# Patient Record
Sex: MALE | Race: WHITE | NOT HISPANIC OR LATINO | Employment: OTHER | URBAN - METROPOLITAN AREA
[De-identification: names, ages, dates, MRNs, and addresses within clinical notes are randomized per-mention and may not be internally consistent; named-entity substitution may affect disease eponyms.]

---

## 2017-01-12 ENCOUNTER — TELEPHONE (OUTPATIENT)
Dept: FAMILY MEDICINE | Facility: CLINIC | Age: 52
End: 2017-01-12

## 2017-01-12 ENCOUNTER — HOSPITAL ENCOUNTER (INPATIENT)
Facility: HOSPITAL | Age: 52
LOS: 5 days | Discharge: NURSING, REHAB OR LONG-TERM CARE | DRG: 871 | End: 2017-01-17
Attending: SURGERY | Admitting: FAMILY MEDICINE
Payer: MEDICARE

## 2017-01-12 DIAGNOSIS — J18.9 PNEUMONIA OF BOTH LUNGS DUE TO INFECTIOUS ORGANISM: ICD-10-CM

## 2017-01-12 DIAGNOSIS — J18.9 PNEUMONIA OF LEFT LOWER LOBE DUE TO INFECTIOUS ORGANISM: ICD-10-CM

## 2017-01-12 DIAGNOSIS — A41.9 SEPSIS, DUE TO UNSPECIFIED ORGANISM: Primary | ICD-10-CM

## 2017-01-12 PROBLEM — R33.9 URINARY RETENTION: Status: ACTIVE | Noted: 2017-01-12

## 2017-01-12 LAB
ALBUMIN SERPL BCP-MCNC: 3.5 G/DL
ALP SERPL-CCNC: 134 U/L
ALT SERPL W/O P-5'-P-CCNC: 32 U/L
ANION GAP SERPL CALC-SCNC: 12 MMOL/L
APTT BLDCRRT: 27.9 SEC
AST SERPL-CCNC: 39 U/L
BACTERIA #/AREA URNS HPF: ABNORMAL /HPF
BASOPHILS # BLD AUTO: 0.03 K/UL
BASOPHILS NFR BLD: 0.2 %
BILIRUB SERPL-MCNC: 0.7 MG/DL
BILIRUB UR QL STRIP: ABNORMAL
BNP SERPL-MCNC: 14 PG/ML
BUN SERPL-MCNC: 9 MG/DL
CALCIUM SERPL-MCNC: 9.9 MG/DL
CAOX CRY URNS QL MICRO: ABNORMAL
CHLORIDE SERPL-SCNC: 105 MMOL/L
CK MB SERPL-MCNC: 3.7 NG/ML
CK MB SERPL-RTO: 0.3 %
CK SERPL-CCNC: 1058 U/L
CK SERPL-CCNC: 1058 U/L
CLARITY UR: CLEAR
CO2 SERPL-SCNC: 27 MMOL/L
COLOR UR: YELLOW
CREAT SERPL-MCNC: 1.2 MG/DL
DIFFERENTIAL METHOD: ABNORMAL
EOSINOPHIL # BLD AUTO: 0 K/UL
EOSINOPHIL NFR BLD: 0 %
ERYTHROCYTE [DISTWIDTH] IN BLOOD BY AUTOMATED COUNT: 15 %
EST. GFR  (AFRICAN AMERICAN): >60 ML/MIN/1.73 M^2
EST. GFR  (NON AFRICAN AMERICAN): >60 ML/MIN/1.73 M^2
GLUCOSE SERPL-MCNC: 133 MG/DL
GLUCOSE UR QL STRIP: NEGATIVE
GRAN CASTS #/AREA URNS LPF: 2 /LPF
HCT VFR BLD AUTO: 48.4 %
HGB BLD-MCNC: 16.4 G/DL
HGB UR QL STRIP: NEGATIVE
HYALINE CASTS #/AREA URNS LPF: 3 /LPF
INR PPP: 1.2
KETONES UR QL STRIP: NEGATIVE
LACTATE SERPL-SCNC: 1.3 MMOL/L
LACTATE SERPL-SCNC: 1.9 MMOL/L
LACTATE SERPL-SCNC: 2.8 MMOL/L
LEUKOCYTE ESTERASE UR QL STRIP: NEGATIVE
LYMPHOCYTES # BLD AUTO: 1.3 K/UL
LYMPHOCYTES NFR BLD: 6.4 %
MAGNESIUM SERPL-MCNC: 2.4 MG/DL
MCH RBC QN AUTO: 31.4 PG
MCHC RBC AUTO-ENTMCNC: 33.9 %
MCV RBC AUTO: 93 FL
MICROSCOPIC COMMENT: ABNORMAL
MONOCYTES # BLD AUTO: 1.2 K/UL
MONOCYTES NFR BLD: 6 %
NEUTROPHILS # BLD AUTO: 17.4 K/UL
NEUTROPHILS NFR BLD: 87.4 %
NITRITE UR QL STRIP: NEGATIVE
PH UR STRIP: 6 [PH] (ref 5–8)
PHOSPHATE SERPL-MCNC: 2.9 MG/DL
PLATELET # BLD AUTO: 343 K/UL
PMV BLD AUTO: 11.6 FL
POTASSIUM SERPL-SCNC: 3.8 MMOL/L
PROT SERPL-MCNC: 8.5 G/DL
PROT UR QL STRIP: ABNORMAL
PROTHROMBIN TIME: 11.9 SEC
RBC # BLD AUTO: 5.23 M/UL
RBC #/AREA URNS HPF: 0 /HPF (ref 0–4)
SODIUM SERPL-SCNC: 144 MMOL/L
SP GR UR STRIP: 1.02 (ref 1–1.03)
TROPONIN I SERPL DL<=0.01 NG/ML-MCNC: 0.01 NG/ML
TROPONIN I SERPL DL<=0.01 NG/ML-MCNC: 0.01 NG/ML
URN SPEC COLLECT METH UR: ABNORMAL
UROBILINOGEN UR STRIP-ACNC: NEGATIVE EU/DL
WBC # BLD AUTO: 19.93 K/UL
WBC #/AREA URNS HPF: 2 /HPF (ref 0–5)

## 2017-01-12 PROCEDURE — 85610 PROTHROMBIN TIME: CPT

## 2017-01-12 PROCEDURE — 27000494 *HC OXYGEN SET UP (STAH ONLY)

## 2017-01-12 PROCEDURE — 81000 URINALYSIS NONAUTO W/SCOPE: CPT

## 2017-01-12 PROCEDURE — 85025 COMPLETE CBC W/AUTO DIFF WBC: CPT

## 2017-01-12 PROCEDURE — 82553 CREATINE MB FRACTION: CPT

## 2017-01-12 PROCEDURE — 83735 ASSAY OF MAGNESIUM: CPT

## 2017-01-12 PROCEDURE — 25000242 PHARM REV CODE 250 ALT 637 W/ HCPCS: Performed by: FAMILY MEDICINE

## 2017-01-12 PROCEDURE — 63600175 PHARM REV CODE 636 W HCPCS: Performed by: FAMILY MEDICINE

## 2017-01-12 PROCEDURE — 11000001 HC ACUTE MED/SURG PRIVATE ROOM

## 2017-01-12 PROCEDURE — 27000221 HC OXYGEN, UP TO 24 HOURS

## 2017-01-12 PROCEDURE — 83880 ASSAY OF NATRIURETIC PEPTIDE: CPT

## 2017-01-12 PROCEDURE — 83605 ASSAY OF LACTIC ACID: CPT | Mod: 91

## 2017-01-12 PROCEDURE — 93005 ELECTROCARDIOGRAM TRACING: CPT

## 2017-01-12 PROCEDURE — 25000003 PHARM REV CODE 250: Performed by: SURGERY

## 2017-01-12 PROCEDURE — 36415 COLL VENOUS BLD VENIPUNCTURE: CPT

## 2017-01-12 PROCEDURE — 87040 BLOOD CULTURE FOR BACTERIA: CPT | Mod: 59

## 2017-01-12 PROCEDURE — 27000339 *HC DAILY SUPPLY KIT

## 2017-01-12 PROCEDURE — 25000003 PHARM REV CODE 250: Performed by: FAMILY MEDICINE

## 2017-01-12 PROCEDURE — 99285 EMERGENCY DEPT VISIT HI MDM: CPT

## 2017-01-12 PROCEDURE — 84484 ASSAY OF TROPONIN QUANT: CPT | Mod: 91

## 2017-01-12 PROCEDURE — 99223 1ST HOSP IP/OBS HIGH 75: CPT | Mod: ,,, | Performed by: FAMILY MEDICINE

## 2017-01-12 PROCEDURE — 85730 THROMBOPLASTIN TIME PARTIAL: CPT

## 2017-01-12 PROCEDURE — 63600175 PHARM REV CODE 636 W HCPCS: Performed by: SURGERY

## 2017-01-12 PROCEDURE — 94640 AIRWAY INHALATION TREATMENT: CPT

## 2017-01-12 PROCEDURE — 51701 INSERT BLADDER CATHETER: CPT

## 2017-01-12 PROCEDURE — 96367 TX/PROPH/DG ADDL SEQ IV INF: CPT

## 2017-01-12 PROCEDURE — 84100 ASSAY OF PHOSPHORUS: CPT

## 2017-01-12 PROCEDURE — 94761 N-INVAS EAR/PLS OXIMETRY MLT: CPT

## 2017-01-12 PROCEDURE — 96365 THER/PROPH/DIAG IV INF INIT: CPT

## 2017-01-12 PROCEDURE — 94760 N-INVAS EAR/PLS OXIMETRY 1: CPT

## 2017-01-12 PROCEDURE — 80053 COMPREHEN METABOLIC PANEL: CPT

## 2017-01-12 RX ORDER — ONDANSETRON 2 MG/ML
4 INJECTION INTRAMUSCULAR; INTRAVENOUS EVERY 8 HOURS PRN
Status: DISCONTINUED | OUTPATIENT
Start: 2017-01-12 | End: 2017-01-17 | Stop reason: HOSPADM

## 2017-01-12 RX ORDER — OXCARBAZEPINE 150 MG/1
300 TABLET, FILM COATED ORAL 2 TIMES DAILY
Status: DISCONTINUED | OUTPATIENT
Start: 2017-01-12 | End: 2017-01-17 | Stop reason: HOSPADM

## 2017-01-12 RX ORDER — MUPIROCIN 20 MG/G
OINTMENT TOPICAL 2 TIMES DAILY
COMMUNITY
End: 2018-07-27

## 2017-01-12 RX ORDER — ALBUTEROL SULFATE 2.5 MG/.5ML
2.5 SOLUTION RESPIRATORY (INHALATION) EVERY 6 HOURS PRN
Status: DISCONTINUED | OUTPATIENT
Start: 2017-01-12 | End: 2017-01-13

## 2017-01-12 RX ORDER — HYDROCODONE BITARTRATE AND ACETAMINOPHEN 5; 325 MG/1; MG/1
1 TABLET ORAL EVERY 4 HOURS PRN
Status: DISCONTINUED | OUTPATIENT
Start: 2017-01-12 | End: 2017-01-17 | Stop reason: HOSPADM

## 2017-01-12 RX ORDER — TAMSULOSIN HYDROCHLORIDE 0.4 MG/1
0.4 CAPSULE ORAL DAILY
Status: DISCONTINUED | OUTPATIENT
Start: 2017-01-12 | End: 2017-01-17 | Stop reason: HOSPADM

## 2017-01-12 RX ORDER — TRAZODONE HYDROCHLORIDE 50 MG/1
50 TABLET ORAL NIGHTLY
Status: DISCONTINUED | OUTPATIENT
Start: 2017-01-12 | End: 2017-01-13

## 2017-01-12 RX ORDER — ACETAMINOPHEN 325 MG/1
650 TABLET ORAL EVERY 8 HOURS PRN
Status: DISCONTINUED | OUTPATIENT
Start: 2017-01-12 | End: 2017-01-17 | Stop reason: HOSPADM

## 2017-01-12 RX ORDER — LEVALBUTEROL 1.25 MG/.5ML
1.25 SOLUTION, CONCENTRATE RESPIRATORY (INHALATION)
Status: COMPLETED | OUTPATIENT
Start: 2017-01-12 | End: 2017-01-12

## 2017-01-12 RX ORDER — PANTOPRAZOLE SODIUM 40 MG/1
40 TABLET, DELAYED RELEASE ORAL DAILY
Status: DISCONTINUED | OUTPATIENT
Start: 2017-01-12 | End: 2017-01-14

## 2017-01-12 RX ADMIN — TAMSULOSIN HYDROCHLORIDE 0.4 MG: 0.4 CAPSULE ORAL at 08:01

## 2017-01-12 RX ADMIN — OXCARBAZEPINE 300 MG: 150 TABLET, FILM COATED ORAL at 08:01

## 2017-01-12 RX ADMIN — CEFTRIAXONE 1 G: 1 INJECTION, SOLUTION INTRAVENOUS at 01:01

## 2017-01-12 RX ADMIN — AZITHROMYCIN MONOHYDRATE 500 MG: 500 INJECTION, POWDER, LYOPHILIZED, FOR SOLUTION INTRAVENOUS at 02:01

## 2017-01-12 RX ADMIN — TRAZODONE HYDROCHLORIDE 50 MG: 50 TABLET ORAL at 08:01

## 2017-01-12 RX ADMIN — PIPERACILLIN AND TAZOBACTAM 3.38 G: 3; .375 INJECTION, POWDER, LYOPHILIZED, FOR SOLUTION INTRAVENOUS; PARENTERAL at 08:01

## 2017-01-12 RX ADMIN — SODIUM CHLORIDE 500 ML: 0.9 INJECTION, SOLUTION INTRAVENOUS at 01:01

## 2017-01-12 RX ADMIN — PANTOPRAZOLE SODIUM 40 MG: 40 TABLET, DELAYED RELEASE ORAL at 02:01

## 2017-01-12 RX ADMIN — ALBUTEROL SULFATE 2.5 MG: 2.5 SOLUTION RESPIRATORY (INHALATION) at 09:01

## 2017-01-12 RX ADMIN — LEVALBUTEROL 1.25 MG: 1.25 SOLUTION, CONCENTRATE RESPIRATORY (INHALATION) at 12:01

## 2017-01-12 NOTE — PLAN OF CARE
Problem: Patient Care Overview  Goal: Plan of Care Review  Outcome: Ongoing (interventions implemented as appropriate)  Caregiver agrees with plan of care.   Iv antibiotics given. o2 sat monitored.  Will check on often, because pt can not communicate secondary to mental retardation.

## 2017-01-12 NOTE — ED NOTES
"Here from group home w c/o cough x 1 week, reports was seen at [facility] for same c/o "a few days ago"  "

## 2017-01-12 NOTE — TELEPHONE ENCOUNTER
Spoke with Dr Alvarez and per his recommendation, Chery was told to bring him to the ER.  She verbalized understanding.

## 2017-01-12 NOTE — TELEPHONE ENCOUNTER
----- Message from Sawyer Nash sent at 2017  8:25 AM CST -----  Contact: ANNIKA / CARE TAKER  Mack Will  MRN: [id number]  : 1965  PCP: Chetan Alvarez  Home Phone      [phone]  Work Phone      Not on file.  Mobile          [phone]  Mobile          [phone]      MESSAGE: STATED THAT PT NEEDS TO BE SEEN TODAY FOR NOT FEELING WELL. HE WAS BROUGHT TO ER A FEW DAYS AGO AND WAS TOLD HE WAS HAVING BAD GAS PAINS BUT NOW HE IS VERY LETHARGIC, NOT WANTING TO OPEN HIS EYES AND JUST NOT SEEMING LIKE NORMAL. PLEASE CALL     PHONE: [phone]

## 2017-01-12 NOTE — ED NOTES
Patient to room 305 via stretcher w portable O2 @ 3 LPM NC, respirations even and unlabored, NAD, skin W/D.

## 2017-01-12 NOTE — ED PROVIDER NOTES
[facility]                                                               Chief Complaint  51 y.o. male with Cough (x 1 week)    History of Present Illness  Mack Will presents to the emergency room with cough this past week  Patient is mentally handicapped and lives at a local group home with sitters  Patient has had increasing cough with subjective fever at the group home  Patient on exam has coarse breath sounds in all fields, worsen in the LLL  Pox was 88% on arrival, chest x-ray shows a left retrocardiac pneumonia    The history is provided by the patient  Past medical history: Alzheimer's, Down's, seizures  Past surgical history: Cyst removal, cholecystectomy  No known ALLERGIES    Review of Systems and Physical Exam     Review of Systems  -- Constitution - no fever, denies fatigue, no weakness, no chills  -- Eyes - no tearing or redness, no visual disturbance  -- Ear, Nose - no tinnitus or earache, no nasal congestion or discharge  -- Mouth,Throat - no sore throat, no toothache, normal voice, normal swallowing  -- Respiratory - cough and congestion, shortness of breath, no MCQUEEN  -- Cardiovascular - denies chest pain, no palpitations, denies claudication  -- Gastrointestinal - denies abdominal pain, nausea, vomiting, or diarrhea  -- Musculoskeletal - denies back pain, negative for myalgias and arthralgias   -- Neurological - no headache, denies weakness or seizure; no LOC  -- Skin - denies pallor, rash, or changes in skin. no hives or welts noted    Vital Signs  -- BP is 111/79 and his pulse is 97.   -- His respiration is 20 and oxygen saturation is 97%.      Physical Exam  -- Nursing note and vitals reviewed  -- Head: Atraumatic. Normocephalic. No obvious abnormality  -- Eyes: Pupils are equal and reactive to light. Normal conjunctiva and lids  -- Nose: Nose normal in appearance, nares grossly normal. No discharge  -- Throat: Mucous membranes moist, pharynx normal, normal tonsils. No  lesions   -- Ears: External ears and TM normal bilaterally. Normal hearing and no drainage  -- Cardiac: Normal rate, regular rhythm and normal heart sounds  -- Pulmonary: Crackles in all fields bilaterally, worse in the left base  -- Abdominal: Soft, no tenderness. Normal bowel sounds. Normal liver edge  -- Musculoskeletal: Normal range of motion, no effusions. Joints stable   -- Neurological: No focal deficits. Showed good interaction with staff    Emergency Room Course     Treatment and Evaluation  -- The EKG findings today were without concerning findings from baseline   -- Chest x-ray shows a retrocardiac left opacity  -- Lactic Acid drawn in the ER today was normal   -- Blood cultures have also been drawn, results are pending   -- The troponin drawn in the ER today was within normal limits    -- The magnesium and phosphorus were within normal limits   -- The BNP drawn in the ER today were within normal limits     Abnormal lab values  -- WBC 19.93 (*)   -- Gran% 87.4 (*)   -- Lymph% 6.4 (*)   -- Glucose 133 (*)   -- Total Protein 8.5 (*)   -- CPK 1058 (*)     Medications Given  -- cefTRIAXone (ROCEPHIN) 1 g in dextrose 5 % 50 mL IVPB    -- azithromycin 500 mg in dextrose 5 % 250 mL IVPB (ready to mix system) (not administered)   -- levalbuterol nebulizer solution 1.25 mg (1.25 mg Nebulization Given 1/12/17 1213)   -- sodium chloride 0.9% bolus 500 mL (500 mLs Intravenous New Bag 1/12/17 1334)     Diagnosis  -- The primary encounter diagnosis was Sepsis, due to unspecified organism.   -- A diagnosis of Pneumonia of left lower lobe due to infectious organism     Disposition and Plan  -- Disposition: observation  -- Condition: stable  -- Telemetry monitoring  -- Morning labs  -- SCD hoses  -- Home medications  -- Nausea medication when necessary  -- Pain medication when necessary  -- Hep-Lock IV  -- Routine monitoring  -- Bed rest until otherwise stated  -- Low salt diet  -- Protonix for GERD prophylaxis  --  Breathing treatments  -- IV antibiotics  -- Blood cultures pending    This note is dictated on Dragon Natural Speaking word recognition program.  There are word recognition mistakes that are occasionally missed on review.           Dax Zavala MD  01/12/17 4159

## 2017-01-13 LAB
ALBUMIN SERPL BCP-MCNC: 2.6 G/DL
ALP SERPL-CCNC: 120 U/L
ALT SERPL W/O P-5'-P-CCNC: 23 U/L
ANION GAP SERPL CALC-SCNC: 11 MMOL/L
AST SERPL-CCNC: 24 U/L
BASOPHILS # BLD AUTO: 0.03 K/UL
BASOPHILS NFR BLD: 0.2 %
BILIRUB SERPL-MCNC: 0.5 MG/DL
BUN SERPL-MCNC: 8 MG/DL
CALCIUM SERPL-MCNC: 8.7 MG/DL
CHLORIDE SERPL-SCNC: 106 MMOL/L
CO2 SERPL-SCNC: 25 MMOL/L
CREAT SERPL-MCNC: 0.9 MG/DL
D DIMER PPP IA.FEU-MCNC: 1.72 MG/L FEU
DIFFERENTIAL METHOD: ABNORMAL
EOSINOPHIL # BLD AUTO: 0 K/UL
EOSINOPHIL NFR BLD: 0.1 %
ERYTHROCYTE [DISTWIDTH] IN BLOOD BY AUTOMATED COUNT: 15.2 %
EST. GFR  (AFRICAN AMERICAN): >60 ML/MIN/1.73 M^2
EST. GFR  (NON AFRICAN AMERICAN): >60 ML/MIN/1.73 M^2
FLUAV AG SPEC QL IA: NEGATIVE
FLUBV AG SPEC QL IA: NEGATIVE
GLUCOSE SERPL-MCNC: 133 MG/DL
HCT VFR BLD AUTO: 41.1 %
HGB BLD-MCNC: 13.9 G/DL
LACTATE SERPL-SCNC: 1.7 MMOL/L
LYMPHOCYTES # BLD AUTO: 1.2 K/UL
LYMPHOCYTES NFR BLD: 6.7 %
MCH RBC QN AUTO: 31.5 PG
MCHC RBC AUTO-ENTMCNC: 33.8 %
MCV RBC AUTO: 93 FL
MONOCYTES # BLD AUTO: 1.5 K/UL
MONOCYTES NFR BLD: 8.2 %
NEUTROPHILS # BLD AUTO: 15.7 K/UL
NEUTROPHILS NFR BLD: 84.8 %
PLATELET # BLD AUTO: 281 K/UL
PMV BLD AUTO: 12 FL
POTASSIUM SERPL-SCNC: 3.5 MMOL/L
PROT SERPL-MCNC: 6.7 G/DL
RBC # BLD AUTO: 4.41 M/UL
SODIUM SERPL-SCNC: 142 MMOL/L
SPECIMEN SOURCE: NORMAL
TROPONIN I SERPL DL<=0.01 NG/ML-MCNC: 0.01 NG/ML
TROPONIN I SERPL DL<=0.01 NG/ML-MCNC: 0.01 NG/ML
TROPONIN I SERPL DL<=0.01 NG/ML-MCNC: <0.006 NG/ML
WBC # BLD AUTO: 18.52 K/UL

## 2017-01-13 PROCEDURE — 25500020 PHARM REV CODE 255: Performed by: FAMILY MEDICINE

## 2017-01-13 PROCEDURE — 92610 EVALUATE SWALLOWING FUNCTION: CPT

## 2017-01-13 PROCEDURE — 80053 COMPREHEN METABOLIC PANEL: CPT

## 2017-01-13 PROCEDURE — 94761 N-INVAS EAR/PLS OXIMETRY MLT: CPT

## 2017-01-13 PROCEDURE — 84484 ASSAY OF TROPONIN QUANT: CPT

## 2017-01-13 PROCEDURE — 63600175 PHARM REV CODE 636 W HCPCS: Performed by: NURSE PRACTITIONER

## 2017-01-13 PROCEDURE — 25000242 PHARM REV CODE 250 ALT 637 W/ HCPCS: Performed by: INTERNAL MEDICINE

## 2017-01-13 PROCEDURE — 27200120 HC KIT IV START (RUSH ONLY)

## 2017-01-13 PROCEDURE — 85379 FIBRIN DEGRADATION QUANT: CPT

## 2017-01-13 PROCEDURE — 94668 MNPJ CHEST WALL SBSQ: CPT

## 2017-01-13 PROCEDURE — 94640 AIRWAY INHALATION TREATMENT: CPT

## 2017-01-13 PROCEDURE — 83605 ASSAY OF LACTIC ACID: CPT

## 2017-01-13 PROCEDURE — 99233 SBSQ HOSP IP/OBS HIGH 50: CPT | Mod: ,,, | Performed by: INTERNAL MEDICINE

## 2017-01-13 PROCEDURE — 87400 INFLUENZA A/B EACH AG IA: CPT

## 2017-01-13 PROCEDURE — 25000003 PHARM REV CODE 250: Performed by: SURGERY

## 2017-01-13 PROCEDURE — 36415 COLL VENOUS BLD VENIPUNCTURE: CPT

## 2017-01-13 PROCEDURE — 27000221 HC OXYGEN, UP TO 24 HOURS

## 2017-01-13 PROCEDURE — 85025 COMPLETE CBC W/AUTO DIFF WBC: CPT

## 2017-01-13 PROCEDURE — 27000339 *HC DAILY SUPPLY KIT

## 2017-01-13 PROCEDURE — 25000003 PHARM REV CODE 250: Performed by: FAMILY MEDICINE

## 2017-01-13 PROCEDURE — 63600175 PHARM REV CODE 636 W HCPCS: Performed by: FAMILY MEDICINE

## 2017-01-13 PROCEDURE — 51701 INSERT BLADDER CATHETER: CPT

## 2017-01-13 PROCEDURE — 51798 US URINE CAPACITY MEASURE: CPT

## 2017-01-13 PROCEDURE — 11000001 HC ACUTE MED/SURG PRIVATE ROOM

## 2017-01-13 PROCEDURE — 25000003 PHARM REV CODE 250: Performed by: NURSE PRACTITIONER

## 2017-01-13 RX ORDER — SODIUM CHLORIDE 9 MG/ML
INJECTION, SOLUTION INTRAVENOUS CONTINUOUS
Status: DISCONTINUED | OUTPATIENT
Start: 2017-01-13 | End: 2017-01-14

## 2017-01-13 RX ORDER — ALBUTEROL SULFATE 2.5 MG/.5ML
2.5 SOLUTION RESPIRATORY (INHALATION) EVERY 6 HOURS
Status: DISCONTINUED | OUTPATIENT
Start: 2017-01-13 | End: 2017-01-17 | Stop reason: HOSPADM

## 2017-01-13 RX ORDER — ENOXAPARIN SODIUM 100 MG/ML
40 INJECTION SUBCUTANEOUS EVERY 24 HOURS
Status: DISCONTINUED | OUTPATIENT
Start: 2017-01-13 | End: 2017-01-17 | Stop reason: HOSPADM

## 2017-01-13 RX ADMIN — OXCARBAZEPINE 300 MG: 150 TABLET, FILM COATED ORAL at 05:01

## 2017-01-13 RX ADMIN — IOHEXOL 100 ML: 350 INJECTION, SOLUTION INTRAVENOUS at 02:01

## 2017-01-13 RX ADMIN — AZITHROMYCIN MONOHYDRATE 500 MG: 500 INJECTION, POWDER, LYOPHILIZED, FOR SOLUTION INTRAVENOUS at 04:01

## 2017-01-13 RX ADMIN — ENOXAPARIN SODIUM 40 MG: 40 INJECTION, SOLUTION INTRAVENOUS; SUBCUTANEOUS at 01:01

## 2017-01-13 RX ADMIN — ALBUTEROL SULFATE 2.5 MG: 2.5 SOLUTION RESPIRATORY (INHALATION) at 01:01

## 2017-01-13 RX ADMIN — PIPERACILLIN AND TAZOBACTAM 3.38 G: 3; .375 INJECTION, POWDER, LYOPHILIZED, FOR SOLUTION INTRAVENOUS; PARENTERAL at 04:01

## 2017-01-13 RX ADMIN — PIPERACILLIN AND TAZOBACTAM 3.38 G: 3; .375 INJECTION, POWDER, LYOPHILIZED, FOR SOLUTION INTRAVENOUS; PARENTERAL at 09:01

## 2017-01-13 RX ADMIN — PIPERACILLIN AND TAZOBACTAM 3.38 G: 3; .375 INJECTION, POWDER, LYOPHILIZED, FOR SOLUTION INTRAVENOUS; PARENTERAL at 01:01

## 2017-01-13 RX ADMIN — SODIUM CHLORIDE 250 ML: 0.9 INJECTION, SOLUTION INTRAVENOUS at 09:01

## 2017-01-13 RX ADMIN — ALBUTEROL SULFATE 2.5 MG: 2.5 SOLUTION RESPIRATORY (INHALATION) at 06:01

## 2017-01-13 NOTE — PROGRESS NOTES
Bladder scan performed at 1230 showed >200 mL urine. After 3 attempts, 230 mL were drained with a red-tip catheter.

## 2017-01-13 NOTE — PLAN OF CARE
Called to room because Mr Will's sp02 is 88% on 5lpm NC. Placed him on 50% venti mask. sp02 is now 95%. He is tolerating the venti mask. RN aware.

## 2017-01-13 NOTE — PLAN OF CARE
01/13/17 1243   Discharge Assessment   Assessment Type Discharge Planning Assessment   Confirmed/corrected address and phone number on facesheet? Yes   Assessment information obtained from? Patient   Expected Length of Stay (days) 3   Communicated expected length of stay with patient/caregiver yes   Type of Healthcare Directive Received Advance Directive   If Healthcare Directive is received, is it scanned into Epic? no (comment)  (Just completed)   Prior to hospitilization cognitive status: Unable to Assess   Prior to hospitalization functional status: Partially Dependent   Current cognitive status: Unable to Assess   Current Functional Status: Partially Dependent   Arrived From group home   Lives With facility resident   Able to Return to Prior Arrangements yes   Is patient able to care for self after discharge? No   How many people do you have in your home that can help with your care after discharge? other (see comments)  ([facility] [place])   Who are your caregiver(s) and their phone number(s)? [phone]   Patient's perception of discharge disposition group home   Readmission Within The Last 30 Days no previous admission in last 30 days   Patient currently being followed by outpatient case management? No   Patient currently receives home health services? No   Does the patient currently use HME? No   Patient currently receives private duty nursing? No   Patient currently receives any other outside agency services? No   Equipment Currently Used at Home none   Do you have any problems affording any of your prescribed medications? No   Is the patient taking medications as prescribed? yes   Do you have any financial concerns preventing you from receiving the healthcare you need? No   Does the patient have transportation to healthcare appointments? Yes   Transportation Available family or friend will provide   On Dialysis? No   Does the patient receive services at the Coumadin Clinic? No   Are there any open  cases? No   Discharge Plan A Group Home   Discharge Plan B Group home   Patient/Family In Agreement With Plan yes   Patient is a 51 year old male coming to the hospital from home where he lives at a group home.  His brother tanner is here showing patient support and encouragement.  He spoke with the MD and is interested in completing a declaration by surrogate.  He is unable to locate the POA.  Tanner contacted patient's family who is a sister, Sultana Cheng [phone] and father, Chase Will [phone] and all in agreement with declaration/living will.  A surrogate declaration was completed.  Patient's family had no further questions or concerns for .  Patient to return to the group home upon discharge.

## 2017-01-13 NOTE — SUBJECTIVE & OBJECTIVE
Interval note:: he is non verbal. Oxygenation is a concern. Also watching fluid status  Review of Systems   Constitutional: Positive for fatigue and fever. Negative for chills.   HENT: Negative for congestion, ear pain, postnasal drip, rhinorrhea, sore throat and trouble swallowing.    Respiratory: Positive for cough, shortness of breath and wheezing.    Cardiovascular: Negative for chest pain and palpitations.   Gastrointestinal: Negative for abdominal pain, diarrhea, nausea and vomiting.   Genitourinary: Positive for decreased urine volume and difficulty urinating. Negative for dysuria and frequency.   Skin: Negative for rash.   Neurological: Positive for weakness. Negative for headaches.     Objective:     Vital Signs (Most Recent):  Temp: 98.1 °F (36.7 °C) (01/13/17 0815)  Pulse: 89 (01/13/17 0815)  Resp: 20 (01/13/17 0815)  BP: (!) 149/66 (01/13/17 0815)  SpO2: (!) 90 % (01/13/17 0815) Vital Signs (24h Range):  Temp:  [97.6 °F (36.4 °C)-100.3 °F (37.9 °C)] 98.1 °F (36.7 °C)  Pulse:  [] 89  Resp:  [18-24] 20  SpO2:  [86 %-97 %] 90 %  BP: (106-149)/(54-79) 149/66     Weight: 79.8 kg (176 lb)  Body mass index is 31.18 kg/(m^2).    Physical Exam   Constitutional: He appears well-developed. No distress.   HENT:   Head: Normocephalic and atraumatic.   Downs facies   Eyes: Conjunctivae are normal. Pupils are equal, round, and reactive to light.   Neck: Normal range of motion. Neck supple. No thyromegaly present.   Cardiovascular: Normal rate, regular rhythm and intact distal pulses.    Murmur heard.  Pulmonary/Chest: He is in respiratory distress. He has no wheezes. He has rales.   Abdominal: Soft. Bowel sounds are normal. There is no tenderness.   Musculoskeletal: Normal range of motion. He exhibits no edema.   Lymphadenopathy:     He has no cervical adenopathy.   Neurological: He is alert.   Skin: Skin is warm and dry. No rash noted.   Psychiatric: He has a normal mood and affect. His behavior is normal.    Nursing note and vitals reviewed.       Significant Labs:   BMP:   Recent Labs  Lab 01/12/17  1223  01/13/17  0531   GLU  --   < > 133*   NA  --   < > 142   K  --   < > 3.5   CL  --   < > 106   CO2  --   < > 25   BUN  --   < > 8   CREATININE  --   < > 0.9   CALCIUM  --   < > 8.7   MG 2.4  --   --    < > = values in this interval not displayed.  CBC:     Recent Labs  Lab 01/12/17  1224 01/13/17  0531   WBC 19.93* 18.52*   HGB 16.4 13.9*   HCT 48.4 41.1    281     Lactic Acid:     Recent Labs  Lab 01/12/17  1224 01/12/17  1613 01/12/17  1950   LACTATE 1.3 1.9 2.8*       Recent Labs  Lab 01/12/17  1224  01/13/17  0531   CPK 1058*  1058*  --   --    CPKMB 3.7  --   --    TROPONINI 0.008  < > 0.009   MB 0.3  --   --    < > = values in this interval not displayed.  BNP    Recent Labs  Lab 01/12/17  1223   BNP 14     Blood cultures pending x 2    resp cultures ordered    Urinalysis    Recent Labs  Lab 01/12/17  2111   COLORU Yellow   SPECGRAV 1.025   PHUR 6.0   PROTEINUA 1+*   BACTERIA Many*   NITRITE Negative   LEUKOCYTESUR Negative   UROBILINOGEN Negative   HYALINECASTS 3*     ificant Imaging:   CXR Left retrocardiac opacity is noted which could be related to atelectasis, aspiration or pneumonia.  Mild chronic lung changes are seen.  The heart is normal in size.  The skeletal structures are intact.

## 2017-01-13 NOTE — ASSESSMENT & PLAN NOTE
Given dose of rocephin and azithromycin in Er.  Continue azithromycin and change rocephin to zosyn to cover for aspiration pna.  With mental status and poor ability to communicate, swallow study may not be successful, but has been ordered.  Monitor for signs of fever, etc.

## 2017-01-13 NOTE — ASSESSMENT & PLAN NOTE
Likely secondary to pna but with urinary retention, some concern for bladder etiology.  On pathway.  No need for fluids.  Continue abx.

## 2017-01-13 NOTE — H&P
[facility] Medicine  History & Physical    Patient Name: Mack Will  MRN: [id number]  Admission Date: 1/12/2017  Attending Physician: Makenzie Mckeon MD   Primary Care Provider: Chetan Alvarez MD         Patient information was obtained from relative(s) and ER records.     Subjective:     Principal Problem:Pneumonia of left lower lobe due to infectious organism    Chief Complaint:  Pna     HPI: 51 year old non verbal and demented Downs syndrome patient was sent here via ambulance from his group home because of worsening cough, fever and fatigue. His condition was called to Dr. Alvarez who advised ER evaluation.    Past Medical History   Diagnosis Date    Alzheimer disease     Down syndrome     Seizure        Past Surgical History   Procedure Laterality Date    Cholecystectomy      Cyst removal         Review of patient's allergies indicates:  No Known Allergies    No current facility-administered medications on file prior to encounter.      Current Outpatient Prescriptions on File Prior to Encounter   Medication Sig    clotrimazole (LOTRIMIN) 1 % cream Apply topically 2 (two) times daily.    cyanocobalamin, vitamin B-12, (VITAMIN B-12) 50 mcg tablet Take 50 mcg by mouth once daily.     oxcarbazepine (TRILEPTAL) 300 MG Tab Take 1 tablet (300 mg total) by mouth 2 (two) times daily.    trazodone (DESYREL) 50 MG tablet Take 1 tablet (50 mg total) by mouth every evening.    [DISCONTINUED] ketoconazole (NIZORAL) 2 % cream Apply topically once daily.     Family History     Problem Relation (Age of Onset)    Heart disease Father        Social History Main Topics    Smoking status: Never Smoker    Smokeless tobacco: Never Used    Alcohol use No    Drug use: Not on file    Sexual activity: Not on file     Review of Systems   Constitutional: Positive for fatigue and fever. Negative for chills.   HENT: Negative for congestion, ear pain, postnasal drip, rhinorrhea, sore  throat and trouble swallowing.    Respiratory: Positive for cough, shortness of breath and wheezing.    Cardiovascular: Negative for chest pain and palpitations.   Gastrointestinal: Negative for abdominal pain, diarrhea, nausea and vomiting.   Genitourinary: Positive for decreased urine volume and difficulty urinating. Negative for dysuria and frequency.   Skin: Negative for rash.   Neurological: Positive for weakness. Negative for headaches.     Objective:     Vital Signs (Most Recent):  Temp: 99.4 °F (37.4 °C) (01/12/17 1416)  Pulse: 108 (01/12/17 1800)  Resp: 20 (01/12/17 1416)  BP: (!) 142/64 (01/12/17 1416)  SpO2: (!) 94 % (01/12/17 1416) Vital Signs (24h Range):  Temp:  [97.6 °F (36.4 °C)-99.4 °F (37.4 °C)] 99.4 °F (37.4 °C)  Pulse:  [] 108  Resp:  [18-24] 20  SpO2:  [88 %-97 %] 94 %  BP: (108-142)/(64-79) 142/64     Weight: 79.8 kg (176 lb)  Body mass index is 31.18 kg/(m^2).    Physical Exam   Constitutional: He appears well-developed. No distress.   HENT:   Head: Normocephalic and atraumatic.   Downs facies   Eyes: Conjunctivae are normal. Pupils are equal, round, and reactive to light.   Neck: Normal range of motion. Neck supple. No thyromegaly present.   Cardiovascular: Normal rate, regular rhythm and intact distal pulses.    Murmur heard.  Pulmonary/Chest: He is in respiratory distress. He has no wheezes. He has rales.   Abdominal: Soft. Bowel sounds are normal. There is no tenderness.   Musculoskeletal: Normal range of motion. He exhibits no edema.   Lymphadenopathy:     He has no cervical adenopathy.   Neurological: He is alert.   Skin: Skin is warm and dry. No rash noted.   Psychiatric: He has a normal mood and affect. His behavior is normal.   Nursing note and vitals reviewed.       Significant Labs:   BMP:   Recent Labs  Lab 01/12/17  1223 01/12/17  1224   GLU  --  133*   NA  --  144   K  --  3.8   CL  --  105   CO2  --  27   BUN  --  9   CREATININE  --  1.2   CALCIUM  --  9.9   MG 2.4  --       CBC:   Recent Labs  Lab 01/12/17  1224   WBC 19.93*   HGB 16.4   HCT 48.4        Cardiac Markers: No results for input(s): CKMB, TROPONINT, MYOGLOBIN in the last 48 hours.  Lactic Acid:   Recent Labs  Lab 01/12/17  1224 01/12/17  1613 01/12/17  1950   LACTATE 1.3 1.9 2.8*     Urine Culture: No results for input(s): LABURIN in the last 48 hours.    Significant Imaging: I have reviewed all pertinent imaging results/findings within the past 24 hours.    Assessment/Plan:     * Pneumonia of left lower lobe due to infectious organism  Given dose of rocephin and azithromycin in Er.  Continue azithromycin and change rocephin to zosyn to cover for aspiration pna.  With mental status and poor ability to communicate, swallow study may not be successful, but has been ordered.  Monitor for signs of fever, etc.      Down's syndrome        Sepsis  Likely secondary to pna but with urinary retention, some concern for bladder etiology.  On pathway.  No need for fluids.  Continue abx.        Urinary retention  Check In and Out cath.  Send urine culture.      VTE Risk Mitigation         Ordered     Medium Risk of VTE  Once      01/12/17 0460        Makenzie Mckeon MD  Department of Hospital Medicine   [facility]

## 2017-01-13 NOTE — ASSESSMENT & PLAN NOTE
Non verbal, difficult to communicate, unsure what patient is understanding, no family at bedside

## 2017-01-13 NOTE — PROGRESS NOTES
Scanned bladder, <523Mls. Catheter placed, had some difficulty, pt is ok now. Urine is draining slowing. Will remove in later.

## 2017-01-13 NOTE — CONSULTS
Mack Will is a 51 y.o. year old male that's presents with a chief complaint of SOB and Wheezing for 3 days.Pt with production of sputum and he felt hot .Pt has alzheimer and was seen at [facility] and a CXR was done that is reported negative  He had flu injection. Pt never smoked Consulted to evaluate Respiratory status.    Past Medical History   Diagnosis Date    Alzheimer disease     Down syndrome     Seizure         Past Surgical History   Procedure Laterality Date    Cholecystectomy      Cyst removal         Prior to Admission medications    Medication Sig Start Date End Date Taking? Authorizing Provider   clotrimazole (LOTRIMIN) 1 % cream Apply topically 2 (two) times daily. 7/18/16  Yes Chetan Alvarez MD   cyanocobalamin, vitamin B-12, (VITAMIN B-12) 50 mcg tablet Take 50 mcg by mouth once daily.    Yes Historical Provider, MD   mupirocin (BACTROBAN) 2 % ointment Apply topically 2 (two) times daily.   Yes Historical Provider, MD   oxcarbazepine (TRILEPTAL) 300 MG Tab Take 1 tablet (300 mg total) by mouth 2 (two) times daily. 6/21/16 6/21/17 Yes Bib Correa MD   trazodone (DESYREL) 50 MG tablet Take 1 tablet (50 mg total) by mouth every evening. 8/4/16  Yes Bib Correa MD       Social History     Social History    Marital status: Single     Spouse name: N/A    Number of children: N/A    Years of education: N/A     Occupational History    Not on file.     Social History Main Topics    Smoking status: Never Smoker    Smokeless tobacco: Never Used    Alcohol use No    Drug use: Not on file    Sexual activity: Not on file     Other Topics Concern    Not on file     Social History Narrative       Family History   Problem Relation Age of Onset    Heart disease Father        Review of patient's allergies indicates:  No Known Allergies Allergies have been reviewed.     ROS:  negative N/V, positive Fever, negative Syncope, negative Unilateral weakness, negative Chest pain,  negative Rash, negative Sore throat, negative   Lower extremity swelling, negativeAbdominal pain, negativeDysuria, negativePolyuria, negativeEasy Bruising,  negativeWeight Loss, negativeNasal Drainage,otherwise ROS Negative    PE:   Vitals:    01/13/17 0815   BP: (!) 149/66   Pulse: 89   Resp: 20   Temp: 98.1 °F (36.7 °C)     Alert and orientated X 3   HEENT: Head: Normocephalic no trauma                Ears : Normal Pinna No Drainage no Battles sign                Eyes: Vision Unchanged, No conjunctivitis,No drainage                Neck: Supple, No JVD,No Abnormal Carotid Pulsations                Throat: No Erythema, No pus,No Swelling,Mallampati score=    Chest: bilateral wheezing rhonchi with faint crackles bibasilar   Cardiac: RRR S1+ S2 with a -S3: +M = 2/6, No R/H/G  Abdomen: Bowel Sounds are Normal.Soft Abdomen. No organomegaly of Liver,Spleen,or Kidneys   CNS: Non focal and intact. Cranial nerves 2, 346,8,9,10 and 12 are normal.Norrmal gait.Normal posture.  Extremities: No Clubbing,No Cyanosis with oxygen,Positive mild edema of lower extremities Bilateral  Skin: No Rash, No Ulcerative sores,and No cellulitis of the IV site.    Lab Results   Component Value Date    WBC 18.52 (H) 01/13/2017    HGB 13.9 (L) 01/13/2017    HCT 41.1 01/13/2017     01/13/2017    ALT 23 01/13/2017    AST 24 01/13/2017     01/13/2017    K 3.5 01/13/2017     01/13/2017    CREATININE 0.9 01/13/2017    BUN 8 01/13/2017    CO2 25 01/13/2017    TSH 1.028 05/30/2016    INR 1.2 01/12/2017       Assesment  1. Left lower Lobe Pneumonia   2. Downs syndrome  3. Leukocytosis  4. UTI      ICD-10-CM ICD-9-CM   1. Sepsis, due to unspecified organism A41.9 038.9     995.91   2. Pneumonia of left lower lobe due to infectious organism J18.1 486         Plan:  1. Antibiotics   2. Follow Cxr  3. Beta agonist  4. Flu test

## 2017-01-13 NOTE — SUBJECTIVE & OBJECTIVE
Past Medical History   Diagnosis Date    Alzheimer disease     Down syndrome     Seizure        Past Surgical History   Procedure Laterality Date    Cholecystectomy      Cyst removal         Review of patient's allergies indicates:  No Known Allergies    No current facility-administered medications on file prior to encounter.      Current Outpatient Prescriptions on File Prior to Encounter   Medication Sig    clotrimazole (LOTRIMIN) 1 % cream Apply topically 2 (two) times daily.    cyanocobalamin, vitamin B-12, (VITAMIN B-12) 50 mcg tablet Take 50 mcg by mouth once daily.     oxcarbazepine (TRILEPTAL) 300 MG Tab Take 1 tablet (300 mg total) by mouth 2 (two) times daily.    trazodone (DESYREL) 50 MG tablet Take 1 tablet (50 mg total) by mouth every evening.    [DISCONTINUED] ketoconazole (NIZORAL) 2 % cream Apply topically once daily.     Family History     Problem Relation (Age of Onset)    Heart disease Father        Social History Main Topics    Smoking status: Never Smoker    Smokeless tobacco: Never Used    Alcohol use No    Drug use: Not on file    Sexual activity: Not on file     Review of Systems   Constitutional: Positive for fatigue and fever. Negative for chills.   HENT: Negative for congestion, ear pain, postnasal drip, rhinorrhea, sore throat and trouble swallowing.    Respiratory: Positive for cough, shortness of breath and wheezing.    Cardiovascular: Negative for chest pain and palpitations.   Gastrointestinal: Negative for abdominal pain, diarrhea, nausea and vomiting.   Genitourinary: Positive for decreased urine volume and difficulty urinating. Negative for dysuria and frequency.   Skin: Negative for rash.   Neurological: Positive for weakness. Negative for headaches.     Objective:     Vital Signs (Most Recent):  Temp: 99.4 °F (37.4 °C) (01/12/17 1416)  Pulse: 108 (01/12/17 1800)  Resp: 20 (01/12/17 1416)  BP: (!) 142/64 (01/12/17 1416)  SpO2: (!) 94 % (01/12/17 1416) Vital Signs  (24h Range):  Temp:  [97.6 °F (36.4 °C)-99.4 °F (37.4 °C)] 99.4 °F (37.4 °C)  Pulse:  [] 108  Resp:  [18-24] 20  SpO2:  [88 %-97 %] 94 %  BP: (108-142)/(64-79) 142/64     Weight: 79.8 kg (176 lb)  Body mass index is 31.18 kg/(m^2).    Physical Exam   Constitutional: He appears well-developed. No distress.   HENT:   Head: Normocephalic and atraumatic.   Downs facies   Eyes: Conjunctivae are normal. Pupils are equal, round, and reactive to light.   Neck: Normal range of motion. Neck supple. No thyromegaly present.   Cardiovascular: Normal rate, regular rhythm and intact distal pulses.    Murmur heard.  Pulmonary/Chest: He is in respiratory distress. He has no wheezes. He has rales.   Abdominal: Soft. Bowel sounds are normal. There is no tenderness.   Musculoskeletal: Normal range of motion. He exhibits no edema.   Lymphadenopathy:     He has no cervical adenopathy.   Neurological: He is alert.   Skin: Skin is warm and dry. No rash noted.   Psychiatric: He has a normal mood and affect. His behavior is normal.   Nursing note and vitals reviewed.       Significant Labs:   BMP:   Recent Labs  Lab 01/12/17  1223 01/12/17  1224   GLU  --  133*   NA  --  144   K  --  3.8   CL  --  105   CO2  --  27   BUN  --  9   CREATININE  --  1.2   CALCIUM  --  9.9   MG 2.4  --      CBC:   Recent Labs  Lab 01/12/17  1224   WBC 19.93*   HGB 16.4   HCT 48.4        Cardiac Markers: No results for input(s): CKMB, TROPONINT, MYOGLOBIN in the last 48 hours.  Lactic Acid:   Recent Labs  Lab 01/12/17  1224 01/12/17  1613 01/12/17  1950   LACTATE 1.3 1.9 2.8*     Urine Culture: No results for input(s): LABURIN in the last 48 hours.    Significant Imaging: I have reviewed all pertinent imaging results/findings within the past 24 hours.

## 2017-01-13 NOTE — ASSESSMENT & PLAN NOTE
Likely secondary to pna but with urinary retention, some concern for bladder etiology.  On pathway.  Continue abx.  REpeat lactate acid now  NS 250ml bolus this am and cont NS at 75ml/hr. BP stable

## 2017-01-13 NOTE — PROGRESS NOTES
Bedside report received. Pt stable, not very communicative. Dr. Mckeon here to see pt. Brother at bedside.

## 2017-01-13 NOTE — ASSESSMENT & PLAN NOTE
Check In and Out cath.  Send u/a- ok  Give fluids and cont flomax  D/c trazadone  If no urine this am will need to place gabriel

## 2017-01-13 NOTE — PROGRESS NOTES
Discussed his condition with his brother who has power of    Discussed living will issues   His impression is he will be heading for DNR status .  Will have  to do surrogate declaration

## 2017-01-13 NOTE — ASSESSMENT & PLAN NOTE
Given dose of rocephin and azithromycin in Er.  Continue azithromycin and change rocephin to zosyn to cover for aspiration pna.  With mental status and poor ability to communicate, swallow study may not be successful, but has been ordered.  Monitor for signs of fever, etc.  Due to increased O2 needs check d dimer and repeat cxr, consult pulmonology

## 2017-01-13 NOTE — PT/OT/SLP EVAL
Inpatient Speech Language Pathology  Clinical Swallow Evaluation    Mack Will   MRN: [id number]   Admitting Diagnosis: Pneumonia of left lower lobe due to infectious organism    Diet recommendations: Solid Diet Level: Puree  Liquid Diet Level: Thin Feed only when awake/alert, HOB to 90 degrees, Check for pocketing/oral residue and Meds crushed in puree, ONE BITE AT A TIME AND ALLOW PT TIME TO SWALLOW BETWEEN BITES, SMALL BITES AND SIPS, MONITOR O2 WITH MEALS.     SLP Treatment Date: 17  Speech Start Time: 1600     Speech Stop Time: 1620     Speech Total (min): 20 min       TREATMENT BILLABLE MINUTES:  Eval Swallow and Oral Function  20 minutes    Diagnosis: Pneumonia of left lower lobe due to infectious organism  Mack Will is a 51 y.o. male with PMH of Down Syndrome and Alzheimers.  Pt's brother Misael was present and stated that Mr. Will lived with him until 09/15/16 when he moved into a group home.  Pt presented to ED from group home with worsening cough, fever, and fatigue.  CT of the chest revealed  bilateral lower lobe infiltrates greater on the right than the left.    Past Medical History   Diagnosis Date    Alzheimer disease     Down syndrome     Seizure      Past Surgical History   Procedure Laterality Date    Cholecystectomy      Cyst removal       Has the patient been evaluated by SLP for swallowing? : Yes  Keep patient NPO?: No   General Precautions: Standard,      Current Respiratory Status: Venti mask     Social Hx: Patient lives in a group home.    Prior diet: regular.    Subjective:  Pt was calm during this evaluation; however responses were limited and Pts brother was helpful to improve cooperation.   Patient goals: none stated.    Pain Ratin/10 (no pain behaviors noted during this evaluation)    Objective:     Oral Musculature Evaluation  Oral Musculature:  (decreased ROM )  Oral Musculature Comments: Unable to fully assess due to impaired following directions, decreased  labial ROM noted.  Brother present and reported Pt at baseline.      Bedside Swallow Eval:  Consistencies Assessed: Thin liquids - coca cola via straw and Puree - chocolate pudding via clinician fed teaspoons  Oral Phase: excess chewing  Pharyngeal Phase: Pt did not demonstrate any classic signs of aspiration including vocal quality change, cough, or throat clear.  He did however demonstrate watery eye X1. Pts brother stated that this happens occasionally at baseline.      Assessment:  Mack Will is a 51 y.o. male with a medical diagnosis of Pneumonia of left lower lobe due to infectious organism and presents with no overt signs of aspiration.  Pt does however have lower lobe infiltrates greater on the right than the left (per CT) as well as one episode of watery eyes and vocalization was limited (during swallow assessment) to only being present after about 1/2 of the presentations. MBSS would be optimal to fully assess swallow function however cooperation would be doubtful.  There is not enough evidence in my opinion to recommend NPO however I do recommend close monitoring of Pt during meals including O2.  Also recommend speech to continue to follow and make changes as needed.      Do you have any cultural, spiritual, Christianity conflicts, given your current situation?: none verbalized     Goals:   SLP Goals        Problem: SLP Goal    Goal Priority Disciplines Outcome   SLP Goal     SLP    Description:    1) Pt will tolerate least restrictive diet              Plan:   Patient to be seen Therapy Frequency: 3 x/week, 4 x/week, 5 x/week   Plan of Care expires: 01/27/17  Plan of Care reviewed with: patient, sibling  SLP Follow-up?: Yes      JEAN Seasl, CCC-SLP  01/13/2017

## 2017-01-13 NOTE — PLAN OF CARE
Problem: Patient Care Overview  Goal: Plan of Care Review  Outcome: Ongoing (interventions implemented as appropriate)  Vitals stable, afebrile, no signs of distress. Pt is not verbal, but non verbal indicators are absent for pain. Pt lung sounds have Rhonchi, and cough is really congested. Pt has speech evaluation in morning, for swallow study. In and out catheter placed for urinary retention. Pt will be re-evaluated in AM. Breathing treatments PRN. Telemetry. IV abx. Fall precautions remain, frequent checks done, and bed alarm on. Bother agrees with plan of care, questions answered.

## 2017-01-14 PROCEDURE — 11000001 HC ACUTE MED/SURG PRIVATE ROOM

## 2017-01-14 PROCEDURE — 94640 AIRWAY INHALATION TREATMENT: CPT

## 2017-01-14 PROCEDURE — 25000003 PHARM REV CODE 250: Performed by: NURSE PRACTITIONER

## 2017-01-14 PROCEDURE — 25000242 PHARM REV CODE 250 ALT 637 W/ HCPCS: Performed by: INTERNAL MEDICINE

## 2017-01-14 PROCEDURE — 27000339 *HC DAILY SUPPLY KIT

## 2017-01-14 PROCEDURE — 25000003 PHARM REV CODE 250: Performed by: INTERNAL MEDICINE

## 2017-01-14 PROCEDURE — 63600175 PHARM REV CODE 636 W HCPCS: Performed by: INTERNAL MEDICINE

## 2017-01-14 PROCEDURE — 27200120 HC KIT IV START (RUSH ONLY)

## 2017-01-14 PROCEDURE — 99233 SBSQ HOSP IP/OBS HIGH 50: CPT | Mod: ,,, | Performed by: INTERNAL MEDICINE

## 2017-01-14 PROCEDURE — 25000003 PHARM REV CODE 250: Performed by: SURGERY

## 2017-01-14 PROCEDURE — 63600175 PHARM REV CODE 636 W HCPCS: Performed by: NURSE PRACTITIONER

## 2017-01-14 PROCEDURE — 25000003 PHARM REV CODE 250: Performed by: FAMILY MEDICINE

## 2017-01-14 PROCEDURE — 63600175 PHARM REV CODE 636 W HCPCS: Performed by: FAMILY MEDICINE

## 2017-01-14 PROCEDURE — C9113 INJ PANTOPRAZOLE SODIUM, VIA: HCPCS | Performed by: INTERNAL MEDICINE

## 2017-01-14 RX ORDER — PANTOPRAZOLE SODIUM 40 MG/10ML
40 INJECTION, POWDER, LYOPHILIZED, FOR SOLUTION INTRAVENOUS DAILY
Status: DISCONTINUED | OUTPATIENT
Start: 2017-01-14 | End: 2017-01-17 | Stop reason: HOSPADM

## 2017-01-14 RX ORDER — PHENAZOPYRIDINE HYDROCHLORIDE 100 MG/1
100 TABLET, FILM COATED ORAL
Status: DISPENSED | OUTPATIENT
Start: 2017-01-14 | End: 2017-01-16

## 2017-01-14 RX ORDER — KETOROLAC TROMETHAMINE 30 MG/ML
30 INJECTION, SOLUTION INTRAMUSCULAR; INTRAVENOUS EVERY 6 HOURS PRN
Status: DISCONTINUED | OUTPATIENT
Start: 2017-01-14 | End: 2017-01-17

## 2017-01-14 RX ADMIN — PIPERACILLIN AND TAZOBACTAM 3.38 G: 3; .375 INJECTION, POWDER, LYOPHILIZED, FOR SOLUTION INTRAVENOUS; PARENTERAL at 04:01

## 2017-01-14 RX ADMIN — SODIUM CHLORIDE: 0.9 INJECTION, SOLUTION INTRAVENOUS at 12:01

## 2017-01-14 RX ADMIN — TAMSULOSIN HYDROCHLORIDE 0.4 MG: 0.4 CAPSULE ORAL at 11:01

## 2017-01-14 RX ADMIN — PHENAZOPYRIDINE HYDROCHLORIDE 100 MG: 100 TABLET ORAL at 06:01

## 2017-01-14 RX ADMIN — PANTOPRAZOLE SODIUM 40 MG: 40 INJECTION, POWDER, FOR SOLUTION INTRAVENOUS at 11:01

## 2017-01-14 RX ADMIN — PIPERACILLIN AND TAZOBACTAM 3.38 G: 3; .375 INJECTION, POWDER, LYOPHILIZED, FOR SOLUTION INTRAVENOUS; PARENTERAL at 08:01

## 2017-01-14 RX ADMIN — ENOXAPARIN SODIUM 40 MG: 40 INJECTION, SOLUTION INTRAVENOUS; SUBCUTANEOUS at 11:01

## 2017-01-14 RX ADMIN — ALBUTEROL SULFATE 2.5 MG: 2.5 SOLUTION RESPIRATORY (INHALATION) at 07:01

## 2017-01-14 RX ADMIN — ALBUTEROL SULFATE 2.5 MG: 2.5 SOLUTION RESPIRATORY (INHALATION) at 01:01

## 2017-01-14 RX ADMIN — OXCARBAZEPINE 300 MG: 150 TABLET, FILM COATED ORAL at 11:01

## 2017-01-14 RX ADMIN — ALBUTEROL SULFATE 2.5 MG: 2.5 SOLUTION RESPIRATORY (INHALATION) at 12:01

## 2017-01-14 RX ADMIN — AZITHROMYCIN MONOHYDRATE 500 MG: 500 INJECTION, POWDER, LYOPHILIZED, FOR SOLUTION INTRAVENOUS at 02:01

## 2017-01-14 RX ADMIN — PIPERACILLIN AND TAZOBACTAM 3.38 G: 3; .375 INJECTION, POWDER, LYOPHILIZED, FOR SOLUTION INTRAVENOUS; PARENTERAL at 02:01

## 2017-01-14 RX ADMIN — KETOROLAC TROMETHAMINE 30 MG: 30 INJECTION, SOLUTION INTRAMUSCULAR at 09:01

## 2017-01-14 RX ADMIN — LEUCINE, PHENYLALANINE, LYSINE, METHIONINE, ISOLEUCINE, VALINE, HISTIDINE, THREONINE, TRYPTOPHAN, ALANINE, GLYCINE, ARGININE, PROLINE, SERINE, TYROSINE, DEXTROSE 1000 ML: 311; 238; 247; 170; 255; 247; 204; 179; 77; 880; 438; 489; 289; 213; 17; 5 INJECTION INTRAVENOUS at 10:01

## 2017-01-14 RX ADMIN — OXCARBAZEPINE 300 MG: 150 TABLET, FILM COATED ORAL at 06:01

## 2017-01-14 RX ADMIN — ALBUTEROL SULFATE 2.5 MG: 2.5 SOLUTION RESPIRATORY (INHALATION) at 06:01

## 2017-01-14 RX ADMIN — PHENAZOPYRIDINE HYDROCHLORIDE 100 MG: 100 TABLET ORAL at 11:01

## 2017-01-14 NOTE — PLAN OF CARE
Problem: Patient Care Overview  Goal: Plan of Care Review  Outcome: Ongoing (interventions implemented as appropriate)  Vitals stable; O2 above 90% on venti mask. No indicators of pain. Unable to void; bladder scans and in & out catheters performed every 8 hours as needed. Patient put on BSC to attempt voiding; unsuccessful. Fall precautions maintained: up with assistance; bed alarm; non-slip socks; frequent patient rounding; call bell within reach. IV fluids and antibiotics continued; breathing treatments continued. After speech evaluation; pureed food, thin liquids, crush pills. Plan of care reviewed with brother and other family; voiced understanding. Will continue to monitor.

## 2017-01-14 NOTE — SUBJECTIVE & OBJECTIVE
ICT: I have reviewed all pertinent results/findings within the past 24 hours and my personal findings are: Review of Systems   Constitutional: Positive for fatigue and fever. Negative for chills.   HENT: Negative for congestion, ear pain, postnasal drip, rhinorrhea, sore throat and trouble swallowing.    Respiratory: Positive for cough, shortness of breath and wheezing.    Cardiovascular: Negative for chest pain and palpitations.   Gastrointestinal: Negative for abdominal pain, diarrhea, nausea and vomiting.   Genitourinary: Negative for dysuria and frequency.   Skin: Negative for rash.   Neurological: Positive for weakness. Negative for headaches.     Objective:     Vital Signs (Most Recent):  Temp: 97.8 °F (36.6 °C) (01/14/17 0730)  Pulse: 60 (01/14/17 0741)  Resp: 20 (01/14/17 0741)  BP: 135/66 (01/14/17 0730)  SpO2: 96 % (01/14/17 0741) Vital Signs (24h Range):  Temp:  [96.9 °F (36.1 °C)-99 °F (37.2 °C)] 97.8 °F (36.6 °C)  Pulse:  [51-93] 60  Resp:  [18-22] 20  SpO2:  [87 %-97 %] 96 %  BP: (120-135)/(58-66) 135/66     Weight: 67.2 kg (148 lb 2.4 oz)  Body mass index is 26.24 kg/(m^2).    Intake/Output Summary (Last 24 hours) at 01/14/17 0850  Last data filed at 01/14/17 0600   Gross per 24 hour   Intake             1810 ml   Output              555 ml   Net             1255 ml      Physical Exam   Constitutional: He appears well-developed. No distress.   HENT:   Head: Normocephalic and atraumatic.   Downs facies   Eyes: Conjunctivae are normal. Pupils are equal, round, and reactive to light.   Neck: Normal range of motion. Neck supple. No thyromegaly present.   Cardiovascular: Normal rate, regular rhythm and intact distal pulses.    Murmur heard.  Pulmonary/Chest: He has wheezes. He has rhonchi. He has rales.   Abdominal: Soft. Bowel sounds are normal. There is no tenderness.   Musculoskeletal: Normal range of motion. He exhibits no edema.   Lymphadenopathy:     He has no cervical adenopathy.   Neurological: He  is alert.   Skin: Skin is warm and dry. No rash noted.   Psychiatric: He has a normal mood and affect. His behavior is normal.   Nursing note and vitals reviewed.      Significant Labs:   CBC:   Recent Labs  Lab 01/12/17  1224 01/13/17  0531   WBC 19.93* 18.52*   HGB 16.4 13.9*   HCT 48.4 41.1    281     CMP:   Recent Labs  Lab 01/12/17  1224 01/13/17  0531    142   K 3.8 3.5    106   CO2 27 25   * 133*   BUN 9 8   CREATININE 1.2 0.9   CALCIUM 9.9 8.7   PROT 8.5* 6.7   ALBUMIN 3.5 2.6*   BILITOT 0.7 0.5   ALKPHOS 134 120   AST 39 24   ALT 32 23   ANIONGAP 12 11   EGFRNONAA >60 >60       Significant ImaginseeCT:  No pulmonary embolism

## 2017-01-14 NOTE — PROGRESS NOTES
[facility] Hospital Medicine  Progress Note    Patient Name: Mack Will  MRN: [id number]  Patient Class: IP- Inpatient   Admission Date: 1/12/2017  Length of Stay: 2 days  Attending Physician: Makenzie Mckeon MD  Primary Care Provider: Chetan Alvarez MD        Subjective:     Principal Problem:Pneumonia of left lower lobe due to infectious organism    HPI:  51 year old non verbal and demented Downs syndrome patient was sent here via ambulance from his group home because of worsening cough, fever and fatigue. His condition was called to Dr. Alvarez who advised ER evaluation.  Admitted for pneumia ; presently on IV antibiotics      Hospital Course:  Patient was seen and evaluated in the ER and was found to be hypoxic and in respiratory distress. He was admitted to floor with left lower lobe pneumonia. Placed on zithromax and zosyn. Questionable aspiration. Speech consulted for swallow assessment this am. He is currntly NPO. t max 100.3. POx 90% on 5LNC this am. WBC [id number]>>>[id number].     He is non verbal    Nurse at bedside reports that he had a difficult cath last night. Urine dark and mucusy. No urine since then. (NPO and not on fluids). Also noted elevated lactate.     1/14/17  He looks more alert   Looks like bothered by catheter ;but finally had foleys in  Not eating well.  White count slightly better    CT scan ; no PE   Bilateral infiltrates          ICT: I have reviewed all pertinent results/findings within the past 24 hours and my personal findings are: Review of Systems   Constitutional: Positive for fatigue and fever. Negative for chills.   HENT: Negative for congestion, ear pain, postnasal drip, rhinorrhea, sore throat and trouble swallowing.    Respiratory: Positive for cough, shortness of breath and wheezing.    Cardiovascular: Negative for chest pain and palpitations.   Gastrointestinal: Negative for abdominal pain, diarrhea, nausea and vomiting.   Genitourinary: Negative for  dysuria and frequency.   Skin: Negative for rash.   Neurological: Positive for weakness. Negative for headaches.     Objective:     Vital Signs (Most Recent):  Temp: 97.8 °F (36.6 °C) (01/14/17 0730)  Pulse: 60 (01/14/17 0741)  Resp: 20 (01/14/17 0741)  BP: 135/66 (01/14/17 0730)  SpO2: 96 % (01/14/17 0741) Vital Signs (24h Range):  Temp:  [96.9 °F (36.1 °C)-99 °F (37.2 °C)] 97.8 °F (36.6 °C)  Pulse:  [51-93] 60  Resp:  [18-22] 20  SpO2:  [87 %-97 %] 96 %  BP: (120-135)/(58-66) 135/66     Weight: 67.2 kg (148 lb 2.4 oz)  Body mass index is 26.24 kg/(m^2).    Intake/Output Summary (Last 24 hours) at 01/14/17 0850  Last data filed at 01/14/17 0600   Gross per 24 hour   Intake             1810 ml   Output              555 ml   Net             1255 ml      Physical Exam   Constitutional: He appears well-developed. No distress.   HENT:   Head: Normocephalic and atraumatic.   Downs facies   Eyes: Conjunctivae are normal. Pupils are equal, round, and reactive to light.   Neck: Normal range of motion. Neck supple. No thyromegaly present.   Cardiovascular: Normal rate, regular rhythm and intact distal pulses.    Murmur heard.  Pulmonary/Chest: He has wheezes. He has rhonchi. He has rales.   Abdominal: Soft. Bowel sounds are normal. There is no tenderness.   Musculoskeletal: Normal range of motion. He exhibits no edema.   Lymphadenopathy:     He has no cervical adenopathy.   Neurological: He is alert.   Skin: Skin is warm and dry. No rash noted.   Psychiatric: He has a normal mood and affect. His behavior is normal.   Nursing note and vitals reviewed.      Significant Labs:   CBC:   Recent Labs  Lab 01/12/17  1224 01/13/17  0531   WBC 19.93* 18.52*   HGB 16.4 13.9*   HCT 48.4 41.1    281     CMP:   Recent Labs  Lab 01/12/17  1224 01/13/17  0531    142   K 3.8 3.5    106   CO2 27 25   * 133*   BUN 9 8   CREATININE 1.2 0.9   CALCIUM 9.9 8.7   PROT 8.5* 6.7   ALBUMIN 3.5 2.6*   BILITOT 0.7 0.5   ALKPHOS  134 120   AST 39 24   ALT 32 23   ANIONGAP 12 11   EGFRNONAA >60 >60       Significant ImaginseeCT:  No pulmonary embolism         Assessment/Plan:      * Pneumonia of left lower lobe due to infectious organism  Given dose of rocephin and azithromycin in Er.  Continue azithromycin and change rocephin to zosyn to cover for aspiration pna.  With mental status and poor ability to communicate, swallow study may not be successful, but has been ordered.  Monitor for signs of fever, etc.  CT ; NO PE   Bilateral infiltrates : bilateral pneumonia     Syncope        Down's syndrome  Non verbal, difficult to communicate, unsure what patient is understanding, no family at bedside      Dementia without behavioral disturbance  See down plan      Sepsis  Likely secondary to pna but with urinary retention, some concern for bladder etiology.  On pathway.  Continue abx.  REpeat lactate acid now  Not eating well   Will change IV fluids to clinimix for nutrition    Urinary retention  S/p foleys catheter  Bladder /uretheral discomfort due to catheter  toradol ordered for pain       VTE Risk Mitigation         Ordered     enoxaparin injection 40 mg  Daily     Route:  Subcutaneous        01/13/17 1008     Medium Risk of VTE  Once      01/12/17 1409          Hunter Morfin MD  Department of Hospital Medicine   [facility]

## 2017-01-14 NOTE — PLAN OF CARE
Problem: Patient Care Overview  Goal: Plan of Care Review  Outcome: Ongoing (interventions implemented as appropriate)  Vitals stable, afebrile, no signs of distress. Pt is not communicative, but non verbal indicators are absent of pain at this time. [place] placed during shift for urinary retention, but pt is making little urine. Pt on fluids, and ABX. Lung sounds remain the same, rhonchi heard throughout lung fields. Cough is really congested and loose, but pt does not comprehend to spit it out. Pt refused to eat or drink anything during shift. He seems worn out. Pt on Venti mask, and tolerates it well. Sister was here during the evening. Fall precautions remain, frequent checks done and bed alarm set. Family are all in agreement with plan of care. Questions answered.

## 2017-01-14 NOTE — PROGRESS NOTES
"Mack Will is a 51 y.o. male patient.    Active Hospital Problems    Diagnosis  POA    *Pneumonia of left lower lobe due to infectious organism [J18.1]  Yes    Sepsis [A41.9]  Yes    Urinary retention [R33.9]  Yes    Dementia without behavioral disturbance [F03.90]  Yes    Down's syndrome [Q90.9]  Not Applicable      Resolved Hospital Problems    Diagnosis Date Resolved POA   No resolved problems to display.     Temp: 97.8 °F (36.6 °C) (01/14/17 0730)  Pulse: 60 (01/14/17 0741)  Resp: 20 (01/14/17 0741)  BP: 135/66 (01/14/17 0730)  SpO2: 96 % (01/14/17 0741)  Weight: 67.2 kg (148 lb 2.4 oz) (01/14/17 0500)  Height: 5' 3" (160 cm) (01/12/17 1414)    Subjective:  Symptoms:  Worsening.  He reports shortness of breath, cough, chest pain, weakness and anxiety.    Diet:  Adequate intake.    Activity level: Impaired due to weakness.    Pain:  He complains of pain that is mild.      Objective:  General Appearance:  Ill-appearing.    Vital signs: (most recent): Blood pressure 135/66, pulse 60, temperature 97.8 °F (36.6 °C), temperature source Axillary, resp. rate 20, height 5' 3" (1.6 m), weight 67.2 kg (148 lb 2.4 oz), SpO2 96 %.  Fever.    Output: Producing urine and producing stool.    Lungs:  Tachypnea and increased effort.  He is in respiratory distress.  No stridor.  There are wheezes, rales, rhonchi and decreased breath sounds.    Heart: Tachycardia.  Regular rhythm.  S1 normal and S2 normal.  No murmur, gallop or friction rub.   Chest: Chest wall tenderness present.  Symmetric chest wall expansion.   Extremities: Normal range of motion.  There is dependent edema.  There is no venous stasis, deformity, effusion or local swelling.    Neurological: Patient is alert.  Normal strength.  Patient has normal reflexes, normal muscle tone and normal coordination.    Skin:  Warm and dry.  No rash, cyanosis or ulceration.   Abdomen: Abdomen is soft, flat and non-distended.  There are no signs of ascites.  Bowel sounds " are normal.   There is no abdominal tenderness.     Pupils:  Pupils are equal, round, and reactive to light.    Pulses: Distal pulses are intact.      Assessment:  (Left Lower Lobe Pneumonia   Asthmatic Bronchitis   UTI  Downs syndrome).     Plan:   Continue support looks better still with crackles and wheezing .       Salas Nevarez MD  1/14/2017

## 2017-01-14 NOTE — ASSESSMENT & PLAN NOTE
Likely secondary to pna but with urinary retention, some concern for bladder etiology.  On pathway.  Continue abx.  REpeat lactate acid now  Not eating well   Will change IV fluids to clinimix for nutrition

## 2017-01-14 NOTE — ASSESSMENT & PLAN NOTE
Given dose of rocephin and azithromycin in Er.  Continue azithromycin and change rocephin to zosyn to cover for aspiration pna.  With mental status and poor ability to communicate, swallow study may not be successful, but has been ordered.  Monitor for signs of fever, etc.  CT ; NO PE   Bilateral infiltrates : bilateral pneumonia

## 2017-01-15 LAB
ALBUMIN SERPL BCP-MCNC: 2.2 G/DL
ALP SERPL-CCNC: 129 U/L
ALT SERPL W/O P-5'-P-CCNC: 29 U/L
ANION GAP SERPL CALC-SCNC: 8 MMOL/L
AST SERPL-CCNC: 25 U/L
BASOPHILS # BLD AUTO: 0.05 K/UL
BASOPHILS NFR BLD: 0.6 %
BILIRUB SERPL-MCNC: 0.3 MG/DL
BUN SERPL-MCNC: 9 MG/DL
CALCIUM SERPL-MCNC: 8.7 MG/DL
CHLORIDE SERPL-SCNC: 106 MMOL/L
CO2 SERPL-SCNC: 28 MMOL/L
CREAT SERPL-MCNC: 0.8 MG/DL
DIFFERENTIAL METHOD: ABNORMAL
EOSINOPHIL # BLD AUTO: 0.2 K/UL
EOSINOPHIL NFR BLD: 1.8 %
ERYTHROCYTE [DISTWIDTH] IN BLOOD BY AUTOMATED COUNT: 14.8 %
EST. GFR  (AFRICAN AMERICAN): >60 ML/MIN/1.73 M^2
EST. GFR  (NON AFRICAN AMERICAN): >60 ML/MIN/1.73 M^2
GLUCOSE SERPL-MCNC: 119 MG/DL
HCT VFR BLD AUTO: 37 %
HGB BLD-MCNC: 12.5 G/DL
LYMPHOCYTES # BLD AUTO: 1.9 K/UL
LYMPHOCYTES NFR BLD: 21 %
MCH RBC QN AUTO: 31.6 PG
MCHC RBC AUTO-ENTMCNC: 33.8 %
MCV RBC AUTO: 94 FL
MONOCYTES # BLD AUTO: 0.8 K/UL
MONOCYTES NFR BLD: 8.6 %
NEUTROPHILS # BLD AUTO: 6 K/UL
NEUTROPHILS NFR BLD: 68 %
PLATELET # BLD AUTO: 325 K/UL
PMV BLD AUTO: 11.8 FL
POTASSIUM SERPL-SCNC: 4.2 MMOL/L
PROT SERPL-MCNC: 6.3 G/DL
RBC # BLD AUTO: 3.95 M/UL
SODIUM SERPL-SCNC: 142 MMOL/L
WBC # BLD AUTO: 8.8 K/UL

## 2017-01-15 PROCEDURE — 94640 AIRWAY INHALATION TREATMENT: CPT

## 2017-01-15 PROCEDURE — 25000003 PHARM REV CODE 250: Performed by: INTERNAL MEDICINE

## 2017-01-15 PROCEDURE — 11000001 HC ACUTE MED/SURG PRIVATE ROOM

## 2017-01-15 PROCEDURE — 99232 SBSQ HOSP IP/OBS MODERATE 35: CPT | Mod: ,,, | Performed by: INTERNAL MEDICINE

## 2017-01-15 PROCEDURE — 25000003 PHARM REV CODE 250: Performed by: SURGERY

## 2017-01-15 PROCEDURE — 25000003 PHARM REV CODE 250: Performed by: FAMILY MEDICINE

## 2017-01-15 PROCEDURE — 80053 COMPREHEN METABOLIC PANEL: CPT

## 2017-01-15 PROCEDURE — 27200120 HC KIT IV START (RUSH ONLY)

## 2017-01-15 PROCEDURE — 63600175 PHARM REV CODE 636 W HCPCS: Performed by: INTERNAL MEDICINE

## 2017-01-15 PROCEDURE — 94668 MNPJ CHEST WALL SBSQ: CPT

## 2017-01-15 PROCEDURE — 27000221 HC OXYGEN, UP TO 24 HOURS

## 2017-01-15 PROCEDURE — 85025 COMPLETE CBC W/AUTO DIFF WBC: CPT

## 2017-01-15 PROCEDURE — 99900031 HC PATIENT EDUCATION (STAT)

## 2017-01-15 PROCEDURE — 25000242 PHARM REV CODE 250 ALT 637 W/ HCPCS: Performed by: INTERNAL MEDICINE

## 2017-01-15 PROCEDURE — 63600175 PHARM REV CODE 636 W HCPCS: Performed by: NURSE PRACTITIONER

## 2017-01-15 PROCEDURE — C9113 INJ PANTOPRAZOLE SODIUM, VIA: HCPCS | Performed by: INTERNAL MEDICINE

## 2017-01-15 PROCEDURE — 94761 N-INVAS EAR/PLS OXIMETRY MLT: CPT

## 2017-01-15 PROCEDURE — 63600175 PHARM REV CODE 636 W HCPCS: Performed by: FAMILY MEDICINE

## 2017-01-15 PROCEDURE — 36415 COLL VENOUS BLD VENIPUNCTURE: CPT

## 2017-01-15 PROCEDURE — 27000339 *HC DAILY SUPPLY KIT

## 2017-01-15 RX ADMIN — PIPERACILLIN AND TAZOBACTAM 3.38 G: 3; .375 INJECTION, POWDER, LYOPHILIZED, FOR SOLUTION INTRAVENOUS; PARENTERAL at 05:01

## 2017-01-15 RX ADMIN — LEUCINE, PHENYLALANINE, LYSINE, METHIONINE, ISOLEUCINE, VALINE, HISTIDINE, THREONINE, TRYPTOPHAN, ALANINE, GLYCINE, ARGININE, PROLINE, SERINE, TYROSINE, DEXTROSE 1000 ML: 311; 238; 247; 170; 255; 247; 204; 179; 77; 880; 438; 489; 289; 213; 17; 5 INJECTION INTRAVENOUS at 12:01

## 2017-01-15 RX ADMIN — HYDROCODONE BITARTATE AND ACETAMINOPHEN 1 TABLET: 5; 325 TABLET ORAL at 02:01

## 2017-01-15 RX ADMIN — OXCARBAZEPINE 300 MG: 150 TABLET, FILM COATED ORAL at 05:01

## 2017-01-15 RX ADMIN — PHENAZOPYRIDINE HYDROCHLORIDE 100 MG: 100 TABLET ORAL at 08:01

## 2017-01-15 RX ADMIN — HYDROCODONE BITARTATE AND ACETAMINOPHEN 1 TABLET: 5; 325 TABLET ORAL at 08:01

## 2017-01-15 RX ADMIN — ALBUTEROL SULFATE 2.5 MG: 2.5 SOLUTION RESPIRATORY (INHALATION) at 01:01

## 2017-01-15 RX ADMIN — ALBUTEROL SULFATE 2.5 MG: 2.5 SOLUTION RESPIRATORY (INHALATION) at 07:01

## 2017-01-15 RX ADMIN — PHENAZOPYRIDINE HYDROCHLORIDE 100 MG: 100 TABLET ORAL at 05:01

## 2017-01-15 RX ADMIN — ENOXAPARIN SODIUM 40 MG: 40 INJECTION, SOLUTION INTRAVENOUS; SUBCUTANEOUS at 01:01

## 2017-01-15 RX ADMIN — LEUCINE, PHENYLALANINE, LYSINE, METHIONINE, ISOLEUCINE, VALINE, HISTIDINE, THREONINE, TRYPTOPHAN, ALANINE, GLYCINE, ARGININE, PROLINE, SERINE, TYROSINE, DEXTROSE 1000 ML: 311; 238; 247; 170; 255; 247; 204; 179; 77; 880; 438; 489; 289; 213; 17; 5 INJECTION INTRAVENOUS at 01:01

## 2017-01-15 RX ADMIN — PANTOPRAZOLE SODIUM 40 MG: 40 INJECTION, POWDER, FOR SOLUTION INTRAVENOUS at 08:01

## 2017-01-15 RX ADMIN — OXCARBAZEPINE 300 MG: 150 TABLET, FILM COATED ORAL at 08:01

## 2017-01-15 RX ADMIN — PIPERACILLIN AND TAZOBACTAM 3.38 G: 3; .375 INJECTION, POWDER, LYOPHILIZED, FOR SOLUTION INTRAVENOUS; PARENTERAL at 09:01

## 2017-01-15 RX ADMIN — KETOROLAC TROMETHAMINE 30 MG: 30 INJECTION, SOLUTION INTRAMUSCULAR at 07:01

## 2017-01-15 RX ADMIN — PIPERACILLIN AND TAZOBACTAM 3.38 G: 3; .375 INJECTION, POWDER, LYOPHILIZED, FOR SOLUTION INTRAVENOUS; PARENTERAL at 01:01

## 2017-01-15 RX ADMIN — AZITHROMYCIN MONOHYDRATE 500 MG: 500 INJECTION, POWDER, LYOPHILIZED, FOR SOLUTION INTRAVENOUS at 03:01

## 2017-01-15 NOTE — PROGRESS NOTES
[facility] Hospital Medicine  Progress Note    Patient Name: Mack Will  MRN: [id number]  Patient Class: IP- Inpatient   Admission Date: 1/12/2017  Length of Stay: 3 days  Attending Physician: Makenzie Mckeon MD  Primary Care Provider: Chetan Alvarez MD        Subjective:     Principal Problem:Pneumonia of left lower lobe due to infectious organism    HPI:  51 year old non verbal and demented Downs syndrome patient was sent here via ambulance from his group home because of worsening cough, fever and fatigue. His condition was called to Dr. Alvarez who advised ER evaluation.  Admitted for pneumia ; presently on IV antibiotics      Hospital Course:  Patient was seen and evaluated in the ER and was found to be hypoxic and in respiratory distress. He was admitted to floor with left lower lobe pneumonia. Placed on zithromax and zosyn. Questionable aspiration. Speech consulted for swallow assessment this am. He is currntly NPO. t max 100.3. POx 90% on 5LNC this am. WBC [id number]>>>[id number].     He is non verbal    Nurse at bedside reports that he had a difficult cath last night. Urine dark and mucusy. No urine since then. (NPO and not on fluids). Also noted elevated lactate.     1/15/17  He looks more alert   Looks like bothered by catheter ;but  eating  Better     White count slightly better    CT scan ; no PE   Bilateral infiltrates              Review of Systems   Constitutional: Positive for fatigue and fever. Negative for chills.   HENT: Negative for congestion, ear pain, postnasal drip, rhinorrhea, sore throat and trouble swallowing.    Respiratory: Positive for cough, shortness of breath and wheezing.    Cardiovascular: Negative for chest pain and palpitations.   Gastrointestinal: Negative for abdominal pain, diarrhea, nausea and vomiting.   Genitourinary: Positive for penile swelling. Negative for dysuria and frequency.   Skin: Negative for rash.   Neurological: Positive for weakness.  Negative for headaches.     Objective:     Vital Signs (Most Recent):  Temp: 96.9 °F (36.1 °C) (01/15/17 0358)  Pulse: 60 (01/15/17 0737)  Resp: 18 (01/15/17 0737)  BP: (!) 112/58 (01/15/17 0358)  SpO2: 95 % (01/15/17 0737) Vital Signs (24h Range):  Temp:  [96.4 °F (35.8 °C)-98 °F (36.7 °C)] 96.9 °F (36.1 °C)  Pulse:  [47-86] 60  Resp:  [18-20] 18  SpO2:  [91 %-97 %] 95 %  BP: (110-135)/(58-67) 112/58     Weight: 67.2 kg (148 lb 2.4 oz)  Body mass index is 26.24 kg/(m^2).    Intake/Output Summary (Last 24 hours) at 01/15/17 0907  Last data filed at 01/15/17 0600   Gross per 24 hour   Intake          1136.25 ml   Output             1850 ml   Net          -713.75 ml      Physical Exam   Constitutional: He appears well-developed. No distress.   HENT:   Head: Normocephalic and atraumatic.   Downs facies   Eyes: Conjunctivae are normal. Pupils are equal, round, and reactive to light.   Neck: Normal range of motion. Neck supple. No thyromegaly present.   Cardiovascular: Normal rate, regular rhythm and intact distal pulses.    Murmur heard.  Pulmonary/Chest: He has wheezes. He has rhonchi. He has rales.   Abdominal: Soft. Bowel sounds are normal. There is no tenderness.   Musculoskeletal: Normal range of motion. He exhibits no edema.   Lymphadenopathy:     He has no cervical adenopathy.   Neurological: He is alert.   Skin: Skin is warm and dry. No rash noted.   Psychiatric: He has a normal mood and affect. His behavior is normal.   Nursing note and vitals reviewed.      Significant Labs:   CBC:   Recent Labs  Lab 01/15/17  0630   WBC 8.80   HGB 12.5*   HCT 37.0*        CMP:   Recent Labs  Lab 01/15/17  0630      K 4.2      CO2 28   *   BUN 9   CREATININE 0.8   CALCIUM 8.7   PROT 6.3   ALBUMIN 2.2*   BILITOT 0.3   ALKPHOS 129   AST 25   ALT 29   ANIONGAP 8   EGFRNONAA >60         Assessment/Plan:      * Pneumonia of left lower lobe due to infectious organism  Given dose of rocephin and azithromycin  in Er.  Continue azithromycin and change rocephin to zosyn to cover for aspiration pna.  With mental status and poor ability to communicate, swallow study may not be successful, but has been ordered.  Monitor for signs of fever, etc.  CT ; NO PE   Bilateral infiltrates : bilateral pneumonia     Syncope        Down's syndrome  Non verbal, difficult to communicate, unsure what patient is understanding, no family at bedside      Dementia without behavioral disturbance  See down plan      Sepsis  Likely secondary to pna but with urinary retention, some concern for bladder etiology.  On pathway.  Continue abx.  REpeat lactate acid now  Not eating well   Will change IV fluids to clinimix for nutrition    Urinary retention  S/p foleys catheter  Bladder /uretheral discomfort due to catheter  toradol ordered for pain       VTE Risk Mitigation         Ordered     enoxaparin injection 40 mg  Daily     Route:  Subcutaneous        01/13/17 1008     Medium Risk of VTE  Once      01/12/17 1409          Hunter Morfin MD  Department of Hospital Medicine   [facility]

## 2017-01-15 NOTE — PROGRESS NOTES
"Mack Will is a 51 y.o. male patient.    Active Hospital Problems    Diagnosis  POA    *Pneumonia of left lower lobe due to infectious organism [J18.1]  Yes    Sepsis [A41.9]  Yes    Urinary retention [R33.9]  Yes    Dementia without behavioral disturbance [F03.90]  Yes    Down's syndrome [Q90.9]  Not Applicable      Resolved Hospital Problems    Diagnosis Date Resolved POA   No resolved problems to display.     Temp: 96.9 °F (36.1 °C) (01/15/17 0358)  Pulse: 60 (01/15/17 0737)  Resp: 18 (01/15/17 0737)  BP: (!) 112/58 (01/15/17 0358)  SpO2: 95 % (01/15/17 0737)  Weight: 67.2 kg (148 lb 2.4 oz) (01/14/17 0500)  Height: 5' 3" (160 cm) (01/12/17 1414)    Subjective:  Symptoms:  Worsening.  He reports shortness of breath, cough, chest pain, weakness and anxiety.    Diet:  Adequate intake.    Activity level: Impaired due to weakness.    Pain:  He complains of pain that is mild.      Objective:  General Appearance:  Ill-appearing.    Vital signs: (most recent): Blood pressure (!) 112/58, pulse 60, temperature 96.9 °F (36.1 °C), temperature source Axillary, resp. rate 18, height 5' 3" (1.6 m), weight 67.2 kg (148 lb 2.4 oz), SpO2 95 %.  Fever.    Output: Producing urine and producing stool.    Lungs:  Tachypnea and increased effort.  He is in respiratory distress.  No stridor.  There are wheezes, rales, rhonchi and decreased breath sounds.    Heart: Tachycardia.  Regular rhythm.  S1 normal and S2 normal.  No murmur, gallop or friction rub.   Chest: Chest wall tenderness present.  Symmetric chest wall expansion.   Extremities: Normal range of motion.  There is dependent edema.  There is no venous stasis, deformity, effusion or local swelling.    Neurological: Patient is alert.  Normal strength.  Patient has normal reflexes, normal muscle tone and normal coordination.    Skin:  Warm and dry.  No rash, cyanosis or ulceration.   Abdomen: Abdomen is soft, flat and non-distended.  There are no signs of ascites.  Bowel " sounds are normal.   There is no abdominal tenderness.     Pupils:  Pupils are equal, round, and reactive to light.    Pulses: Distal pulses are intact.      Assessment:  (Left Lower Lobe Pneumonia   Asthmatic Bronchitis   UTI  Downs syndrome).     Plan:   Less rhonchi but breathing improved.       Salas Nevarez MD  1/15/2017

## 2017-01-15 NOTE — PLAN OF CARE
Problem: Patient Care Overview  Goal: Plan of Care Review  Outcome: Ongoing (interventions implemented as appropriate)  Bradycardic; other vitals stable; O2 above 95% on venti mask. Non-verbal indicators of pain this morning indicating bladder/catheter pain; catheter manipulated to drain more urine; toradol and pyridium given; non-verbal indicators absent. Catheter and BSC.  Fall precautions maintained: bed alarm; non-slip socks; frequent patient rounding; up with assistance; call bell within reach. Clinimix and IV antibiotics continued. Patient's appetite increased; ate most of lunch and some of supper today. Patient is more alert and verbal today. Plan of care discussed with family; voiced understanding. Will continue to monitor.

## 2017-01-15 NOTE — SUBJECTIVE & OBJECTIVE
Review of Systems   Constitutional: Positive for fatigue and fever. Negative for chills.   HENT: Negative for congestion, ear pain, postnasal drip, rhinorrhea, sore throat and trouble swallowing.    Respiratory: Positive for cough, shortness of breath and wheezing.    Cardiovascular: Negative for chest pain and palpitations.   Gastrointestinal: Negative for abdominal pain, diarrhea, nausea and vomiting.   Genitourinary: Positive for penile swelling. Negative for dysuria and frequency.   Skin: Negative for rash.   Neurological: Positive for weakness. Negative for headaches.     Objective:     Vital Signs (Most Recent):  Temp: 96.9 °F (36.1 °C) (01/15/17 0358)  Pulse: 60 (01/15/17 0737)  Resp: 18 (01/15/17 0737)  BP: (!) 112/58 (01/15/17 0358)  SpO2: 95 % (01/15/17 0737) Vital Signs (24h Range):  Temp:  [96.4 °F (35.8 °C)-98 °F (36.7 °C)] 96.9 °F (36.1 °C)  Pulse:  [47-86] 60  Resp:  [18-20] 18  SpO2:  [91 %-97 %] 95 %  BP: (110-135)/(58-67) 112/58     Weight: 67.2 kg (148 lb 2.4 oz)  Body mass index is 26.24 kg/(m^2).    Intake/Output Summary (Last 24 hours) at 01/15/17 0907  Last data filed at 01/15/17 0600   Gross per 24 hour   Intake          1136.25 ml   Output             1850 ml   Net          -713.75 ml      Physical Exam   Constitutional: He appears well-developed. No distress.   HENT:   Head: Normocephalic and atraumatic.   Downs facies   Eyes: Conjunctivae are normal. Pupils are equal, round, and reactive to light.   Neck: Normal range of motion. Neck supple. No thyromegaly present.   Cardiovascular: Normal rate, regular rhythm and intact distal pulses.    Murmur heard.  Pulmonary/Chest: He has wheezes. He has rhonchi. He has rales.   Abdominal: Soft. Bowel sounds are normal. There is no tenderness.   Musculoskeletal: Normal range of motion. He exhibits no edema.   Lymphadenopathy:     He has no cervical adenopathy.   Neurological: He is alert.   Skin: Skin is warm and dry. No rash noted.   Psychiatric:  He has a normal mood and affect. His behavior is normal.   Nursing note and vitals reviewed.      Significant Labs:   CBC:   Recent Labs  Lab 01/15/17  0630   WBC 8.80   HGB 12.5*   HCT 37.0*        CMP:   Recent Labs  Lab 01/15/17  0630      K 4.2      CO2 28   *   BUN 9   CREATININE 0.8   CALCIUM 8.7   PROT 6.3   ALBUMIN 2.2*   BILITOT 0.3   ALKPHOS 129   AST 25   ALT 29   ANIONGAP 8   EGFRNONAA >60

## 2017-01-15 NOTE — PROGRESS NOTES
Patient's family reports that patient is unresponsive. Initially, patient only woke to sternal rub. Patient is difficult to arouse but responds to pain and repositioning. Withdraws from pain, opens eyes, and moans. Vitals stable. Pupils reactive. Will continue to monitor.

## 2017-01-15 NOTE — PLAN OF CARE
Problem: Patient Care Overview  Goal: Plan of Care Review  Outcome: Ongoing (interventions implemented as appropriate)  Clinimix continues. Patient crying out and moaning. When asked if he was hurting he blinked indicating yes. Patient grabbing at catheter.   Catheter repositioned. Bladder non-distended. Hydrocodone given and was effective for bladder pain.    Problem: Pneumonia (Adult)  Goal: Signs and Symptoms of Listed Potential Problems Will be Absent, Minimized or Managed (Pneumonia)  Signs and symptoms of listed potential problems will be absent, minimized or managed by discharge/transition of care (reference Pneumonia (Adult) CPG).   Outcome: Ongoing (interventions implemented as appropriate)  Afebrile. IV Zosyn continues without any adverse reactions. Lungs sounds coarse with rhonchi noted. Oxygen continues on venti mask 50%. Respiratory continues every 6 hours.     Problem: Pressure Ulcer Risk (Morales Scale) (Adult,Obstetrics,Pediatric)  Goal: Skin Integrity  Patient will demonstrate the desired outcomes by discharge/transition of care.   Outcome: Ongoing (interventions implemented as appropriate)  Patient turned and repositioned every 2 hours. Heel booties on.

## 2017-01-16 PROBLEM — R53.81 DEBILITY: Status: ACTIVE | Noted: 2017-01-16

## 2017-01-16 LAB
ALBUMIN SERPL BCP-MCNC: 2.6 G/DL
ALP SERPL-CCNC: 154 U/L
ALT SERPL W/O P-5'-P-CCNC: 28 U/L
ANION GAP SERPL CALC-SCNC: 15 MMOL/L
AST SERPL-CCNC: 27 U/L
BASOPHILS # BLD AUTO: 0.07 K/UL
BASOPHILS NFR BLD: 0.6 %
BILIRUB SERPL-MCNC: 0.3 MG/DL
BUN SERPL-MCNC: 11 MG/DL
CALCIUM SERPL-MCNC: 9.1 MG/DL
CHLORIDE SERPL-SCNC: 104 MMOL/L
CO2 SERPL-SCNC: 22 MMOL/L
CREAT SERPL-MCNC: 0.8 MG/DL
DIFFERENTIAL METHOD: ABNORMAL
EOSINOPHIL # BLD AUTO: 0.2 K/UL
EOSINOPHIL NFR BLD: 1.8 %
ERYTHROCYTE [DISTWIDTH] IN BLOOD BY AUTOMATED COUNT: 14.9 %
EST. GFR  (AFRICAN AMERICAN): >60 ML/MIN/1.73 M^2
EST. GFR  (NON AFRICAN AMERICAN): >60 ML/MIN/1.73 M^2
GLUCOSE SERPL-MCNC: 106 MG/DL
HCT VFR BLD AUTO: 44.3 %
HGB BLD-MCNC: 14.8 G/DL
LYMPHOCYTES # BLD AUTO: 2.6 K/UL
LYMPHOCYTES NFR BLD: 23.4 %
MCH RBC QN AUTO: 31.2 PG
MCHC RBC AUTO-ENTMCNC: 33.4 %
MCV RBC AUTO: 93 FL
MONOCYTES # BLD AUTO: 1.2 K/UL
MONOCYTES NFR BLD: 10.9 %
NEUTROPHILS # BLD AUTO: 7.2 K/UL
NEUTROPHILS NFR BLD: 63.3 %
PLATELET # BLD AUTO: 349 K/UL
PMV BLD AUTO: 12.3 FL
POTASSIUM SERPL-SCNC: 4.2 MMOL/L
PROT SERPL-MCNC: 7.1 G/DL
RBC # BLD AUTO: 4.75 M/UL
SODIUM SERPL-SCNC: 141 MMOL/L
WBC # BLD AUTO: 11.29 K/UL

## 2017-01-16 PROCEDURE — 25000003 PHARM REV CODE 250: Performed by: FAMILY MEDICINE

## 2017-01-16 PROCEDURE — 25000003 PHARM REV CODE 250: Performed by: SURGERY

## 2017-01-16 PROCEDURE — 80053 COMPREHEN METABOLIC PANEL: CPT

## 2017-01-16 PROCEDURE — 25000003 PHARM REV CODE 250: Performed by: NURSE PRACTITIONER

## 2017-01-16 PROCEDURE — 97530 THERAPEUTIC ACTIVITIES: CPT

## 2017-01-16 PROCEDURE — 27000339 *HC DAILY SUPPLY KIT

## 2017-01-16 PROCEDURE — 97162 PT EVAL MOD COMPLEX 30 MIN: CPT

## 2017-01-16 PROCEDURE — 25000003 PHARM REV CODE 250: Performed by: INTERNAL MEDICINE

## 2017-01-16 PROCEDURE — 63600175 PHARM REV CODE 636 W HCPCS: Performed by: SURGERY

## 2017-01-16 PROCEDURE — 36415 COLL VENOUS BLD VENIPUNCTURE: CPT

## 2017-01-16 PROCEDURE — 99900031 HC PATIENT EDUCATION (STAT)

## 2017-01-16 PROCEDURE — 63600175 PHARM REV CODE 636 W HCPCS: Performed by: FAMILY MEDICINE

## 2017-01-16 PROCEDURE — 85025 COMPLETE CBC W/AUTO DIFF WBC: CPT

## 2017-01-16 PROCEDURE — C9113 INJ PANTOPRAZOLE SODIUM, VIA: HCPCS | Performed by: INTERNAL MEDICINE

## 2017-01-16 PROCEDURE — 94761 N-INVAS EAR/PLS OXIMETRY MLT: CPT

## 2017-01-16 PROCEDURE — 11000001 HC ACUTE MED/SURG PRIVATE ROOM

## 2017-01-16 PROCEDURE — 94640 AIRWAY INHALATION TREATMENT: CPT

## 2017-01-16 PROCEDURE — 25000242 PHARM REV CODE 250 ALT 637 W/ HCPCS: Performed by: INTERNAL MEDICINE

## 2017-01-16 PROCEDURE — 99232 SBSQ HOSP IP/OBS MODERATE 35: CPT | Mod: ,,, | Performed by: INTERNAL MEDICINE

## 2017-01-16 PROCEDURE — 63600175 PHARM REV CODE 636 W HCPCS: Performed by: INTERNAL MEDICINE

## 2017-01-16 PROCEDURE — 63600175 PHARM REV CODE 636 W HCPCS: Performed by: NURSE PRACTITIONER

## 2017-01-16 PROCEDURE — 27000221 HC OXYGEN, UP TO 24 HOURS

## 2017-01-16 PROCEDURE — 94668 MNPJ CHEST WALL SBSQ: CPT

## 2017-01-16 RX ADMIN — LEUCINE, PHENYLALANINE, LYSINE, METHIONINE, ISOLEUCINE, VALINE, HISTIDINE, THREONINE, TRYPTOPHAN, ALANINE, GLYCINE, ARGININE, PROLINE, SERINE, TYROSINE, DEXTROSE 1000 ML: 311; 238; 247; 170; 255; 247; 204; 179; 77; 880; 438; 489; 289; 213; 17; 5 INJECTION INTRAVENOUS at 03:01

## 2017-01-16 RX ADMIN — PIPERACILLIN AND TAZOBACTAM 3.38 G: 3; .375 INJECTION, POWDER, LYOPHILIZED, FOR SOLUTION INTRAVENOUS; PARENTERAL at 09:01

## 2017-01-16 RX ADMIN — ALBUTEROL SULFATE 2.5 MG: 2.5 SOLUTION RESPIRATORY (INHALATION) at 06:01

## 2017-01-16 RX ADMIN — TAMSULOSIN HYDROCHLORIDE 0.4 MG: 0.4 CAPSULE ORAL at 09:01

## 2017-01-16 RX ADMIN — PIPERACILLIN AND TAZOBACTAM 3.38 G: 3; .375 INJECTION, POWDER, LYOPHILIZED, FOR SOLUTION INTRAVENOUS; PARENTERAL at 02:01

## 2017-01-16 RX ADMIN — PIPERACILLIN AND TAZOBACTAM 3.38 G: 3; .375 INJECTION, POWDER, LYOPHILIZED, FOR SOLUTION INTRAVENOUS; PARENTERAL at 05:01

## 2017-01-16 RX ADMIN — ENOXAPARIN SODIUM 40 MG: 40 INJECTION, SOLUTION INTRAVENOUS; SUBCUTANEOUS at 12:01

## 2017-01-16 RX ADMIN — PANTOPRAZOLE SODIUM 40 MG: 40 INJECTION, POWDER, FOR SOLUTION INTRAVENOUS at 09:01

## 2017-01-16 RX ADMIN — ALBUTEROL SULFATE 2.5 MG: 2.5 SOLUTION RESPIRATORY (INHALATION) at 01:01

## 2017-01-16 RX ADMIN — LEUCINE, PHENYLALANINE, LYSINE, METHIONINE, ISOLEUCINE, VALINE, HISTIDINE, THREONINE, TRYPTOPHAN, ALANINE, GLYCINE, ARGININE, PROLINE, SERINE, TYROSINE, DEXTROSE 1000 ML: 311; 238; 247; 170; 255; 247; 204; 179; 77; 880; 438; 489; 289; 213; 17; 5 INJECTION INTRAVENOUS at 05:01

## 2017-01-16 RX ADMIN — OXCARBAZEPINE 150 MG: 150 TABLET, FILM COATED ORAL at 09:01

## 2017-01-16 RX ADMIN — KETOROLAC TROMETHAMINE 30 MG: 30 INJECTION, SOLUTION INTRAMUSCULAR at 01:01

## 2017-01-16 RX ADMIN — AZITHROMYCIN MONOHYDRATE 500 MG: 500 INJECTION, POWDER, LYOPHILIZED, FOR SOLUTION INTRAVENOUS at 03:01

## 2017-01-16 NOTE — ASSESSMENT & PLAN NOTE
S/p foleys catheter-remains in place  Bladder/uretheral discomfort due to catheter  toradol ordered for pain     Will try to d/c gabriel for now and watch for urinary retention;   We unfortunately only have one 10 F in house. Checking with central supply to see how quickly we can get another in house in case he cannot void again.

## 2017-01-16 NOTE — ASSESSMENT & PLAN NOTE
Non verbal, difficult to communicate, unsure what patient is understanding.     Normal state is minimal communication, walking, voiding on his own.

## 2017-01-16 NOTE — PROGRESS NOTES
[facility] Medicine  Progress Note    Patient Name: Mack Will  MRN: [id number]  Patient Class: IP- Inpatient   Admission Date: 1/12/2017  Length of Stay: 4 days  Attending Physician: Makenzie Mckeon MD  Primary Care Provider: Chetan Alvarez MD        Subjective:     Principal Problem:Pneumonia of both lungs due to infectious organism    HPI:  51 year old non verbal and demented Downs syndrome patient was sent here via ambulance from his group home because of worsening cough, fever and fatigue. His condition was called to Dr. Alvarez who advised ER evaluation.  Admitted for pneumia ; presently on IV antibiotics      Hospital Course:  Patient was seen and evaluated in the ER and was found to be hypoxic and in respiratory distress. He was admitted to floor with left lower lobe pneumonia. Placed on zithromax and zosyn-day 5 today. Questionable aspiration. Speech consulted for swallow assessment, limited eval, but no gross aspiration noted. No longer having a fever. Pt's WBC down to normal.     He is non verbal now-had limited speech before.     Pt had traumatic gabriel placement, now remains with gabriel catheter for urinary retention. Started on Flomax    CT scan; no PE   Bilateral infiltrates    He is eating minimal even with family's coaxing. We have changed his IVF to clinimix on Sunday.     Pt has not gotten out of bed, was previously walking.               Review of Systems   Unable to perform ROS: Dementia     Objective:     Vital Signs (Most Recent):  Temp: 97.4 °F (36.3 °C) (01/16/17 1108)  Pulse: 70 (01/16/17 1338)  Resp: 19 (01/16/17 1338)  BP: (!) 147/71 (01/16/17 1108)  SpO2: 96 % (01/16/17 1338) Vital Signs (24h Range):  Temp:  [95.3 °F (35.2 °C)-97.4 °F (36.3 °C)] 97.4 °F (36.3 °C)  Pulse:  [48-82] 70  Resp:  [18-20] 19  SpO2:  [94 %-99 %] 96 %  BP: (115-156)/(60-84) 147/71     Weight: 78.5 kg (173 lb 1 oz)  Body mass index is 30.66 kg/(m^2).    Intake/Output Summary  (Last 24 hours) at 01/16/17 1511  Last data filed at 01/16/17 0600   Gross per 24 hour   Intake          1581.25 ml   Output             2100 ml   Net          -518.75 ml      Physical Exam   Constitutional: He appears well-developed. No distress.   HENT:   Head: Normocephalic and atraumatic.   Downs facies   Eyes: Conjunctivae are normal. Pupils are equal, round, and reactive to light. Right eye exhibits no discharge. Left eye exhibits no discharge.   Neck: Normal range of motion. Neck supple. No thyromegaly present.   Cardiovascular: Normal rate, regular rhythm and intact distal pulses.    Murmur heard.  Pulmonary/Chest: Effort normal. He has rhonchi.   Coarse breath sounds throughout without wheezing   Abdominal: Soft. Bowel sounds are normal. He exhibits no distension. There is no tenderness.   Musculoskeletal: Normal range of motion. He exhibits no edema.   Lymphadenopathy:     He has no cervical adenopathy.   Neurological: He is alert.   Skin: Skin is warm and dry. No rash noted.   Psychiatric: He has a normal mood and affect. His behavior is normal.   Nursing note and vitals reviewed.      Significant Labs:   CBC:     Recent Labs  Lab 01/15/17  0630 01/16/17  0514   WBC 8.80 11.29   HGB 12.5* 14.8   HCT 37.0* 44.3    349     CMP:     Recent Labs  Lab 01/15/17  0630 01/16/17  0514    141   K 4.2 4.2    104   CO2 28 22*   * 106   BUN 9 11   CREATININE 0.8 0.8   CALCIUM 8.7 9.1   PROT 6.3 7.1   ALBUMIN 2.2* 2.6*   BILITOT 0.3 0.3   ALKPHOS 129 154*   AST 25 27   ALT 29 28   ANIONGAP 8 15   EGFRNONAA >60 >60         Assessment/Plan:      * Pneumonia of both lungs due to infectious organism  Given dose of rocephin and azithromycin in Er.  Continue azithromycin and change rocephin to zosyn to cover for aspiration pna; day 5 today.   With mental status and poor ability to communicate.   Swallow eval done; no gross swallowing diff noted.     CT ; NO PE   Bilateral infiltrates : bilateral  pneumonia   No more fever, elevation in WBC-continue same antibx, 10 days total antibx needed    Down's syndrome  Non verbal, difficult to communicate, unsure what patient is understanding.     Normal state is minimal communication, walking, voiding on his own.       Seizure disorder  Continue oxcarbazepine      Dementia without behavioral disturbance  See down's plan      Sepsis  Likely secondary to pna but with urinary retention, some concern for bladder etiology.  On pathway.  Continue abx.  Repeat Lactic Acid wnl.   Not eating well-changed to clinimix for nutrition Sunday    Urinary retention  S/p foleys catheter-remains in place  Bladder/uretheral discomfort due to catheter  toradol ordered for pain     Will try to d/c gabriel for now and watch for urinary retention;   We unfortunately only have one 10 F in house. Checking with central supply to see how quickly we can get another in house in case he cannot void again.     Debility  Has yet to get OOB; usually walking; OOB today per PT      VTE Risk Mitigation         Ordered     enoxaparin injection 40 mg  Daily     Route:  Subcutaneous        01/13/17 1008     Medium Risk of VTE  Once      01/12/17 1409          Italia Vargas MD  Department of Hospital Medicine   [facility]

## 2017-01-16 NOTE — ASSESSMENT & PLAN NOTE
Likely secondary to pna but with urinary retention, some concern for bladder etiology.  On pathway.  Continue abx.  Repeat Lactic Acid wnl.   Not eating well-changed to clinimix for nutrition Sunday

## 2017-01-16 NOTE — PLAN OF CARE
Problem: Patient Care Overview  Goal: Plan of Care Review  Outcome: Ongoing (interventions implemented as appropriate)  Plan of care reviewed with patient and with his brother. They both agree with the plan of care. clinimix continues without any adverse reactions. Telemetry monitoring continues. Patient crying and moaning in pain. C/o pain to scrotal area. Ketorolac given x 1 this shift and was effective for pain.     Problem: Pneumonia (Adult)  Goal: Signs and Symptoms of Listed Potential Problems Will be Absent, Minimized or Managed (Pneumonia)  Signs and symptoms of listed potential problems will be absent, minimized or managed by discharge/transition of care (reference Pneumonia (Adult) CPG).   Outcome: Ongoing (interventions implemented as appropriate)  Afebrile. IV antibiotics continue without any adverse reactions. Lungs with coarse rhonchi noted. Wet, non-productive cough noted. patient pulls mask off frequently. Patient with difficulty coughing up secretions.  Respiratory called. Patient suctioned with yellow phelgm noted in cannister and oxygen switched from nasal cannula to high flow nasal cannula at 7l. Nebs continue every 6 hours.      Problem: Pressure Ulcer Risk (Morales Scale) (Adult,Obstetrics,Pediatric)  Goal: Skin Integrity  Patient will demonstrate the desired outcomes by discharge/transition of care.   Outcome: Ongoing (interventions implemented as appropriate)  Patient encouraged and compliant with turning every 2 hours. Skin integrity intact.

## 2017-01-16 NOTE — PLAN OF CARE
Problem: Patient Care Overview  Goal: Plan of Care Review  Outcome: Ongoing (interventions implemented as appropriate)  Bradycardic; O2 above 94% on venti mask; other vitals stable. Complained of headache and catheter pain with non-verbal indicators; relieved with hydrocodone. Patient sedated most of the day; now almost back to baseline. Fall precautions maintained: up with assistance; bed alarm; frequent patient rounding; call bell within reach. [place] catheter; BSC. Pureed diet; ate most of supper. IV fluids and antibiotics continued. Breathing treatments and chest physiotherapy. Repositioned every 2 hours; heel booties. Plan of care discussed with family; voiced understanding. Will continue to monitor.

## 2017-01-16 NOTE — PROGRESS NOTES
"Mack Will is a 51 y.o. male patient.sleeping minimal snoring    Active Hospital Problems    Diagnosis  POA    *Pneumonia of left lower lobe due to infectious organism [J18.1]  Yes    Sepsis [A41.9]  Yes    Urinary retention [R33.9]  Yes    Dementia without behavioral disturbance [F03.90]  Yes    Down's syndrome [Q90.9]  Not Applicable      Resolved Hospital Problems    Diagnosis Date Resolved POA   No resolved problems to display.     Temp: 96.7 °F (35.9 °C) (01/16/17 0359)  Pulse: (!) 52 (01/16/17 0659)  Resp: 18 (01/16/17 0659)  BP: 130/84 (01/16/17 0359)  SpO2: 95 % (01/16/17 0659)  Weight: 78.5 kg (173 lb 1 oz) (01/16/17 0500)  Height: 5' 3" (160 cm) (01/12/17 1414)    Subjective:  Symptoms:  Worsening.  He reports shortness of breath, cough, chest pain, weakness and anxiety.    Diet:  Adequate intake.    Activity level: Impaired due to weakness.    Pain:  He complains of pain that is mild.      Objective:  General Appearance:  Ill-appearing.    Vital signs: (most recent): Blood pressure 130/84, pulse (!) 52, temperature 96.7 °F (35.9 °C), temperature source Oral, resp. rate 18, height 5' 3" (1.6 m), weight 78.5 kg (173 lb 1 oz), SpO2 95 %.  Fever.    Output: Producing urine and producing stool.    Lungs:  Tachypnea and increased effort.  He is in respiratory distress.  No stridor.  There are wheezes, rales, rhonchi and decreased breath sounds.    Heart: Tachycardia.  Regular rhythm.  S1 normal and S2 normal.  No murmur, gallop or friction rub.   Chest: Chest wall tenderness present.  Symmetric chest wall expansion.   Extremities: Normal range of motion.  There is dependent edema.  There is no venous stasis, deformity, effusion or local swelling.    Neurological: Patient is alert.  Normal strength.  Patient has normal reflexes, normal muscle tone and normal coordination.    Skin:  Warm and dry.  No rash, cyanosis or ulceration.   Abdomen: Abdomen is soft, flat and non-distended.  There are no signs of " ascites.  Bowel sounds are normal.   There is no abdominal tenderness.     Pupils:  Pupils are equal, round, and reactive to light.    Pulses: Distal pulses are intact.      Assessment:  (Left Lower Lobe Pneumonia   Asthmatic Bronchitis   UTI  Downs syndrome).     Plan:   Much Less rhonchi but breathing improved.       Salas Nevarez MD  1/16/2017

## 2017-01-16 NOTE — PROGRESS NOTES
01/16/17 1608   Reason for Assessment   Reason for Assessment physician consult   Diagnosis pulmonary disease  (pneumonia)   Relevent Medical History Down's syndrome and alzheimerws   Level of Care Medium: 2   Nutrition Risk Screen   Nutrition Risk Screen tube feeding or parenteral nutrition   Nutrition/Diet History   Food Preferences has been eating one meal daily   Meal/Snack Patterns pt will recieve 3 meals and 3 snacks daily while on unit   Typical Activity Patterns sedentary   Functional Status not able to prepare meals;not able to purchase food   Food Allergies (none)   Factors Affecting Nutritional Intake impaired cognitive status/motor control;compromised airway   Nutrition Support Formula Prior to Admit (n/a)   Anthropometrics   Height (cm) 160 cm   Height (inches) 62.99 in   Weight (kg) 78.5 kg   Weight (lb) 173.06 lb   Ideal Body Weight (IBW), Male 123.94 lb   % Ideal Body Weight, Male (lb) 139.63 lb   Adjusted Body Weight 61.7 kg (136 lb)   BMI (kg/m2) 30.66   BMI Grade 30 - 34.9- obesity - grade I   Labs/Tests/Procedures/Meds   Pertinent Labs Reviewed reviewed   Pertinent Labs Comments Alb 2.6;   Pertinent Medications Reviewed reviewed   Physical Findings/Assessment   Overall Physical Appearance overweight   Tubes (PPN)   Oral/Mouth Cavity (needs blended foods)   Estimated/Assessed Needs   Weight Used For Calorie Calculations 78.5 kg (173 lb 1 oz)   Energy Calorie Reqirements (kcal) 1900   Energy Need Method indirect calorimetry   Indirect Calorimetry x 25   25 kcal/kg (kcal) 1962.5   Protein Requirements 80 gm   Weight Used For Protein Calculations 61.7 kg (136 lb)   1.3 gm Protein (gm) 80.36   RDA Method (mL) 1900   Nutrition Prescription Ordered   Current Diet Order low salt, pureed   Nutrition Order Comments good   Current Nutrition Support Formula Ordered (n/a)   Evaluation Of Received Nutrient/Fluid Intake   Parenteral Calories (kcal) 306   Parenteral Protein (gm) 76.5   Parenteral Fluid (mL)  1800   Oral Calories (kcal) (pt eating 25-75%)   Oral Protein (gm) (unknown at this time)   Total Calories (kcal) 1800   Total Calories (kcal/kg) 22.84   Total Protein (gm) 76.5   Total Protein (gm/kg) 0.97   Total Fluid Intake (mL) 1800   Energy Calories Required meeting needs  (needs met only with PPN)   Protein Required meeting needs  (only with PPN)   Malnutrition (Undernutrition) Diagnosis   % Intake of Estimated Energy Needs 50 - 75%   % Meal Intake 50%   Malnutrition Reason chronic   Nutrition Diagnosis   Nutrition Problem Inadequate protein intake   Etiology/Related To hx of Downs and Alz   Nutrition Diagnosis Signs/Symptoms As Evidenced By review labs   Nutrition Diagnosis Status Continues   Additional Nutrition Diagnosis? no   Recommendations   Recommendation/Intervention may need [facility] placement to double check on oral intake.  Will send high protein snacks   Goals to increase oral intake to 75% each meal   Nutrition Goal Status progressing towards goal   Communication of RD Recs (refer to notes)   Monitor and Evaluation   Food and Nutrient Intake food and beverage intake  (will ask staff to record oral intake)   Biochemical Data, Medical Tests and Procedures electrolyte and renal panel;gastrointestinal profile;glucose/endocrine profile;inflammatory profile;lipid profile   Nutrition Follow-up   RD Follow-up? Yes   Next Date to be Seen by RD 01/17/17

## 2017-01-16 NOTE — PROGRESS NOTES
Pt was having difficulty moving air due to secretions built up in back of the throat so sx was set up and pt was suctioned the O2 was also changed from a VM to a HFNC at 7L

## 2017-01-16 NOTE — PT/OT/SLP EVAL
"Physical Therapy  Evaluation    Mack Will   MRN: [id number]   Admitting Diagnosis: Pneumonia of both lungs due to infectious organism    PT Received On: 17  PT Start Time: 1105     PT Stop Time: 1120    PT Total Time (min): 15 min       Billable Minutes:  Evaluation 15 minutes    Diagnosis: Pneumonia of both lungs due to infectious organism      Past Medical History   Diagnosis Date    Alzheimer disease     Down syndrome     Seizure       Past Surgical History   Procedure Laterality Date    Cholecystectomy      Cyst removal         Referring physician: Neeru Mendieta NP  Date referred to PT: 17    General Precautions: Standard, fall  Orthopedic Precautions: N/A     Patient History:  Lives With: facility resident  Living Arrangements: group home  Stair Railings at Home: none  Equipment Currently Used at Home: none  DME owned (not currently used): none    Previous Level of Function:  Ambulation Skills: independent  Transfer Skills: independent  ADL Skills: independent  Work/Leisure Activity: independent    Subjective:  Communicated with patient and family prior to session.    Chief Complaint: "Pt does not verbally communicate or follow commands well." per pt sister  Patient goals: Family goals to increase mobility    Pain Ratin/10                    Objective:   Patient found with: bed alarm, peripheral IV, oxygen, gabriel catheter     Cognitive Exam:  Oriented to: Person    Follows Commands/attention: Pt does not follow commands  Communication: Pt non-verbal and does not follow commands  Safety awareness/insight to disability: impaired    Physical Exam:  Skin integrity: Visible skin intact  Edema: None noted     Sensation:   Intact    Upper Extremity Range of Motion:  Right Upper Extremity: WFL  Left Upper Extremity: WFL    Upper Extremity Strength:  Right Upper Extremity: WFL  Left Upper Extremity: WFL    Lower Extremity Range of Motion:  Right Lower Extremity: WFL  Left Lower Extremity: " WFL    Lower Extremity Strength:  Right Lower Extremity: WFL  Left Lower Extremity: WFL     Fine motor coordination:  Intact    Gross motor coordination: WFL    Functional Mobility:  Bed Mobility:  Rolling/Turning to Left: Total assistance  Rolling/Turning Right: Total assistance  Scooting/Bridging: Total Assistance  Supine to Sit: Total Assistance  Sit to Supine: Total Assistance    Transfers: Pt actively resisting attempts to sit EOB or attempt any OOB mobility.       Gait:          AM-PAC 6 CLICK MOBILITY  How much help from another person does this patient currently need?   1 = Unable, Total/Dependent Assistance  2 = A lot, Maximum/Moderate Assistance  3 = A little, Minimum/Contact Guard/Supervision  4 = None, Modified [place]/Independent    Turning over in bed (including adjusting bedclothes, sheets and blankets)?: 2  Sitting down on and standing up from a chair with arms (e.g., wheelchair, bedside commode, etc.): 1  Moving from lying on back to sitting on the side of the bed?: 1  Moving to and from a bed to a chair (including a wheelchair)?: 1  Need to walk in hospital room?: 1  Climbing 3-5 steps with a railing?: 1  Total Score: 7     AM-PAC Raw Score CMS G-Code Modifier Level of Impairment Assistance   6 % Total / Unable   7 - 9 CM 80 - 100% Maximal Assist   10 - 14 CL 60 - 80% Moderate Assist   15 - 19 CK 40 - 60% Moderate Assist   20 - 22 CJ 20 - 40% Minimal Assist   23 CI 1-20% SBA / CGA   24 CH 0% Independent/ Mod I     Patient left supine with all lines intact, call button in reach, NSG notified and family present.    Assessment:     Factors that may affect patient's status:  [] Patient's age [] Time since onset of injury/illness/exacerbation  []Prior Level of function       [] Current level of function [] Status of current condition [x]Patient's cognitive status and safety concerns      [x] Patient's level of motivation    [] Patient's home situation (environment and family support)  [x]  Objective examination findings [] Goals and goal agreement with the patient        [x] Rehab potential (prognosis) and probable outcome    [] Expected progression of patient    Criteria:     History:    > 3  Personal Factors and/or co-morbidity - 97324  Examination of Body System:  addressing a total of 3 or more elements - 28898  Clinical Presentation:  Evolving - 77756  Clinical Decision Making (Complexity):  Moderate - 35363      On examination of body system using standardized tests and measures patient presents with 3 or more elements from any of the following: body structures and functions, activity limitations, and/or participation restrictions.  Leading to a clinical presentation that is considered evolving with changing characteristics     Mack Will is a 51 y.o. male with a medical diagnosis of Pneumonia of both lungs due to infectious organism and presents with inability to follow commands. No active participation in PT evaluation except actively resisting all bed mobility. Pt with good strength when actively resisting mobility. Pt with increased fall risk and impaired safety awareness. Pt refused to attempt sitting EOB or attempts at OOB activity by screaming and resisting forcefully.    Rehab identified problem list/impairments: Rehab identified problem list/impairments: weakness, impaired endurance, impaired self care skills, impaired functional mobilty, impaired cognition, impaired balance, gait instability, decreased lower extremity function, decreased upper extremity function, decreased safety awareness    Rehab potential is poor.    Activity tolerance: Poor    Discharge recommendations: Discharge Facility/Level Of Care Needs: home with home health, home health PT, home health OT     Barriers to discharge: Barriers to Discharge: None    Equipment recommendations: Equipment Needed After Discharge: none     GOALS:   Physical Therapy Goals        Problem: Physical Therapy Goal    Goal Priority  Disciplines Outcome Goal Variances Interventions   Physical Therapy Goal     PT/OT, PT      Description:  Goals to be met by: upon D/C from facility     Patient will increase functional independence with mobility by performin. Supine to sit with MInimal Assistance  2. Sit to supine with MInimal Assistance  3. Sit to stand transfer with Minimal Assistance  4. Bed to chair transfer with Minimal Assistance   5. Gait  x 20 feet with Minimal Assistance               PLAN:    Patient to be seen  (1-2x/day Monday-Friday; PRN Weekends/Holidays) to address the above listed problems via therapeutic activities, therapeutic exercises, gait training  Plan of Care expires:  (upon D/C from facility)  Plan of Care reviewed with: patient, caregiver          Lulú Sagastume, PT  2017

## 2017-01-16 NOTE — PT/OT/SLP PROGRESS
Speech Language Pathology      Mack Will  MRN: [id number]    Patient not seen today secondary to Patient fatigue (Pt was sleeping and had been agitated much of the night and morning according to sister who was present in the room.  ). Had a discussion with Pts sister regarding potential for aspiration, and signs to watch for during meals.  She stated that she will try to feed him when he wakes but did not want to disturb him now.  Will follow-up tomorrow.    JEAN Seals, CCC-SLP   1/16/2017

## 2017-01-16 NOTE — PLAN OF CARE
Problem: Patient Care Overview  Goal: Plan of Care Review  Outcome: Ongoing (interventions implemented as appropriate)  Recommendations  Recommendation/Intervention may need [facility] placement to double check on oral intake. Will send high protein snacks  Goals to increase oral intake to 75% each meal  Nutrition Goal Status progressing towards goal  Communication of RD Recs (refer to notes)

## 2017-01-16 NOTE — SUBJECTIVE & OBJECTIVE
Review of Systems   Unable to perform ROS: Dementia     Objective:     Vital Signs (Most Recent):  Temp: 97.4 °F (36.3 °C) (01/16/17 1108)  Pulse: 70 (01/16/17 1338)  Resp: 19 (01/16/17 1338)  BP: (!) 147/71 (01/16/17 1108)  SpO2: 96 % (01/16/17 1338) Vital Signs (24h Range):  Temp:  [95.3 °F (35.2 °C)-97.4 °F (36.3 °C)] 97.4 °F (36.3 °C)  Pulse:  [48-82] 70  Resp:  [18-20] 19  SpO2:  [94 %-99 %] 96 %  BP: (115-156)/(60-84) 147/71     Weight: 78.5 kg (173 lb 1 oz)  Body mass index is 30.66 kg/(m^2).    Intake/Output Summary (Last 24 hours) at 01/16/17 1511  Last data filed at 01/16/17 0600   Gross per 24 hour   Intake          1581.25 ml   Output             2100 ml   Net          -518.75 ml      Physical Exam   Constitutional: He appears well-developed. No distress.   HENT:   Head: Normocephalic and atraumatic.   Downs facies   Eyes: Conjunctivae are normal. Pupils are equal, round, and reactive to light. Right eye exhibits no discharge. Left eye exhibits no discharge.   Neck: Normal range of motion. Neck supple. No thyromegaly present.   Cardiovascular: Normal rate, regular rhythm and intact distal pulses.    Murmur heard.  Pulmonary/Chest: Effort normal. He has rhonchi.   Coarse breath sounds throughout without wheezing   Abdominal: Soft. Bowel sounds are normal. He exhibits no distension. There is no tenderness.   Musculoskeletal: Normal range of motion. He exhibits no edema.   Lymphadenopathy:     He has no cervical adenopathy.   Neurological: He is alert.   Skin: Skin is warm and dry. No rash noted.   Psychiatric: He has a normal mood and affect. His behavior is normal.   Nursing note and vitals reviewed.      Significant Labs:   CBC:     Recent Labs  Lab 01/15/17  0630 01/16/17  0514   WBC 8.80 11.29   HGB 12.5* 14.8   HCT 37.0* 44.3    349     CMP:     Recent Labs  Lab 01/15/17  0630 01/16/17  0514    141   K 4.2 4.2    104   CO2 28 22*   * 106   BUN 9 11   CREATININE 0.8 0.8    CALCIUM 8.7 9.1   PROT 6.3 7.1   ALBUMIN 2.2* 2.6*   BILITOT 0.3 0.3   ALKPHOS 129 154*   AST 25 27   ALT 29 28   ANIONGAP 8 15   EGFRNONAA >60 >60

## 2017-01-16 NOTE — PT/OT/SLP PROGRESS
Physical Therapy  Treatment    Mack Will   MRN: [id number]   Admitting Diagnosis: Pneumonia of both lungs due to infectious organism    PT Received On: 17  PT Start Time: 1415     PT Stop Time: 1445    PT Total Time (min): 30 min       Billable Minutes:  Therapeutic Activity 30 minutes    Treatment Type: Treatment  PT/PTA: PT             General Precautions: Standard, fall  Orthopedic Precautions: N/A   Braces:           Subjective:  Communicated with patient and family prior to session.    Pain Ratin/10                   Objective:   Patient found with: bed alarm, peripheral IV, oxygen    Functional Mobility:  Bed Mobility:   Rolling/Turning to Left: Maximum assistance  Rolling/Turning Right: Maximum assistance  Scooting/Bridging: Maximum Assistance  Supine to Sit: Maximum Assistance (x 3 people)  Sit to Supine: Maximum Assistance (x 3 people)    Balance:   Static Sit: 0: Needs MAXIMAL assist to maintain sitting without back support    Therapeutic Activities and Exercises:  Pt sat EOB x 3 minutes with total assist x 3 people with multiple forms of cueing for task completion. Pt actively pushing backward while sitting and screaming.      AM-PAC 6 CLICK MOBILITY  How much help from another person does this patient currently need?   1 = Unable, Total/Dependent Assistance  2 = A lot, Maximum/Moderate Assistance  3 = A little, Minimum/Contact Guard/Supervision  4 = None, Modified [place]/Independent    Turning over in bed (including adjusting bedclothes, sheets and blankets)?: 2  Sitting down on and standing up from a chair with arms (e.g., wheelchair, bedside commode, etc.): 1  Moving from lying on back to sitting on the side of the bed?: 2  Moving to and from a bed to a chair (including a wheelchair)?: 1  Need to walk in hospital room?: 1  Climbing 3-5 steps with a railing?: 1  Total Score: 8    AM-PAC Raw Score CMS G-Code Modifier Level of Impairment Assistance   6 % Total / Unable   7 - 9 CM  80 - 100% Maximal Assist   10 - 14 CL 60 - 80% Moderate Assist   15 - 19 CK 40 - 60% Moderate Assist   20 - 22 CJ 20 - 40% Minimal Assist   23 CI 1-20% SBA / CGA   24 CH 0% Independent/ Mod I     Patient left supine with all lines intact, call button in reach, NSG notified and family present.    Assessment:  Mack Will is a 51 y.o. male with a medical diagnosis of Pneumonia of both lungs due to infectious organism and presents with poor active participation this PM. Pt very drowsy through treatment session. Pt actively resisting all mobility attempts.    Rehab identified problem list/impairments: Rehab identified problem list/impairments: weakness, impaired endurance, impaired self care skills, impaired functional mobilty, gait instability, impaired balance, decreased lower extremity function, decreased safety awareness, impaired cognition    Rehab potential is poor.    Activity tolerance: Poor    Discharge recommendations: Discharge Facility/Level Of Care Needs: home with home health     Barriers to discharge: Barriers to Discharge: None    Equipment recommendations: Equipment Needed After Discharge: none     GOALS:   Physical Therapy Goals        Problem: Physical Therapy Goal    Goal Priority Disciplines Outcome Goal Variances Interventions   Physical Therapy Goal     PT/OT, PT      Description:  Goals to be met by: upon D/C from facility     Patient will increase functional independence with mobility by performin. Supine to sit with MInimal Assistance  2. Sit to supine with MInimal Assistance  3. Sit to stand transfer with Minimal Assistance  4. Bed to chair transfer with Minimal Assistance   5. Gait  x 20 feet with Minimal Assistance               PLAN:    Patient to be seen  (1-2x/day Monday-Friday; PRN Weekends/Holidays)  to address the above listed problems via gait training, therapeutic activities, therapeutic exercises  Plan of Care expires:  (upon D/C from facility)  Plan of Care reviewed with:  patient, caregiver         Lulú Sagastume, PT  01/16/2017

## 2017-01-16 NOTE — PLAN OF CARE
Problem: Patient Care Overview  Goal: Plan of Care Review  Outcome: Ongoing (interventions implemented as appropriate)  Pt doing well. He is not cooperating  Today. He has been asleep or pretending to sleep most of the day. No question/concerns at this time from family. Family agrees with poc. No pain/falls.  Iv abx azito and zoysn on board. [place] is removed awaiting pt to pee

## 2017-01-16 NOTE — PROGRESS NOTES
Patient almost back to baseline according to brother. Awake and alert; interacting with family; eating; speaking select words.

## 2017-01-16 NOTE — ASSESSMENT & PLAN NOTE
Given dose of rocephin and azithromycin in Er.  Continue azithromycin and change rocephin to zosyn to cover for aspiration pna; day 5 today.   With mental status and poor ability to communicate.   Swallow eval done; no gross swallowing diff noted.     CT ; NO PE   Bilateral infiltrates : bilateral pneumonia   No more fever, elevation in WBC-continue same antibx, 10 days total antibx needed

## 2017-01-16 NOTE — CONSULTS
IP consult to dietary  Consult performed by: HOMER THURSTON  Consult ordered by: RAFAEL MENCHACA  Assessment/Recommendations: OUSMANE Thurston Registered Dietitian Signed Nutrition Progress Notes         Hide copied text  Jose De Jesusver for attribution information      01/16/17 9117  Reason for Assessment  Reason for Assessment physician consult  Diagnosis pulmonary disease  (pneumonia)  Relevent Medical History Down's syndrome and alzheimerws  Level of Care Medium: 2  Nutrition Risk Screen  Nutrition Risk Screen tube feeding or parenteral nutrition  Nutrition/Diet History  Food Preferences has been eating one meal daily  Meal/Snack Patterns pt will recieve 3 meals and 3 snacks daily while on unit  Typical Activity Patterns sedentary  Functional Status not able to prepare meals;not able to purchase food  Food Allergies (none)  Factors Affecting Nutritional Intake impaired cognitive status/motor control;compromised airway  Nutrition Support Formula Prior to Admit (n/a)  Anthropometrics  Height (cm) 160 cm  Height (inches) 62.99 in  Weight (kg) 78.5 kg  Weight (lb) 173.06 lb  Ideal Body Weight (IBW), Male 123.94 lb  % Ideal Body Weight, Male (lb) 139.63 lb  Adjusted Body Weight 61.7 kg (136 lb)  BMI (kg/m2) 30.66  BMI Grade 30 - 34.9- obesity - grade I  Labs/Tests/Procedures/Meds  Pertinent Labs Reviewed reviewed  Pertinent Labs Comments Alb 2.6;  Pertinent Medications Reviewed reviewed  Physical Findings/Assessment  Overall Physical Appearance overweight  Tubes (PPN)  Oral/Mouth Cavity (needs blended foods)  Estimated/Assessed Needs  Weight Used For Calorie Calculations 78.5 kg (173 lb 1 oz)  Energy Calorie Reqirements (kcal) 1900  Energy Need Method indirect calorimetry  Indirect Calorimetry x 25  25 kcal/kg (kcal) 1962.5  Protein Requirements 80 gm  Weight Used For Protein Calculations 61.7 kg (136 lb)  1.3 gm Protein (gm) 80.36  RDA Method (mL) 1900  Nutrition Prescription Ordered  Current Diet Order low salt,  pureed  Nutrition Order Comments good  Current Nutrition Support Formula Ordered (n/a)  Evaluation Of Received Nutrient/Fluid Intake  Parenteral Calories (kcal) 306  Parenteral Protein (gm) 76.5  Parenteral Fluid (mL) 1800  Oral Calories (kcal) (pt eating 25-75%)  Oral Protein (gm) (unknown at this time)  Total Calories (kcal) 1800  Total Calories (kcal/kg) 22.84  Total Protein (gm) 76.5  Total Protein (gm/kg) 0.97  Total Fluid Intake (mL) 1800  Energy Calories Required meeting needs  (needs met only with PPN)  Protein Required meeting needs  (only with PPN)  Malnutrition (Undernutrition) Diagnosis  % Intake of Estimated Energy Needs 50 - 75%  % Meal Intake 50%  Malnutrition Reason chronic  Nutrition Diagnosis  Nutrition Problem Inadequate protein intake  Etiology/Related To hx of Downs and Alz  Nutrition Diagnosis Signs/Symptoms As Evidenced By review labs  Nutrition Diagnosis Status Continues  Additional Nutrition Diagnosis? no  Recommendations  Recommendation/Intervention may need [facility] placement to double check on oral intake. Will send high protein snacks  Goals to increase oral intake to 75% each meal  Nutrition Goal Status progressing towards goal  Communication of RD Recs (refer to notes)  Monitor and Evaluation  Food and Nutrient Intake food and beverage intake  (will ask staff to record oral intake)  Biochemical Data, Medical Tests and Procedures electrolyte and renal panel;gastrointestinal profile;glucose/endocrine profile;inflammatory profile;lipid profile  Nutrition Follow-up  RD Follow-up? Yes  Next Date to be Seen by RD 01/17/17

## 2017-01-17 ENCOUNTER — HOSPITAL ENCOUNTER (INPATIENT)
Facility: HOSPITAL | Age: 52
LOS: 3 days | Discharge: ANOTHER HOSPITAL | DRG: 193 | End: 2017-01-20
Attending: FAMILY MEDICINE | Admitting: INTERNAL MEDICINE
Payer: MEDICARE

## 2017-01-17 VITALS
SYSTOLIC BLOOD PRESSURE: 105 MMHG | RESPIRATION RATE: 18 BRPM | BODY MASS INDEX: 30.66 KG/M2 | HEART RATE: 58 BPM | WEIGHT: 173.06 LBS | DIASTOLIC BLOOD PRESSURE: 64 MMHG | TEMPERATURE: 95 F | HEIGHT: 63 IN | OXYGEN SATURATION: 95 %

## 2017-01-17 DIAGNOSIS — I61.4 CEREBELLAR HEMORRHAGE, ACUTE: Primary | ICD-10-CM

## 2017-01-17 DIAGNOSIS — I61.9 CEREBROVASCULAR ACCIDENT (CVA) WITH INTRACRANIAL HEMORRHAGE: ICD-10-CM

## 2017-01-17 DIAGNOSIS — J18.9 PNEUMONIA: ICD-10-CM

## 2017-01-17 LAB
ALBUMIN SERPL BCP-MCNC: 2.5 G/DL
ALP SERPL-CCNC: 157 U/L
ALT SERPL W/O P-5'-P-CCNC: 30 U/L
ANION GAP SERPL CALC-SCNC: 10 MMOL/L
AST SERPL-CCNC: 26 U/L
BASOPHILS # BLD AUTO: 0.03 K/UL
BASOPHILS NFR BLD: 0.3 %
BILIRUB SERPL-MCNC: 0.2 MG/DL
BUN SERPL-MCNC: 11 MG/DL
CALCIUM SERPL-MCNC: 9.4 MG/DL
CHLORIDE SERPL-SCNC: 103 MMOL/L
CO2 SERPL-SCNC: 28 MMOL/L
CREAT SERPL-MCNC: 0.7 MG/DL
DIFFERENTIAL METHOD: ABNORMAL
EOSINOPHIL # BLD AUTO: 0.1 K/UL
EOSINOPHIL NFR BLD: 0.6 %
ERYTHROCYTE [DISTWIDTH] IN BLOOD BY AUTOMATED COUNT: 14.3 %
EST. GFR  (AFRICAN AMERICAN): >60 ML/MIN/1.73 M^2
EST. GFR  (NON AFRICAN AMERICAN): >60 ML/MIN/1.73 M^2
GLUCOSE SERPL-MCNC: 106 MG/DL
HCT VFR BLD AUTO: 43.3 %
HGB BLD-MCNC: 14.7 G/DL
LYMPHOCYTES # BLD AUTO: 1.5 K/UL
LYMPHOCYTES NFR BLD: 15.4 %
MCH RBC QN AUTO: 30.7 PG
MCHC RBC AUTO-ENTMCNC: 33.9 %
MCV RBC AUTO: 90 FL
MONOCYTES # BLD AUTO: 1 K/UL
MONOCYTES NFR BLD: 10.2 %
NEUTROPHILS # BLD AUTO: 7.2 K/UL
NEUTROPHILS NFR BLD: 73.5 %
PLATELET # BLD AUTO: 382 K/UL
PMV BLD AUTO: 12 FL
POTASSIUM SERPL-SCNC: 3.5 MMOL/L
PROT SERPL-MCNC: 7.1 G/DL
RBC # BLD AUTO: 4.79 M/UL
SODIUM SERPL-SCNC: 141 MMOL/L
WBC # BLD AUTO: 9.79 K/UL

## 2017-01-17 PROCEDURE — 25000003 PHARM REV CODE 250: Performed by: SURGERY

## 2017-01-17 PROCEDURE — 94761 N-INVAS EAR/PLS OXIMETRY MLT: CPT

## 2017-01-17 PROCEDURE — 85025 COMPLETE CBC W/AUTO DIFF WBC: CPT

## 2017-01-17 PROCEDURE — C9113 INJ PANTOPRAZOLE SODIUM, VIA: HCPCS | Performed by: INTERNAL MEDICINE

## 2017-01-17 PROCEDURE — 27000221 HC OXYGEN, UP TO 24 HOURS

## 2017-01-17 PROCEDURE — 63600175 PHARM REV CODE 636 W HCPCS: Performed by: FAMILY MEDICINE

## 2017-01-17 PROCEDURE — 25000242 PHARM REV CODE 250 ALT 637 W/ HCPCS: Performed by: INTERNAL MEDICINE

## 2017-01-17 PROCEDURE — 25000003 PHARM REV CODE 250: Performed by: NURSE PRACTITIONER

## 2017-01-17 PROCEDURE — 25000003 PHARM REV CODE 250: Performed by: FAMILY MEDICINE

## 2017-01-17 PROCEDURE — 63600175 PHARM REV CODE 636 W HCPCS: Performed by: INTERNAL MEDICINE

## 2017-01-17 PROCEDURE — 94640 AIRWAY INHALATION TREATMENT: CPT

## 2017-01-17 PROCEDURE — 25000242 PHARM REV CODE 250 ALT 637 W/ HCPCS: Performed by: NURSE PRACTITIONER

## 2017-01-17 PROCEDURE — 11000004 HC SNF PRIVATE

## 2017-01-17 PROCEDURE — 97530 THERAPEUTIC ACTIVITIES: CPT

## 2017-01-17 PROCEDURE — 94668 MNPJ CHEST WALL SBSQ: CPT

## 2017-01-17 PROCEDURE — 36415 COLL VENOUS BLD VENIPUNCTURE: CPT

## 2017-01-17 PROCEDURE — 99900031 HC PATIENT EDUCATION (STAT)

## 2017-01-17 PROCEDURE — 80053 COMPREHEN METABOLIC PANEL: CPT

## 2017-01-17 PROCEDURE — 99238 HOSP IP/OBS DSCHRG MGMT 30/<: CPT | Mod: ,,, | Performed by: INTERNAL MEDICINE

## 2017-01-17 PROCEDURE — 63600175 PHARM REV CODE 636 W HCPCS: Performed by: NURSE PRACTITIONER

## 2017-01-17 PROCEDURE — 27000339 *HC DAILY SUPPLY KIT

## 2017-01-17 PROCEDURE — 97110 THERAPEUTIC EXERCISES: CPT

## 2017-01-17 RX ORDER — HYDROCODONE BITARTRATE AND ACETAMINOPHEN 5; 325 MG/1; MG/1
1 TABLET ORAL EVERY 4 HOURS PRN
Status: CANCELLED | OUTPATIENT
Start: 2017-01-17

## 2017-01-17 RX ORDER — GUAIFENESIN 600 MG/1
600 TABLET, EXTENDED RELEASE ORAL 2 TIMES DAILY
Status: DISCONTINUED | OUTPATIENT
Start: 2017-01-17 | End: 2017-01-17 | Stop reason: HOSPADM

## 2017-01-17 RX ORDER — ONDANSETRON 2 MG/ML
4 INJECTION INTRAMUSCULAR; INTRAVENOUS EVERY 8 HOURS PRN
Status: DISCONTINUED | OUTPATIENT
Start: 2017-01-17 | End: 2017-01-20 | Stop reason: HOSPADM

## 2017-01-17 RX ORDER — PANTOPRAZOLE SODIUM 40 MG/10ML
40 INJECTION, POWDER, LYOPHILIZED, FOR SOLUTION INTRAVENOUS DAILY
Status: CANCELLED | OUTPATIENT
Start: 2017-01-18

## 2017-01-17 RX ORDER — KETOROLAC TROMETHAMINE 30 MG/ML
30 INJECTION, SOLUTION INTRAMUSCULAR; INTRAVENOUS EVERY 6 HOURS PRN
Status: ACTIVE | OUTPATIENT
Start: 2017-01-17 | End: 2017-01-17

## 2017-01-17 RX ORDER — TAMSULOSIN HYDROCHLORIDE 0.4 MG/1
0.4 CAPSULE ORAL DAILY
Status: DISCONTINUED | OUTPATIENT
Start: 2017-01-18 | End: 2017-01-20 | Stop reason: HOSPADM

## 2017-01-17 RX ORDER — OXCARBAZEPINE 150 MG/1
300 TABLET, FILM COATED ORAL 2 TIMES DAILY
Status: CANCELLED | OUTPATIENT
Start: 2017-01-17

## 2017-01-17 RX ORDER — TAMSULOSIN HYDROCHLORIDE 0.4 MG/1
0.4 CAPSULE ORAL DAILY
Status: CANCELLED | OUTPATIENT
Start: 2017-01-18

## 2017-01-17 RX ORDER — GUAIFENESIN 600 MG/1
600 TABLET, EXTENDED RELEASE ORAL 2 TIMES DAILY
Status: DISCONTINUED | OUTPATIENT
Start: 2017-01-17 | End: 2017-01-20 | Stop reason: HOSPADM

## 2017-01-17 RX ORDER — ALBUTEROL SULFATE 2.5 MG/.5ML
2.5 SOLUTION RESPIRATORY (INHALATION) EVERY 6 HOURS
Status: DISCONTINUED | OUTPATIENT
Start: 2017-01-17 | End: 2017-01-20 | Stop reason: HOSPADM

## 2017-01-17 RX ORDER — PANTOPRAZOLE SODIUM 40 MG/10ML
40 INJECTION, POWDER, LYOPHILIZED, FOR SOLUTION INTRAVENOUS DAILY
Status: DISCONTINUED | OUTPATIENT
Start: 2017-01-18 | End: 2017-01-20 | Stop reason: HOSPADM

## 2017-01-17 RX ORDER — HYDROCODONE BITARTRATE AND ACETAMINOPHEN 5; 325 MG/1; MG/1
1 TABLET ORAL EVERY 4 HOURS PRN
Status: DISCONTINUED | OUTPATIENT
Start: 2017-01-17 | End: 2017-01-20 | Stop reason: HOSPADM

## 2017-01-17 RX ORDER — ACETAMINOPHEN 325 MG/1
650 TABLET ORAL EVERY 8 HOURS PRN
Status: DISCONTINUED | OUTPATIENT
Start: 2017-01-17 | End: 2017-01-20 | Stop reason: HOSPADM

## 2017-01-17 RX ORDER — ALBUTEROL SULFATE 2.5 MG/.5ML
2.5 SOLUTION RESPIRATORY (INHALATION) EVERY 6 HOURS
Status: CANCELLED | OUTPATIENT
Start: 2017-01-17

## 2017-01-17 RX ORDER — ONDANSETRON 2 MG/ML
4 INJECTION INTRAMUSCULAR; INTRAVENOUS EVERY 8 HOURS PRN
Status: CANCELLED | OUTPATIENT
Start: 2017-01-17

## 2017-01-17 RX ORDER — OXCARBAZEPINE 150 MG/1
300 TABLET, FILM COATED ORAL 2 TIMES DAILY
Status: DISCONTINUED | OUTPATIENT
Start: 2017-01-17 | End: 2017-01-20 | Stop reason: HOSPADM

## 2017-01-17 RX ORDER — ENOXAPARIN SODIUM 100 MG/ML
40 INJECTION SUBCUTANEOUS EVERY 24 HOURS
Status: CANCELLED | OUTPATIENT
Start: 2017-01-17

## 2017-01-17 RX ORDER — KETOROLAC TROMETHAMINE 30 MG/ML
30 INJECTION, SOLUTION INTRAMUSCULAR; INTRAVENOUS EVERY 6 HOURS PRN
Status: CANCELLED | OUTPATIENT
Start: 2017-01-17 | End: 2017-01-17

## 2017-01-17 RX ORDER — ENOXAPARIN SODIUM 100 MG/ML
40 INJECTION SUBCUTANEOUS EVERY 24 HOURS
Status: DISCONTINUED | OUTPATIENT
Start: 2017-01-18 | End: 2017-01-19

## 2017-01-17 RX ORDER — ACETAMINOPHEN 325 MG/1
650 TABLET ORAL EVERY 8 HOURS PRN
Status: CANCELLED | OUTPATIENT
Start: 2017-01-17

## 2017-01-17 RX ORDER — GUAIFENESIN 600 MG/1
600 TABLET, EXTENDED RELEASE ORAL 2 TIMES DAILY
Status: CANCELLED | OUTPATIENT
Start: 2017-01-17

## 2017-01-17 RX ADMIN — PIPERACILLIN AND TAZOBACTAM 3.38 G: 3; .375 INJECTION, POWDER, LYOPHILIZED, FOR SOLUTION INTRAVENOUS; PARENTERAL at 09:01

## 2017-01-17 RX ADMIN — GUAIFENESIN 600 MG: 600 TABLET, EXTENDED RELEASE ORAL at 09:01

## 2017-01-17 RX ADMIN — LEUCINE, PHENYLALANINE, LYSINE, METHIONINE, ISOLEUCINE, VALINE, HISTIDINE, THREONINE, TRYPTOPHAN, ALANINE, GLYCINE, ARGININE, PROLINE, SERINE, TYROSINE, DEXTROSE 1000 ML: 311; 238; 247; 170; 255; 247; 204; 179; 77; 880; 438; 489; 289; 213; 17; 5 INJECTION INTRAVENOUS at 09:01

## 2017-01-17 RX ADMIN — TAMSULOSIN HYDROCHLORIDE 0.4 MG: 0.4 CAPSULE ORAL at 08:01

## 2017-01-17 RX ADMIN — ALBUTEROL SULFATE 2.5 MG: 2.5 SOLUTION RESPIRATORY (INHALATION) at 07:01

## 2017-01-17 RX ADMIN — ALBUTEROL SULFATE 2.5 MG: 2.5 SOLUTION RESPIRATORY (INHALATION) at 12:01

## 2017-01-17 RX ADMIN — ALBUTEROL SULFATE 2.5 MG: 2.5 SOLUTION RESPIRATORY (INHALATION) at 06:01

## 2017-01-17 RX ADMIN — PIPERACILLIN AND TAZOBACTAM 3.38 G: 3; .375 INJECTION, POWDER, LYOPHILIZED, FOR SOLUTION INTRAVENOUS; PARENTERAL at 05:01

## 2017-01-17 RX ADMIN — ENOXAPARIN SODIUM 40 MG: 40 INJECTION, SOLUTION INTRAVENOUS; SUBCUTANEOUS at 01:01

## 2017-01-17 RX ADMIN — AZITHROMYCIN MONOHYDRATE 500 MG: 500 INJECTION, POWDER, LYOPHILIZED, FOR SOLUTION INTRAVENOUS at 02:01

## 2017-01-17 RX ADMIN — PIPERACILLIN AND TAZOBACTAM 3.38 G: 3; .375 INJECTION, POWDER, LYOPHILIZED, FOR SOLUTION INTRAVENOUS; PARENTERAL at 01:01

## 2017-01-17 RX ADMIN — PANTOPRAZOLE SODIUM 40 MG: 40 INJECTION, POWDER, FOR SOLUTION INTRAVENOUS at 08:01

## 2017-01-17 NOTE — PT/OT/SLP PROGRESS
Physical Therapy  Treatment    Mack Will   MRN: [id number]   Admitting Diagnosis: Pneumonia of both lungs due to infectious organism    PT Received On: 17  PT Start Time: 1310     PT Stop Time: 1330    PT Total Time (min): 20 min       Billable Minutes:  Therapeutic Exercise 20 minutes    Treatment Type: Treatment  PT/PTA: PT             General Precautions: Standard, aspiration, fall  Orthopedic Precautions: N/A   Braces:           Subjective:  Communicated with patient prior to session.  Pt. Had no verbalization this p.m.  Increased eye opening and increased alertness noted.    Pain Ratin/10              Pain Rating Post-Intervention: 0/10    Objective:   Patient found with: bed alarm, peripheral IV    Functional Mobility:  Bed Mobility:   Rolling/Turning to Left: Maximum assistance  Rolling/Turning Right: Maximum assistance  Scooting/Bridging: Maximum Assistance       Therapeutic Activities and Exercises:  There. Ex.:  Bed mobility tasks performed with max. A.  VC's given to pt. With manual guidance and tactile cues for sequencing.  Pt. Performed A/A exercises of BLE, while supine in bed, for N.M. Tone and strength and fluid dynamics. 1-on-1 BLE HC stretches performed with pt.  BLE: ankle pumps, hip abd/add, heel slides (2x15) performed with pt.     AM-PAC 6 CLICK MOBILITY  How much help from another person does this patient currently need?   1 = Unable, Total/Dependent Assistance  2 = A lot, Maximum/Moderate Assistance  3 = A little, Minimum/Contact Guard/Supervision  4 = None, Modified [place]/Independent    Turning over in bed (including adjusting bedclothes, sheets and blankets)?: 2  Sitting down on and standing up from a chair with arms (e.g., wheelchair, bedside commode, etc.): 2  Moving from lying on back to sitting on the side of the bed?: 2  Moving to and from a bed to a chair (including a wheelchair)?: 1  Need to walk in hospital room?: 1  Climbing 3-5 steps with a railing?: 1  Total  Score: 9    AM-PAC Raw Score CMS G-Code Modifier Level of Impairment Assistance   6 % Total / Unable   7 - 9 CM 80 - 100% Maximal Assist   10 - 14 CL 60 - 80% Moderate Assist   15 - 19 CK 40 - 60% Moderate Assist   20 - 22 CJ 20 - 40% Minimal Assist   23 CI 1-20% SBA / CGA   24 CH 0% Independent/ Mod I     Patient left supine with all lines intact, call button in reach and RN notified.    Assessment:  Mack Will is a 51 y.o. male with a medical diagnosis of Pneumonia of both lungs due to infectious organism and presents with increased alertness this p.m. And increased participation.  Please see PT discharge note.  Anticipated pt. D/C to Swing Bed Unit.      Rehab identified problem list/impairments: Rehab identified problem list/impairments: weakness, impaired balance, decreased safety awareness, impaired endurance, impaired functional mobilty, gait instability, decreased lower extremity function, impaired cognition    Rehab potential is poor.    Activity tolerance: Poor    Discharge recommendations: Discharge Facility/Level Of Care Needs: nursing facility, skilled     Barriers to discharge: Barriers to Discharge: None    Equipment recommendations: Equipment Needed After Discharge: none     GOALS:   Physical Therapy Goals        Problem: Physical Therapy Goal    Goal Priority Disciplines Outcome Goal Variances Interventions   Physical Therapy Goal     PT/OT, PT      Description:  Goals to be met by: upon D/C from facility     Patient will increase functional independence with mobility by performin. Supine to sit with MInimal Assistance  2. Sit to supine with MInimal Assistance  3. Sit to stand transfer with Minimal Assistance  4. Bed to chair transfer with Minimal Assistance   5. Gait  x 20 feet with Minimal Assistance               PLAN:    Patient to be seen  (PRN(Sat.,Sun.);1-2x/day(M-F))  to address the above listed problems via gait training, therapeutic activities, therapeutic  exercises  Plan of Care expires: 01/17/17 (Following p.m. PT tx. session)  Plan of Care reviewed with: patient         Matthew Riggs, PT  01/17/2017

## 2017-01-17 NOTE — PROGRESS NOTES
[facility] Hospital Medicine  Progress Note    Patient Name: Mack Will  MRN: [id number]  Patient Class: IP- Inpatient   Admission Date: 1/12/2017  Length of Stay: 5 days  Attending Physician: Makenzie Mckeon MD  Primary Care Provider: Chetan Alvarez MD        Subjective:     Principal Problem:Pneumonia of both lungs due to infectious organism    HPI:  51 year old non verbal and demented Downs syndrome patient was sent here via ambulance from his group home because of worsening cough, fever and fatigue. His condition was called to Dr. Alvarez who advised ER evaluation.  Admitted for pneumia ; presently on IV antibiotics      Hospital Course:  Patient was seen and evaluated in the ER and was found to be hypoxic and in respiratory distress. He was admitted to floor with left lower lobe pneumonia. Placed on zithromax and zosyn-day 5 today. Questionable aspiration. Speech consulted for swallow assessment, limited eval, but no gross aspiration noted. CT scan; no PE. Bilateral infiltrates. No longer having a fever. Pt's WBC down to normal and remains there    He is non verbal now-had limited speech before. Most info comes from brother at bedside    Pt had traumatic gabriel placement for urinary retention. Started on Flomax. He voided last night and again this am a large amount.    He is eating minimal even with family's coaxing. We have changed his IVF to clinimix on Sunday. Brother at bedside he will try and push today.     Pt has not gotten out of bed, was previously walking. PT started. Resistant to them but when he was ready he got into bed by himself              Review of Systems   Unable to perform ROS: Dementia     Objective:     Vital Signs (Most Recent):  Temp: 96.1 °F (35.6 °C) (01/17/17 0740)  Pulse: (!) 58 (01/17/17 0740)  Resp: 16 (01/17/17 0740)  BP: 123/65 (01/17/17 0740)  SpO2: 97 % (01/17/17 0740) Vital Signs (24h Range):  Temp:  [95.3 °F (35.2 °C)-97.6 °F (36.4 °C)] 96.1 °F  (35.6 °C)  Pulse:  [46-80] 58  Resp:  [16-22] 16  SpO2:  [91 %-97 %] 97 %  BP: (107-156)/(61-91) 123/65     Weight: 78.5 kg (173 lb 1 oz)  Body mass index is 30.66 kg/(m^2).    Intake/Output Summary (Last 24 hours) at 01/17/17 0826  Last data filed at 01/17/17 0602   Gross per 24 hour   Intake           1217.5 ml   Output              900 ml   Net            317.5 ml      Physical Exam   Constitutional: He appears well-developed. No distress.   HENT:   Head: Normocephalic and atraumatic.   Downs facies   Eyes: Conjunctivae are normal. Pupils are equal, round, and reactive to light. Right eye exhibits no discharge. Left eye exhibits no discharge.   Neck: Normal range of motion. Neck supple. No thyromegaly present.   Cardiovascular: Normal rate, regular rhythm and intact distal pulses.    Murmur heard.  Pulmonary/Chest: Effort normal. He has rhonchi.   Coarse breath sounds throughout without wheezing   Abdominal: Soft. Bowel sounds are normal. He exhibits no distension. There is no tenderness.   Musculoskeletal: Normal range of motion. He exhibits no edema.   Lymphadenopathy:     He has no cervical adenopathy.   Neurological: He is alert.   Skin: Skin is warm and dry. No rash noted.   Psychiatric: He has a normal mood and affect. His behavior is normal.   Nursing note and vitals reviewed.      Significant Labs:   CBC:     Recent Labs  Lab 01/16/17  0514 01/17/17  0537   WBC 11.29 9.79   HGB 14.8 14.7   HCT 44.3 43.3    382*     CMP:     Recent Labs  Lab 01/16/17  0514 01/17/17  0537    141   K 4.2 3.5    103   CO2 22* 28    106   BUN 11 11   CREATININE 0.8 0.7   CALCIUM 9.1 9.4   PROT 7.1 7.1   ALBUMIN 2.6* 2.5*   BILITOT 0.3 0.2   ALKPHOS 154* 157*   AST 27 26   ALT 28 30   ANIONGAP 15 10   EGFRNONAA >60 >60       Recent Labs  Lab 01/13/17  1225   TROPONINI <0.006     D dimer 1.72    BNP    Recent Labs  Lab 01/12/17  1223   BNP 14         Flu -    U/a -    KUB Nonspecific bowel gas pattern  without evidence of focal obstruction     CXR The cardiac silhouette is within normal limits.  Bilateral lower lobe infiltrates.  No acute osseous abnormality    CT chest r/o PE   1.  No CT evidence of central pulmonary embolus.  Mid and distal branch emboli cannot be excluded due to the timing of the bolus of contrast and extensive patient motion artifact.  2.  Bilateral lower lobe infiltrates greater on the right than the left.    Assessment/Plan:      * Pneumonia of both lungs due to infectious organism  Given dose of rocephin and azithromycin in Er.  Continue azithromycin and change rocephin to zosyn to cover for aspiration pna; day 6 today.   With mental status and poor ability to communicate.   Swallow eval done; no gross swallowing diff noted.     CT ; NO PE   Bilateral infiltrates : bilateral pneumonia   No more fever, elevation in WBC-continue same antibx, 10 days total antibx needed  SWING today for cont IV abx and PT/OT    Down's syndrome  Non verbal, difficult to communicate, unsure what patient is understanding.     Normal state is minimal communication, walking, voiding on his own.       Seizure disorder  Continue oxcarbazepine      Dementia without behavioral disturbance  See down's plan      Sepsis  Likely secondary to pna but with urinary retention, some concern for bladder etiology.  On pathway.  Continue abx.  Repeat Lactic Acid wnl.   Not eating well-changed to clinimix for nutrition Sunday    Urinary retention  S/p foleys catheter    toradol ordered for pain     d/c gabriel for now and watch for urinary retention; cont flomax      Debility  Has yet to get OOB; usually walking; OOB today per PT/OT      VTE Risk Mitigation         Ordered     enoxaparin injection 40 mg  Daily     Route:  Subcutaneous        01/13/17 1008     Medium Risk of VTE  Once      01/12/17 1409          Hunter Morfin MD  Department of Hospital Medicine   [facility]

## 2017-01-17 NOTE — PLAN OF CARE
Problem: Fall Risk (Adult)  Goal: Absence of Falls  Patient will demonstrate the desired outcomes by discharge/transition of care.   Outcome: Ongoing (interventions implemented as appropriate)  Bed alarm in use. MELANIE Mercado RN    Problem: Patient Care Overview  Goal: Plan of Care Review  Outcome: Ongoing (interventions implemented as appropriate)  Awake at intervals and hollering out. Quiets down when spoken to in a soothing voice. Congested cough with scattered rhonchi and rales though out entire chest. Resp. Tech.  doing PT to chest and tolerating well. O2 per N/C @ 4 l/min with O2 sats in the mid 90's. Tolerating  Clinimix and IV antibiotics well. Diapers in use and voided large amounts X 2 . Turned every 2 hours. Bed alarm in use. MELANIE Mercado RN    Problem: Pressure Ulcer Risk (Morales Scale) (Adult,Obstetrics,Pediatric)  Goal: Identify Related Risk Factors and Signs and Symptoms  Related risk factors and signs and symptoms are identified upon initiation of Human Response Clinical Practice Guideline (CPG)   Outcome: Ongoing (interventions implemented as appropriate)  Turned every 2 hours. Skin care with incontinence. MELANIE Mercado RN

## 2017-01-17 NOTE — PT/OT/SLP PROGRESS
Occupational Therapy      Mack Will  MRN: [id number]    Patient not seen today secondary to Patient unwilling to participate, Other (Comment) (Therapist with help of nursing tried to get patient to participate.  Pt resisted movement of B UE and LE, attempt to assess LE dressing and feeding. Pt open eyes when name called, but close eyes when commands given .). Will follow-up 01/18/2017.    Bess Collier, OT  1/17/2017

## 2017-01-17 NOTE — PT/OT/SLP PROGRESS
Physical Therapy  Treatment    Mack Will   MRN: [id number]   Admitting Diagnosis: Pneumonia of both lungs due to infectious organism    PT Received On: 17  PT Start Time: 0945     PT Stop Time: 1009    PT Total Time (min): 24 min       Billable Minutes:  Therapeutic Activity 24 minutes    Treatment Type: Treatment  PT/PTA: PT             General Precautions: Standard, aspiration, fall  Orthopedic Precautions: N/A   Braces:           Subjective:  Communicated with patient prior to session.  No pt. Verbalization this a.m.    Pain Ratin/10              Pain Rating Post-Intervention: 0/10    Objective:   Patient found with: bed alarm, peripheral IV    Functional Mobility:  Bed Mobility:   Rolling/Turning to Left: Maximum assistance  Rolling/Turning Right: Maximum assistance  Scooting/Bridging: Maximum Assistance  Supine to Sit: Maximum Assistance  Sit to Supine: Maximum Assistance    Transfers:  Sit <> Stand Assistance: Maximum Assistance  Sit <> Stand Assistive Device: No Assistive Device    Gait:   Gait Distance:  (U/A to initiate)        Balance:   Static Sit: 0: Needs MAXIMAL assist to maintain sitting without back support  Dynamic Sit: POOR: N/A  Static Stand: 0: Needs MAXIMAL assist to maintain   Dynamic stand: POOR: N/A     Therapeutic Activities and Exercises:  There. Act.:  Weight shifting and weight acceptance tasks performed in standing with max. A. X 2.  Trunk control tasks performed with max. A. X 2, while pt. Seated EOB.  Transfer Training:  Pt. Transferred sit <> stand with HHA, max. A. X 2, x 4 bouts.  VC's and manual guidance and tactile cues given to pt. For sequencing.  Bed mobility tasks performed with max. A. X 2 and VC's and manual guidance for sequencing.  VC's given to pt. To increase alertness and participation.     AM-PAC 6 CLICK MOBILITY  How much help from another person does this patient currently need?   1 = Unable, Total/Dependent Assistance  2 = A lot, Maximum/Moderate  Assistance  3 = A little, Minimum/Contact Guard/Supervision  4 = None, Modified [place]/Independent    Turning over in bed (including adjusting bedclothes, sheets and blankets)?: 2  Sitting down on and standing up from a chair with arms (e.g., wheelchair, bedside commode, etc.): 2  Moving from lying on back to sitting on the side of the bed?: 2  Moving to and from a bed to a chair (including a wheelchair)?: 1  Need to walk in hospital room?: 1  Climbing 3-5 steps with a railing?: 1  Total Score: 9    AM-PAC Raw Score CMS G-Code Modifier Level of Impairment Assistance   6 % Total / Unable   7 - 9 CM 80 - 100% Maximal Assist   10 - 14 CL 60 - 80% Moderate Assist   15 - 19 CK 40 - 60% Moderate Assist   20 - 22 CJ 20 - 40% Minimal Assist   23 CI 1-20% SBA / CGA   24 CH 0% Independent/ Mod I     Patient left left sidelying with all lines intact, call button in reach, RN notified and Family present.    Assessment:  Mack Will is a 51 y.o. male with a medical diagnosis of Pneumonia of both lungs due to infectious organism and presents with increased participation with PT and increased mobility with sit<>stand transfers.  Pt. Is progressing towards PT POC goals.    Rehab identified problem list/impairments: Rehab identified problem list/impairments: weakness, impaired balance, decreased safety awareness, impaired endurance, impaired self care skills, impaired functional mobilty, gait instability, decreased lower extremity function, impaired cognition    Rehab potential is poor.    Activity tolerance: Poor    Discharge recommendations: Discharge Facility/Level Of Care Needs: nursing facility, basic     Barriers to discharge: Barriers to Discharge: None    Equipment recommendations: Equipment Needed After Discharge: none     GOALS:   Physical Therapy Goals        Problem: Physical Therapy Goal    Goal Priority Disciplines Outcome Goal Variances Interventions   Physical Therapy Goal     PT/OT, PT       Description:  Goals to be met by: upon D/C from facility     Patient will increase functional independence with mobility by performin. Supine to sit with MInimal Assistance  2. Sit to supine with MInimal Assistance  3. Sit to stand transfer with Minimal Assistance  4. Bed to chair transfer with Minimal Assistance   5. Gait  x 20 feet with Minimal Assistance               PLAN:    Patient to be seen  (PRN(Sat.,Sun.);1-2x/day(M-F))  to address the above listed problems via gait training, therapeutic activities, therapeutic exercises  Plan of Care expires:  (Upon D/C from facility)  Plan of Care reviewed with: patient, family         Matthew Schusternard, PT  2017

## 2017-01-17 NOTE — ASSESSMENT & PLAN NOTE
Given dose of rocephin and azithromycin in Er.  Continue azithromycin and change rocephin to zosyn to cover for aspiration pna; day 6 today.   With mental status and poor ability to communicate.   Swallow eval done; no gross swallowing diff noted.     CT ; NO PE   Bilateral infiltrates : bilateral pneumonia   No more fever, elevation in WBC-continue same antibx, 10 days total antibx needed  SWING today for cont IV abx and PT/OT

## 2017-01-17 NOTE — PLAN OF CARE
Problem: Patient Care Overview  Goal: Plan of Care Review  Outcome: Ongoing (interventions implemented as appropriate)  Pt doing well. Family has no question/concerns at this time. Family agrees with poc. No pain/falls.  PT/OT/ST on case. Pt moved to swing today. Iv abx and clinmix. Pt got up in rommel chair today for 1-2 hours. He also stood 3 times with pt with max assistance.

## 2017-01-17 NOTE — PLAN OF CARE
01/17/17 1343   Final Note   Assessment Type Final Discharge Note   Discharge Disposition Swing Bed   Right Care Referral Info   Post Acute Recommendation SNF

## 2017-01-17 NOTE — ASSESSMENT & PLAN NOTE
Non verbal, difficult to communicate, unsure what patient is understanding.     Normal state is minimal communication, walking, voiding on his own.     Brother at bedside, discussed POC, verbalized understanding

## 2017-01-17 NOTE — NURSING
Report received and assessment completed. Pretending to be asleep, does flutter eye lids when spoken to and does slightly resist his eye being opened to complete Neuro checks. Skin warm and dry and moving all extremities. Clinimix IV patent and infusing per pump. Bruises and swelling, small blisters and possible tears to scrotum.  noted to scrotum. With scattered bruises to extremities. Diaper in place and has not voided since am shift. Will monitor urinary output. Supine position & will turn every 2 hours. MELANIE Mercado RN

## 2017-01-17 NOTE — PROGRESS NOTES
"Mack Will is a 51 y.o. male patient.sleeping minimal snoring    Active Hospital Problems    Diagnosis  POA    *Pneumonia of both lungs due to infectious organism [J18.9]  Yes    Debility [R53.81]  Yes    Sepsis [A41.9]  Yes    Urinary retention [R33.9]  Yes    Dementia without behavioral disturbance [F03.90]  Yes    Seizure disorder [G40.909]  Yes    Down's syndrome [Q90.9]  Not Applicable      Resolved Hospital Problems    Diagnosis Date Resolved POA   No resolved problems to display.     Temp: 96.1 °F (35.6 °C) (01/17/17 0740)  Pulse: (!) 58 (01/17/17 0740)  Resp: 16 (01/17/17 0740)  BP: 123/65 (01/17/17 0740)  SpO2: 97 % (01/17/17 0740)  Weight: 78.5 kg (173 lb 1 oz) (01/16/17 0500)  Height: 5' 3" (160 cm) (01/12/17 1414)    Subjective:  Symptoms:  Worsening.  He reports shortness of breath, cough, chest pain, weakness and anxiety.    Diet:  Adequate intake.    Activity level: Impaired due to weakness.    Pain:  He complains of pain that is mild.      Objective:  General Appearance:  Ill-appearing.    Vital signs: (most recent): Blood pressure 123/65, pulse (!) 58, temperature 96.1 °F (35.6 °C), temperature source Axillary, resp. rate 16, height 5' 3" (1.6 m), weight 78.5 kg (173 lb 1 oz), SpO2 97 %.  Vital signs are normal.  Fever.    Output: Producing urine and producing stool.    Lungs:  Tachypnea and increased effort.  He is not in respiratory distress.  No stridor.  There are decreased breath sounds.  No wheezes, rales or rhonchi (sounds of secretions are from the throat ).    Heart: Tachycardia.  Regular rhythm.  S1 normal and S2 normal.  No murmur, gallop or friction rub.   Chest: Chest wall tenderness present.  Symmetric chest wall expansion.   Extremities: Normal range of motion.  There is dependent edema.  There is no venous stasis, deformity, effusion or local swelling.    Neurological: Patient is alert and oriented to person, place and time.  Normal strength.  Patient has normal reflexes, " normal muscle tone and normal coordination.    Skin:  Warm and dry.  No rash, ecchymosis, cyanosis or ulceration.   Abdomen: Abdomen is soft, flat and non-distended.  There are no signs of ascites.  Bowel sounds are normal.   There is no abdominal tenderness.   There is no mass.   Pupils:  Pupils are equal, round, and reactive to light.    Pulses: Distal pulses are intact.      Assessment:  (Left Lower Lobe Pneumonia resolved  Asthmatic Bronchitis   UTI  Downs syndrome).     Plan:   No rhonchi  No Wheezing and  breathing much improved  Catheter removed patient with good urine Output.       Salas Nevarez MD  1/17/2017

## 2017-01-17 NOTE — SUBJECTIVE & OBJECTIVE
Review of Systems   Unable to perform ROS: Dementia     Objective:     Vital Signs (Most Recent):  Temp: 96.1 °F (35.6 °C) (01/17/17 0740)  Pulse: (!) 58 (01/17/17 0740)  Resp: 16 (01/17/17 0740)  BP: 123/65 (01/17/17 0740)  SpO2: 97 % (01/17/17 0740) Vital Signs (24h Range):  Temp:  [95.3 °F (35.2 °C)-97.6 °F (36.4 °C)] 96.1 °F (35.6 °C)  Pulse:  [46-80] 58  Resp:  [16-22] 16  SpO2:  [91 %-97 %] 97 %  BP: (107-156)/(61-91) 123/65     Weight: 78.5 kg (173 lb 1 oz)  Body mass index is 30.66 kg/(m^2).    Intake/Output Summary (Last 24 hours) at 01/17/17 0826  Last data filed at 01/17/17 0602   Gross per 24 hour   Intake           1217.5 ml   Output              900 ml   Net            317.5 ml      Physical Exam   Constitutional: He appears well-developed. No distress.   HENT:   Head: Normocephalic and atraumatic.   Downs facies   Eyes: Conjunctivae are normal. Pupils are equal, round, and reactive to light. Right eye exhibits no discharge. Left eye exhibits no discharge.   Neck: Normal range of motion. Neck supple. No thyromegaly present.   Cardiovascular: Normal rate, regular rhythm and intact distal pulses.    Murmur heard.  Pulmonary/Chest: Effort normal. He has rhonchi.   Coarse breath sounds throughout without wheezing   Abdominal: Soft. Bowel sounds are normal. He exhibits no distension. There is no tenderness.   Musculoskeletal: Normal range of motion. He exhibits no edema.   Lymphadenopathy:     He has no cervical adenopathy.   Neurological: He is alert.   Skin: Skin is warm and dry. No rash noted.   Psychiatric: He has a normal mood and affect. His behavior is normal.   Nursing note and vitals reviewed.      Significant Labs:   CBC:     Recent Labs  Lab 01/16/17  0514 01/17/17  0537   WBC 11.29 9.79   HGB 14.8 14.7   HCT 44.3 43.3    382*     CMP:     Recent Labs  Lab 01/16/17  0514 01/17/17  0537    141   K 4.2 3.5    103   CO2 22* 28    106   BUN 11 11   CREATININE 0.8 0.7    CALCIUM 9.1 9.4   PROT 7.1 7.1   ALBUMIN 2.6* 2.5*   BILITOT 0.3 0.2   ALKPHOS 154* 157*   AST 27 26   ALT 28 30   ANIONGAP 15 10   EGFRNONAA >60 >60       Recent Labs  Lab 01/13/17  1225   TROPONINI <0.006     D dimer 1.72    BNP    Recent Labs  Lab 01/12/17  1223   BNP 14         Flu -    U/a -    KUB Nonspecific bowel gas pattern without evidence of focal obstruction     CXR The cardiac silhouette is within normal limits.  Bilateral lower lobe infiltrates.  No acute osseous abnormality    CT chest r/o PE   1.  No CT evidence of central pulmonary embolus.  Mid and distal branch emboli cannot be excluded due to the timing of the bolus of contrast and extensive patient motion artifact.  2.  Bilateral lower lobe infiltrates greater on the right than the left.

## 2017-01-17 NOTE — PT/OT/SLP DISCHARGE
Physical Therapy Discharge Summary    Mack Will  MRN: [id number]   Pneumonia of both lungs due to infectious organism   Patient Discharged from acute Physical Therapy on 2017 following p.m. PT tx. session.  Please refer to prior PT noted date on 2017 for functional status.     Assessment:   Patient appropriate for care in another setting.  GOALS:   Physical Therapy Goals        Problem: Physical Therapy Goal    Goal Priority Disciplines Outcome Goal Variances Interventions   Physical Therapy Goal     PT/OT, PT      Description:  Goals to be met by: upon D/C from facility     Patient will increase functional independence with mobility by performin. Supine to sit with MInimal Assistance  2. Sit to supine with MInimal Assistance  3. Sit to stand transfer with Minimal Assistance  4. Bed to chair transfer with Minimal Assistance   5. Gait  x 20 feet with Minimal Assistance             Reasons for Discontinuation of Therapy Services  Transfer to alternate level of care.      Plan:  Patient Discharged to: Swing Bed Unit.

## 2017-01-17 NOTE — ASSESSMENT & PLAN NOTE
Given dose of rocephin and azithromycin in Er.  Continue azithromycin and change rocephin to zosyn to cover for aspiration pna; day 6 today. - will SWING today to cont getting 10 days  With mental status and poor ability to communicate.   Swallow eval done; no gross swallowing diff noted.     CT ; NO PE   Bilateral infiltrates : bilateral pneumonia   No more fever, elevation in WBC-continue same antibx, 10 days total antibx needed  SWING today for cont IV abx and PT/OT

## 2017-01-17 NOTE — DISCHARGE SUMMARY
[facility] Medicine  Discharge Summary      Patient Name: Mack Will  MRN: [id number]  Admission Date: 1/12/2017  Hospital Length of Stay: 5 days  Discharge Date and Time: 1/17/2017 9:21 AM  Attending Physician: Makenzie Mckeon MD   Discharging Provider: Eva Brasher NP  Primary Care Provider: Chetan Alvarez MD      HPI:   51 year old non verbal and demented Downs syndrome patient was sent here via ambulance from his group home because of worsening cough, fever and fatigue. His condition was called to Dr. Alvarez who advised ER evaluation.  Admitted for pneumia ; presently on IV antibiotics      * No surgery found *      Indwelling Lines/Drains at time of discharge:   Lines/Drains/Airways          No matching active lines, drains, or airways        Hospital Course:   Patient was seen and evaluated in the ER and was found to be hypoxic and in respiratory distress. He was admitted to floor with left lower lobe pneumonia. Placed on zithromax and zosyn-day 5 today. Questionable aspiration. Speech consulted for swallow assessment, limited eval, but no gross aspiration noted. CT scan; no PE. Bilateral infiltrates. No longer having a fever. Pt's WBC down to normal and remains there    He is non verbal now-had limited speech before. Most info comes from brother at bedside    Pt had traumatic gabriel placement for urinary retention. Started on Flomax. He voided last night and again this am a large amount.    He is eating minimal even with family's coaxing. We have changed his IVF to clinimix on Sunday. Brother at bedside he will try and push today.     Pt has not gotten out of bed, was previously walking. PT started. Resistant to them but when he was ready he got into bed by himself           Consults:   Consults         Status Ordering Provider     Inpatient consult to Pulmonology  Once     Provider:  Salas Nevarez MD    Completed EVA BRASHER consult to case management   Once     Provider:  (Not yet assigned)    Completed ARASH CARSON consult to dietary  Once     Provider:  (Not yet assigned)    Completed RAFAEL MENCHACA          Significant Diagnostic Studies:     CBC:      Recent Labs  Lab 01/16/17  0514 01/17/17  0537   WBC 11.29 9.79   HGB 14.8 14.7   HCT 44.3 43.3    382*      CMP:      Recent Labs  Lab 01/16/17  0514 01/17/17  0537    141   K 4.2 3.5    103   CO2 22* 28    106   BUN 11 11   CREATININE 0.8 0.7   CALCIUM 9.1 9.4   PROT 7.1 7.1   ALBUMIN 2.6* 2.5*   BILITOT 0.3 0.2   ALKPHOS 154* 157*   AST 27 26   ALT 28 30   ANIONGAP 15 10   EGFRNONAA >60 >60         Recent Labs  Lab 01/13/17  1225   TROPONINI <0.006      D dimer 1.72     BNP     Recent Labs  Lab 01/12/17  1223   BNP 14            Flu -     U/a -     KUB Nonspecific bowel gas pattern without evidence of focal obstruction      CXR The cardiac silhouette is within normal limits.  Bilateral lower lobe infiltrates.  No acute osseous abnormality     CT chest r/o PE   1.  No CT evidence of central pulmonary embolus.  Mid and distal branch emboli cannot be excluded due to the timing of the bolus of contrast and extensive patient motion artifact.  2.  Bilateral lower lobe infiltrates greater on the right than the left.      Pending Diagnostic Studies:     None        Final Active Diagnoses:    Diagnosis Date Noted POA    PRINCIPAL PROBLEM:  Pneumonia of both lungs due to infectious organism [J18.9] 01/12/2017 Yes    Debility [R53.81] 01/16/2017 Yes    Sepsis [A41.9] 01/12/2017 Yes    Urinary retention [R33.9] 01/12/2017 Yes    Dementia without behavioral disturbance [F03.90] 07/18/2016 Yes    Seizure disorder [G40.909] 03/28/2015 Yes    Down's syndrome [Q90.9] 03/26/2015 Not Applicable      Problems Resolved During this Admission:    Diagnosis Date Noted Date Resolved POA      * Pneumonia of both lungs due to infectious organism  Given dose of rocephin and azithromycin in  Er.  Continue azithromycin and change rocephin to zosyn to cover for aspiration pna; day 6 today. - will SWING today to cont getting 10 days  With mental status and poor ability to communicate.   Swallow eval done; no gross swallowing diff noted.     CT ; NO PE   Bilateral infiltrates : bilateral pneumonia   No more fever, elevation in WBC-continue same antibx, 10 days total antibx needed  SWING today for cont IV abx and PT/OT    Down's syndrome  Non verbal, difficult to communicate, unsure what patient is understanding.     Normal state is minimal communication, walking, voiding on his own.     Brother at bedside, discussed POC, verbalized understanding      Seizure disorder  Continue oxcarbazepine      Dementia without behavioral disturbance  See down's plan      Sepsis  Likely secondary to pna but with urinary retention, some concern for bladder etiology.  On pathway.  Continue abx.  Repeat Lactic Acid wnl.   Not eating well-changed to clinimix for nutrition Sunday consider d/cing once taking po    Urinary retention  S/p foleys catheter    toradol ordered for pain     d/c gabriel for now and watch for urinary retention; cont flomax      Debility   usually walking; OOB yesterday with resistance with PT  Cont on SWING bed  Also add OT        Discharged Condition: fair    Disposition: Swing Bed    Follow Up:  Follow-up Information     Follow up with Chetan Alvarez MD.    Specialty:  Family Medicine    Why:  Outpatient Services    Contact information:    [facility] [place] [address] [phone]          Patient Instructions:   No discharge procedures on file.  Medications:  Transfer Medications (for Discharge Readmit only):   Current Facility-Administered Medications   Medication Dose Route Frequency Provider Last Rate Last Dose    acetaminophen tablet 650 mg  650 mg Oral Q8H PRN Dax Zavala MD        albuterol sulfate nebulizer solution 2.5 mg  2.5 mg Nebulization Q6H Salas Nevarez MD   2.5 mg at  01/17/17 0737    Amino acid 4.25% - dextrose 5% (CLINIMIX) solution  1,000 mL Intravenous Continuous Neeru Mendieta NP 75 mL/hr at 01/17/17 0602 1,000 mL at 01/17/17 0602    azithromycin 500 mg in dextrose 5 % 250 mL IVPB (ready to mix system)  500 mg Intravenous Q24H Dax Zavala MD   Stopped at 01/16/17 1600    enoxaparin injection 40 mg  40 mg Subcutaneous Daily Eva Brasher NP   40 mg at 01/16/17 1200    guaifenesin 12 hr tablet 600 mg  600 mg Oral BID Eva Brasher NP   600 mg at 01/17/17 0854    hydrocodone-acetaminophen 5-325mg per tablet 1 tablet  1 tablet Oral Q4H PRN Dax Zavala MD   1 tablet at 01/15/17 0834    ketorolac injection 30 mg  30 mg Intravenous Q6H PRN Hunter Morfin MD   30 mg at 01/16/17 0101    ondansetron injection 4 mg  4 mg Intravenous Q8H PRN Dax Zavala MD        oxcarbazepine tablet 300 mg  300 mg Oral BID Dax Zavala MD   300 mg at 01/17/17 0854    pantoprazole injection 40 mg  40 mg Intravenous Daily Hunter Morfin MD   40 mg at 01/17/17 0854    piperacillin-tazobactam 3.375 g in dextrose 5 % 50 mL IVPB (ready to mix system)  3.375 g Intravenous Q8H Makenzie Mckeon MD   Stopped at 01/17/17 0601    promethazine (PHENERGAN) 12.5 mg in dextrose 5 % 50 mL IVPB  12.5 mg Intravenous Q6H PRN Dax Zavala MD        tamsulosin 24 hr capsule 0.4 mg  0.4 mg Oral Daily Makenzie Mckeon MD   0.4 mg at 01/17/17 0854     Time spent on the discharge of patient: 20 minutes    Eva Brasher NP  Department of Hospital Medicine  [facility]

## 2017-01-17 NOTE — ASSESSMENT & PLAN NOTE
S/p foleys catheter    toradol ordered for pain     d/c gabriel for now and watch for urinary retention; cont flomax

## 2017-01-17 NOTE — PT/OT/SLP PROGRESS
Speech Language Pathology  Treatment    Mack Will   MRN: [id number]   Admitting Diagnosis: Pneumonia of both lungs due to infectious organism    Diet recommendations: Solid Diet Level: Puree  Liquid Diet Level: Thin Feed only when awake/alert, Check for pocketing/oral residue and Meds crushed in puree    SLP Treatment Date: 17  Speech Start Time: 930     Speech Stop Time: 942     Speech Total (min): 12 min       TREATMENT BILLABLE MINUTES:  Treatment Swallowing Dysfunction  10 minutes    Has the patient been evaluated by SLP for swallowing? : Yes  Keep patient NPO?: No   General Precautions: Standard, aspiration, fall  Current Respiratory Status:  (trials of room air)       Subjective:  Pt sleeping initially with brother and sister present.      Pain Ratin/10 (no pain behaviors noted)    Objective:   Attempted to waken/arouse patient. Pt with no positive response to environmental or positioning changes, tactile cueing, or verbal encouragement.  Pt kept eyes closed throughout all attempts. Pt raised his eyebrows when he heard footsteps toward him, he tightened his lips when anything was placed near his mouth, he pulled his arms away X1.   Placed the spoon in Pts hand to elicit an independent action.  Offered encouragement to Pt to feed himself.  He did not make any attempt to hold spoon or  a piece of ice to eat.      Assessment:  Mack Will is a 51 y.o. male with a medical diagnosis of Pneumonia of both lungs due to infectious organism and presents with decreased PO intake related to cooperation.  Nursing reported they would like Pt to be seated in a chair today to improve alertness/cooperation.     Discharge recommendations: Discharge Facility/Level Of Care Needs: nursing facility, skilled     Goals:   SLP Goals        Problem: SLP Goal    Goal Priority Disciplines Outcome   SLP Goal     SLP Ongoing (interventions implemented as appropriate)   Description:    1) Pt will tolerate least  restrictive diet              Plan:   Patient to be seen Therapy Frequency: 3 x/week, 4 x/week, 5 x/week   Plan of Care expires: 01/27/17  Plan of Care reviewed with: patient, sibling  SLP Follow-up?: Yes  SLP - Next Visit Date: 01/18/17      JEAN Seals, CCC-SLP  01/17/2017

## 2017-01-17 NOTE — ASSESSMENT & PLAN NOTE
Likely secondary to pna but with urinary retention, some concern for bladder etiology.  On pathway.  Continue abx.  Repeat Lactic Acid wnl.   Not eating well-changed to clinimix for nutrition Sunday consider d/cing once taking po

## 2017-01-18 LAB
BACTERIA BLD CULT: NORMAL
BACTERIA BLD CULT: NORMAL

## 2017-01-18 PROCEDURE — 27000221 HC OXYGEN, UP TO 24 HOURS

## 2017-01-18 PROCEDURE — 99232 SBSQ HOSP IP/OBS MODERATE 35: CPT | Mod: ,,, | Performed by: FAMILY MEDICINE

## 2017-01-18 PROCEDURE — 94668 MNPJ CHEST WALL SBSQ: CPT

## 2017-01-18 PROCEDURE — 94667 MNPJ CHEST WALL 1ST: CPT

## 2017-01-18 PROCEDURE — 27000339 *HC DAILY SUPPLY KIT

## 2017-01-18 PROCEDURE — 25000242 PHARM REV CODE 250 ALT 637 W/ HCPCS: Performed by: NURSE PRACTITIONER

## 2017-01-18 PROCEDURE — 11000004 HC SNF PRIVATE

## 2017-01-18 PROCEDURE — C9113 INJ PANTOPRAZOLE SODIUM, VIA: HCPCS | Performed by: NURSE PRACTITIONER

## 2017-01-18 PROCEDURE — 94761 N-INVAS EAR/PLS OXIMETRY MLT: CPT

## 2017-01-18 PROCEDURE — 25000003 PHARM REV CODE 250: Performed by: NURSE PRACTITIONER

## 2017-01-18 PROCEDURE — 94640 AIRWAY INHALATION TREATMENT: CPT

## 2017-01-18 PROCEDURE — 99900035 HC TECH TIME PER 15 MIN (STAT)

## 2017-01-18 PROCEDURE — 63600175 PHARM REV CODE 636 W HCPCS: Performed by: NURSE PRACTITIONER

## 2017-01-18 RX ORDER — MEGESTROL ACETATE 40 MG/ML
200 SUSPENSION ORAL DAILY
Status: DISCONTINUED | OUTPATIENT
Start: 2017-01-18 | End: 2017-01-20 | Stop reason: HOSPADM

## 2017-01-18 RX ADMIN — ALBUTEROL SULFATE 2.5 MG: 2.5 SOLUTION RESPIRATORY (INHALATION) at 01:01

## 2017-01-18 RX ADMIN — ALBUTEROL SULFATE 2.5 MG: 2.5 SOLUTION RESPIRATORY (INHALATION) at 07:01

## 2017-01-18 RX ADMIN — TAMSULOSIN HYDROCHLORIDE 0.4 MG: 0.4 CAPSULE ORAL at 09:01

## 2017-01-18 RX ADMIN — ALBUTEROL SULFATE 2.5 MG: 2.5 SOLUTION RESPIRATORY (INHALATION) at 12:01

## 2017-01-18 RX ADMIN — PIPERACILLIN AND TAZOBACTAM 3.38 G: 3; .375 INJECTION, POWDER, LYOPHILIZED, FOR SOLUTION INTRAVENOUS; PARENTERAL at 05:01

## 2017-01-18 RX ADMIN — PANTOPRAZOLE SODIUM 40 MG: 40 INJECTION, POWDER, FOR SOLUTION INTRAVENOUS at 09:01

## 2017-01-18 RX ADMIN — OXCARBAZEPINE 300 MG: 150 TABLET, FILM COATED ORAL at 04:01

## 2017-01-18 RX ADMIN — AZITHROMYCIN MONOHYDRATE 500 MG: 500 INJECTION, POWDER, LYOPHILIZED, FOR SOLUTION INTRAVENOUS at 02:01

## 2017-01-18 RX ADMIN — ENOXAPARIN SODIUM 40 MG: 40 INJECTION, SOLUTION INTRAVENOUS; SUBCUTANEOUS at 01:01

## 2017-01-18 RX ADMIN — PIPERACILLIN AND TAZOBACTAM 3.38 G: 3; .375 INJECTION, POWDER, LYOPHILIZED, FOR SOLUTION INTRAVENOUS; PARENTERAL at 09:01

## 2017-01-18 RX ADMIN — LEUCINE, PHENYLALANINE, LYSINE, METHIONINE, ISOLEUCINE, VALINE, HISTIDINE, THREONINE, TRYPTOPHAN, ALANINE, GLYCINE, ARGININE, PROLINE, SERINE, TYROSINE, DEXTROSE 1000 ML: 311; 238; 247; 170; 255; 247; 204; 179; 77; 880; 438; 489; 289; 213; 17; 5 INJECTION INTRAVENOUS at 07:01

## 2017-01-18 RX ADMIN — LEUCINE, PHENYLALANINE, LYSINE, METHIONINE, ISOLEUCINE, VALINE, HISTIDINE, THREONINE, TRYPTOPHAN, ALANINE, GLYCINE, ARGININE, PROLINE, SERINE, TYROSINE, DEXTROSE 1000 ML: 311; 238; 247; 170; 255; 247; 204; 179; 77; 880; 438; 489; 289; 213; 17; 5 INJECTION INTRAVENOUS at 03:01

## 2017-01-18 RX ADMIN — PIPERACILLIN AND TAZOBACTAM 3.38 G: 3; .375 INJECTION, POWDER, LYOPHILIZED, FOR SOLUTION INTRAVENOUS; PARENTERAL at 01:01

## 2017-01-18 RX ADMIN — GUAIFENESIN 600 MG: 600 TABLET, EXTENDED RELEASE ORAL at 04:01

## 2017-01-18 NOTE — PLAN OF CARE
Problem: Pressure Ulcer Risk (Morales Scale) (Adult,Obstetrics,Pediatric)  Intervention: Promote/Optimize Nutrition  Recommendations   Recommendation/Intervention 1. Continue Puree Low Sodium diet with assistance, strong encouragement of intake   2. High protein snacks to be provided   3. Continue Clinimax until PO intake is >50%   Nutrition Goal Status progressing towards goal

## 2017-01-18 NOTE — NURSING
Patient coughing frequently, could hear the sputum moving. O2 sat 89-91 on 9L of O2. Respiratory therapist called to bedside. Repositioned patient to sit higher in bed. Suction performed by the respiratory therapist. O2 sat 91. Patient stable upon leaving the room.

## 2017-01-18 NOTE — ASSESSMENT & PLAN NOTE
Very minimal communication, brother at bedside. Pt responsive to him which is his usual  Not eating very much even with brother coaxing. Clinimix infusion cont

## 2017-01-18 NOTE — NURSING
Report received. Patient resting in bed. Family at bedside. Call bell within reach. Clinimix infusing. Side rails up. Bed locked and low.

## 2017-01-18 NOTE — PROGRESS NOTES
Patient transferred to skill unit.  Based on his discharge planning assessment of 1/13/17 his discharge plan is to return to the group home.  His brother and sister have been visiting him on a daily basis and show support.  A surrogate declaration has been completed by family.   will follow for any needs that may arise.

## 2017-01-18 NOTE — PROGRESS NOTES
Inpatient Speech Pathology Note        [id number]  Initially Pt having a bath.  Waited for PCT's to complete. They reported that he did not open his eyes and only moaned once.  Attempted to waken Pt via multiple methods, pulled sheets, mild painful stimuli to ear lobe-pulled away but did not respond otherwise, elevated HOB, lights on, straw to lips, tickled feet, verbal stimuli, and encouragement.  Pt with no other appropriate response. Unable to complete evaluation at this time. Consider alternate means of nutrition if Pt response/cooperation does not improve.  Will re-attempt tomorrow 01/19/17.    JEAN Seals, CCC-SLP  1/18/2017

## 2017-01-18 NOTE — PT/OT/SLP PROGRESS
Physical Therapy      Mack Will  MRN: [id number]    Patient not seen today secondary to Unavailable (Comment) (Pt having a bath. ST waiting to perform skilled eval when finished his bath.). Will follow-up 01/18/2017 in PM.    Lulú Sagastume, PT

## 2017-01-18 NOTE — SUBJECTIVE & OBJECTIVE
Past Medical History   Diagnosis Date    Alzheimer disease     Down syndrome     Seizure        Past Surgical History   Procedure Laterality Date    Cholecystectomy      Cyst removal         Review of patient's allergies indicates:  No Known Allergies    Current Facility-Administered Medications on File Prior to Encounter   Medication    [DISCONTINUED] acetaminophen tablet 650 mg    [DISCONTINUED] albuterol sulfate nebulizer solution 2.5 mg    [DISCONTINUED] Amino acid 4.25% - dextrose 5% (CLINIMIX) solution    [DISCONTINUED] azithromycin 500 mg in dextrose 5 % 250 mL IVPB (ready to mix system)    [DISCONTINUED] enoxaparin injection 40 mg    [DISCONTINUED] guaifenesin 12 hr tablet 600 mg    [DISCONTINUED] hydrocodone-acetaminophen 5-325mg per tablet 1 tablet    [DISCONTINUED] ketorolac injection 30 mg    [DISCONTINUED] ondansetron injection 4 mg    [DISCONTINUED] oxcarbazepine tablet 300 mg    [DISCONTINUED] pantoprazole injection 40 mg    [DISCONTINUED] piperacillin-tazobactam 3.375 g in dextrose 5 % 50 mL IVPB (ready to mix system)    [DISCONTINUED] promethazine (PHENERGAN) 12.5 mg in dextrose 5 % 50 mL IVPB    [DISCONTINUED] tamsulosin 24 hr capsule 0.4 mg     Current Outpatient Prescriptions on File Prior to Encounter   Medication Sig    clotrimazole (LOTRIMIN) 1 % cream Apply topically 2 (two) times daily.    cyanocobalamin, vitamin B-12, (VITAMIN B-12) 50 mcg tablet Take 50 mcg by mouth once daily.     mupirocin (BACTROBAN) 2 % ointment Apply topically 2 (two) times daily.    oxcarbazepine (TRILEPTAL) 300 MG Tab Take 1 tablet (300 mg total) by mouth 2 (two) times daily.    trazodone (DESYREL) 50 MG tablet Take 1 tablet (50 mg total) by mouth every evening.     Family History     Problem Relation (Age of Onset)    Heart disease Father        Social History Main Topics    Smoking status: Never Smoker    Smokeless tobacco: Never Used    Alcohol use No    Drug use: Not on file     Sexual activity: Not on file     Review of Systems   Unable to perform ROS: Dementia     Objective:     Vital Signs (Most Recent):  Temp: 96.8 °F (36 °C) (01/18/17 0358)  Pulse: 87 (01/18/17 0717)  Resp: (!) 22 (01/18/17 0717)  BP: (!) 143/70 (01/18/17 0358)  SpO2: (!) 92 % (01/18/17 0717) Vital Signs (24h Range):  Temp:  [95.3 °F (35.2 °C)-97.7 °F (36.5 °C)] 96.8 °F (36 °C)  Pulse:  [55-87] 87  Resp:  [18-22] 22  SpO2:  [85 %-98 %] 92 %  BP: (105-153)/(64-91) 143/70     Weight: 79.8 kg (176 lb)  Body mass index is 31.18 kg/(m^2).    Physical Exam   Constitutional: He appears well-developed. No distress.   HENT:   Head: Normocephalic and atraumatic.   Downs facies   Eyes: Conjunctivae are normal. Pupils are equal, round, and reactive to light. Right eye exhibits no discharge. Left eye exhibits no discharge.   Neck: Normal range of motion. Neck supple. No thyromegaly present.   Cardiovascular: Normal rate, regular rhythm and intact distal pulses.    Murmur heard.  Pulmonary/Chest: Effort normal. He has rhonchi.   Coarse breath sounds throughout without wheezing   Abdominal: Soft. Bowel sounds are normal. He exhibits no distension. There is no tenderness.   Musculoskeletal: Normal range of motion. He exhibits no edema.   Lymphadenopathy:     He has no cervical adenopathy.   Neurological:   Somnolent. Opens eyes, tries to talk but is not audible, very soft    Skin: Skin is warm and dry. No rash noted.   Psychiatric: He has a normal mood and affect. His behavior is normal.   Nursing note and vitals reviewed.       Significant Labs:   Lab Results   Component Value Date    WBC 9.79 01/17/2017    HGB 14.7 01/17/2017    HCT 43.3 01/17/2017    MCV 90 01/17/2017     (H) 01/17/2017     BMP  Lab Results   Component Value Date     01/17/2017    K 3.5 01/17/2017     01/17/2017    CO2 28 01/17/2017    BUN 11 01/17/2017    CREATININE 0.7 01/17/2017    CALCIUM 9.4 01/17/2017    ANIONGAP 10 01/17/2017     ESTGFRAFRICA >60 01/17/2017    EGFRNONAA >60 01/17/2017     Lab Results   Component Value Date    ALT 30 01/17/2017    AST 26 01/17/2017    ALKPHOS 157 (H) 01/17/2017    BILITOT 0.2 01/17/2017     Blood cultures NGTD x 2    KUB Nonspecific bowel gas pattern without evidence of focal obstruction.    CXR Left retrocardiac opacity is noted which could be related to atelectasis, aspiration or pneumonia.  Mild chronic lung changes are seen.  The heart is normal in size.  The skeletal structures are intact.    CTA chest   1.  No CT evidence of central pulmonary embolus.  Mid and distal branch emboli cannot be excluded due to the timing of the bolus of contrast and extensive patient motion artifact.  2.  Bilateral lower lobe infiltrates greater on the right than the left.

## 2017-01-18 NOTE — PROGRESS NOTES
Food & Nutrition  Follow-Up    Recommendations   Recommendation/Intervention 1. Continue Puree Low Sodium diet with assistance, strong encouragement of intake   2. High protein snacks to be provided   3. Continue Clinimax until PO intake is >50%   Nutrition Goal Status progressing towards goal        01/18/17 1400   Reason for Assessment   Reason for Assessment RD follow-up   Level of Care Medium: 2   General Information Comments changed to swing yesterday, does not cooperate, SLP unable to assess today, no family present at time of visit   Nutrition Risk Screen   Nutrition Risk Screen tube feeding or parenteral nutrition   Nutrition/Diet History   Factors Affecting Nutritional Intake impaired cognitive status/motor control;compromised airway   Labs/Tests/Procedures/Meds   Diagnostic Test/Procedure Review reviewed, pertinent   Pertinent Labs Reviewed reviewed   Pertinent Labs Comments Alb 2.5L   Pertinent Medications Reviewed reviewed   Pertinent Medications Comments Insulin   Physical Findings/Assessment   Tubes (PPN)   Nutrition Prescription Ordered   Current Diet Order low salt puree with snacks   Nutrition Order Comments adequate   Current Nutrition Support Formula Ordered (Clinimax 4.25/5)   Current Nutrition Support Rate Ordered 75 (ml)   Current Nutrition Support Frequency Ordered ml/aph30hty   Evaluation Of Received Nutrient/Fluid Intake   Parenteral Calories (kcal) 612   Parenteral Protein (gm) 76.5   Parenteral Fluid (mL) 1800   Nutrition Risk   Level of Risk high   Nutrition Diagnosis   Nutrition Diagnosis Status Continues   Additional Nutrition Diagnosis? no   Recommendations   Recommendation/Intervention Continue Puree Low Sodium diet with assistance, strong encouragement of intake 2. High protein snacks to be provided 3. Continue Clinimax until PO intake is >50%   Nutrition Goal Status progressing towards goal   Monitor and Evaluation   Food and Nutrient Intake food and beverage intake   Food and  Nutrient Adminstration diet order   Physical Activity and Function nutrition-related ADLs and IADLs   Anthropometric Measurements weight;weight change   Biochemical Data, Medical Tests and Procedures electrolyte and renal panel   Nutrition-Focused Physical Findings overall appearance   Nutrition Follow-up   RD Follow-up? Yes   Next Date to be Seen by RD 01/20/17

## 2017-01-18 NOTE — PROGRESS NOTES
Pt's brother here and able to get him to eat 2 containers of applesauce and successfully take his mucinex and oxcarbazepine from this morning.

## 2017-01-18 NOTE — PT/OT/SLP PROGRESS
Physical Therapy      Mack Will  MRN: [id number]    Patient not seen today secondary to Patient unwilling to participate. Pt with eyes open but when spoken to purposefully shuts them. Pt actively resisting all attempts at mobility. Unable to complete evaluation secondary to lack of cooperation and inability to follow any commands. Will follow-up 01/19/2017.    Lulú Sagastume, PT

## 2017-01-18 NOTE — PT/OT/SLP PROGRESS
Occupational Therapy      Mack Will  MRN: [id number]    Patient not seen today secondary to Patient unwilling to participate. Pt open eyes today, but when movement is attempted, he close eyes and actively resist.  Therapist will attempt evaluation again tomorrow, if Pt does not participate orders will be d/c until improvement in status. Will follow-up 01/19/2017.    Bess Collier OT  1/18/2017

## 2017-01-18 NOTE — PLAN OF CARE
Problem: Patient Care Overview  Goal: Plan of Care Review  Outcome: Ongoing (interventions implemented as appropriate)  Pt respiratory status is deteriorating he has coarse rhonchi BS and you can place hand on chest and feel the vibration without stethoscope. His SPO2 dropped again tonight so he was sx'd which did not help much in my opinion he needs a therapy vest and aggressive pulmonary toilet which we don't have here, or possible bronchoscopy, however; another chest xray would be helpful in that determination.

## 2017-01-18 NOTE — NURSING
MD notified about increase in oxygen, agrees. Will leave patient on 9L O2 via high flow nasal canula per MD order and chest x-ray ordered for morning.

## 2017-01-18 NOTE — NURSING
"Patient swallowed flomax with great encouragement. Unable to give rest of medication. Contraindicated; patient not wanting to participate and other meds unable to crush. i asked his brother with his encouragement  Should I  attempt to crush and give his oxcarbazepine at present, states" no".  "

## 2017-01-18 NOTE — PLAN OF CARE
Problem: Patient Care Overview  Goal: Plan of Care Review  Outcome: Ongoing (interventions implemented as appropriate)  Fall precautions maintained. Patient remains free from falls/injuries. IV ABX administered. Continuous infusion of clinimix. Patient coughing frequently. Lungs coarse. Suctioning done by respiratory therapist. Helped with repositioning. Hygiene care.  Diapers.

## 2017-01-19 PROBLEM — E53.8 B12 DEFICIENCY: Status: ACTIVE | Noted: 2017-01-19

## 2017-01-19 PROBLEM — I61.9 HEMORRHAGIC CEREBROVASCULAR ACCIDENT (CVA): Status: ACTIVE | Noted: 2017-01-19

## 2017-01-19 PROBLEM — G30.0 EARLY ONSET ALZHEIMER'S DISEASE WITH BEHAVIORAL DISTURBANCE: Status: ACTIVE | Noted: 2017-01-19

## 2017-01-19 PROBLEM — F02.818 EARLY ONSET ALZHEIMER'S DISEASE WITH BEHAVIORAL DISTURBANCE: Status: ACTIVE | Noted: 2017-01-19

## 2017-01-19 PROCEDURE — 27000339 *HC DAILY SUPPLY KIT

## 2017-01-19 PROCEDURE — 25000242 PHARM REV CODE 250 ALT 637 W/ HCPCS: Performed by: NURSE PRACTITIONER

## 2017-01-19 PROCEDURE — 27000221 HC OXYGEN, UP TO 24 HOURS

## 2017-01-19 PROCEDURE — 86580 TB INTRADERMAL TEST: CPT | Performed by: NURSE PRACTITIONER

## 2017-01-19 PROCEDURE — 94640 AIRWAY INHALATION TREATMENT: CPT

## 2017-01-19 PROCEDURE — 94761 N-INVAS EAR/PLS OXIMETRY MLT: CPT

## 2017-01-19 PROCEDURE — 99223 1ST HOSP IP/OBS HIGH 75: CPT | Mod: ,,, | Performed by: PSYCHIATRY & NEUROLOGY

## 2017-01-19 PROCEDURE — 25000003 PHARM REV CODE 250: Performed by: NURSE PRACTITIONER

## 2017-01-19 PROCEDURE — 11000004 HC SNF PRIVATE

## 2017-01-19 PROCEDURE — 99309 SBSQ NF CARE MODERATE MDM 30: CPT | Mod: ,,, | Performed by: FAMILY MEDICINE

## 2017-01-19 PROCEDURE — 63600175 PHARM REV CODE 636 W HCPCS: Performed by: NURSE PRACTITIONER

## 2017-01-19 PROCEDURE — C9113 INJ PANTOPRAZOLE SODIUM, VIA: HCPCS | Performed by: NURSE PRACTITIONER

## 2017-01-19 PROCEDURE — 94668 MNPJ CHEST WALL SBSQ: CPT

## 2017-01-19 RX ADMIN — PANTOPRAZOLE SODIUM 40 MG: 40 INJECTION, POWDER, FOR SOLUTION INTRAVENOUS at 08:01

## 2017-01-19 RX ADMIN — ALBUTEROL SULFATE 2.5 MG: 2.5 SOLUTION RESPIRATORY (INHALATION) at 12:01

## 2017-01-19 RX ADMIN — TUBERCULIN PURIFIED PROTEIN DERIVATIVE 5 UNITS: 5 INJECTION, SOLUTION INTRADERMAL at 01:01

## 2017-01-19 RX ADMIN — MEGESTROL ACETATE 200 MG: 40 SUSPENSION ORAL at 08:01

## 2017-01-19 RX ADMIN — GUAIFENESIN 600 MG: 600 TABLET, EXTENDED RELEASE ORAL at 08:01

## 2017-01-19 RX ADMIN — PIPERACILLIN AND TAZOBACTAM 3.38 G: 3; .375 INJECTION, POWDER, LYOPHILIZED, FOR SOLUTION INTRAVENOUS; PARENTERAL at 09:01

## 2017-01-19 RX ADMIN — PIPERACILLIN AND TAZOBACTAM 3.38 G: 3; .375 INJECTION, POWDER, LYOPHILIZED, FOR SOLUTION INTRAVENOUS; PARENTERAL at 05:01

## 2017-01-19 RX ADMIN — PIPERACILLIN AND TAZOBACTAM 3.38 G: 3; .375 INJECTION, POWDER, LYOPHILIZED, FOR SOLUTION INTRAVENOUS; PARENTERAL at 01:01

## 2017-01-19 RX ADMIN — ALBUTEROL SULFATE 2.5 MG: 2.5 SOLUTION RESPIRATORY (INHALATION) at 07:01

## 2017-01-19 RX ADMIN — OXCARBAZEPINE 300 MG: 150 TABLET, FILM COATED ORAL at 08:01

## 2017-01-19 RX ADMIN — OXCARBAZEPINE 300 MG: 150 TABLET, FILM COATED ORAL at 05:01

## 2017-01-19 RX ADMIN — TAMSULOSIN HYDROCHLORIDE 0.4 MG: 0.4 CAPSULE ORAL at 08:01

## 2017-01-19 RX ADMIN — AZITHROMYCIN MONOHYDRATE 500 MG: 500 INJECTION, POWDER, LYOPHILIZED, FOR SOLUTION INTRAVENOUS at 01:01

## 2017-01-19 NOTE — SUBJECTIVE & OBJECTIVE
Interval History: not responding to questions, no sig events overnight    Review of Systems   Unable to perform ROS: Dementia     Objective:     Vital Signs (Most Recent):  Temp: 96.6 °F (35.9 °C) (01/19/17 0752)  Pulse: (!) 55 (01/19/17 0752)  Resp: 20 (01/19/17 0752)  BP: (!) 140/69 (01/19/17 0752)  SpO2: 96 % (01/19/17 0752) Vital Signs (24h Range):  Temp:  [96.6 °F (35.9 °C)-98.3 °F (36.8 °C)] 96.6 °F (35.9 °C)  Pulse:  [48-88] 55  Resp:  [18-20] 20  SpO2:  [95 %-99 %] 96 %  BP: (109-153)/(62-90) 140/69     Weight: 67.4 kg (148 lb 9.4 oz)  Body mass index is 26.32 kg/(m^2).    Intake/Output Summary (Last 24 hours) at 01/19/17 1001  Last data filed at 01/19/17 0400   Gross per 24 hour   Intake              970 ml   Output                0 ml   Net              970 ml      Physical Exam   Constitutional: He appears well-developed. No distress.   HENT:   Head: Normocephalic and atraumatic.   Downs facies   Eyes: Right eye exhibits no discharge. Left eye exhibits no discharge.   Left eye ptosis   Neck: Normal range of motion. Neck supple. No thyromegaly present.   Cardiovascular: Normal rate, regular rhythm and intact distal pulses.    Murmur heard.  Pulmonary/Chest: Effort normal. He has rhonchi.   Coarse breath sounds throughout without wheezing   Abdominal: Soft. Bowel sounds are normal. He exhibits no distension. There is no tenderness.   Musculoskeletal: Normal range of motion. He exhibits no edema.   Lymphadenopathy:     He has no cervical adenopathy.   Neurological:   Somnolent. Opens eyes only   Skin: Skin is warm and dry. No rash noted.   Erythema to left hand   Psychiatric: He has a normal mood and affect. His behavior is normal.   Nursing note and vitals reviewed.      Significant Labs:  Labs reviewed    Significant Imaging: I have reviewed and interpreted all pertinent imaging results/findings within the past 24 hours.     CXR 1/18/17: Chest, 1 view: Coarse interstitial markings are seen throughout the  lungs.  Left retrocardiac opacity is seen.  The heart is normal in size.  The skeletal structures are intact    CT head pending

## 2017-01-19 NOTE — PROGRESS NOTES
Food & Nutrition  Follow-UP Note    Recommendation/Interventions:  1. Continue Low Sodium Puree diet with high protein snacks as tolerated encouraging intake  2. Continue Megace to increase appetite   3. Re-initiate PPN for nutrition support if intake does not increase with appetite stimulant.   4. Consider obtaining access for enteral nutrition (NG,OG, or PEG) for nutrition intake if does not increase with appetite stimulant    Goals:increase oral intake to 75%  Nutrition Goal Status: progressing    Reason for Assessment: Follow-Up  Interdisciplinary Rounds: Attended  Level of Care: High    Pt not consuming meals, PPN D/c'd, Megace added    Anthropometrics:  Ht: 160.02cm  Wt: 67.4kg    Labs: No New  Medications:   Current Facility-Administered Medications:     acetaminophen tablet 650 mg, 650 mg, Oral, Q8H PRN, Eva Brasher NP    albuterol sulfate nebulizer solution 2.5 mg, 2.5 mg, Nebulization, Q6H, Eva Brasher NP, 2.5 mg at 01/19/17 1230    azithromycin 500 mg in dextrose 5 % 250 mL IVPB (ready to mix system), 500 mg, Intravenous, Q24H, Eva Brasher NP, Last Rate: 250 mL/hr at 01/19/17 1356, 500 mg at 01/19/17 1356    guaifenesin 12 hr tablet 600 mg, 600 mg, Oral, BID, Eva Brasher NP, 600 mg at 01/19/17 0801    hydrocodone-acetaminophen 5-325mg per tablet 1 tablet, 1 tablet, Oral, Q4H PRN, Eva Brasher NP    megestrol 400 mg/10 mL (10 mL) suspension 200 mg, 200 mg, Oral, Daily, Eva Brasher NP, 200 mg at 01/19/17 0801    ondansetron injection 4 mg, 4 mg, Intravenous, Q8H PRN, Eva Brasher NP    oxcarbazepine tablet 300 mg, 300 mg, Oral, BID, Eva Brasher NP, 300 mg at 01/19/17 0801    pantoprazole injection 40 mg, 40 mg, Intravenous, Daily, Eva Brasher NP, 40 mg at 01/19/17 0801    piperacillin-tazobactam 3.375 g in dextrose 5 % 50 mL IVPB (ready to mix system), 3.375 g, Intravenous, Q8H, Eva Brasher, NP, Last Rate: 100 mL/hr at 01/19/17 1307, 3.375 g  at 01/19/17 1307    promethazine (PHENERGAN) 12.5 mg in dextrose 5 % 50 mL IVPB, 12.5 mg, Intravenous, Q6H PRN, Eva Brasher NP    tamsulosin 24 hr capsule 0.4 mg, 0.4 mg, Oral, Daily, Eva Brasher NP, 0.4 mg at 01/19/17 0800    Physical Findings:  Overall:bedbound, overwt  Tubes:N/A    Estimated Needs:  Wt used: 78.5kg  Energy Calorie Requirement: 1900 (indirect calorimetry)  Protein Requirement: 80gm (1.3)  Fluid: 1900 (1ml/kcal)    Current Diet: Low Sodium, Puree with Snacks  Nutrition Order Comments: Adequate    Nutrition Risk: High    Nutrition Diagnosis:  Inadequate protein intake R/T Hx of down syndrome AEB reviewed labs.  Status: Continues    Monitor & Evaluate:  Intake: Oral food and nutrition intake  Nutrition Administration: Oral diet order  Knowledge/Beliefs/Attitutes:  Physical Activity & Function: Nutrition Related ADLs  Anthropometric Measurements: weight; weight change  Biochemical Data, Medical Tests and Procedures: electrolytes, albumin  Nutrition-Focused Physical Findings: Overall appearance    Nutrition Follow-Up: 01/23/2017

## 2017-01-19 NOTE — ASSESSMENT & PLAN NOTE
Neurology following.  With this bleed prognosis is poor.    Will discuss with family plan for ongoing care.    Not eating currently

## 2017-01-19 NOTE — NURSING
Patient will received the remainder of the Clinimix in bag currently infusing per MD order, then will be discontinued.

## 2017-01-19 NOTE — PLAN OF CARE
Problem: Patient Care Overview  Goal: Plan of Care Review  Outcome: Ongoing (interventions implemented as appropriate)  Pt tolerating treatments and HFNC at 9lpm.  Pt gets combative and screams when trying to do CPT this shift.  No distress noted.

## 2017-01-19 NOTE — SUBJECTIVE & OBJECTIVE
Past Medical History   Diagnosis Date    Alzheimer disease     Down syndrome     Seizure        Past Surgical History   Procedure Laterality Date    Cholecystectomy      Cyst removal         Review of patient's allergies indicates:  No Known Allergies        No current facility-administered medications on file prior to encounter.      Current Outpatient Prescriptions on File Prior to Encounter   Medication Sig    clotrimazole (LOTRIMIN) 1 % cream Apply topically 2 (two) times daily.    cyanocobalamin, vitamin B-12, (VITAMIN B-12) 50 mcg tablet Take 50 mcg by mouth once daily.     mupirocin (BACTROBAN) 2 % ointment Apply topically 2 (two) times daily.    oxcarbazepine (TRILEPTAL) 300 MG Tab Take 1 tablet (300 mg total) by mouth 2 (two) times daily.    trazodone (DESYREL) 50 MG tablet Take 1 tablet (50 mg total) by mouth every evening.     Family History     Problem Relation (Age of Onset)    Heart disease Father        Social History Main Topics    Smoking status: Never Smoker    Smokeless tobacco: Never Used    Alcohol use No    Drug use: Not on file    Sexual activity: Not on file   I have reviewed all of this patient's past medical and surgical histories as well as family and social histories and active allergies and medications as documented in the electronic medical record.    Review of Systems   Unable to perform ROS: Dementia     Objective:     Vital Signs (Most Recent):  Temp: 97.8 °F (36.6 °C) (01/19/17 0428)  Pulse: 60 (01/19/17 0718)  Resp: 20 (01/19/17 0718)  BP: 128/69 (01/19/17 0428)  SpO2: 97 % (01/19/17 0718) Vital Signs (24h Range):  Temp:  [97 °F (36.1 °C)-98.3 °F (36.8 °C)] 97.8 °F (36.6 °C)  Pulse:  [48-88] 60  Resp:  [18-20] 20  SpO2:  [95 %-99 %] 97 %  BP: (109-153)/(62-90) 128/69     Weight: 67.4 kg (148 lb 9.4 oz)  Body mass index is 26.32 kg/(m^2).    Physical Exam            Exam:    Gen Appearance, well developed/nourished in no apparent distress  CV: 2+ distal pulses with no  edema or swelling  Neuro:  MS: Sleeping but grimaces to voice/ localizes to pain and resists examiner. Does open eyes to brother. Does not speak at baseline   CN: Optic discs are flat with normal vasculature, PERRL, face symmetric and tongue is midline and strong  Motor: Does localize to pain and resists equally   Sensory: Can't cooperate  Cerebellar:Can't cooperate  Gait: Not done      Significant Labs: Blood cultures are no growth. CMP, CBC reviewed from 17    Significant Imagin17 Xray chest: Coarse interstitial markings are seen throughout the lungs.  Left retrocardiac opacity is seen.  The heart is normal in size.  The skeletal structures are intact.

## 2017-01-19 NOTE — PLAN OF CARE
Problem: Patient Care Overview  Goal: Plan of Care Review  Outcome: Ongoing (interventions implemented as appropriate)  HOB elevated, neuro checks q4, iv abx continued, Pt family aware of plan of care. No questions or concerns at this time.     Problem: Fall Risk (Adult)  Goal: Absence of Falls  Patient will demonstrate the desired outcomes by discharge/transition of care.   Outcome: Ongoing (interventions implemented as appropriate)  Pt free of falls/incidents. Fall precautions maintained. Bed alarm engaged.

## 2017-01-19 NOTE — PROGRESS NOTES
[facility] Medicine  Progress Note    Patient Name: Mack Will  MRN: [id number]  Patient Class: IP- Swing   Admission Date: 1/17/2017  Length of Stay: 1 days  Attending Physician: Chetan Alvarez MD  Primary Care Provider: Chetan Alvarez MD        Subjective:     Principal Problem:Pneumonia    HPI:  Principal Problem:Pneumonia of both lungs due to infectious organism     HPI:  51 year old non verbal and demented Downs syndrome patient was sent here via ambulance from his group home because of worsening cough, fever and fatigue. His condition was called to Dr. Alvarez who advised ER evaluation.    Patient was seen and evaluated in the ER and was found to be hypoxic and in respiratory distress. He was admitted to floor with left lower lobe pneumonia. Placed on zithromax and zosyn-day 6 today. Questionable aspiration. Speech consulted for swallow assessment, limited eval, but no gross aspiration noted. CT scan; no PE. Bilateral infiltrates. No longer having a fever. Pt's WBC down to normal and remains there     He is non verbal now-had limited speech before. Most info comes from brother at bedside     Pt had traumatic gabriel placement for urinary retention. Started on Flomax. He voided last night and again this am a large amount.     He is eating minimal even with family's coaxing. We have changed his IVF to clinimix on Sunday. Brother at bedside he will try and push.      Pt has not gotten out of bed, was previously walking. PT started. Resistant to them but when he was ready he got into bed by himself      Hospital Course:  He has been placed on SWING unit for continued 10 day course of abx and cont PT. His plans are to return to the group home and needs more independent ambulation.     Dr Nevarez following. He is currently on 9L high flow NC tks> 90%. Refuses mask. Mouth breather. Has junky cough. Not bringing anything up. Mucinex started yesterday      Past Medical History    Diagnosis Date    Alzheimer disease     Down syndrome     Seizure        Past Surgical History   Procedure Laterality Date    Cholecystectomy      Cyst removal         Review of patient's allergies indicates:  No Known Allergies    Current Facility-Administered Medications on File Prior to Encounter   Medication    [DISCONTINUED] acetaminophen tablet 650 mg    [DISCONTINUED] albuterol sulfate nebulizer solution 2.5 mg    [DISCONTINUED] Amino acid 4.25% - dextrose 5% (CLINIMIX) solution    [DISCONTINUED] azithromycin 500 mg in dextrose 5 % 250 mL IVPB (ready to mix system)    [DISCONTINUED] enoxaparin injection 40 mg    [DISCONTINUED] guaifenesin 12 hr tablet 600 mg    [DISCONTINUED] hydrocodone-acetaminophen 5-325mg per tablet 1 tablet    [DISCONTINUED] ketorolac injection 30 mg    [DISCONTINUED] ondansetron injection 4 mg    [DISCONTINUED] oxcarbazepine tablet 300 mg    [DISCONTINUED] pantoprazole injection 40 mg    [DISCONTINUED] piperacillin-tazobactam 3.375 g in dextrose 5 % 50 mL IVPB (ready to mix system)    [DISCONTINUED] promethazine (PHENERGAN) 12.5 mg in dextrose 5 % 50 mL IVPB    [DISCONTINUED] tamsulosin 24 hr capsule 0.4 mg     Current Outpatient Prescriptions on File Prior to Encounter   Medication Sig    clotrimazole (LOTRIMIN) 1 % cream Apply topically 2 (two) times daily.    cyanocobalamin, vitamin B-12, (VITAMIN B-12) 50 mcg tablet Take 50 mcg by mouth once daily.     mupirocin (BACTROBAN) 2 % ointment Apply topically 2 (two) times daily.    oxcarbazepine (TRILEPTAL) 300 MG Tab Take 1 tablet (300 mg total) by mouth 2 (two) times daily.    trazodone (DESYREL) 50 MG tablet Take 1 tablet (50 mg total) by mouth every evening.     Family History     Problem Relation (Age of Onset)    Heart disease Father        Social History Main Topics    Smoking status: Never Smoker    Smokeless tobacco: Never Used    Alcohol use No    Drug use: Not on file    Sexual activity: Not on  file     Review of Systems   Unable to perform ROS: Dementia     Objective:     Vital Signs (Most Recent):  Temp: 96.8 °F (36 °C) (01/18/17 0358)  Pulse: 87 (01/18/17 0717)  Resp: (!) 22 (01/18/17 0717)  BP: (!) 143/70 (01/18/17 0358)  SpO2: (!) 92 % (01/18/17 0717) Vital Signs (24h Range):  Temp:  [95.3 °F (35.2 °C)-97.7 °F (36.5 °C)] 96.8 °F (36 °C)  Pulse:  [55-87] 87  Resp:  [18-22] 22  SpO2:  [85 %-98 %] 92 %  BP: (105-153)/(64-91) 143/70     Weight: 79.8 kg (176 lb)  Body mass index is 31.18 kg/(m^2).    Physical Exam   Constitutional: He appears well-developed. No distress.   HENT:   Head: Normocephalic and atraumatic.   Downs facies   Eyes: Conjunctivae are normal. Pupils are equal, round, and reactive to light. Right eye exhibits no discharge. Left eye exhibits no discharge.   Neck: Normal range of motion. Neck supple. No thyromegaly present.   Cardiovascular: Normal rate, regular rhythm and intact distal pulses.    Murmur heard.  Pulmonary/Chest: Effort normal. He has rhonchi.   Coarse breath sounds throughout without wheezing   Abdominal: Soft. Bowel sounds are normal. He exhibits no distension. There is no tenderness.   Musculoskeletal: Normal range of motion. He exhibits no edema.   Lymphadenopathy:     He has no cervical adenopathy.   Neurological:   Somnolent. Opens eyes, tries to talk but is not audible, very soft    Skin: Skin is warm and dry. No rash noted.   Psychiatric: He has a normal mood and affect. His behavior is normal.   Nursing note and vitals reviewed.       Significant Labs:   Lab Results   Component Value Date    WBC 9.79 01/17/2017    HGB 14.7 01/17/2017    HCT 43.3 01/17/2017    MCV 90 01/17/2017     (H) 01/17/2017     BMP  Lab Results   Component Value Date     01/17/2017    K 3.5 01/17/2017     01/17/2017    CO2 28 01/17/2017    BUN 11 01/17/2017    CREATININE 0.7 01/17/2017    CALCIUM 9.4 01/17/2017    ANIONGAP 10 01/17/2017    ESTGFRAFRICA >60 01/17/2017     EGFRNONAA >60 01/17/2017     Lab Results   Component Value Date    ALT 30 01/17/2017    AST 26 01/17/2017    ALKPHOS 157 (H) 01/17/2017    BILITOT 0.2 01/17/2017     Blood cultures NGTD x 2    KUB Nonspecific bowel gas pattern without evidence of focal obstruction.    CXR Left retrocardiac opacity is noted which could be related to atelectasis, aspiration or pneumonia.  Mild chronic lung changes are seen.  The heart is normal in size.  The skeletal structures are intact.    CTA chest   1.  No CT evidence of central pulmonary embolus.  Mid and distal branch emboli cannot be excluded due to the timing of the bolus of contrast and extensive patient motion artifact.  2.  Bilateral lower lobe infiltrates greater on the right than the left.          Assessment/Plan:      * Pneumonia  Azithromycin and zosyn day 6/10  Albuterol 2.5mg neb every 6hr  mucinex 600mg po BID      Down's syndrome  Very minimal communication, brother at bedside. Pt responsive to him which is his usual  Not eating very much even with brother coaxing. Clinimix infusion cont      Seizure disorder  Oxcarbazepine 300mg po BID      Dementia without behavioral disturbance  See down's plan      Urinary retention  Still urinating without cath. Cont flomax      Debility  PT/OT      VTE Risk Mitigation         Ordered     enoxaparin injection 40 mg  Daily     Route:  Subcutaneous        01/17/17 1442     Medium Risk of VTE  Once      01/17/17 1442          Chetan Alvarez MD  Department of Hospital Medicine   [facility]

## 2017-01-19 NOTE — ASSESSMENT & PLAN NOTE
-some delirium on dementia +/- behavioral disturbance likely associated with acute infectious changes from pneumonia this admit   -Check CT head to rule out other causes  -trazodone for insomnia, sundowning given pre admit not currently being used- likely due to more sedated state since admission  -prognosis long term in this patient with dementia and down syndrome reviewed with patient's  brother Giovanni. Agreed: will continue current care and reassess for longer term less agressive care if patient does not show improvement in the next few days.

## 2017-01-19 NOTE — PROGRESS NOTES
Inpatient Speech Pathology Note        8:35am - 8:55am    Attempted to eval Pt today. Pt with slightly improved responses. Pt opened eyes once, nodded yes 2x, shook head no once, Held spoon and bottle of milk with encouragement. Pt did not however take any bites or sips despite max encouragement and cueing. Pt nodded yes when asked if he would like to try again tomorrow. Will re-attempt eval tomorrow.     JEAN Seals, CCC-SLP  1/19/2017

## 2017-01-19 NOTE — PLAN OF CARE
Problem: Patient Care Overview  Goal: Plan of Care Review  Outcome: Ongoing (interventions implemented as appropriate)  Clinimix maintained. IV abx continued. Turn q2. Pt & brother aware of plan of care. No questions or concerns at this time.    Problem: Fall Risk (Adult)  Goal: Absence of Falls  Patient will demonstrate the desired outcomes by discharge/transition of care.   Outcome: Ongoing (interventions implemented as appropriate)  Pt free of falls/incidents. Fall precautions maintained. Bed alarm engaged.

## 2017-01-19 NOTE — ASSESSMENT & PLAN NOTE
Patient with expanded life span with Down's syndrome who has developed associated Alzheimer's dementia.

## 2017-01-19 NOTE — PHYSICIAN QUERY
"PT Name: Mack Will  MR #: [id number]    Physician Query Form - Pneumonia Clarification    Reviewer Ag Kirkpatrick RN, CDI  Ext [phone]    This form is a permanent document in the medical record.    Query Date:  January 19, 2017    By submitting this query, we are merely seeking further clarification of documentation. Please utilize your independent clinical judgment when addressing the question(s) below.  (The Medical record reflects the following:)   Indicators   Supporting Clinical Findings Location in Medical Record   X "Pneumonia" documented Pneumonia of both lungs due to infectious organism   Progress Note: 1/18    Chest X-Ray:      PaO2=   PaCO2=    O2 sat=     X Cultures : Blood cultures NGTD x 2   Progress Note: 1/18   X Treatment : Placed on zithromax and zosyn-day 6 today. Questionable aspiration. Speech consulted for swallow assessment, limited eval, but no gross aspiration noted    Azithromycin and zosyn day 6/10  Albuterol 2.5mg neb every 6hr  mucinex 600mg po BID   Progress Note: 1/18    Supplemental O2:      Other     Provider, please specify type of pneumonia.    [  ] Bacterial (Specify organism) PNA        [  ] Bacterial, Gram Negative organism PNA        [  ] Viral (Specify virus) PNA       [  ] Fungal (Specify organism) PNA       [  ] Aspiration PNA        [  ] Other type of pneumonia (Specify)        [ ] Clinically undetermined    Please document in your progress notes daily for the duration of treatment, until resolved, and include in your discharge summary.                                                                                    "

## 2017-01-19 NOTE — ASSESSMENT & PLAN NOTE
Very minimal communication, brother no longer at bedside.  He is minimally arouseable at present.   Eating minimal. Spoke to Dr. Correa about poor prognosis. Will get CT head today.   Potential move to hospice later this week.

## 2017-01-19 NOTE — CONSULTS
[facility]  Neurology  Consult Note    Patient Name: Mack Will  MRN: [id number]  Admission Date: 1/17/2017  Hospital Length of Stay: 2 days  Code Status: Full Code   Attending Provider: Chetan Alvarez MD   Consulting Provider: Huang Correa MD  Primary Care Physician: Chetan Alvarez MD  Principal Problem:Pneumonia    Inpatient consult to Neurology  Consult performed by: HUANG CORREA  Consult ordered by: ELODIA PAIZ         Subjective:     Chief Complaint:  AMS     HPI:   Mack Will is a 51 y.o. Male known to me for Alzheimer's dementia associated with Down Syndrome.  Patient is admitted to Swing bed after hospitalization for pneumonia and has had altered mental status since admit. His usual baseline is ambulatory and non-verbal with some behavioral disturbance although often smiling and interactive. This has not been the case since admit. He has been eating only at times and only interactive with extreme encouragement.        Past Medical History   Diagnosis Date    Alzheimer disease     Down syndrome     Seizure        Past Surgical History   Procedure Laterality Date    Cholecystectomy      Cyst removal         Review of patient's allergies indicates:  No Known Allergies        No current facility-administered medications on file prior to encounter.      Current Outpatient Prescriptions on File Prior to Encounter   Medication Sig    clotrimazole (LOTRIMIN) 1 % cream Apply topically 2 (two) times daily.    cyanocobalamin, vitamin B-12, (VITAMIN B-12) 50 mcg tablet Take 50 mcg by mouth once daily.     mupirocin (BACTROBAN) 2 % ointment Apply topically 2 (two) times daily.    oxcarbazepine (TRILEPTAL) 300 MG Tab Take 1 tablet (300 mg total) by mouth 2 (two) times daily.    trazodone (DESYREL) 50 MG tablet Take 1 tablet (50 mg total) by mouth every evening.     Family History     Problem Relation (Age of Onset)    Heart disease Father        Social  History Main Topics    Smoking status: Never Smoker    Smokeless tobacco: Never Used    Alcohol use No    Drug use: Not on file    Sexual activity: Not on file   I have reviewed all of this patient's past medical and surgical histories as well as family and social histories and active allergies and medications as documented in the electronic medical record.    Review of Systems   Unable to perform ROS: Dementia     Objective:     Vital Signs (Most Recent):  Temp: 97.8 °F (36.6 °C) (17)  Pulse: 60 (17)  Resp: 20 (17)  BP: 128/69 (17)  SpO2: 97 % (17) Vital Signs (24h Range):  Temp:  [97 °F (36.1 °C)-98.3 °F (36.8 °C)] 97.8 °F (36.6 °C)  Pulse:  [48-88] 60  Resp:  [18-20] 20  SpO2:  [95 %-99 %] 97 %  BP: (109-153)/(62-90) 128/69     Weight: 67.4 kg (148 lb 9.4 oz)  Body mass index is 26.32 kg/(m^2).    Physical Exam            Exam:    Gen Appearance, well developed/nourished in no apparent distress  CV: 2+ distal pulses with no edema or swelling  Neuro:  MS: Sleeping but grimaces to voice/ localizes to pain and resists examiner. Does open eyes to brother. Does not speak at baseline   CN: Optic discs are flat with normal vasculature, PERRL, face symmetric and tongue is midline and strong  Motor: Does localize to pain and resists equally   Sensory: Can't cooperate  Cerebellar:Can't cooperate  Gait: Not done      Significant Labs: Blood cultures are no growth. CMP, CBC reviewed from 17    Significant Imagin17 Xray chest: Coarse interstitial markings are seen throughout the lungs.  Left retrocardiac opacity is seen.  The heart is normal in size.  The skeletal structures are intact.    Assessment and Plan:     Down's syndrome  Patient with expanded life span with Down's syndrome who has developed associated Alzheimer's dementia.     Seizure disorder  Continue home med, trileptal for seizure prevention    Dementia without behavioral disturbance  More  altered than baseline. Check CT head  Prognosis reviewed with brother    B12 deficiency  -Patient has B12 deficiency known prior.   -Check B12 level (patient was on po repletion prior to admit)    Early onset Alzheimer's disease with behavioral disturbance  -some delirium on dementia +/- behavioral disturbance likely associated with acute infectious changes from pneumonia this admit   -Check CT head to rule out other causes  -trazodone for insomnia, sundowning given pre admit not currently being used- likely due to more sedated state since admission  -prognosis long term in this patient with dementia and down syndrome reviewed with patient's  brother Giovanni. Agreed: will continue current care and reassess for longer term less agressive care if patient does not show improvement in the next few days.           Bib Correa MD  Neurology  [facility]    ADDENDUM:  Reviewed results of CT head with patient's brother Giovanni, Primary team, and Nursing staff. Patient has a midline acute cerebellar hemorrhage. Likely this is contributing to patient's delirium/ AMS. Suspect bleeding is not hyperacute.  I recommend:  - offered transfer to higher level of care with neurosurgery consultation/ closer monitoring. Brother declines.  -Continue Neurochecks q 4hours and will order repeat Head CT  if worse (pupillary changes, hemiparesis, worsening level of alertness  -CT head in the am  -Involve speech therapy again to reassess swallow evaluation if able  -Treat any increased BP  aggressively (Blood pressure has been ideal below 160 systolic)  -Avoid ASA/anticoagulation  -Etiology of hemorrhage likely due to cerebral amyloid angiopathy in patient with AD. Check CTA head tomorrow to rule out other causes.   -Patient is full code   Will follow

## 2017-01-19 NOTE — PROGRESS NOTES
[facility] Hospital Medicine  Progress Note    Patient Name: Mack Will  MRN: [id number]  Patient Class: IP- Swing   Admission Date: 1/17/2017  Length of Stay: 2 days  Attending Physician: Chetan Alvarez MD  Primary Care Provider: Chetan Alvarez MD        Subjective:     Principal Problem:Pneumonia    HPI:  Principal Problem:Pneumonia of both lungs due to infectious organism     HPI:  51 year old non verbal and demented Downs syndrome patient was sent here via ambulance from his group home because of worsening cough, fever and fatigue. His condition was called to Dr. Alvarez who advised ER evaluation.    Patient was seen and evaluated in the ER and was found to be hypoxic and in respiratory distress. He was admitted to floor with left lower lobe pneumonia. Placed on zithromax and zosyn-day 6 today. Questionable aspiration. Speech consulted for swallow assessment, limited eval, but no gross aspiration noted. CT scan; no PE. Bilateral infiltrates. No longer having a fever. Pt's WBC down to normal and remains there     He is non verbal now-had limited speech before. Most info comes from brother at bedside     Pt had traumatic gabriel placement for urinary retention. Started on Flomax. He voided last night and again this am a large amount.     He is eating minimal even with family's coaxing. We have changed his IVF to clinimix on Sunday. Brother at bedside he will try and push.      Pt has not gotten out of bed, was previously walking. PT started. Resistant to them but when he was ready he got into bed by himself      Hospital Course:  He has been placed on SWING unit for continued 10 day course of abx and cont PT.     Dr Nevarez following. He is currently on 9L high flow NC tks> 90%. Refuses mask. Mouth breather. Has junky cough. Not bringing anything up. Mucinex started yesterday    Not eating.    Very acute change in MS.      Interval History: not responding to questions, no sig events  overnight    Review of Systems   Unable to perform ROS: Dementia     Objective:     Vital Signs (Most Recent):  Temp: 96.6 °F (35.9 °C) (01/19/17 0752)  Pulse: (!) 55 (01/19/17 0752)  Resp: 20 (01/19/17 0752)  BP: (!) 140/69 (01/19/17 0752)  SpO2: 96 % (01/19/17 0752) Vital Signs (24h Range):  Temp:  [96.6 °F (35.9 °C)-98.3 °F (36.8 °C)] 96.6 °F (35.9 °C)  Pulse:  [48-88] 55  Resp:  [18-20] 20  SpO2:  [95 %-99 %] 96 %  BP: (109-153)/(62-90) 140/69     Weight: 67.4 kg (148 lb 9.4 oz)  Body mass index is 26.32 kg/(m^2).    Intake/Output Summary (Last 24 hours) at 01/19/17 1001  Last data filed at 01/19/17 0400   Gross per 24 hour   Intake              970 ml   Output                0 ml   Net              970 ml      Physical Exam   Constitutional: He appears well-developed. No distress.   HENT:   Head: Normocephalic and atraumatic.   Downs facies   Eyes: Right eye exhibits no discharge. Left eye exhibits no discharge.   Left eye ptosis   Neck: Normal range of motion. Neck supple. No thyromegaly present.   Cardiovascular: Normal rate, regular rhythm and intact distal pulses.    Murmur heard.  Pulmonary/Chest: Effort normal. He has rhonchi.   Coarse breath sounds throughout without wheezing   Abdominal: Soft. Bowel sounds are normal. He exhibits no distension. There is no tenderness.   Musculoskeletal: Normal range of motion. He exhibits no edema.   Lymphadenopathy:     He has no cervical adenopathy.   Neurological:   Somnolent. Opens eyes only   Skin: Skin is warm and dry. No rash noted.   Erythema to left hand   Psychiatric: He has a normal mood and affect. His behavior is normal.   Nursing note and vitals reviewed.      Significant Labs:  Labs reviewed    Significant Imaging: I have reviewed and interpreted all pertinent imaging results/findings within the past 24 hours.     CXR 1/18/17: Chest, 1 view: Coarse interstitial markings are seen throughout the lungs.  Left retrocardiac opacity is seen.  The heart is  normal in size.  The skeletal structures are intact    CT head pending    Assessment/Plan:      * Pneumonia  Azithromycin and zosyn day 7/10  Albuterol 2.5mg neb every 6hr  mucinex 600mg po BID      Down's syndrome  Very minimal communication, brother no longer at bedside.  He is minimally arouseable at present.   Eating minimal. Spoke to Dr. Correa about poor prognosis. Will get CT head today.   Potential move to hospice later this week.       Seizure disorder  Oxcarbazepine 300mg po BID      Urinary retention  Still urinating without cath. Cont flomax      Debility  PT/OT      B12 deficiency  Dr. Correa checking B12 level; h/o def      Early onset Alzheimer's disease with behavioral disturbance  Dr. Correa consulted, reports poor prognosis, but will get CT head today  May be future hospice candidate.       Hemorrhagic cerebrovascular accident (CVA)  Neurology following.  With this bleed prognosis is poor.    Will discuss with family plan for ongoing care.    Not eating currently      Dementia without behavioral disturbance, resolved as of 1/19/2017  See down's plan      VTE Risk Mitigation         Ordered     enoxaparin injection 40 mg  Daily     Route:  Subcutaneous        01/17/17 1442     Medium Risk of VTE  Once      01/17/17 1442          Makenzie Mckeon MD  Department of Hospital Medicine   [facility]

## 2017-01-19 NOTE — PT/OT/SLP PROGRESS
Physical Therapy      Mack Will  MRN: [id number]    Patient not seen 1/19/2017 p.m. secondary to MD hold (Comment) (M.REINALDO hold secondary to pt. status.). Will follow-up 1/20/2017.  RN aware of pt. Status.    Matthew Riggs, PT

## 2017-01-19 NOTE — NURSING
Report received. Patient sitting up in bed. No signs of respiratory distress. Clinimix infusing. Plan of care reviewed with patient. Call bell within reach.

## 2017-01-19 NOTE — PT/OT/SLP PROGRESS
Physical Therapy      Mack Will  MRN: [id number]    Patient not seen 1/19/2017 a.m. secondary to MD hold (Comment) (MD hold secondary to pt. status.). Will follow-up 1/19/2017 luz marina Riggs, PT

## 2017-01-19 NOTE — PLAN OF CARE
Problem: Patient Care Overview  Goal: Plan of Care Review  Outcome: Ongoing (interventions implemented as appropriate)  Fall precautions maintained. Patient remains free from falls/injuries. IV ABX and clinimix administered. Assisted patient with repositioning. Diapers. Hygiene care provided. Patient receiving respiratory treatments.

## 2017-01-19 NOTE — PLAN OF CARE
Problem: Nutrition, Imbalanced: Inadequate Oral Intake (Adult)  Intervention: Promote/Optimize Nutrition  Recommendation/Interventions:  1. Continue Low Sodium Puree diet with high protein snacks as tolerated encouraging intake  2. Continue Megace to increase appetite   3. Re-initiate PPN for nutrition support if intake does not increase with appetite stimulant.   4. Consider obtaining access for enteral nutrition (NG,OG, or PEG) for nutrition intake if does not increase with appetite stimulant     Goals:increase oral intake to 75%  Nutrition Goal Status: progressing

## 2017-01-19 NOTE — ASSESSMENT & PLAN NOTE
Dr. Correa consulted, reports poor prognosis, but will get CT head today  May be future hospice candidate.

## 2017-01-19 NOTE — ASSESSMENT & PLAN NOTE
-Patient has B12 deficiency known prior.   -Check B12 level (patient was on po repletion prior to admit)

## 2017-01-19 NOTE — PT/OT/SLP PROGRESS
Occupational Therapy      Mack Will  MRN: [id number]    Patient not seen today secondary to MD hold (Comment) (Pt on hold due to medical status.). Will follow-up 01/20/1017.    Bess Collier OT  1/19/2017

## 2017-01-20 ENCOUNTER — HOSPITAL ENCOUNTER (INPATIENT)
Facility: HOSPITAL | Age: 52
LOS: 3 days | Discharge: NURSING, REHAB OR LONG-TERM CARE | DRG: 064 | End: 2017-01-23
Attending: FAMILY MEDICINE | Admitting: FAMILY MEDICINE
Payer: MEDICARE

## 2017-01-20 VITALS
WEIGHT: 142.88 LBS | HEIGHT: 63 IN | HEART RATE: 63 BPM | DIASTOLIC BLOOD PRESSURE: 67 MMHG | OXYGEN SATURATION: 97 % | TEMPERATURE: 98 F | SYSTOLIC BLOOD PRESSURE: 140 MMHG | BODY MASS INDEX: 25.32 KG/M2 | RESPIRATION RATE: 20 BRPM

## 2017-01-20 DIAGNOSIS — I61.9 CEREBROVASCULAR ACCIDENT (CVA) WITH INTRACRANIAL HEMORRHAGE: ICD-10-CM

## 2017-01-20 LAB
ANION GAP SERPL CALC-SCNC: 12 MMOL/L
BASOPHILS NFR BLD: 2 %
BUN SERPL-MCNC: 11 MG/DL
CALCIUM SERPL-MCNC: 9.2 MG/DL
CHLORIDE SERPL-SCNC: 103 MMOL/L
CO2 SERPL-SCNC: 26 MMOL/L
CREAT SERPL-MCNC: 0.9 MG/DL
DIFFERENTIAL METHOD: ABNORMAL
EOSINOPHIL NFR BLD: 3 %
ERYTHROCYTE [DISTWIDTH] IN BLOOD BY AUTOMATED COUNT: 14.9 %
EST. GFR  (AFRICAN AMERICAN): >60 ML/MIN/1.73 M^2
EST. GFR  (NON AFRICAN AMERICAN): >60 ML/MIN/1.73 M^2
GIANT PLATELETS BLD QL SMEAR: PRESENT
GLUCOSE SERPL-MCNC: 99 MG/DL
HCT VFR BLD AUTO: 44.8 %
HGB BLD-MCNC: 15.2 G/DL
LYMPHOCYTES NFR BLD: 18 %
MCH RBC QN AUTO: 31 PG
MCHC RBC AUTO-ENTMCNC: 33.9 %
MCV RBC AUTO: 91 FL
MONOCYTES NFR BLD: 4 %
NEUTROPHILS NFR BLD: 71 %
NEUTS BAND NFR BLD MANUAL: 2 %
PLATELET # BLD AUTO: 395 K/UL
PLATELET BLD QL SMEAR: ABNORMAL
PMV BLD AUTO: 11.5 FL
POTASSIUM SERPL-SCNC: 3.3 MMOL/L
RBC # BLD AUTO: 4.9 M/UL
SODIUM SERPL-SCNC: 141 MMOL/L
VIT B12 SERPL-MCNC: 1968 PG/ML
WBC # BLD AUTO: 10.4 K/UL

## 2017-01-20 PROCEDURE — C9113 INJ PANTOPRAZOLE SODIUM, VIA: HCPCS | Performed by: NURSE PRACTITIONER

## 2017-01-20 PROCEDURE — 25000003 PHARM REV CODE 250: Performed by: NURSE PRACTITIONER

## 2017-01-20 PROCEDURE — 63600175 PHARM REV CODE 636 W HCPCS: Performed by: NURSE PRACTITIONER

## 2017-01-20 PROCEDURE — 27000221 HC OXYGEN, UP TO 24 HOURS

## 2017-01-20 PROCEDURE — 94640 AIRWAY INHALATION TREATMENT: CPT

## 2017-01-20 PROCEDURE — 11000001 HC ACUTE MED/SURG PRIVATE ROOM

## 2017-01-20 PROCEDURE — 27000339 *HC DAILY SUPPLY KIT

## 2017-01-20 PROCEDURE — 85027 COMPLETE CBC AUTOMATED: CPT

## 2017-01-20 PROCEDURE — 97110 THERAPEUTIC EXERCISES: CPT

## 2017-01-20 PROCEDURE — 25000242 PHARM REV CODE 250 ALT 637 W/ HCPCS: Performed by: NURSE PRACTITIONER

## 2017-01-20 PROCEDURE — 92610 EVALUATE SWALLOWING FUNCTION: CPT

## 2017-01-20 PROCEDURE — 80048 BASIC METABOLIC PNL TOTAL CA: CPT

## 2017-01-20 PROCEDURE — 85007 BL SMEAR W/DIFF WBC COUNT: CPT

## 2017-01-20 PROCEDURE — 97162 PT EVAL MOD COMPLEX 30 MIN: CPT

## 2017-01-20 PROCEDURE — 94668 MNPJ CHEST WALL SBSQ: CPT

## 2017-01-20 PROCEDURE — G8980 MOBILITY D/C STATUS: HCPCS | Mod: CM

## 2017-01-20 PROCEDURE — 94760 N-INVAS EAR/PLS OXIMETRY 1: CPT

## 2017-01-20 PROCEDURE — G8979 MOBILITY GOAL STATUS: HCPCS | Mod: CL

## 2017-01-20 PROCEDURE — 99239 HOSP IP/OBS DSCHRG MGMT >30: CPT | Mod: ,,, | Performed by: FAMILY MEDICINE

## 2017-01-20 PROCEDURE — 82607 VITAMIN B-12: CPT

## 2017-01-20 PROCEDURE — 99233 SBSQ HOSP IP/OBS HIGH 50: CPT | Mod: ,,, | Performed by: PSYCHIATRY & NEUROLOGY

## 2017-01-20 PROCEDURE — 94667 MNPJ CHEST WALL 1ST: CPT

## 2017-01-20 PROCEDURE — G8978 MOBILITY CURRENT STATUS: HCPCS | Mod: CM

## 2017-01-20 PROCEDURE — 97163 PT EVAL HIGH COMPLEX 45 MIN: CPT

## 2017-01-20 PROCEDURE — G8979 MOBILITY GOAL STATUS: HCPCS | Mod: CK

## 2017-01-20 PROCEDURE — 36415 COLL VENOUS BLD VENIPUNCTURE: CPT

## 2017-01-20 RX ORDER — PANTOPRAZOLE SODIUM 40 MG/10ML
40 INJECTION, POWDER, LYOPHILIZED, FOR SOLUTION INTRAVENOUS DAILY
Status: DISCONTINUED | OUTPATIENT
Start: 2017-01-21 | End: 2017-01-23 | Stop reason: HOSPADM

## 2017-01-20 RX ORDER — GUAIFENESIN 600 MG/1
600 TABLET, EXTENDED RELEASE ORAL 2 TIMES DAILY
Status: CANCELLED | OUTPATIENT
Start: 2017-01-20

## 2017-01-20 RX ORDER — ACETAMINOPHEN 325 MG/1
650 TABLET ORAL EVERY 8 HOURS PRN
Status: DISCONTINUED | OUTPATIENT
Start: 2017-01-20 | End: 2017-01-23 | Stop reason: HOSPADM

## 2017-01-20 RX ORDER — HYDROCODONE BITARTRATE AND ACETAMINOPHEN 5; 325 MG/1; MG/1
1 TABLET ORAL EVERY 4 HOURS PRN
Status: CANCELLED | OUTPATIENT
Start: 2017-01-20

## 2017-01-20 RX ORDER — ONDANSETRON 2 MG/ML
4 INJECTION INTRAMUSCULAR; INTRAVENOUS EVERY 8 HOURS PRN
Status: CANCELLED | OUTPATIENT
Start: 2017-01-20

## 2017-01-20 RX ORDER — TAMSULOSIN HYDROCHLORIDE 0.4 MG/1
0.4 CAPSULE ORAL DAILY
Status: CANCELLED | OUTPATIENT
Start: 2017-01-21

## 2017-01-20 RX ORDER — ACETAMINOPHEN 325 MG/1
650 TABLET ORAL EVERY 8 HOURS PRN
Status: CANCELLED | OUTPATIENT
Start: 2017-01-20

## 2017-01-20 RX ORDER — ALBUTEROL SULFATE 2.5 MG/.5ML
2.5 SOLUTION RESPIRATORY (INHALATION) EVERY 6 HOURS
Status: CANCELLED | OUTPATIENT
Start: 2017-01-20

## 2017-01-20 RX ORDER — HYDROCODONE BITARTRATE AND ACETAMINOPHEN 5; 325 MG/1; MG/1
1 TABLET ORAL EVERY 4 HOURS PRN
Status: DISCONTINUED | OUTPATIENT
Start: 2017-01-20 | End: 2017-01-23 | Stop reason: HOSPADM

## 2017-01-20 RX ORDER — MEGESTROL ACETATE 40 MG/ML
200 SUSPENSION ORAL DAILY
Status: CANCELLED | OUTPATIENT
Start: 2017-01-21

## 2017-01-20 RX ORDER — SCOLOPAMINE TRANSDERMAL SYSTEM 1 MG/1
1 PATCH, EXTENDED RELEASE TRANSDERMAL
Status: DISCONTINUED | OUTPATIENT
Start: 2017-01-20 | End: 2017-01-20 | Stop reason: HOSPADM

## 2017-01-20 RX ORDER — ALBUTEROL SULFATE 2.5 MG/.5ML
2.5 SOLUTION RESPIRATORY (INHALATION) EVERY 6 HOURS
Status: DISCONTINUED | OUTPATIENT
Start: 2017-01-20 | End: 2017-01-23 | Stop reason: HOSPADM

## 2017-01-20 RX ORDER — PANTOPRAZOLE SODIUM 40 MG/10ML
40 INJECTION, POWDER, LYOPHILIZED, FOR SOLUTION INTRAVENOUS DAILY
Status: CANCELLED | OUTPATIENT
Start: 2017-01-21

## 2017-01-20 RX ORDER — OXCARBAZEPINE 150 MG/1
300 TABLET, FILM COATED ORAL 2 TIMES DAILY
Status: CANCELLED | OUTPATIENT
Start: 2017-01-20

## 2017-01-20 RX ORDER — MEGESTROL ACETATE 40 MG/ML
200 SUSPENSION ORAL DAILY
Status: DISCONTINUED | OUTPATIENT
Start: 2017-01-21 | End: 2017-01-23 | Stop reason: HOSPADM

## 2017-01-20 RX ORDER — ONDANSETRON 2 MG/ML
4 INJECTION INTRAMUSCULAR; INTRAVENOUS EVERY 8 HOURS PRN
Status: DISCONTINUED | OUTPATIENT
Start: 2017-01-20 | End: 2017-01-23 | Stop reason: HOSPADM

## 2017-01-20 RX ORDER — OXCARBAZEPINE 150 MG/1
300 TABLET, FILM COATED ORAL 2 TIMES DAILY
Status: DISCONTINUED | OUTPATIENT
Start: 2017-01-20 | End: 2017-01-23 | Stop reason: HOSPADM

## 2017-01-20 RX ORDER — TAMSULOSIN HYDROCHLORIDE 0.4 MG/1
0.4 CAPSULE ORAL DAILY
Status: DISCONTINUED | OUTPATIENT
Start: 2017-01-21 | End: 2017-01-23 | Stop reason: HOSPADM

## 2017-01-20 RX ORDER — GUAIFENESIN 600 MG/1
600 TABLET, EXTENDED RELEASE ORAL 2 TIMES DAILY
Status: DISCONTINUED | OUTPATIENT
Start: 2017-01-20 | End: 2017-01-23 | Stop reason: HOSPADM

## 2017-01-20 RX ADMIN — PIPERACILLIN AND TAZOBACTAM 3.38 G: 3; .375 INJECTION, POWDER, LYOPHILIZED, FOR SOLUTION INTRAVENOUS; PARENTERAL at 10:01

## 2017-01-20 RX ADMIN — PANTOPRAZOLE SODIUM 40 MG: 40 INJECTION, POWDER, FOR SOLUTION INTRAVENOUS at 09:01

## 2017-01-20 RX ADMIN — ALBUTEROL SULFATE 2.5 MG: 2.5 SOLUTION RESPIRATORY (INHALATION) at 12:01

## 2017-01-20 RX ADMIN — TAMSULOSIN HYDROCHLORIDE 0.4 MG: 0.4 CAPSULE ORAL at 11:01

## 2017-01-20 RX ADMIN — ALBUTEROL SULFATE 2.5 MG: 2.5 SOLUTION RESPIRATORY (INHALATION) at 07:01

## 2017-01-20 RX ADMIN — PIPERACILLIN AND TAZOBACTAM 3.38 G: 3; .375 INJECTION, POWDER, LYOPHILIZED, FOR SOLUTION INTRAVENOUS; PARENTERAL at 05:01

## 2017-01-20 RX ADMIN — AZITHROMYCIN MONOHYDRATE 500 MG: 500 INJECTION, POWDER, LYOPHILIZED, FOR SOLUTION INTRAVENOUS at 01:01

## 2017-01-20 RX ADMIN — MEGESTROL ACETATE 200 MG: 40 SUSPENSION ORAL at 11:01

## 2017-01-20 RX ADMIN — OXCARBAZEPINE 300 MG: 150 TABLET, FILM COATED ORAL at 11:01

## 2017-01-20 RX ADMIN — PIPERACILLIN AND TAZOBACTAM 3.38 G: 3; .375 INJECTION, POWDER, LYOPHILIZED, FOR SOLUTION INTRAVENOUS; PARENTERAL at 01:01

## 2017-01-20 RX ADMIN — SCOPOLAMINE 1.5 MG: 1 PATCH, EXTENDED RELEASE TRANSDERMAL at 01:01

## 2017-01-20 RX ADMIN — GUAIFENESIN 600 MG: 600 TABLET, EXTENDED RELEASE ORAL at 11:01

## 2017-01-20 RX ADMIN — ALBUTEROL SULFATE 2.5 MG: 2.5 SOLUTION RESPIRATORY (INHALATION) at 01:01

## 2017-01-20 NOTE — PLAN OF CARE
01/20/17 1511   Discharge Assessment   Assessment Type Discharge Planning Assessment   Confirmed/corrected address and phone number on facesheet? Yes   Assessment information obtained from? Caregiver   Expected Length of Stay (days) 3   Communicated expected length of stay with patient/caregiver yes   Type of Healthcare Directive Received Advance Directive   If Healthcare Directive is received, is it scanned into Epic? yes   Prior to hospitilization cognitive status: Unable to Assess   Current cognitive status: Unable to Assess   Current Functional Status: Partially Dependent   Arrived From group home   Lives With facility resident   Able to Return to Prior Arrangements unable to determine at this time (comments)   Is patient able to care for self after discharge? Unable to determine at this time (comments)   Who are your caregiver(s) and their phone number(s)? Brother Giovanni [phone]   Patient's perception of discharge disposition nursing home   Readmission Within The Last 30 Days other (see comments)  (Patient moved from Skill Unit to Acute)   Patient currently being followed by outpatient case management? No   Patient currently receives home health services? No   Does the patient currently use HME? No   Patient currently receives private duty nursing? No   Patient currently receives any other outside agency services? No   Equipment Currently Used at Home none   Do you have any problems affording any of your prescribed medications? No   Is the patient taking medications as prescribed? yes   Do you have any financial concerns preventing you from receiving the healthcare you need? No   Does the patient have transportation to healthcare appointments? Yes   Transportation Available van, wheelchair accessible   On Dialysis? No   Does the patient receive services at the [facility]mad[facility]? No   Are there any open cases? No   Discharge Plan A New Nursing Home placement - long-term care facility   Discharge Plan B New  Nursing Home placement - [facility] care facility   Patient/Family In Agreement With Plan yes

## 2017-01-20 NOTE — PROGRESS NOTES
"Food & Nutrition  INITITAL Note    Recommendation/Interventions:  1. Continue Low Sodium, Puree diet with snacks as tolerated with strong encouragement good intake   2. Continue Megace to increase appetite   3. Consider enteral nutrition access for nutrition support due to continued none to very little intake   4. If unable to aquire enteral access, re-initiate PPN for nutrition support    Goals: increase oral intake to 75% of meals  Nutrition Goal Status: progressing    Reason for Assessment:Identified at risk/ changed from SWING to InPt  Diagnosis: Pulmonary diease (pneumonia)  Medical History: Down Syndrome, Alzheimers  Level of Care: High 3    Pt changed from SWING to inpt today. Continued no intake of meals; from group home    Nutrition Risk Screen: reduced oral intake over last month    Nutrition/Diet History:  Meal Patterns:prior to admit only consumed 1 meal/day  Typical Activity Pattern: sedentary  Functional Status:non ambulatory, not able to prepare meals, not able to purchase food  Factors Affecting Nutritional intake:impaired cognitive status/motor control; compromised airway    Anthropometrics:  Ht: 63"   Wt: 67.4kg  IBW: 123.94lb  %IBW: 119.89  BMI: 26.33  BMI Grade: 25-29 overweight    Labs: reviewed  Medications:   Current Facility-Administered Medications:     acetaminophen tablet 650 mg, 650 mg, Oral, Q8H PRN, Neeru Mendieta NP    albuterol sulfate nebulizer solution 2.5 mg, 2.5 mg, Nebulization, Q6H, Neeru Mendieta NP    [START ON 1/21/2017] azithromycin 500 mg in dextrose 5 % 250 mL IVPB (ready to mix system), 500 mg, Intravenous, Q24H, Neeru Mendieta NP    guaifenesin 12 hr tablet 600 mg, 600 mg, Oral, BID, Neeru Mendieta NP    hydrocodone-acetaminophen 5-325mg per tablet 1 tablet, 1 tablet, Oral, Q4H PRN, Neeru Mendieta NP    [START ON 1/21/2017] megestrol 400 mg/10 mL (10 mL) suspension 200 mg, 200 mg, Oral, Daily, Neeru Mendieta NP    ondansetron " injection 4 mg, 4 mg, Intravenous, Q8H PRN, Neeru Mendieta NP    oxcarbazepine tablet 300 mg, 300 mg, Oral, BID, Neeru Mendieta NP    [START ON 1/21/2017] pantoprazole injection 40 mg, 40 mg, Intravenous, Daily, Neeru Mendieta NP    piperacillin-tazobactam 3.375 g in dextrose 5 % 50 mL IVPB (ready to mix system), 3.375 g, Intravenous, Q8H, Neeru Mendieta NP    promethazine (PHENERGAN) 12.5 mg in dextrose 5 % 50 mL IVPB, 12.5 mg, Intravenous, Q6H PRN, Neeru Mendieta NP    [START ON 1/21/2017] tamsulosin 24 hr capsule 0.4 mg, 0.4 mg, Oral, Daily, Neeru Mendieta NP      Physical Findings:  Overall: overweight    Estimated Needs:  Wt used: 67.4 kg  Energy Calorie Requirement: 1700 (mifflin 1.2)  Protein Requirement: 80 (1.2)  Fluid:1700 (1ml/kcal)    Current Diet:  Low Sodium, Puree with Snacks  Nutrition Order Comments: adequate    Nutrition Risk: High    Malnutrition Diagnosis:  % Intake Estimated Energy Needs: 0-25%  % Meal Intake: 0%  Malnutrition Reason: Chronic    Nutrition Diagnosis:  Inadequate energy intake R/T hx of down syndrome/ current Dx AEB labs, not eating, recent use of clinimix   Status: continues    Monitor & Evaluate:  Intake: food and beverage intake  Nutrition Administration: diet order  Physical Activity & Function: nutrition related ADL's  Anthropometric Measurements: weight, weight change  Biochemical Data, Medical Tests and Procedures: electrolyte and renal panel, gi profile  Nutrition-Focused Physical Findings: overall appearance    Nutrition Follow-Up: 01/23/2017

## 2017-01-20 NOTE — PLAN OF CARE
Problem: Patient Care Overview  Goal: Plan of Care Review  Outcome: Ongoing (interventions implemented as appropriate)  Fall precautions maintained. Patient remains free from falls/injuries. Patient on 4L o2 nasal yair, o2 sat mid 90s. Receiving scheduled respiratory treatments. Patient refused to eat anything, other than 2 bites of applesauce and refused drink. Some difficulty waking patient around midnight; neuro check WDL upon awakening. Afterwards, patient was easily awakened. Neuro checks WDL. IV ABX administered.

## 2017-01-20 NOTE — PT/OT/SLP PROGRESS
Occupational Therapy      Mack Will  MRN: [id number]    Patient not seen today secondary to Patient unwilling to participate. Will follow-up 01/23/2016, since he was readmitted to acute, OT will attempt eval one more time, this will be the fourth attempted. If Pt is unwilling to participate, orders will be d/c.    Bess Collier, OT  1/20/2017

## 2017-01-20 NOTE — PT/OT/SLP DISCHARGE
Physical Therapy Discharge Summary    Mack Will  MRN: [id number]   Pneumonia   Patient Discharged from acute Physical Therapy on 2017.  Please refer to prior PT noted date on 17 for functional status.     Assessment:   Patient has not met goals.  GOALS:   Physical Therapy Goals     Not on file      Multidisciplinary Problems (Resolved)        Problem: Physical Therapy Goal    Goal Priority Disciplines Outcome Goal Variances Interventions   Physical Therapy Goal   (Resolved)     PT/OT, PT Outcome(s) achieved     Description:  Goals to be met by: upon D/C from facility     Patient will increase functional independence with mobility by performin. Supine to sit with MInimal Assistance -- NOT MET  2. Sit to supine with MInimal Assistance -- NOT MET  3. Bed to chair transfer with Minimal Assistance  -- NOT MET            Reasons for Discontinuation of Therapy Services  Transfer to alternate level of care.      Plan:  Patient Discharged to: Inpatient status.

## 2017-01-20 NOTE — PT/OT/SLP EVAL
Physical Therapy  Evaluation    Mack Will   MRN: [id number]   Admitting Diagnosis: Pneumonia    PT Received On: 17  PT Start Time: 1050     PT Stop Time: 1115    PT Total Time (min): 25 min       Billable Minutes:  Evaluation 15 minutes and Therapeutic Exercise 10 minutes    Diagnosis: Pneumonia      Past Medical History   Diagnosis Date    Alzheimer disease     Down syndrome     Seizure       Past Surgical History   Procedure Laterality Date    Cholecystectomy      Cyst removal         Referring physician: Eva Brasher NP  Date referred to PT: 17    General Precautions: Standard, aspiration, fall  Orthopedic Precautions: N/A     Patient History:  Lives With: facility resident  Living Arrangements: group home  Equipment Currently Used at Home: none  DME owned (not currently used): none    Previous Level of Function:  Ambulation Skills: needs assist  Transfer Skills: needs assist  ADL Skills: needs assist  Work/Leisure Activity: needs assist    Subjective:  Communicated with NSG prior to session.    Chief Complaint: Pt unable to express c/o secondary to cognitive status  Patient goals: Family goals are to increase mobility and go home.    Pain Ratin/10                    Objective:   Patient found with: bed alarm, peripheral IV     Cognitive Exam:  Oriented to: unable to assess secondary to cognition    Follows Commands/attention: Inattentive  Communication: Pt non-verbal  Safety awareness/insight to disability: impaired    Physical Exam:  Skin integrity: Visible skin intact  Edema: None noted     Upper Extremity Range of Motion:  Right Upper Extremity: WFL  Left Upper Extremity: WFL    Upper Extremity Strength:  Right Upper Extremity: WFL  Left Upper Extremity: WFL    Lower Extremity Range of Motion:  Right Lower Extremity: WFL  Left Lower Extremity: WFL    Lower Extremity Strength:  Right Lower Extremity: WFL  Left Lower Extremity: WFL     Functional Mobility:  Bed  Mobility:  Rolling/Turning to Left: Maximum assistance  Rolling/Turning Right: Maximum assistance  Scooting/Bridging: Maximum Assistance  Supine to Sit: Maximum Assistance  Sit to Supine: Maximum Assistance    Transfers: Refuses to attempt       Therapeutic Activities and Exercises:  PROM to BUE/BLE at all joints in available planes of motion with rest breaks as needed to increase strength and endurance with all gross mobility.    AM-PAC 6 CLICK MOBILITY  How much help from another person does this patient currently need?   1 = Unable, Total/Dependent Assistance  2 = A lot, Maximum/Moderate Assistance  3 = A little, Minimum/Contact Guard/Supervision  4 = None, Modified [place]/Independent    Turning over in bed (including adjusting bedclothes, sheets and blankets)?: 2  Sitting down on and standing up from a chair with arms (e.g., wheelchair, bedside commode, etc.): 1  Moving from lying on back to sitting on the side of the bed?: 2  Moving to and from a bed to a chair (including a wheelchair)?: 1  Need to walk in hospital room?: 1  Climbing 3-5 steps with a railing?: 1  Total Score: 8     AM-PAC Raw Score CMS G-Code Modifier Level of Impairment Assistance   6 % Total / Unable   7 - 9 CM 80 - 100% Maximal Assist   10 - 14 CL 60 - 80% Moderate Assist   15 - 19 CK 40 - 60% Moderate Assist   20 - 22 CJ 20 - 40% Minimal Assist   23 CI 1-20% SBA / CGA   24 CH 0% Independent/ Mod I     Patient left supine with all lines intact, call button in reach and NSG notified.    Assessment:     Factors that may affect patient's status:  [] Patient's age [] Time since onset of injury/illness/exacerbation  []Prior Level of function       [] Current level of function [] Status of current condition []Patient's cognitive status and safety concerns      [] Patient's level of motivation    [] Patient's home situation (environment and family support)  [] Objective examination findings [] Goals and goal agreement with the patient         [] Rehab potential (prognosis) and probable outcome    [] Expected progression of patient    Criteria:     History:    > 3  Personal Factors and/or co-morbidity - 16170  Examination of Body System:  addressing a total of 3 or more elements - 99517  Clinical Presentation:  Evolving - 41829  Clinical Decision Making (Complexity):  High - 03546      On examination of body system using standardized tests and measures patient presents with 3 or more elements from any of the following: body structures and functions, activity limitations, and/or participation restrictions.  Leading to a clinical presentation that is considered unstable with unpredictable characteristics    Mack Will is a 51 y.o. male with a medical diagnosis of Pneumonia and presents with increased drowsiness this AM. Pt would open eyes briefly but unable to engage in task. Pt followed no commands. Pt did not resist PROM; but unable to actively participate in treatment session.    Rehab identified problem list/impairments: Rehab identified problem list/impairments: weakness, impaired endurance, impaired self care skills, impaired functional mobilty, impaired cognition, decreased coordination, impaired balance, decreased lower extremity function, decreased upper extremity function, decreased safety awareness    Rehab potential is poor.    Activity tolerance: Fair    Discharge recommendations: Discharge Facility/Level Of Care Needs:  (GROUP HOME)     Barriers to discharge: Barriers to Discharge: None    Equipment recommendations: Equipment Needed After Discharge: none     GOALS:   Physical Therapy Goals        Problem: Physical Therapy Goal    Goal Priority Disciplines Outcome Goal Variances Interventions   Physical Therapy Goal     PT/OT, PT      Description:  Goals to be met by: upon D/C from facility     Patient will increase functional independence with mobility by performin. Supine to sit with MInimal Assistance  2. Sit to supine with  MInimal Assistance  3. Bed to chair transfer with Minimal Assistance               PLAN:    Patient to be seen  (1-2x/day Monday-Friday; PRN Weekends/Holidays) to address the above listed problems via gait training, therapeutic activities, therapeutic exercises  Plan of Care expires:  (upon D/C from facility)  Plan of Care reviewed with: patient          Lulú Sagastume, PT  01/20/2017

## 2017-01-20 NOTE — PROGRESS NOTES
Chief Complaint:  AMS      HPI: Mack Will is a 51 y.o. Male known to me for Alzheimer's dementia associated with Down Syndrome.  Patient is admitted to Swing bed after hospitalization for pneumonia and has had altered mental status since admit. His usual baseline is ambulatory and non-verbal with some behavioral disturbance although often smiling and interactive. This has not been the case since admit. He has been eating only at times and only interactive with extreme encouragement.     Interval history:  Patient is improved today 1/20/17: interactive with brother.  Able to follow some simple commands and starting to tolerate more po this am.    Patient was given scopalamine by primary team for symptomatic care           Past Medical History   Diagnosis Date    Alzheimer disease      Down syndrome      Seizure                 Past Surgical History   Procedure Laterality Date    Cholecystectomy        Cyst removal             Review of patient's allergies indicates:  No Known Allergies                Family History     Problem Relation (Age of Onset)     Heart disease Father              Social History Main Topics    Smoking status: Never Smoker    Smokeless tobacco: Never Used    Alcohol use No    Drug use: Not on file    Sexual activity: Not on file   I have reviewed all of this patient's past medical and surgical histories as well as family and social histories and active allergies and medications as documented in the electronic medical record.     Review of Systems   Unable to perform ROS: Dementia      Objective:      Vitals:    01/20/17 0934   BP: (!) 123/59   Pulse:    Resp: 20   Temp: 97.6 °F (36.4 °C)            Weight: 67.4 kg (148 lb 9.4 oz)  Body mass index is 26.32 kg/(m^2).     Physical Exam          Exam:    Gen Appearance, well developed/nourished in no apparent distress  CV: 2+ distal pulses with no edema or swelling  Neuro:  MS: Awake, alert and sustains attention fully on brother.  Speaks his name and brother's names. Cooperates with very simple commands.  . Does not speak much at baseline   Patient closes his eyes at times (?vertigo)  CN: Optic discs are flat with normal vasculature, PERRL, face symmetric and tongue is midline and strong  Motor: Does move both arms and legs to command equally and fully   Sensory: Can't cooperate  Cerebellar:Can't cooperate  Gait: Not done        Significant Labs: Blood cultures are no growth. CMP, CBC reviewed from 17     Significant Imagin17 Xray chest: Coarse interstitial markings are seen throughout the lungs.  Left retrocardiac opacity is seen.  The heart is normal in size.  The skeletal structures are intact.    CT head today:  Stable parenchymal hemorrhage involving the cerebellar vermis.  No new hemorrhage is seen.  No hydrocephalus. (CTA not done/ patient tolerance questionable)  Assessment and Plan:      Mack Will is a 51 y.o. male admitted with pneumonia and has AMS  I recommend:    Cerebellar hemorrhage  -Likely due to amyloid angiopathy  -Check CTA head and neck Monday if patient can tolerate to rule out other causes for next recheck of this hemorrhage.  -Check CT head sooner for any other changes in neuro-checks  Patient seems improved today/ tolerating some po- re-consult ST  -Treat any increased BP aggressively (Blood pressure has been ideal below 160 systolic)  -Avoid ASA/anticoagulation (current BP ideal)    Down's syndrome  Patient with expanded life span with Down's syndrome who has developed associated Alzheimer's dementia.      Seizure disorder  Continue home med, trileptal for seizure prevention     Dementia without behavioral disturbance  -Reviewed poor long term prognosis with brother who states he would like PEG tube for patient if nutrition does not improve soon  - trazodone for insomnia,  given pre admit not currently being used- likely due to more sedated state since admission. Can continue to hold  B12  deficiency  -Patient has B12 deficiency known prior.   -Follow up B12 level (patient was on po repletion prior to admit)       will follow        Bib Correa MD  Neurology  [facility]

## 2017-01-20 NOTE — PLAN OF CARE
Problem: Patient Care Overview  Goal: Plan of Care Review  Outcome: Ongoing (interventions implemented as appropriate)  Pt tolerating 3-4 lpm NC with O2 sats in mid-high 90s range. Tolerated treatment and slept through CPT.  No distress noted; resting comfortably.

## 2017-01-20 NOTE — ASSESSMENT & PLAN NOTE
Very minimal communication, brother at bedside.  He is more awake today  Still eating minimal. Spoke to Dr. Correa about poor prognosis especially in light of bleed.

## 2017-01-20 NOTE — PROGRESS NOTES
Discharge from swing and readmit to acute per MD orders. Pt brother aware of POC. Report given to PRUDENCE Canales.

## 2017-01-20 NOTE — DISCHARGE SUMMARY
[facility] Hospital Medicine  Discharge Summary      Patient Name: Mack Will  MRN: [id number]  Admission Date: 1/17/2017  Hospital Length of Stay: 3 days  Discharge Date and Time: 1/20/2017 11:19 AM  Attending Physician: Chetan Alvarez MD   Discharging Provider: Neeru Mendieta NP  Primary Care Provider: Chetan Alvarez MD      HPI:   Principal Problem:Pneumonia of both lungs due to infectious organism     HPI:  51 year old non verbal and demented Downs syndrome patient was sent here via ambulance from his group home because of worsening cough, fever and fatigue. His condition was called to Dr. Alvarez who advised ER evaluation.    Patient was seen and evaluated in the ER and was found to be hypoxic and in respiratory distress. He was admitted to floor with left lower lobe pneumonia. Placed on zithromax and zosyn-day 6 today. Questionable aspiration. Speech consulted for swallow assessment, limited eval, but no gross aspiration noted. CT scan; no PE. Bilateral infiltrates. No longer having a fever. Pt's WBC down to normal and remains there     He is non verbal now-had limited speech before. Most info comes from brother at bedside     Pt had traumatic gabriel placement for urinary retention. Started on Flomax. He voided last night and again this am a large amount.     He is eating minimal even with family's coaxing. We have changed his IVF to clinimix on Sunday. Brother at bedside he will try and push.      Pt has not gotten out of bed, was previously walking. PT started. Resistant to them but when he was ready he got into bed by himself      * No surgery found *      Indwelling Lines/Drains at time of discharge:   Lines/Drains/Airways          No matching active lines, drains, or airways        Hospital Course:   He has been placed on SWING unit for continued 10 day course of abx and cont PT.     Dr Nevarez following. He is currently on 3L high flow NC tks> 90%--99% at present. Refuses  mask. Mouth breather. Has junky cough. Not bringing anything up. Mucinex started this week.     Not eating. Very acute change in MS. Dr. Correa consulted, CT head noted: New hemorrhage measuring 2.1 x 2.6 x 2.2 cm within the cerebellar vermis.  There is mass effect upon the posterior aspect of the fourth ventricle and mild adjacent vasogenic edema.  No evidence for herniation or hydrocephalus at this time.    K+ a little low this am, expected with min intake.        Consults:   Consults         Status Ordering Provider     Inpatient consult to Neurology  Once     Provider:  Bib Correa MD    Completed ELODIA PAIZ          Significant Diagnostic Studies:     BMP  Lab Results   Component Value Date     01/20/2017    K 3.3 (L) 01/20/2017     01/20/2017    CO2 26 01/20/2017    BUN 11 01/20/2017    CREATININE 0.9 01/20/2017    CALCIUM 9.2 01/20/2017    ANIONGAP 12 01/20/2017    ESTGFRAFRICA >60 01/20/2017    EGFRNONAA >60 01/20/2017         Pending Diagnostic Studies:     Procedure Component Value Units Date/Time    Vitamin B12 [[id number]] Collected:  01/20/17 0946    Order Status:  Sent Lab Status:  In process Updated:  01/20/17 0946    Specimen:  Blood from Blood         Final Active Diagnoses:    Diagnosis Date Noted POA    PRINCIPAL PROBLEM:  Pneumonia [J18.9] 01/17/2017 Yes    B12 deficiency [E53.8] 01/19/2017 Yes    Early onset Alzheimer's disease with behavioral disturbance [G30.0, F02.81] 01/19/2017 Yes    Hemorrhagic cerebrovascular accident (CVA) [I61.9] 01/19/2017 Clinically Undetermined    Debility [R53.81] 01/16/2017 Yes    Urinary retention [R33.9] 01/12/2017 Yes    Seizure disorder [G40.909] 03/28/2015 Yes    Down's syndrome [Q90.9] 03/26/2015 Not Applicable      Problems Resolved During this Admission:    Diagnosis Date Noted Date Resolved POA    Dementia without behavioral disturbance [F03.90] 07/18/2016 01/19/2017 Yes      * Pneumonia  Azithromycin and zosyn day  8/10  Albuterol 2.5mg neb every 6hr  mucinex 600mg po BID      Down's syndrome  Very minimal communication, brother at bedside.  He is more awake today  Still eating minimal. Spoke to Dr. Correa about poor prognosis especially in light of bleed.         Seizure disorder  Oxcarbazepine 300mg po BID      Urinary retention  Still urinating without cath. Cont flomax      Debility  Ok to resume OT/PT/ST    B12 deficiency  Dr. Correa checking B12 level; h/o def      Early onset Alzheimer's disease with behavioral disturbance  Dr. Correa consulted, reports poor prognosis.  May be future hospice candidate.       Hemorrhagic cerebrovascular accident (CVA)  Neurology following.  With this bleed prognosis is poor.    Brother, POA, wants everything done at this point. Will move back to acute care with continued neuro checks. Add scop patch. Continue therapy. May need to place NG tube if he does not start eating for nutrition.     Not eating currently        Discharged Condition: stable    Disposition: Admitted as an Inpatient    Follow Up:  Follow-up Information     Follow up with Chetan Alvarez MD.    Specialty:  Family Medicine    Why:  Outpatient Services    Contact information:    Rasta VIZCAINO [phone]          Patient Instructions:   No discharge procedures on file.  Medications:  Transfer Medications (for Discharge Readmit only):   Current Facility-Administered Medications   Medication Dose Route Frequency Provider Last Rate Last Dose    acetaminophen tablet 650 mg  650 mg Oral Q8H PRN Eva Brasher NP        albuterol sulfate nebulizer solution 2.5 mg  2.5 mg Nebulization Q6H Eva Brasher NP   2.5 mg at 01/20/17 0727    azithromycin 500 mg in dextrose 5 % 250 mL IVPB (ready to mix system)  500 mg Intravenous Q24H Eva Brasher  mL/hr at 01/19/17 1356 500 mg at 01/19/17 1356    guaifenesin 12 hr tablet 600 mg  600 mg Oral BID Eva Brasher NP    Stopped at 01/20/17 0900    hydrocodone-acetaminophen 5-325mg per tablet 1 tablet  1 tablet Oral Q4H PRN Eva Brasher NP        megestrol 400 mg/10 mL (10 mL) suspension 200 mg  200 mg Oral Daily Eva Brasher NP   Stopped at 01/20/17 0900    ondansetron injection 4 mg  4 mg Intravenous Q8H PRN Eva Brasher NP        oxcarbazepine tablet 300 mg  300 mg Oral BID Eva Brasher NP   Stopped at 01/20/17 0900    pantoprazole injection 40 mg  40 mg Intravenous Daily vEa Brasher NP   40 mg at 01/20/17 0930    piperacillin-tazobactam 3.375 g in dextrose 5 % 50 mL IVPB (ready to mix system)  3.375 g Intravenous Q8H Eva Brasher NP   Stopped at 01/20/17 0531    promethazine (PHENERGAN) 12.5 mg in dextrose 5 % 50 mL IVPB  12.5 mg Intravenous Q6H PRN Eva Brasher NP        tamsulosin 24 hr capsule 0.4 mg  0.4 mg Oral Daily Eva Brasher NP   Stopped at 01/20/17 0900     Time spent on the discharge of patient: 40 minutes    Neeru Mendieta NP  Department of Hospital Medicine  [facility]

## 2017-01-20 NOTE — PT/OT/SLP PROGRESS
Physical Therapy      Mack Will  MRN: [id number]    Patient not seen today secondary to Patient unwilling to participate (NSG present in room during PT attempt. Pt with increased alertness this PM but became very agitated and started crying at all attempts of PT.). Will follow-up 01/21/2017.    Lulú Sagastume, PT

## 2017-01-20 NOTE — PROGRESS NOTES
Waiting for MD office to fax PASSAR back to send to [facility].

## 2017-01-20 NOTE — SUBJECTIVE & OBJECTIVE
Interval History: now responding minimally to questions, no sig events overnight    Review of Systems   Unable to perform ROS: Dementia     Objective:     Vital Signs (Most Recent):  Temp: 96.8 °F (36 °C) (01/20/17 0400)  Pulse: (!) 57 (01/20/17 0727)  Resp: 20 (01/20/17 0727)  BP: 139/77 (01/20/17 0400)  SpO2: 99 % (01/20/17 0727) Vital Signs (24h Range):  Temp:  [96.7 °F (35.9 °C)-98.7 °F (37.1 °C)] 96.8 °F (36 °C)  Pulse:  [48-76] 57  Resp:  [18-20] 20  SpO2:  [94 %-99 %] 99 %  BP: (128-139)/(66-77) 139/77     Weight: 64.8 kg (142 lb 13.7 oz)  Body mass index is 25.31 kg/(m^2).    Intake/Output Summary (Last 24 hours) at 01/20/17 0817  Last data filed at 01/19/17 1700   Gross per 24 hour   Intake              350 ml   Output                0 ml   Net              350 ml      Physical Exam   Constitutional: He appears well-developed. No distress.   HENT:   Head: Normocephalic and atraumatic.   Downs facies   Eyes: Right eye exhibits no discharge. Left eye exhibits no discharge.   Left eye ptosis   Neck: Normal range of motion. Neck supple. No thyromegaly present.   Cardiovascular: Normal rate, regular rhythm and intact distal pulses.    Murmur heard.  Pulmonary/Chest: Effort normal. He has rhonchi.   Coarse breath sounds throughout without wheezing   Abdominal: Soft. Bowel sounds are normal. He exhibits no distension. There is no tenderness.   Musculoskeletal: Normal range of motion. He exhibits no edema.   Lymphadenopathy:     He has no cervical adenopathy.   Neurological:   Somnolent. Opens eyes only   Skin: Skin is warm and dry. No rash noted.   Erythema to left hand   Psychiatric: He has a normal mood and affect. His behavior is normal.   Nursing note and vitals reviewed.      Significant Labs:  Labs reviewed    Significant Imaging: I have reviewed and interpreted all pertinent imaging results/findings within the past 24 hours.     CXR 1/18/17: Chest, 1 view: Coarse interstitial markings are seen throughout  the lungs.  Left retrocardiac opacity is seen.  The heart is normal in size.  The skeletal structures are intact    CT head: New hemorrhage measuring 2.1 x 2.6 x 2.2 cm within the cerebellar vermis.  There is mass effect upon the posterior aspect of the fourth ventricle and mild adjacent vasogenic edema.  No evidence for herniation or hydrocephalus at this time.    Repeat CT this am: Stable bleed

## 2017-01-20 NOTE — PT/OT/SLP EVAL
Speech Language Pathology  Evaluation    Mack Will   MRN: [id number]   Admitting Diagnosis: <principal problem not specified>    Diet recommendations: Solid Diet Level: NPO  Liquid Diet Level: NPO     SLP Treatment Date: 01/20/17  Speech Start Time: 1440     Speech Stop Time: 1505     Speech Total (min): 25 min       TREATMENT BILLABLE MINUTES:  Eval Swallow and Oral Function 25 minutes    Diagnosis: Mack Will is a 51 y.o. male with PMH of Down Syndrome and Alzheimers. Pt's brother Misael  stated that Mr. Will lived with him until 09/15/16 when he moved into a group home. Pt presented to ED from group home with worsening cough, fever, and fatigue. CT of the chest revealed  bilateral lower lobe infiltrates greater on the right than the left. CT of head done on 1/19/17 revealed new hemorrhage measuring 2.1 x 2.6 x 2.2 cm within the cerebellar vermis.There is mass effect upon the posterior aspect of the fourth ventricle and mild adjacent vasogenic edema.       Past Medical History   Diagnosis Date    Alzheimer disease     Down syndrome     Seizure      Past Surgical History   Procedure Laterality Date    Cholecystectomy      Cyst removal         Has the patient been evaluated by SLP for swallowing? : Yes  Keep patient NPO?: Yes   General Precautions: Standard, aspiration, fall          Social Hx: Patient lives in group home.    Prior diet: Puree    Subjective:  Pt was alert for some of the session, but required max cueing and encouragement to participate.     Patient goals None stated       Objective:        Oral Musculature Evaluation  Oral Musculature: general weakness  Secretion Management: left corner drooling  Mandibular Strength and Mobility: WFL  Oral Labial Strength and Mobility: functional pursing, impaired seal     Bedside Swallow Eval:  Consistencies Assessed: Thin liquids via straw and Puree via teaspoon; required max cueing and encouragement to participate with PO.  Oral Phase: leakage,  pocketing and slow oral transit time  Pharyngeal Phase: wet breath sounds immediately following thin liquids and throat clearing with both liquids and pudding        Displayed eye contact when asked questions, but no attempts to verbally respond were noted.     Assessment:  Mack Will is a 51 y.o. male with a medical diagnosis of <principal problem not specified> and presents with signs and symptoms of aspiration with all consistencies attempted. Recommend NPO at this time. Will continue to follow closely and reassess swallow as needed for new recs.     Do you have any cultural, spiritual, Rastafarian conflicts, given your current situation?: None verbalized        Goals:   SLP Goals        Problem: SLP Goal    Goal Priority Disciplines Outcome   SLP Goal     SLP    Description:    1. Pt will tolerate any form of PO without signs of aspiration.                  Plan:   Patient to be seen Therapy Frequency: 3 x/week, 4 x/week, 5 x/week   Plan of Care expires: 02/03/17  Plan of Care reviewed with: patient  SLP Follow-up?: Yes  SLP - Next Visit Date: 01/23/17           JEAN Seals, CCC-SLP  01/20/2017

## 2017-01-20 NOTE — ASSESSMENT & PLAN NOTE
Neurology following.  With this bleed prognosis is poor.    Brother, POA, wants everything done at this point. Will move back to acute care with continued neuro checks. Add scop patch. Continue therapy. May need to place NG tube if he does not start eating for nutrition.     Not eating currently

## 2017-01-20 NOTE — PROGRESS NOTES
Yojana from the group home patient is currently living at reports his brother Bill has requested nursing home placement.  She was given a list of nursing homes and will get with patient's brother.  Nursing home process has been started.  Working on 142.

## 2017-01-20 NOTE — NURSING
Report received. Patient sleeping. No signs of respiratory distress. Call bell within reach. Bed alarm set. Side rails up.

## 2017-01-21 LAB
ANION GAP SERPL CALC-SCNC: 12 MMOL/L
BUN SERPL-MCNC: 13 MG/DL
CALCIUM SERPL-MCNC: 9.1 MG/DL
CHLORIDE SERPL-SCNC: 104 MMOL/L
CO2 SERPL-SCNC: 25 MMOL/L
CREAT SERPL-MCNC: 0.9 MG/DL
EST. GFR  (AFRICAN AMERICAN): >60 ML/MIN/1.73 M^2
EST. GFR  (NON AFRICAN AMERICAN): >60 ML/MIN/1.73 M^2
GLUCOSE SERPL-MCNC: 94 MG/DL
POTASSIUM SERPL-SCNC: 3.4 MMOL/L
SODIUM SERPL-SCNC: 141 MMOL/L
TB INDURATION 48 - 72 HR READ: 0 MM

## 2017-01-21 PROCEDURE — 70450 CT HEAD/BRAIN W/O DYE: CPT | Mod: TC

## 2017-01-21 PROCEDURE — 36415 COLL VENOUS BLD VENIPUNCTURE: CPT

## 2017-01-21 PROCEDURE — 63600175 PHARM REV CODE 636 W HCPCS: Performed by: NURSE PRACTITIONER

## 2017-01-21 PROCEDURE — 94668 MNPJ CHEST WALL SBSQ: CPT

## 2017-01-21 PROCEDURE — 27000339 *HC DAILY SUPPLY KIT

## 2017-01-21 PROCEDURE — 94640 AIRWAY INHALATION TREATMENT: CPT

## 2017-01-21 PROCEDURE — 94761 N-INVAS EAR/PLS OXIMETRY MLT: CPT

## 2017-01-21 PROCEDURE — 99233 SBSQ HOSP IP/OBS HIGH 50: CPT | Mod: ,,, | Performed by: FAMILY MEDICINE

## 2017-01-21 PROCEDURE — G8979 MOBILITY GOAL STATUS: HCPCS | Mod: CK

## 2017-01-21 PROCEDURE — 80048 BASIC METABOLIC PNL TOTAL CA: CPT

## 2017-01-21 PROCEDURE — 25000003 PHARM REV CODE 250: Performed by: NURSE PRACTITIONER

## 2017-01-21 PROCEDURE — 97110 THERAPEUTIC EXERCISES: CPT

## 2017-01-21 PROCEDURE — 99233 SBSQ HOSP IP/OBS HIGH 50: CPT | Mod: ,,, | Performed by: PSYCHIATRY & NEUROLOGY

## 2017-01-21 PROCEDURE — 97164 PT RE-EVAL EST PLAN CARE: CPT

## 2017-01-21 PROCEDURE — C9113 INJ PANTOPRAZOLE SODIUM, VIA: HCPCS | Performed by: NURSE PRACTITIONER

## 2017-01-21 PROCEDURE — 25000242 PHARM REV CODE 250 ALT 637 W/ HCPCS: Performed by: NURSE PRACTITIONER

## 2017-01-21 PROCEDURE — 27000221 HC OXYGEN, UP TO 24 HOURS

## 2017-01-21 PROCEDURE — 11000001 HC ACUTE MED/SURG PRIVATE ROOM

## 2017-01-21 PROCEDURE — G8978 MOBILITY CURRENT STATUS: HCPCS | Mod: CL

## 2017-01-21 PROCEDURE — 97163 PT EVAL HIGH COMPLEX 45 MIN: CPT

## 2017-01-21 RX ADMIN — MEGESTROL ACETATE 200 MG: 40 SUSPENSION ORAL at 08:01

## 2017-01-21 RX ADMIN — OXCARBAZEPINE 300 MG: 150 TABLET, FILM COATED ORAL at 05:01

## 2017-01-21 RX ADMIN — PANTOPRAZOLE SODIUM 40 MG: 40 INJECTION, POWDER, FOR SOLUTION INTRAVENOUS at 08:01

## 2017-01-21 RX ADMIN — OXCARBAZEPINE 300 MG: 150 TABLET, FILM COATED ORAL at 08:01

## 2017-01-21 RX ADMIN — PIPERACILLIN AND TAZOBACTAM 3.38 G: 3; .375 INJECTION, POWDER, LYOPHILIZED, FOR SOLUTION INTRAVENOUS; PARENTERAL at 02:01

## 2017-01-21 RX ADMIN — ALBUTEROL SULFATE 2.5 MG: 2.5 SOLUTION RESPIRATORY (INHALATION) at 07:01

## 2017-01-21 RX ADMIN — AZITHROMYCIN MONOHYDRATE 500 MG: 500 INJECTION, POWDER, LYOPHILIZED, FOR SOLUTION INTRAVENOUS at 02:01

## 2017-01-21 RX ADMIN — TAMSULOSIN HYDROCHLORIDE 0.4 MG: 0.4 CAPSULE ORAL at 08:01

## 2017-01-21 RX ADMIN — GUAIFENESIN 600 MG: 600 TABLET, EXTENDED RELEASE ORAL at 08:01

## 2017-01-21 RX ADMIN — ALBUTEROL SULFATE 2.5 MG: 2.5 SOLUTION RESPIRATORY (INHALATION) at 12:01

## 2017-01-21 RX ADMIN — GUAIFENESIN 600 MG: 600 TABLET, EXTENDED RELEASE ORAL at 09:01

## 2017-01-21 RX ADMIN — PIPERACILLIN AND TAZOBACTAM 3.38 G: 3; .375 INJECTION, POWDER, LYOPHILIZED, FOR SOLUTION INTRAVENOUS; PARENTERAL at 05:01

## 2017-01-21 RX ADMIN — ALBUTEROL SULFATE 2.5 MG: 2.5 SOLUTION RESPIRATORY (INHALATION) at 06:01

## 2017-01-21 NOTE — PLAN OF CARE
Problem: Patient Care Overview  Goal: Plan of Care Review  Outcome: Ongoing (interventions implemented as appropriate)  Patient stable. Awake & alert at times. Vitals stable. Currently on 2L O2. IV antibiotics administered. Patient refusing PO meds/food this afternoon. Patient remains free from falls.

## 2017-01-21 NOTE — PLAN OF CARE
Care of this patient has been delegated to Iva Kwok LPN. I concur with her assessment and interventions.

## 2017-01-21 NOTE — PLAN OF CARE
Problem: Patient Care Overview  Goal: Plan of Care Review  Outcome: Ongoing (interventions implemented as appropriate)  Pt doing well on current treatments. Tolerating CPT well; no distress noted; resting comfortably.

## 2017-01-21 NOTE — ASSESSMENT & PLAN NOTE
Neurology following.  CT yesterday was stable.    Scopolamine patch ordered for nausea/dizziness/cerebellar issues?    Will transfer back to acute care for continued close monitoring  With feeding issues, we have discussed possibility of feeding tube. Will consult surgery for opinion/possible PEG placement.    However, this mroning he is actually awake. If he tolerates PO, we may be able to avoid this. Will have speech see him again today.

## 2017-01-21 NOTE — PROGRESS NOTES
[facility] Hospital Medicine  Progress Note    Patient Name: Mack Will  MRN: [id number]  Patient Class: IP- Inpatient   Admission Date: 1/20/2017  Length of Stay: 1 days  Attending Physician: Makenzie Mckeon MD  Primary Care Provider: Chetan Alvarez MD        Subjective:     Principal Problem:<principal problem not specified>    HPI:  51 year old non verbal and demented Downs syndrome patient was sent here via ambulance from his group home because of worsening cough, fever and fatigue. His condition was called to Dr. Alvarez who advised ER evaluation.     Patient was seen and evaluated in the ER and was found to be hypoxic and in respiratory distress. He was admitted to floor with left lower lobe pneumonia. Placed on zithromax and zosyn-day 6 today. Questionable aspiration. Speech consulted for swallow assessment, limited eval, but no gross aspiration noted. CT scan; no PE. Bilateral infiltrates. No longer having a fever. Pt's WBC down to normal and remains there      He is non verbal now-had limited speech before. Most info comes from brother at bedside      Pt had traumatic gabriel placement for urinary retention. Started on Flomax. He voided last night and again this am a large amount.      He is eating minimal even with family's coaxing. We have changed his IVF to clinimix on Sunday. Brother at bedside he will try and push.       Pt has not gotten out of bed, was previously walking. PT started. Resistant to them but when he was ready he got into bed by himself      Hospital Course:  He was placed on SWING unit for continued 10 day course of abx and cont PT.      Dr Nevarez following. He is currently on 3L high flow NC tks> 90%--99% at present. Refuses mask. Mouth breather. Has junky cough. Not bringing anything up. Mucinex started this week.      Not eating. Very acute change in MS. Dr. Correa consulted, CT head noted: New hemorrhage measuring 2.1 x 2.6 x 2.2 cm within the  cerebellar vermis. There is mass effect upon the posterior aspect of the fourth ventricle and mild adjacent vasogenic edema. No evidence for herniation or hydrocephalus at this time.     K+ a little low this am, expected with min intake.     Now with improved awareness and eating better. Because of new CVA, placed back on acute care.    This morning much more awake. He is agreeing to eat. Did not like having respiratory in room doing CPT.       Interval History: much more awake this morning. ESTEVAN overnight.    Review of Systems   Unable to perform ROS: Dementia     Objective:     Vital Signs (Most Recent):  Temp: 98.7 °F (37.1 °C) (01/21/17 1143)  Pulse: 64 (01/21/17 1143)  Resp: 18 (01/21/17 1143)  BP: 116/63 (01/21/17 1143)  SpO2: (!) 91 % (01/21/17 1143) Vital Signs (24h Range):  Temp:  [97.2 °F (36.2 °C)-98.7 °F (37.1 °C)] 98.7 °F (37.1 °C)  Pulse:  [46-75] 64  Resp:  [18-20] 18  SpO2:  [91 %-97 %] 91 %  BP: (105-142)/(57-73) 116/63     Weight: 64.8 kg (142 lb 13.7 oz)  Body mass index is 25.31 kg/(m^2).    Intake/Output Summary (Last 24 hours) at 01/21/17 1312  Last data filed at 01/21/17 0603   Gross per 24 hour   Intake              100 ml   Output                0 ml   Net              100 ml      Physical Exam   Constitutional: He appears well-developed. No distress.   HENT:   Head: Normocephalic and atraumatic.   Downs facies   Eyes: Right eye exhibits no discharge. Left eye exhibits no discharge.   Left eye ptosis   Neck: Normal range of motion. Neck supple. No thyromegaly present.   Cardiovascular: Normal rate, regular rhythm and intact distal pulses.    Murmur heard.  Pulmonary/Chest: Effort normal. No respiratory distress. He has no wheezes. He has rhonchi. He has no rales.   MUCH improved resp    2L Nc in place   Abdominal: Soft. Bowel sounds are normal. He exhibits no distension. There is no tenderness.   Musculoskeletal: Normal range of motion. He exhibits no edema.   Lymphadenopathy:     He has no  cervical adenopathy.   Neurological:   Much more awake and participatory in converation. Non verbal   Skin: Skin is warm and dry. No rash noted.   Erythema to left hand   Psychiatric: He has a normal mood and affect. His behavior is normal.   Nursing note and vitals reviewed.      Significant Labs:   Blood Culture: No results for input(s): LABBLOO in the last 48 hours.  BMP:   Recent Labs  Lab 01/21/17  0554   GLU 94      K 3.4*      CO2 25   BUN 13   CREATININE 0.9   CALCIUM 9.1     CBC:   Recent Labs  Lab 01/20/17  0946   WBC 10.40   HGB 15.2   HCT 44.8   *     Respiratory Culture: No results for input(s): GSRESP, RESPIRATORYC in the last 48 hours.    Significant Imaging: I have reviewed all pertinent imaging results/findings within the past 24 hours.    Assessment/Plan:      Pneumonia of both lungs due to infectious organism  Complete 10 day course of abx.  Will stop CPT.  Continue resp care.      Debility  Physical therapy      Cerebrovascular accident (CVA) with intracranial hemorrhage  Neurology following.  CT yesterday was stable.    Scopolamine patch ordered for nausea/dizziness/cerebellar issues?    Will transfer back to acute care for continued close monitoring  With feeding issues, we have discussed possibility of feeding tube. Will consult surgery for opinion/possible PEG placement.    However, this mroning he is actually awake. If he tolerates PO, we may be able to avoid this. Will have speech see him again today.      VTE Risk Mitigation         Ordered     Medium Risk of VTE  Once      01/20/17 1450     Place NAYLA hose  Until discontinued      01/20/17 1450          Makenzie Mckeon MD  Department of Hospital Medicine   [facility]

## 2017-01-21 NOTE — SUBJECTIVE & OBJECTIVE
Interval History: much more awake this morning. ESTEVAN overnight.    Review of Systems   Unable to perform ROS: Dementia     Objective:     Vital Signs (Most Recent):  Temp: 98.7 °F (37.1 °C) (01/21/17 1143)  Pulse: 64 (01/21/17 1143)  Resp: 18 (01/21/17 1143)  BP: 116/63 (01/21/17 1143)  SpO2: (!) 91 % (01/21/17 1143) Vital Signs (24h Range):  Temp:  [97.2 °F (36.2 °C)-98.7 °F (37.1 °C)] 98.7 °F (37.1 °C)  Pulse:  [46-75] 64  Resp:  [18-20] 18  SpO2:  [91 %-97 %] 91 %  BP: (105-142)/(57-73) 116/63     Weight: 64.8 kg (142 lb 13.7 oz)  Body mass index is 25.31 kg/(m^2).    Intake/Output Summary (Last 24 hours) at 01/21/17 1312  Last data filed at 01/21/17 0603   Gross per 24 hour   Intake              100 ml   Output                0 ml   Net              100 ml      Physical Exam   Constitutional: He appears well-developed. No distress.   HENT:   Head: Normocephalic and atraumatic.   Downs facies   Eyes: Right eye exhibits no discharge. Left eye exhibits no discharge.   Left eye ptosis   Neck: Normal range of motion. Neck supple. No thyromegaly present.   Cardiovascular: Normal rate, regular rhythm and intact distal pulses.    Murmur heard.  Pulmonary/Chest: Effort normal. No respiratory distress. He has no wheezes. He has rhonchi. He has no rales.   MUCH improved resp    2L Nc in place   Abdominal: Soft. Bowel sounds are normal. He exhibits no distension. There is no tenderness.   Musculoskeletal: Normal range of motion. He exhibits no edema.   Lymphadenopathy:     He has no cervical adenopathy.   Neurological:   Much more awake and participatory in converation. Non verbal   Skin: Skin is warm and dry. No rash noted.   Erythema to left hand   Psychiatric: He has a normal mood and affect. His behavior is normal.   Nursing note and vitals reviewed.      Significant Labs:   Blood Culture: No results for input(s): LABBLOO in the last 48 hours.  BMP:   Recent Labs  Lab 01/21/17  0554   GLU 94      K 3.4*       CO2 25   BUN 13   CREATININE 0.9   CALCIUM 9.1     CBC:   Recent Labs  Lab 01/20/17  0946   WBC 10.40   HGB 15.2   HCT 44.8   *     Respiratory Culture: No results for input(s): GSRESP, RESPIRATORYC in the last 48 hours.    Significant Imaging: I have reviewed all pertinent imaging results/findings within the past 24 hours.

## 2017-01-21 NOTE — SUBJECTIVE & OBJECTIVE
Interval History: now responding minimally to questions, no sig events overnight    Review of Systems   Unable to perform ROS: Dementia     Objective:     Vital Signs (Most Recent):  Temp: 97.2 °F (36.2 °C) (01/20/17 1504)  Pulse: 68 (01/20/17 2000)  Resp: 18 (01/20/17 1917)  BP: 112/60 (01/20/17 1504)  SpO2: 96 % (01/20/17 1917) Vital Signs (24h Range):  Temp:  [96.8 °F (36 °C)-98.1 °F (36.7 °C)] 97.2 °F (36.2 °C)  Pulse:  [48-75] 68  Resp:  [18-20] 18  SpO2:  [95 %-99 %] 96 %  BP: (112-140)/(59-77) 112/60     Weight: 64.8 kg (142 lb 13.7 oz)  Body mass index is 25.31 kg/(m^2).  No intake or output data in the 24 hours ending 01/20/17 2229   Physical Exam   Constitutional: He appears well-developed. No distress.   HENT:   Head: Normocephalic and atraumatic.   Downs facies   Eyes: Right eye exhibits no discharge. Left eye exhibits no discharge.   Left eye ptosis   Neck: Normal range of motion. Neck supple. No thyromegaly present.   Cardiovascular: Normal rate, regular rhythm and intact distal pulses.    Murmur heard.  Pulmonary/Chest: Effort normal. He has rhonchi.   Coarse breath sounds throughout without wheezing   Abdominal: Soft. Bowel sounds are normal. He exhibits no distension. There is no tenderness.   Musculoskeletal: Normal range of motion. He exhibits no edema.   Lymphadenopathy:     He has no cervical adenopathy.   Neurological:   Somnolent. Opens eyes only   Skin: Skin is warm and dry. No rash noted.   Erythema to left hand   Psychiatric: He has a normal mood and affect. His behavior is normal.   Nursing note and vitals reviewed.      Significant Labs:  Labs reviewed    Significant Imaging: I have reviewed and interpreted all pertinent imaging results/findings within the past 24 hours.     CXR 1/18/17: Chest, 1 view: Coarse interstitial markings are seen throughout the lungs.  Left retrocardiac opacity is seen.  The heart is normal in size.  The skeletal structures are intact    CT head: New hemorrhage  measuring 2.1 x 2.6 x 2.2 cm within the cerebellar vermis.  There is mass effect upon the posterior aspect of the fourth ventricle and mild adjacent vasogenic edema.  No evidence for herniation or hydrocephalus at this time.    Repeat CT this am: Stable bleed

## 2017-01-21 NOTE — PT/OT/SLP EVAL
Physical Therapy  Evaluation    Mack Will   MRN: [id number]   Admitting Diagnosis: <principal problem not specified>    PT Received On: 17  PT Start Time: 0900     PT Stop Time: 925    PT Total Time (min): 25 min       Billable Minutes:  Evaluation 15 min and Therapeutic Exercise 10 min    Diagnosis: <principal problem not specified>  Pneumonia and brain infarck    Past Medical History   Diagnosis Date    Alzheimer disease     Down syndrome     Seizure       Past Surgical History   Procedure Laterality Date    Cholecystectomy      Cyst removal         Referring physician: MELANIE Mckeon MD  Date referred to PT: 17    General Precautions: Standard, aspiration, fall  Orthopedic Precautions:     Braces:              Patient History:  Lives With: facility resident  Living Arrangements: group home  Equipment Currently Used at Home: none  DME owned (not currently used): none    Previous Level of Function:  Ambulation Skills: needs assist  Transfer Skills: needs assist  ADL Skills: needs assist  Work/Leisure Activity: needs assist    Subjective:  Communicated with family prior to session.  Ron seems to be getting stronger. He helped to get out of bed this morning and stayed siting about 60 min before going back to bed with help.    Chief Complaint: undetermined due to pt's cognitive status  Patient goals: per immediate family : to return to living in group home with ability to help himself as before this episode.    Pain Ratin/10   Location - Side: Right  Location - Orientation: lower  Location: hand  Pain Addressed:  (IV site)  Pain Rating Post-Intervention: 10    Objective:   Patient found with: peripheral IV     Cognitive Exam:  Oriented to: unable to assess. Pt responds to his brother with head nods and minimal verbalization    Follows Commands/attention: Inattentive  Communication: minimal verbal skills  Safety awareness/insight to disability: impaired    Physical Exam:  Postural  examination/scapula alignment: No postural abnormalities identified    Skin integrity: Visible skin intact    Edema: None noted     Sensation:   Intact    Upper Extremity Range of Motion:  Right Upper Extremity: WFL  Left Upper Extremity: WFL    Upper Extremity Strength:  Right Upper Extremity: WFL  Left Upper Extremity: WFL    Lower Extremity Range of Motion:  Right Lower Extremity: WFL  Left Lower Extremity: WFL    Lower Extremity Strength:  Right Lower Extremity: WFL  Left Lower Extremity: WFL     Fine motor coordination:  Intact    Gross motor coordination: WFL    Functional Mobility:  Bed Mobility:  Rolling/Turning to Left: Maximum assistance  Rolling/Turning Right: Maximum assistance  Scooting/Bridging: Maximum Assistance  Supine to Sit: Maximum Assistance  Sit to Supine: Maximum Assistance    Transfers:  Sit <> Stand Assistance: Maximum Assistance  Sit <> Stand Assistive Device: No Assistive Device  Bed <> Chair Technique: Stand Pivot  Toilet Transfer Technique: Stand Pivot    Gait:   Gait Distance: Patient was assisted to stand and transfer to bed side commode and thence to bed side chair this AM by Male nurse.  Assistance 1: Moderate assistance    Stairs:  Unable to assess    Balance:   Static Sit: GOOD+: Takes MAXIMAL challenges from all directions.    Dynamic Sit: FAIR+: Maintains balance through MINIMAL excursions of active trunk motion  Static Stand: POOR: Needs MODERATE assist to maintain  Dynamic stand: 0: N/A    Therapeutic Activities and Exercises:  Patient was up in bed side chair via RN services. Presently fatigued and minimally  cooperative with family present.  Completed evaluation and thence performed multiple exercises:  Active - assistive to all four extremities for major joint excursions for strength and motion .     AM-PAC 6 CLICK MOBILITY  How much help from another person does this patient currently need?   1 = Unable, Total/Dependent Assistance  2 = A lot, Maximum/Moderate Assistance  3  = A little, Minimum/Contact Guard/Supervision  4 = None, Modified [place]/Independent    Turning over in bed (including adjusting bedclothes, sheets and blankets)?: 2  Sitting down on and standing up from a chair with arms (e.g., wheelchair, bedside commode, etc.): 2  Moving from lying on back to sitting on the side of the bed?: 2  Moving to and from a bed to a chair (including a wheelchair)?: 2  Need to walk in hospital room?: 2  Climbing 3-5 steps with a railing?: 1  Total Score: 11     AM-PAC Raw Score CMS G-Code Modifier Level of Impairment Assistance   6 % Total / Unable   7 - 9 CM 80 - 100% Maximal Assist   10 - 14 CL 60 - 80% Moderate Assist   15 - 19 CK 40 - 60% Moderate Assist   20 - 22 CJ 20 - 40% Minimal Assist   23 CI 1-20% SBA / CGA   24 CH 0% Independent/ Mod I     Patient left left sidelying with all lines intact, call button in reach and RN notified.    Assessment:     Factors that may affect patient's status:  [] Patient's age [] Time since onset of injury/illness/exacerbation  []Prior Level of function       [] Current level of function [] Status of current condition []Patient's cognitive status and safety concerns      [] Patient's level of motivation    [] Patient's home situation (environment and family support)  [] Objective examination findings [] Goals and goal agreement with the patient        [] Rehab potential (prognosis) and probable outcome    [] Expected progression of patient    Criteria:     History:    > 3  Personal Factors and/or co-morbidity - 23535  Examination of Body System:  addressing a total of 3 or more elements - 35205  Clinical Presentation:  Evolving - 14621  Clinical Decision Making (Complexity):  High - 45290      On examination of body system using standardized tests and measures patient presents with 3 or more elements from any of the following: body structures and functions, activity limitations, and/or participation restrictions.  Leading to a clinical  presentation that is considered unstable with unpredictable characteristics  Mack Will is a 51 y.o. male with a medical diagnosis of <principal problem not specified> and presents with multiple medical problems, pneumonia,    Rehab identified problem list/impairments: Rehab identified problem list/impairments: weakness, impaired endurance, impaired self care skills, impaired functional mobilty, gait instability, impaired cognition, decreased coordination, decreased upper extremity function, decreased lower extremity function, decreased safety awareness    Rehab potential is fair.    Activity tolerance: Fair    Discharge recommendations: Discharge Facility/Level Of Care Needs: adult foster care/group home     Barriers to discharge: Barriers to Discharge: None    Equipment recommendations: Equipment Needed After Discharge: none     GOALS:   Physical Therapy Goals        Problem: Physical Therapy Goal    Goal Priority Disciplines Outcome Goal Variances Interventions   Physical Therapy Goal     PT/OT, PT      Description:  PT Goal  1.   Pt will assist supine  to sit with minimal assist.  2.   Pt will assist sit to supine with minimal assist.  3.  Pt will assist with transfers from bed to chair with minimal assiist.  4.  Pt will assist and inititate walking  for 10 - ft and the progress as able.              PLAN:    Patient to be seen  (1-2 times per day M-F,PRN weekends) to address the above listed problems via gait training, therapeutic activities, therapeutic exercises  Plan of Care expires:    Plan of Care reviewed with: patient, family          To Riggs, PT  01/21/2017

## 2017-01-21 NOTE — PLAN OF CARE
Problem: Patient Care Overview  Goal: Plan of Care Review  Outcome: Ongoing (interventions implemented as appropriate)  Patient does open eyes to voices; Afebrile. Receiving IV antibiotics. Telemetry and fall precautions maintained. Breath sounds coarse crackles; 02 saturation 96% on 2 liters nasal cannula. Patient turned every two hours. Plan of care reviewed with patient. Patient non verbal to staff. Bed low and locked; bed exit alarm in use. Call bell in reach

## 2017-01-21 NOTE — H&P
[facility] Medicine  History & Physical    Patient Name: Mack Will  MRN: [id number]  Admission Date: 1/20/2017  Attending Physician: Makenzie Mckeon MD   Primary Care Provider: Chetan Alvarez MD         Patient information was obtained from patient and relative(s).     Subjective:     Principal Problem:<principal problem not specified>    Chief Complaint:  stroke     HPI: 51 year old non verbal and demented Downs syndrome patient was sent here via ambulance from his group home because of worsening cough, fever and fatigue. His condition was called to Dr. Alvarez who advised ER evaluation.     Patient was seen and evaluated in the ER and was found to be hypoxic and in respiratory distress. He was admitted to floor with left lower lobe pneumonia. Placed on zithromax and zosyn-day 6 today. Questionable aspiration. Speech consulted for swallow assessment, limited eval, but no gross aspiration noted. CT scan; no PE. Bilateral infiltrates. No longer having a fever. Pt's WBC down to normal and remains there      He is non verbal now-had limited speech before. Most info comes from brother at bedside      Pt had traumatic gabriel placement for urinary retention. Started on Flomax. He voided last night and again this am a large amount.      He is eating minimal even with family's coaxing. We have changed his IVF to clinimix on Sunday. Brother at bedside he will try and push.       Pt has not gotten out of bed, was previously walking. PT started. Resistant to them but when he was ready he got into bed by himself      Interval History: now responding minimally to questions, no sig events overnight    Review of Systems   Unable to perform ROS: Dementia     Objective:     Vital Signs (Most Recent):  Temp: 97.2 °F (36.2 °C) (01/20/17 1504)  Pulse: 68 (01/20/17 2000)  Resp: 18 (01/20/17 1917)  BP: 112/60 (01/20/17 1504)  SpO2: 96 % (01/20/17 1917) Vital Signs (24h Range):  Temp:  [96.8 °F (36  °C)-98.1 °F (36.7 °C)] 97.2 °F (36.2 °C)  Pulse:  [48-75] 68  Resp:  [18-20] 18  SpO2:  [95 %-99 %] 96 %  BP: (112-140)/(59-77) 112/60     Weight: 64.8 kg (142 lb 13.7 oz)  Body mass index is 25.31 kg/(m^2).  No intake or output data in the 24 hours ending 01/20/17 2229   Physical Exam   Constitutional: He appears well-developed. No distress.   HENT:   Head: Normocephalic and atraumatic.   Downs facies   Eyes: Right eye exhibits no discharge. Left eye exhibits no discharge.   Left eye ptosis   Neck: Normal range of motion. Neck supple. No thyromegaly present.   Cardiovascular: Normal rate, regular rhythm and intact distal pulses.    Murmur heard.  Pulmonary/Chest: Effort normal. He has rhonchi.   Coarse breath sounds throughout without wheezing   Abdominal: Soft. Bowel sounds are normal. He exhibits no distension. There is no tenderness.   Musculoskeletal: Normal range of motion. He exhibits no edema.   Lymphadenopathy:     He has no cervical adenopathy.   Neurological:   Somnolent. Opens eyes only   Skin: Skin is warm and dry. No rash noted.   Erythema to left hand   Psychiatric: He has a normal mood and affect. His behavior is normal.   Nursing note and vitals reviewed.      Significant Labs:  Labs reviewed    Significant Imaging: I have reviewed and interpreted all pertinent imaging results/findings within the past 24 hours.     CXR 1/18/17: Chest, 1 view: Coarse interstitial markings are seen throughout the lungs.  Left retrocardiac opacity is seen.  The heart is normal in size.  The skeletal structures are intact    CT head: New hemorrhage measuring 2.1 x 2.6 x 2.2 cm within the cerebellar vermis.  There is mass effect upon the posterior aspect of the fourth ventricle and mild adjacent vasogenic edema.  No evidence for herniation or hydrocephalus at this time.    Repeat CT this am: Stable bleed    Assessment/Plan:     Cerebrovascular accident (CVA) with intracranial hemorrhage  Neurology following.  CT  today.    Scopolamine patch ordered for nausea/dizziness/cerebellar issues?    Will transfer back to acute care for continued close monitoring      VTE Risk Mitigation         Ordered     Medium Risk of VTE  Once      01/20/17 1450     Place NAYLA hose  Until discontinued      01/20/17 1450        Makenzie Mckeon MD  Department of Hospital Medicine   [facility]

## 2017-01-21 NOTE — ASSESSMENT & PLAN NOTE
Neurology following.  CT today.    Scopolamine patch ordered for nausea/dizziness/cerebellar issues?    Will transfer back to acute care for continued close monitoring

## 2017-01-21 NOTE — PROGRESS NOTES
Chief Complaint:  AMS       HPI: Mack Will is a 51 y.o. Male known to me for Alzheimer's dementia associated with Down Syndrome.  Patient is admitted to Swing bed after hospitalization for pneumonia and has had altered mental status since admit. His usual baseline is ambulatory and non-verbal with some behavioral disturbance although often smiling and interactive. This has not been the case since admit. He has been eating only at times and only interactive with extreme encouragement.      Interval history:  Patient was improved this am 1/21/17  However, at bedside he is crying (he does do this per staff at times) and has asymetric pupils on exam (see below)  Prior, he was tolerating po per nursing (ST note reviewed-- NPO status recommended)              Past Medical History   Diagnosis Date    Alzheimer disease       Down syndrome       Seizure                        Past Surgical History   Procedure Laterality Date    Cholecystectomy          Cyst removal                 Review of patient's allergies indicates:  No Known Allergies                          Family History      Problem Relation (Age of Onset)      Heart disease Father                   Social History Main Topics    Smoking status: Never Smoker    Smokeless tobacco: Never Used    Alcohol use No    Drug use: Not on file    Sexual activity: Not on file   I have reviewed all of this patient's past medical and surgical histories as well as family and social histories and active allergies and medications as documented in the electronic medical record.      Review of Systems   Unable to perform ROS: Dementia       Objective:             Vitals:    01/21/17 0728 01/21/17 0745 01/21/17 0800 01/21/17 1143   BP:  116/71  116/63   BP Location:  Left arm  Left arm   Patient Position:  Lying  Lying   BP Method:  Automatic  Automatic   Pulse: (!) 55 (!) 54 60 64   Resp: 20 20  18   Temp:  97.2 °F (36.2 °C)  98.7 °F (37.1 °C)   TempSrc:  Axillary   Axillary   SpO2: (!) 94% (!) 93%  (!) 91%   Weight:       Height:                     Weight: 67.4 kg (148 lb 9.4 oz)  Body mass index is 26.32 kg/(m^2).      Physical Exam        Gen Appearance, well developed/nourished in no apparent distress  CV: 2+ distal pulses with no edema or swelling  Neuro:  MS: Awake, alert and sustains attention but this fluctuates with crying.  Speaks his name  And nods yes to some questions but appropriateness is questioned   Cooperates with very simple commands.  . Does not speak much at baseline   Patient closes his eyes at times (?vertigo)  CN: Optic discs are flat with normal vasculature, right pupil is 3mm and left pupil is 2mm, face symmetric and tongue is midline and strong  Motor: Does move both arms and legs to command equally and fully   Sensory: Can't cooperate  Cerebellar:Can't cooperate  Gait: Not done          Significant Labs:B12 level normal  CMP and CBC reviewed      Significant Imagin17 Xray chest: Coarse interstitial markings are seen throughout the lungs.  Left retrocardiac opacity is seen.  The heart is normal in size.  The skeletal structures are intact.       Assessment and Plan:       Mack Will is a 51 y.o. male admitted with pneumonia and has AMS  I recommend:    Cerebellar hemorrhage  -pupil asymmetry noted this am: CT head now  -Likely due to amyloid angiopathy  -Check CTA head and neck Monday if patient can tolerate to rule out other causes  -ST recommended NPO  -discussed with brother, Giovanni, at length that NPO was recommended. He does not want feeding tube for patient (PEG or NGT).   -Brother aware of aspiration risk. Can consider MBSS Monday with ST if there are still concerns, but patient seems to be improving with swallowing.   -Avoid ASA/anticoagulation (current BP ideal)     Down's syndrome  Patient with expanded life span with Down's syndrome who has developed associated Alzheimer's dementia.       Seizure disorder  Continue home med,  trileptal for seizure prevention      Dementia without behavioral disturbance  -long term prognosis again reviewed with brother- again, he does not want feeding tub  - trazodone for insomnia, sundowning given pre admit not currently being used- likely due to more sedated state since admission. Can continue to hold  B12 deficiency  -Patient has B12 deficiency known prior, well corrected now         will follow          Bib Correa MD  Neurology  [facility]

## 2017-01-22 PROCEDURE — 27000221 HC OXYGEN, UP TO 24 HOURS

## 2017-01-22 PROCEDURE — 94640 AIRWAY INHALATION TREATMENT: CPT

## 2017-01-22 PROCEDURE — C9113 INJ PANTOPRAZOLE SODIUM, VIA: HCPCS | Performed by: NURSE PRACTITIONER

## 2017-01-22 PROCEDURE — 25000003 PHARM REV CODE 250: Performed by: NURSE PRACTITIONER

## 2017-01-22 PROCEDURE — 11000001 HC ACUTE MED/SURG PRIVATE ROOM

## 2017-01-22 PROCEDURE — 63600175 PHARM REV CODE 636 W HCPCS: Performed by: NURSE PRACTITIONER

## 2017-01-22 PROCEDURE — 99233 SBSQ HOSP IP/OBS HIGH 50: CPT | Mod: ,,, | Performed by: PSYCHIATRY & NEUROLOGY

## 2017-01-22 PROCEDURE — 27000339 *HC DAILY SUPPLY KIT

## 2017-01-22 PROCEDURE — 97110 THERAPEUTIC EXERCISES: CPT

## 2017-01-22 PROCEDURE — 99232 SBSQ HOSP IP/OBS MODERATE 35: CPT | Mod: ,,, | Performed by: FAMILY MEDICINE

## 2017-01-22 PROCEDURE — 94761 N-INVAS EAR/PLS OXIMETRY MLT: CPT

## 2017-01-22 PROCEDURE — 25000242 PHARM REV CODE 250 ALT 637 W/ HCPCS: Performed by: NURSE PRACTITIONER

## 2017-01-22 RX ADMIN — TAMSULOSIN HYDROCHLORIDE 0.4 MG: 0.4 CAPSULE ORAL at 08:01

## 2017-01-22 RX ADMIN — ALBUTEROL SULFATE 2.5 MG: 2.5 SOLUTION RESPIRATORY (INHALATION) at 06:01

## 2017-01-22 RX ADMIN — OXCARBAZEPINE 300 MG: 150 TABLET, FILM COATED ORAL at 08:01

## 2017-01-22 RX ADMIN — ALBUTEROL SULFATE 2.5 MG: 2.5 SOLUTION RESPIRATORY (INHALATION) at 12:01

## 2017-01-22 RX ADMIN — GUAIFENESIN 600 MG: 600 TABLET, EXTENDED RELEASE ORAL at 08:01

## 2017-01-22 RX ADMIN — OXCARBAZEPINE 300 MG: 150 TABLET, FILM COATED ORAL at 04:01

## 2017-01-22 RX ADMIN — ALBUTEROL SULFATE 2.5 MG: 2.5 SOLUTION RESPIRATORY (INHALATION) at 07:01

## 2017-01-22 RX ADMIN — MEGESTROL ACETATE 200 MG: 40 SUSPENSION ORAL at 08:01

## 2017-01-22 RX ADMIN — PANTOPRAZOLE SODIUM 40 MG: 40 INJECTION, POWDER, FOR SOLUTION INTRAVENOUS at 08:01

## 2017-01-22 RX ADMIN — GUAIFENESIN 600 MG: 600 TABLET, EXTENDED RELEASE ORAL at 09:01

## 2017-01-22 NOTE — PLAN OF CARE
Problem: Patient Care Overview  Goal: Plan of Care Review  Outcome: Ongoing (interventions implemented as appropriate)  Pt doing well on current therapy; no distress noted.  Placed back on 2lpm NC.

## 2017-01-22 NOTE — PROGRESS NOTES
Chief Complaint:  AMS       HPI: Mack Will is a 51 y.o. Male known to me for Alzheimer's dementia associated with Down Syndrome.  Patient is admitted to Swing bed after hospitalization for pneumonia and has had altered mental status since admit. His usual baseline is ambulatory and non-verbal with some behavioral disturbance although often smiling and interactive. This has not been the case since admit. He has been eating only at times and only interactive with extreme encouragement.       Interval history:  Patient is improved again today. More awake and interacting more/speaking a bit more.               Past Medical History   Diagnosis Date    Alzheimer disease       Down syndrome       Seizure                            Past Surgical History   Procedure Laterality Date    Cholecystectomy          Cyst removal                 Review of patient's allergies indicates:  No Known Allergies                              Family History       Problem Relation (Age of Onset)      Heart disease Father                       Social History Main Topics    Smoking status: Never Smoker    Smokeless tobacco: Never Used    Alcohol use No    Drug use: Not on file    Sexual activity: Not on file   I have reviewed all of this patient's past medical and surgical histories as well as family and social histories and active allergies and medications as documented in the electronic medical record.      Review of Systems   Unable to perform ROS: Dementia       Objective:              Vitals     Vitals:    01/22/17 0700 01/22/17 0744 01/22/17 0800 01/22/17 1000   BP:   111/65    BP Location:   Right arm    Patient Position:   Lying    BP Method:   Automatic    Pulse: (!) 56 (!) 54 (!) 58 (!) 56   Resp:  20 20    Temp:   96.1 °F (35.6 °C)    TempSrc:   Oral    SpO2:  (!) 94%     Weight:       Height:            /57              Weight: 67.4 kg (148 lb 9.4 oz)  Body mass index is 26.32 kg/(m^2).      Physical  Exam        Gen Appearance, well developed/nourished in no apparent distress  CV: 2+ distal pulses with no edema or swelling  Neuro:  MS: Awake, alert and sustains attention and is a joyful mood,   Speaks his name And nods yes to some questions which are inappropriate answers at times but answers a few more words than usual   Cooperates with very simple commands.  Does not speak much at baseline   CN: Optic discs are flat with normal vasculature, right pupil is 3mm and left pupil is 2mm again today, face symmetric and tongue is midline and strong  Motor: Does move both arms and legs to command equally and fully   Sensory: Can't cooperate  Cerebellar:Can't cooperate  Gait: Not done- remains flat likely some truncal ataxia from hemorrhage          Significant Labs:none new      Significant Imaging: CT head yesterday- no new changes     Assessment and Plan:       Mack Will is a 51 y.o. male admitted with pneumonia and has AMS  I recommend:    Cerebellar hemorrhage  -pupil asymmetry noted but not changes with hemorrhage  -Hemorrhage Likely due to amyloid angiopathy  -Check CTA head and neck tomorrow  if patient can tolerate to rule out other causes of hemorrhage  -ST recommended NPO, but brother wants to advance feeding now that patient is more awake and does not want tube feeds. Brother is aware of aspiration risks  -MBSS can be considered with ST depending on ST's evaluation tomorrow.   -discussed with brother, Giovanni, at length that NPO was recommended. He does not want feeding tube for patient (PEG or NGT).   -Avoid ASA/anticoagulation, BP not elevated      Down's syndrome  Patient with expanded life span with Down's syndrome who has developed associated Alzheimer's dementia.       Seizure disorder  Continue home med, trileptal for seizure prevention      Dementia without behavioral disturbance  -long term prognosis reviewed with brother   - trazodone for insomnia, sundowning given pre admit not currently being  used- likely due to more sedated state since admission. Can continue to hold      B12 deficiency  -Patient has B12 deficiency known prior, well corrected now      Disposition  will need some rehab due to likely ataxia (truncal) from this hemorrhagic CVA-- Back on SWING? For case management to review tomorrow.           Bib Correa MD  Neurology  [facility]

## 2017-01-22 NOTE — PROGRESS NOTES
[facility] Medicine  Progress Note    Patient Name: Mack Will  MRN: [id number]  Patient Class: IP- Inpatient   Admission Date: 1/20/2017  Length of Stay: 2 days  Attending Physician: Makenzie Mckeon MD  Primary Care Provider: Chetan Alvarez MD        Subjective:     Principal Problem:Cerebrovascular accident (CVA) with intracranial hemorrhage    HPI:  51 year old non verbal and demented Downs syndrome patient was sent here via ambulance from his group home because of worsening cough, fever and fatigue. His condition was called to Dr. Alvarez who advised ER evaluation.     Patient was seen and evaluated in the ER and was found to be hypoxic and in respiratory distress. He was admitted to floor with left lower lobe pneumonia. Placed on zithromax and zosyn-day 6 today. Questionable aspiration. Speech consulted for swallow assessment, limited eval, but no gross aspiration noted. CT scan; no PE. Bilateral infiltrates. No longer having a fever. Pt's WBC down to normal and remains there      He is non verbal now-had limited speech before. Most info comes from brother at bedside      Pt had traumatic gabriel placement for urinary retention. Started on Flomax. He voided last night and again this am a large amount.      He is eating minimal even with family's coaxing. We have changed his IVF to clinimix on Sunday. Brother at bedside he will try and push.       Pt has not gotten out of bed, was previously walking. PT started. Resistant to them but when he was ready he got into bed by himself      Hospital Course:  He was placed on SWING unit for continued 10 day course of abx and cont PT.      Dr Nevarez following. He is currently on 3L high flow NC tks> 90%--99% at present. Refuses mask. Mouth breather. Has junky cough. Not bringing anything up. Mucinex started this week.      Not eating. Very acute change in MS. Dr. Correa consulted, CT head noted: New hemorrhage measuring 2.1 x 2.6 x  2.2 cm within the cerebellar vermis. There is mass effect upon the posterior aspect of the fourth ventricle and mild adjacent vasogenic edema. No evidence for herniation or hydrocephalus at this time.     K+ a little low this am, expected with min intake.     Now with improved awareness and eating better. Because of new CVA, placed back on acute care.    1/21  This morning much more awake. He is agreeing to eat. Did not like having respiratory in room doing CPT.     1/22  Yesterday, asymmetric pupils noted on exam and repeat CT scan showed stability of brain bleed.  Doing much better with resp, speech and overall much more active and responsive. EATING!      Interval History: Doing much better    Review of Systems   Constitutional: Negative for chills and fever.   HENT: Negative for congestion, ear pain, postnasal drip, rhinorrhea, sore throat and trouble swallowing.    Eyes: Negative for redness and itching.   Respiratory: Negative for cough, shortness of breath and wheezing.    Cardiovascular: Negative for chest pain and palpitations.   Gastrointestinal: Negative for abdominal pain, diarrhea, nausea and vomiting.   Genitourinary: Negative for dysuria and frequency.   Skin: Negative for rash.   Neurological: Negative for weakness and headaches.   Psychiatric/Behavioral: Positive for agitation and confusion.     Objective:     Vital Signs (Most Recent):  Temp: 96.6 °F (35.9 °C) (01/22/17 0400)  Pulse: (!) 54 (01/22/17 0744)  Resp: 20 (01/22/17 0744)  BP: (!) 120/58 (01/22/17 0400)  SpO2: (!) 94 % (patient not wearing O2/placed back on at this time) (01/22/17 0744) Vital Signs (24h Range):  Temp:  [96.6 °F (35.9 °C)-98.7 °F (37.1 °C)] 96.6 °F (35.9 °C)  Pulse:  [54-76] 54  Resp:  [16-20] 20  SpO2:  [90 %-97 %] 94 %  BP: (106-131)/(57-76) 120/58     Weight: 61.3 kg (135 lb 2.3 oz) (bed scale)  Body mass index is 23.94 kg/(m^2).    Intake/Output Summary (Last 24 hours) at 01/22/17 0824  Last data filed at 01/22/17  0500   Gross per 24 hour   Intake              210 ml   Output                0 ml   Net              210 ml      Physical Exam   Constitutional: He appears well-developed. No distress.   HENT:   Head: Normocephalic and atraumatic.   Downs facies   Eyes: Conjunctivae are normal. Pupils are equal, round, and reactive to light. Right eye exhibits no discharge. Left eye exhibits no discharge.   Left eye ptosis   Neck: Normal range of motion. Neck supple. No thyromegaly present.   Cardiovascular: Normal rate, regular rhythm and intact distal pulses.    Murmur heard.  Pulmonary/Chest: Effort normal and breath sounds normal. No respiratory distress. He has no wheezes. He has no rales.   MUCH improved resp    2L Nc in place   Abdominal: Soft. Bowel sounds are normal. He exhibits no distension. There is no tenderness.   Musculoskeletal: Normal range of motion. He exhibits no edema.   Lymphadenopathy:     He has no cervical adenopathy.   Neurological: He is alert.   Much more awake and participatory in converation. Non verbal   Skin: Skin is warm and dry. No rash noted.   Erythema to left hand   Nursing note and vitals reviewed.      Significant Labs: None    Significant Imaging: I have reviewed all pertinent imaging results/findings within the past 24 hours.     CT scan:       1.  No interval detrimental change in the imaging appearance of the brain as compared to the previous study.  There is continued demonstration of a large hyperdense parenchymal hematoma centered within the cerebellar vermis.  No interval change in the size of the ventricular system as compared to the previous examination.    2.  Cerebral volume loss which is greater than expected for a patient of this chronologic age.  This could be do to a /in utero insult given the presence of prominent basal ganglia and caudate head calcifications.    Assessment/Plan:      * Cerebrovascular accident (CVA) with intracranial hemorrhage  Neurology  following.  CT x 3 all stable.    Scopolamine patch seems to be helping.     Will transfer back to acute care for continued close monitoring  Initially with feeding issues, now more awake and participating well. Family has decide against artificial means of feeding          Down's syndrome  Monitor, no active intervention.      Pneumonia of both lungs due to infectious organism  Complete 10 day course of abx - 1/21  Will stop CPT.  Continue resp care.  This is resolved.  Will f/u with swallow study to prove no aspiration.  This is truly not a concern, though.      Debility  Physical therapy      VTE Risk Mitigation         Ordered     Medium Risk of VTE  Once      01/20/17 1450     Place NAYLA hose  Until discontinued      01/20/17 1450          Makenzie Mckeon MD  Department of Hospital Medicine   [facility]

## 2017-01-22 NOTE — PT/OT/SLP PROGRESS
Physical Therapy  Treatment    Mack Will   MRN: [id number]   Admitting Diagnosis: Cerebrovascular accident (CVA) with intracranial hemorrhage    PT Received On: 17  PT Start Time: 0735     PT Stop Time: 0750    PT Total Time (min): 15 min       Billable Minutes:  Therapeutic Exercise 15 minutes    Treatment Type: Treatment  PT/PTA: PT             General Precautions: Standard, fall, aspiration  Orthopedic Precautions: N/A   Braces:           Subjective:  Communicated with patient prior to session.    Pain Ratin/10                   Objective:   Patient found with: peripheral IV, oxygen    Functional Mobility:    Therapeutic Activities and Exercises:  PROM to BUE/BLE at all joints in available planes of motion with rest breaks as needed to increase strength and endurance with all gross mobility.      AM-PAC 6 CLICK MOBILITY  How much help from another person does this patient currently need?   1 = Unable, Total/Dependent Assistance  2 = A lot, Maximum/Moderate Assistance  3 = A little, Minimum/Contact Guard/Supervision  4 = None, Modified [place]/Independent    Turning over in bed (including adjusting bedclothes, sheets and blankets)?: 2  Sitting down on and standing up from a chair with arms (e.g., wheelchair, bedside commode, etc.): 2  Moving from lying on back to sitting on the side of the bed?: 2  Moving to and from a bed to a chair (including a wheelchair)?: 2  Need to walk in hospital room?: 1  Climbing 3-5 steps with a railing?: 1  Total Score: 10    AM-PAC Raw Score CMS G-Code Modifier Level of Impairment Assistance   6 % Total / Unable   7 - 9 CM 80 - 100% Maximal Assist   10 - 14 CL 60 - 80% Moderate Assist   15 - 19 CK 40 - 60% Moderate Assist   20 - 22 CJ 20 - 40% Minimal Assist   23 CI 1-20% SBA / CGA   24 CH 0% Independent/ Mod I     Patient left supine with all lines intact, call button in reach and NSG notified.    Assessment:  Mack Will is a 51 y.o. male with a medical  diagnosis of Cerebrovascular accident (CVA) with intracranial hemorrhage and presents with active resistance to PROM in BUE/BLE. Pt uncooperative with attempts to sit EOB this AM.    Rehab identified problem list/impairments: Rehab identified problem list/impairments: weakness, impaired endurance, impaired self care skills, impaired functional mobilty, gait instability, impaired balance, decreased lower extremity function, decreased upper extremity function, decreased safety awareness, impaired cognition    Rehab potential is fair.    Activity tolerance: Poor    Discharge recommendations: Discharge Facility/Level Of Care Needs: adult foster care/group home     Barriers to discharge: Barriers to Discharge: None    Equipment recommendations: Equipment Needed After Discharge: none     GOALS:   Physical Therapy Goals        Problem: Physical Therapy Goal    Goal Priority Disciplines Outcome Goal Variances Interventions   Physical Therapy Goal     PT/OT, PT      Description:  PT Goal  1.   Pt will assist supine  to sit with minimal assist.  2.   Pt will assist sit to supine with minimal assist.  3.  Pt will assist with transfers from bed to chair with minimal assiist.  4.  Pt will assist and inititate walking  for 10 - ft and the progress as able.              PLAN:    Patient to be seen  (1-2 x/day Monday-Friday; PRN Weekends/Holidays)  to address the above listed problems via gait training, therapeutic activities, therapeutic exercises  Plan of Care expires:  (upon D/C from facility)  Plan of Care reviewed with: patient         Lulújosie Sagastume, PT  01/22/2017

## 2017-01-22 NOTE — ASSESSMENT & PLAN NOTE
Complete 10 day course of abx - 1/21  Will stop CPT.  Continue resp care.  This is resolved.  Will f/u with swallow study to prove no aspiration.  This is truly not a concern, though.

## 2017-01-22 NOTE — SUBJECTIVE & OBJECTIVE
Interval History: Doing much better    Review of Systems   Constitutional: Negative for chills and fever.   HENT: Negative for congestion, ear pain, postnasal drip, rhinorrhea, sore throat and trouble swallowing.    Eyes: Negative for redness and itching.   Respiratory: Negative for cough, shortness of breath and wheezing.    Cardiovascular: Negative for chest pain and palpitations.   Gastrointestinal: Negative for abdominal pain, diarrhea, nausea and vomiting.   Genitourinary: Negative for dysuria and frequency.   Skin: Negative for rash.   Neurological: Negative for weakness and headaches.   Psychiatric/Behavioral: Positive for agitation and confusion.     Objective:     Vital Signs (Most Recent):  Temp: 96.6 °F (35.9 °C) (01/22/17 0400)  Pulse: (!) 54 (01/22/17 0744)  Resp: 20 (01/22/17 0744)  BP: (!) 120/58 (01/22/17 0400)  SpO2: (!) 94 % (patient not wearing O2/placed back on at this time) (01/22/17 0744) Vital Signs (24h Range):  Temp:  [96.6 °F (35.9 °C)-98.7 °F (37.1 °C)] 96.6 °F (35.9 °C)  Pulse:  [54-76] 54  Resp:  [16-20] 20  SpO2:  [90 %-97 %] 94 %  BP: (106-131)/(57-76) 120/58     Weight: 61.3 kg (135 lb 2.3 oz) (bed scale)  Body mass index is 23.94 kg/(m^2).    Intake/Output Summary (Last 24 hours) at 01/22/17 0824  Last data filed at 01/22/17 0500   Gross per 24 hour   Intake              210 ml   Output                0 ml   Net              210 ml      Physical Exam   Constitutional: He appears well-developed. No distress.   HENT:   Head: Normocephalic and atraumatic.   Downs facies   Eyes: Conjunctivae are normal. Pupils are equal, round, and reactive to light. Right eye exhibits no discharge. Left eye exhibits no discharge.   Left eye ptosis   Neck: Normal range of motion. Neck supple. No thyromegaly present.   Cardiovascular: Normal rate, regular rhythm and intact distal pulses.    Murmur heard.  Pulmonary/Chest: Effort normal and breath sounds normal. No respiratory distress. He has no wheezes.  He has no rales.   MUCH improved resp    2L Nc in place   Abdominal: Soft. Bowel sounds are normal. He exhibits no distension. There is no tenderness.   Musculoskeletal: Normal range of motion. He exhibits no edema.   Lymphadenopathy:     He has no cervical adenopathy.   Neurological: He is alert.   Much more awake and participatory in converation. Non verbal   Skin: Skin is warm and dry. No rash noted.   Erythema to left hand   Nursing note and vitals reviewed.      Significant Labs: None    Significant Imaging: I have reviewed all pertinent imaging results/findings within the past 24 hours.     CT scan:       1.  No interval detrimental change in the imaging appearance of the brain as compared to the previous study.  There is continued demonstration of a large hyperdense parenchymal hematoma centered within the cerebellar vermis.  No interval change in the size of the ventricular system as compared to the previous examination.    2.  Cerebral volume loss which is greater than expected for a patient of this chronologic age.  This could be do to a /in utero insult given the presence of prominent basal ganglia and caudate head calcifications.

## 2017-01-22 NOTE — ASSESSMENT & PLAN NOTE
Neurology following.  CT x 3 all stable.    Scopolamine patch seems to be helping.     Will transfer back to acute care for continued close monitoring  Initially with feeding issues, now more awake and participating well. Family has decide against artificial means of feeding

## 2017-01-22 NOTE — PLAN OF CARE
Problem: Patient Care Overview  Goal: Plan of Care Review  Outcome: Ongoing (interventions implemented as appropriate)  Patient more alert and talking with staff. Breath sounds coarse rhonchi with 02 saturation 97% on room air. At midnight 02 saturation 90% and patient denies any respiratory distress. Patient placed on 2 liters nasal cannula and 02 sats now 93-94%. Patient turned every two hours. Afebrile. Sinus rhythm on telemetry. Fall precautions maintained. Plan of care reviewed with patient. Reinforced care each time. Patient denies any pain. Bed low and locked. Bed exit alarm on. Call bell in reach.

## 2017-01-23 ENCOUNTER — HOSPITAL ENCOUNTER (INPATIENT)
Facility: HOSPITAL | Age: 52
LOS: 4 days | Discharge: HOME | DRG: 947 | End: 2017-01-27
Attending: INTERNAL MEDICINE | Admitting: FAMILY MEDICINE
Payer: MEDICARE

## 2017-01-23 VITALS
SYSTOLIC BLOOD PRESSURE: 129 MMHG | RESPIRATION RATE: 18 BRPM | TEMPERATURE: 96 F | HEIGHT: 63 IN | BODY MASS INDEX: 23.94 KG/M2 | WEIGHT: 135.13 LBS | DIASTOLIC BLOOD PRESSURE: 72 MMHG | HEART RATE: 88 BPM | OXYGEN SATURATION: 92 %

## 2017-01-23 DIAGNOSIS — R53.81 DEBILITY: Primary | ICD-10-CM

## 2017-01-23 DIAGNOSIS — R13.12 OROPHARYNGEAL DYSPHAGIA: ICD-10-CM

## 2017-01-23 DIAGNOSIS — I61.9 CEREBROVASCULAR ACCIDENT (CVA) WITH INTRACRANIAL HEMORRHAGE: ICD-10-CM

## 2017-01-23 PROCEDURE — 94640 AIRWAY INHALATION TREATMENT: CPT

## 2017-01-23 PROCEDURE — C9113 INJ PANTOPRAZOLE SODIUM, VIA: HCPCS | Performed by: NURSE PRACTITIONER

## 2017-01-23 PROCEDURE — 11000004 HC SNF PRIVATE

## 2017-01-23 PROCEDURE — 25000242 PHARM REV CODE 250 ALT 637 W/ HCPCS: Performed by: NURSE PRACTITIONER

## 2017-01-23 PROCEDURE — 70498 CT ANGIOGRAPHY NECK: CPT | Mod: TC

## 2017-01-23 PROCEDURE — 63600175 PHARM REV CODE 636 W HCPCS: Performed by: NURSE PRACTITIONER

## 2017-01-23 PROCEDURE — 25000003 PHARM REV CODE 250: Performed by: NURSE PRACTITIONER

## 2017-01-23 PROCEDURE — G8980 MOBILITY D/C STATUS: HCPCS | Mod: CM

## 2017-01-23 PROCEDURE — 27000221 HC OXYGEN, UP TO 24 HOURS

## 2017-01-23 PROCEDURE — 94761 N-INVAS EAR/PLS OXIMETRY MLT: CPT

## 2017-01-23 PROCEDURE — 27200120 HC KIT IV START (RUSH ONLY)

## 2017-01-23 PROCEDURE — 25500020 PHARM REV CODE 255: Performed by: FAMILY MEDICINE

## 2017-01-23 PROCEDURE — 94760 N-INVAS EAR/PLS OXIMETRY 1: CPT

## 2017-01-23 PROCEDURE — 99233 SBSQ HOSP IP/OBS HIGH 50: CPT | Mod: ,,, | Performed by: PSYCHIATRY & NEUROLOGY

## 2017-01-23 PROCEDURE — 92526 ORAL FUNCTION THERAPY: CPT

## 2017-01-23 PROCEDURE — 99232 SBSQ HOSP IP/OBS MODERATE 35: CPT | Mod: ,,, | Performed by: INTERNAL MEDICINE

## 2017-01-23 PROCEDURE — 70496 CT ANGIOGRAPHY HEAD: CPT | Mod: TC

## 2017-01-23 PROCEDURE — 27000339 *HC DAILY SUPPLY KIT

## 2017-01-23 RX ORDER — ALBUTEROL SULFATE 2.5 MG/.5ML
2.5 SOLUTION RESPIRATORY (INHALATION) EVERY 6 HOURS
Status: CANCELLED | OUTPATIENT
Start: 2017-01-23

## 2017-01-23 RX ORDER — PANTOPRAZOLE SODIUM 40 MG/10ML
40 INJECTION, POWDER, LYOPHILIZED, FOR SOLUTION INTRAVENOUS DAILY
Status: DISCONTINUED | OUTPATIENT
Start: 2017-01-24 | End: 2017-01-25

## 2017-01-23 RX ORDER — OXCARBAZEPINE 150 MG/1
300 TABLET, FILM COATED ORAL 2 TIMES DAILY
Status: CANCELLED | OUTPATIENT
Start: 2017-01-23

## 2017-01-23 RX ORDER — HYDROCODONE BITARTRATE AND ACETAMINOPHEN 5; 325 MG/1; MG/1
1 TABLET ORAL EVERY 4 HOURS PRN
Status: CANCELLED | OUTPATIENT
Start: 2017-01-23

## 2017-01-23 RX ORDER — GUAIFENESIN 600 MG/1
600 TABLET, EXTENDED RELEASE ORAL 2 TIMES DAILY
Status: DISCONTINUED | OUTPATIENT
Start: 2017-01-23 | End: 2017-01-27 | Stop reason: HOSPADM

## 2017-01-23 RX ORDER — ONDANSETRON 2 MG/ML
4 INJECTION INTRAMUSCULAR; INTRAVENOUS EVERY 8 HOURS PRN
Status: DISCONTINUED | OUTPATIENT
Start: 2017-01-23 | End: 2017-01-25

## 2017-01-23 RX ORDER — OXCARBAZEPINE 150 MG/1
300 TABLET, FILM COATED ORAL 2 TIMES DAILY
Status: DISCONTINUED | OUTPATIENT
Start: 2017-01-24 | End: 2017-01-27 | Stop reason: HOSPADM

## 2017-01-23 RX ORDER — GUAIFENESIN 600 MG/1
600 TABLET, EXTENDED RELEASE ORAL 2 TIMES DAILY
Status: CANCELLED | OUTPATIENT
Start: 2017-01-23

## 2017-01-23 RX ORDER — ALBUTEROL SULFATE 2.5 MG/.5ML
2.5 SOLUTION RESPIRATORY (INHALATION) EVERY 6 HOURS
Status: DISCONTINUED | OUTPATIENT
Start: 2017-01-23 | End: 2017-01-27 | Stop reason: HOSPADM

## 2017-01-23 RX ORDER — PANTOPRAZOLE SODIUM 40 MG/10ML
40 INJECTION, POWDER, LYOPHILIZED, FOR SOLUTION INTRAVENOUS DAILY
Status: CANCELLED | OUTPATIENT
Start: 2017-01-24

## 2017-01-23 RX ORDER — ACETAMINOPHEN 325 MG/1
650 TABLET ORAL EVERY 8 HOURS PRN
Status: DISCONTINUED | OUTPATIENT
Start: 2017-01-23 | End: 2017-01-27 | Stop reason: HOSPADM

## 2017-01-23 RX ORDER — ONDANSETRON 2 MG/ML
4 INJECTION INTRAMUSCULAR; INTRAVENOUS EVERY 8 HOURS PRN
Status: CANCELLED | OUTPATIENT
Start: 2017-01-23

## 2017-01-23 RX ORDER — ACETAMINOPHEN 325 MG/1
650 TABLET ORAL EVERY 8 HOURS PRN
Status: CANCELLED | OUTPATIENT
Start: 2017-01-23

## 2017-01-23 RX ORDER — MEGESTROL ACETATE 40 MG/ML
200 SUSPENSION ORAL DAILY
Status: CANCELLED | OUTPATIENT
Start: 2017-01-24

## 2017-01-23 RX ORDER — MEGESTROL ACETATE 40 MG/ML
200 SUSPENSION ORAL DAILY
Status: DISCONTINUED | OUTPATIENT
Start: 2017-01-24 | End: 2017-01-27 | Stop reason: HOSPADM

## 2017-01-23 RX ORDER — HYDROCODONE BITARTRATE AND ACETAMINOPHEN 5; 325 MG/1; MG/1
1 TABLET ORAL EVERY 4 HOURS PRN
Status: DISCONTINUED | OUTPATIENT
Start: 2017-01-23 | End: 2017-01-27 | Stop reason: HOSPADM

## 2017-01-23 RX ORDER — TAMSULOSIN HYDROCHLORIDE 0.4 MG/1
0.4 CAPSULE ORAL DAILY
Status: DISCONTINUED | OUTPATIENT
Start: 2017-01-24 | End: 2017-01-27 | Stop reason: HOSPADM

## 2017-01-23 RX ORDER — TAMSULOSIN HYDROCHLORIDE 0.4 MG/1
0.4 CAPSULE ORAL DAILY
Status: CANCELLED | OUTPATIENT
Start: 2017-01-24

## 2017-01-23 RX ADMIN — PANTOPRAZOLE SODIUM 40 MG: 40 INJECTION, POWDER, FOR SOLUTION INTRAVENOUS at 08:01

## 2017-01-23 RX ADMIN — IOHEXOL 100 ML: 350 INJECTION, SOLUTION INTRAVENOUS at 11:01

## 2017-01-23 RX ADMIN — TAMSULOSIN HYDROCHLORIDE 0.4 MG: 0.4 CAPSULE ORAL at 08:01

## 2017-01-23 RX ADMIN — OXCARBAZEPINE 300 MG: 150 TABLET, FILM COATED ORAL at 05:01

## 2017-01-23 RX ADMIN — ALBUTEROL SULFATE 2.5 MG: 2.5 SOLUTION RESPIRATORY (INHALATION) at 07:01

## 2017-01-23 RX ADMIN — GUAIFENESIN 600 MG: 600 TABLET, EXTENDED RELEASE ORAL at 08:01

## 2017-01-23 RX ADMIN — ALBUTEROL SULFATE 2.5 MG: 2.5 SOLUTION RESPIRATORY (INHALATION) at 12:01

## 2017-01-23 RX ADMIN — MEGESTROL ACETATE 200 MG: 40 SUSPENSION ORAL at 08:01

## 2017-01-23 RX ADMIN — OXCARBAZEPINE 300 MG: 150 TABLET, FILM COATED ORAL at 08:01

## 2017-01-23 NOTE — PROGRESS NOTES
[facility] sent letter PASSAR in review with [facility] for nursing home placement.  Patient's brother states today that he wants patient to go back to the group home when discharged from hospital.  He has been in contact with the group home and stated his wishes to them.

## 2017-01-23 NOTE — ASSESSMENT & PLAN NOTE
Completed 10 day course of abx - 1/21  Will stop CPT.  Continue resp care.  This is resolved.  Will f/u with swallow study to prove no aspiration speech at bedside this am.

## 2017-01-23 NOTE — PT/OT/SLP PROGRESS
Physical Therapy      Mack Will  MRN: [id number]    Patient not seen today secondary to Patient unwilling to participate. Pt x 2 days of uncooperative participation with PT. Pt actively resisting PROM to all four extremities while hollering and moaning. Pt actively resisting attempt at supine to sit EOB with 2 people total assist while patient actively grabs opposite hand rail of bed and pulls himself back in the bed refusing to attempt OOB or EOB T/F. Pt with increased agitation while attempting PT. Will follow-up 01/24/2017. If pt continues to actively resist PT, D/C planning initiated.     Lulú Sagastume, PT

## 2017-01-23 NOTE — PLAN OF CARE
Problem: Patient Care Overview  Goal: Plan of Care Review  Outcome: Ongoing (interventions implemented as appropriate)  Patient with clear breath sounds; 02 saturation on assessment 89% room air; patient placed on 2 liters nasal cannula; 02 saturation up to 93-94%. Patient turned every two hours. Patient took medicine with applesauce without difficulty. Had episode of involuntary movement of extremities; facial grimacing, and teary eyes. Vitals were stable; difficult to assess due to medical condition. Notified MD; MD. felt that patient more awake and alert and this is his baseline. Suggested that we try comfort measures and offer patient some cola; that this might make him calm. After giving patient a couple of sips of cola; reorienting him, and making patient comfortable he went peacefully to sleep and has been sleeping comfortably for awhile when checked on by staff. NSR on telemetry and fall precaution maintained. Bed low and locked; bed exit alarm on; call bell in reach. Plan of care reviewed with patient; reinforcement needed.

## 2017-01-23 NOTE — PROGRESS NOTES
[facility] Hospital Medicine  Progress Note    Patient Name: Mack Will  MRN: [id number]  Patient Class: IP- Inpatient   Admission Date: 1/20/2017  Length of Stay: 3 days  Attending Physician: Makenzie Mckeon MD  Primary Care Provider: Chetan Alvarez MD        Subjective:     Principal Problem:Cerebrovascular accident (CVA) with intracranial hemorrhage    HPI:  51 year old non verbal and demented Downs syndrome patient was sent here via ambulance from his group home because of worsening cough, fever and fatigue. His condition was called to Dr. Alvarez who advised ER evaluation.     Patient was seen and evaluated in the ER and was found to be hypoxic and in respiratory distress. He was admitted to floor with left lower lobe pneumonia. Placed on zithromax and zosyn-day 6 today. Questionable aspiration. Speech consulted for swallow assessment, limited eval, but no gross aspiration noted. CT scan; no PE. Bilateral infiltrates. No longer having a fever. Pt's WBC down to normal and remains there      He is non verbal now-had limited speech before. Most info comes from brother at bedside      Pt had traumatic gabriel placement for urinary retention. Started on Flomax. He voided last night and again this am a large amount.      He is eating minimal even with family's coaxing. We have changed his IVF to clinimix on Sunday. Brother at bedside he will try and push.       Pt has not gotten out of bed, was previously walking. PT started. Resistant to them but when he was ready he got into bed by himself      Hospital Course:  He was placed on SWING unit for continued 10 day course of abx and cont PT.      Dr Nevarez following. He is currently on 2L  NC tks> 90%--97% at present. - completed abx     Not eating. Very acute change in MS. Dr. Correa consulted, CT head noted: New hemorrhage measuring 2.1 x 2.6 x 2.2 cm within the cerebellar vermis 1/19/2017. There is mass effect upon the posterior aspect  of the fourth ventricle and mild adjacent vasogenic edema. No evidence for herniation or hydrocephalus at this time. Ct repeated x 2. 1/21 last ct- head remains stable.    Now with improved awareness and eating better.     1/21  This morning much more awake. He is agreeing to eat. Did not like having respiratory in room doing CPT.     1/22  Yesterday, asymmetric pupils noted on exam and repeat CT scan showed stability of brain bleed.  Doing much better with resp, speech and overall much more active and responsive. EATING!    1/23  He is still doing well. Eating, brother at bedside. CTA head and neck ordered  for today. Speech at bedside this am. He  Is  Eating well, no Aspiration noted. Much more awake and alert this am.       Interval History: Doing much better. No acute changes overnight    Review of Systems   Unable to perform ROS: Dementia     Objective:     Vital Signs (Most Recent):  Temp: 96.4 °F (35.8 °C) (01/23/17 0738)  Pulse: (!) 58 (01/23/17 0738)  Resp: 16 (01/23/17 0738)  BP: 129/64 (01/23/17 0738)  SpO2: (!) 91 % (01/23/17 0738) Vital Signs (24h Range):  Temp:  [96.4 °F (35.8 °C)-98.5 °F (36.9 °C)] 96.4 °F (35.8 °C)  Pulse:  [45-72] 58  Resp:  [16-20] 16  SpO2:  [89 %-97 %] 91 %  BP: (102-129)/(58-67) 129/64     Weight: 61.3 kg (135 lb 2.3 oz) (bed scale)  Body mass index is 23.94 kg/(m^2).    Intake/Output Summary (Last 24 hours) at 01/23/17 0932  Last data filed at 01/23/17 0000   Gross per 24 hour   Intake              180 ml   Output              600 ml   Net             -420 ml      Physical Exam   Constitutional: He appears well-developed. No distress.   HENT:   Head: Normocephalic and atraumatic.   Downs facies   Eyes: Conjunctivae are normal. Pupils are equal, round, and reactive to light. Right eye exhibits no discharge. Left eye exhibits no discharge.   Neck: Normal range of motion. Neck supple. No tracheal deviation present.   Cardiovascular: Normal rate, regular rhythm and intact distal  pulses.    Murmur heard.  Pulmonary/Chest: Effort normal and breath sounds normal. No respiratory distress. He has no wheezes. He has no rales.       2L Nc in place   Abdominal: Soft. Bowel sounds are normal. He exhibits no distension. There is no tenderness.   Musculoskeletal: Normal range of motion. He exhibits no edema.   Neurological: He is alert.   Much more awake and makes eye contact with me but non-verbal   Skin: Skin is warm and dry. No rash noted.   Nursing note and vitals reviewed.      Significant Labs:   BMP  Lab Results   Component Value Date     2017    K 3.4 (L) 2017     2017    CO2 25 2017    BUN 13 2017    CREATININE 0.9 2017    CALCIUM 9.1 2017    ANIONGAP 12 2017    ESTGFRAFRICA >60 2017    EGFRNONAA >60 2017         Significant Imaging:     CT scan:       1.  No interval detrimental change in the imaging appearance of the brain as compared to the previous study.  There is continued demonstration of a large hyperdense parenchymal hematoma centered within the cerebellar vermis.  No interval change in the size of the ventricular system as compared to the previous examination.    2.  Cerebral volume loss which is greater than expected for a patient of this chronologic age.  This could be do to a /in utero insult given the presence of prominent basal ganglia and caudate head calcifications.    Assessment/Plan:      * Cerebrovascular accident (CVA) with intracranial hemorrhage  Neurology following.  CT x 3 all stable.    Scopolamine patch seems to be helping.     Will transfer back to [facility] care for continued close monitoring    Initially with feeding issues, now more awake and participating well. Family has decided against artificial means of feeding          Down's syndrome  Monitor, no active intervention.      Pneumonia of both lungs due to infectious organism  Completed 10 day course of abx -   Will stop  CPT.  Continue resp care.  This is resolved.  Will f/u with swallow study to prove no aspiration speech at bedside this am.        Debility  Physical therapy  Will need  SKILL again for debiility      VTE Risk Mitigation         Ordered     Medium Risk of VTE  Once      01/20/17 1450     Place NAYLA hose  Until discontinued      01/20/17 1450          Italia Vargas MD  Department of Hospital Medicine   [facility]

## 2017-01-23 NOTE — PROGRESS NOTES
"Food & Nutrition  Follow-Up  Note    Recommendation/Interventions:  1. Continue Low Sodium, Puree diet with snacks as tolerated with strong encouragement good intake   2. Continue Megace to increase appetite    Goals: increase oral intake to 75% of meals  Nutrition Goal Status: progressing        Reason for Assessment:Follow-Up  Level of Care: High 3    Pt appetite increased over weekend and today, possible swallow eval, plan to change back to skilled pending tests    Family does not want NH or enteral tube feeds.    Nutrition/Diet History:  Meal Patterns:prior to admit only consumed 1 meal/day  Typical Activity Pattern: sedentary  Functional Status:non ambulatory, not able to prepare meals, not able to purchase food  Factors Affecting Nutritional intake:impaired cognitive status/motor control; compromised airway    Anthropometrics:  Ht: 63"   Wt: 67.4kg  IBW: 123.94lb  %IBW: 119.89  BMI: 26.33  BMI Grade: 25-29 overweight    Labs: reviewed  Medications:   Current Facility-Administered Medications:     acetaminophen tablet 650 mg, 650 mg, Oral, Q8H PRN, Neeru L. Trosclair, NP    albuterol sulfate nebulizer solution 2.5 mg, 2.5 mg, Nebulization, Q6H, Neeru L. Trosclair, NP, 2.5 mg at 01/23/17 1256    guaifenesin 12 hr tablet 600 mg, 600 mg, Oral, BID, Neeru L. Trosclair, NP, 600 mg at 01/23/17 0823    hydrocodone-acetaminophen 5-325mg per tablet 1 tablet, 1 tablet, Oral, Q4H PRN, Neeru L. Trosclair, NP    megestrol 400 mg/10 mL (10 mL) suspension 200 mg, 200 mg, Oral, Daily, Neeru L. Trosclair, NP, 200 mg at 01/23/17 0824    ondansetron injection 4 mg, 4 mg, Intravenous, Q8H PRN, Neeru L. Trosclair, NP    oxcarbazepine tablet 300 mg, 300 mg, Oral, BID, Neeru L. Trosclair, NP, 300 mg at 01/23/17 0823    pantoprazole injection 40 mg, 40 mg, Intravenous, Daily, Neeru L. Trosclair, NP, 40 mg at 01/23/17 0824    promethazine (PHENERGAN) 12.5 mg in dextrose 5 % 50 mL IVPB, 12.5 mg, Intravenous, Q6H PRN, Neeru GOMEZ" Trosclair, NP    tamsulosin 24 hr capsule 0.4 mg, 0.4 mg, Oral, Daily, Neeru GOMEZ Trosclair, NP, 0.4 mg at 01/23/17 0823      Physical Findings:  Overall: overweight    Estimated Needs:  Wt used: 67.4 kg  Energy Calorie Requirement: 1700 (mifflin 1.2)  Protein Requirement: 80 (1.2)  Fluid:1700 (1ml/kcal)    Current Diet:  Low Sodium, Puree with Snacks  Nutrition Order Comments: adequate    Nutrition Risk: High    Nutrition Diagnosis:  Inadequate energy intake R/T hx of down syndrome/ current Dx AEB labs, not eating, recent use of clinimix   Status: continues    Monitor & Evaluate:  Intake: food and beverage intake  Nutrition Administration: diet order  Physical Activity & Function: nutrition related ADL's  Anthropometric Measurements: weight, weight change  Biochemical Data, Medical Tests and Procedures: electrolyte and renal panel, gi profile  Nutrition-Focused Physical Findings: overall appearance    Nutrition Follow-Up: 01/26/2017

## 2017-01-23 NOTE — PT/OT/SLP PROGRESS
Occupational Therapy   Evaluation    Mack Will   MRN: [id number]     OT attempted eval, however patient resisting.  Patient awake and alert with eyes open and tracking clinician, however not following one step verbal commands for ROM and strength assessment.  Attempted tactile cueing with AAROM for assessment, however patient resisting.  Attempted bed mobility in order to follow up with ADL assessment, however patient resisted.  Family not in room, however per report of Speech Therapy who was able to speak with family and obtain prior functional status report, patient did not complete his ADL's prior while living in a group home.  Due to patient being dependent with ADL's prior and his current inability to actively participate in command following for improved independence with ADL's, as well as previous OT attempting eval 4 times last week without success, there is no current need for acute OT.  Please re-consult in future if patient's status should change.  Thank you.    MARICRUZ Higgins,CHT

## 2017-01-23 NOTE — PT/OT/SLP PROGRESS
Speech Language Pathology  Treatment    Mack Will   MRN: [id number]   Admitting Diagnosis: Cerebrovascular accident (CVA) with intracranial hemorrhage    Diet recommendations: Solid Diet Level: Puree  Liquid Diet Level: Thin Feed only when awake/alert, Small bites/sips and Assistance with meals    SLP Treatment Date: 01/23/17  Speech Start Time: 0840     Speech Stop Time: 0858     Speech Total (min): 18 min       TREATMENT BILLABLE MINUTES:  Treatment Swallowing Dysfunction 18 minutes    Has the patient been evaluated by SLP for swallowing? : Yes  Keep patient NPO?: No   General Precautions: Standard, aspiration, fall          Subjective:  Pt was alert and eating with brother in the room.     Objective:   Patient found with: peripheral IV   Vocal quality clear after presentation of PO. Discussed possible risk of aspiration with Pt's brother. Brother verbalized understanding and stated he would like to defer alternate nutrition as long as possible. Pt was fed by brother. Pt was given teaspoons of pudding, pureed solids, and thin liquids via straw. Pt demonstrated clear vocal quality, clear breath sounds, and no cough or throat clear at any point. Pt's brothers stated that he does cough occassionally but this was not observed by clinician today.       Assessment:  Mack Will is a 51 y.o. male with a medical diagnosis of Cerebrovascular accident (CVA) with intracranial hemorrhage and presents with improved swallow function today Recommend diet as stated above. Will continue to follow.    Goals:   SLP Goals        Problem: SLP Goal    Goal Priority Disciplines Outcome   SLP Goal     SLP Ongoing (interventions implemented as appropriate)   Description:    1. Pt will tolerate any form of PO without signs of aspiration.                  Plan:   Patient to be seen Therapy Frequency: 3 x/week, 4 x/week, 5 x/week   Plan of Care expires: 02/03/17  Plan of Care reviewed with: sibling, patient  SLP Follow-up?: Yes  SLP -  Next Visit Date: 01/24/17           JEAN Seals, CCC-SLP  01/23/2017

## 2017-01-23 NOTE — SUBJECTIVE & OBJECTIVE
Interval History: Doing much better. No acute changes overnight    Review of Systems   Unable to perform ROS: Dementia     Objective:     Vital Signs (Most Recent):  Temp: 96.4 °F (35.8 °C) (01/23/17 0738)  Pulse: (!) 58 (01/23/17 0738)  Resp: 16 (01/23/17 0738)  BP: 129/64 (01/23/17 0738)  SpO2: (!) 91 % (01/23/17 0738) Vital Signs (24h Range):  Temp:  [96.4 °F (35.8 °C)-98.5 °F (36.9 °C)] 96.4 °F (35.8 °C)  Pulse:  [45-72] 58  Resp:  [16-20] 16  SpO2:  [89 %-97 %] 91 %  BP: (102-129)/(58-67) 129/64     Weight: 61.3 kg (135 lb 2.3 oz) (bed scale)  Body mass index is 23.94 kg/(m^2).    Intake/Output Summary (Last 24 hours) at 01/23/17 0932  Last data filed at 01/23/17 0000   Gross per 24 hour   Intake              180 ml   Output              600 ml   Net             -420 ml      Physical Exam   Constitutional: He appears well-developed. No distress.   HENT:   Head: Normocephalic and atraumatic.   Downs facies   Eyes: Conjunctivae are normal. Pupils are equal, round, and reactive to light. Right eye exhibits no discharge. Left eye exhibits no discharge.   Neck: Normal range of motion. Neck supple. No tracheal deviation present.   Cardiovascular: Normal rate, regular rhythm and intact distal pulses.    Murmur heard.  Pulmonary/Chest: Effort normal and breath sounds normal. No respiratory distress. He has no wheezes. He has no rales.       2L Nc in place   Abdominal: Soft. Bowel sounds are normal. He exhibits no distension. There is no tenderness.   Musculoskeletal: Normal range of motion. He exhibits no edema.   Neurological: He is alert.   Much more awake and makes eye contact with me but non-verbal   Skin: Skin is warm and dry. No rash noted.   Nursing note and vitals reviewed.      Significant Labs:   BMP  Lab Results   Component Value Date     01/21/2017    K 3.4 (L) 01/21/2017     01/21/2017    CO2 25 01/21/2017    BUN 13 01/21/2017    CREATININE 0.9 01/21/2017    CALCIUM 9.1 01/21/2017    ANIONGAP  12 2017    ESTGFRAFRICA >60 2017    EGFRNONAA >60 2017         Significant Imaging:     CT scan:       1.  No interval detrimental change in the imaging appearance of the brain as compared to the previous study.  There is continued demonstration of a large hyperdense parenchymal hematoma centered within the cerebellar vermis.  No interval change in the size of the ventricular system as compared to the previous examination.    2.  Cerebral volume loss which is greater than expected for a patient of this chronologic age.  This could be do to a /in utero insult given the presence of prominent basal ganglia and caudate head calcifications.

## 2017-01-23 NOTE — PLAN OF CARE
Problem: Patient Care Overview  Goal: Plan of Care Review  Outcome: Ongoing (interventions implemented as appropriate)  Fall precautions maintained. Patient remains free from falls/injuries. Patient transferred to Sweetie chair to be transported to imaging. Patient transferred back to bed when he got back to floor. Patient sat up in bed for meals and ate most of his breakfast and lunch. Patient took morning medications with assistance from Giovanni (brother). Patient awake and alert today; responding to questions and interacting with brother. Neuro checks WDL. Plans for patient to be discharged from acute care and admitted to [facility]. Plan of care reviewed with brother and brother agrees.

## 2017-01-23 NOTE — PLAN OF CARE
Problem: Patient Care Overview  Goal: Plan of Care Review  Outcome: Ongoing (interventions implemented as appropriate)  Pt doing well. Family had  no questions at this time and  agrees with poc. They have a concern about physical therapy and getting him stronger to go back to the group home. They do not want a nursing home. Son will talk to [facility] tomorrow to see exactly what he needs to be able to do for them to take him back. They would like him to become swing again for physical therapy and occupational therapy. Patient has  no pain/falls. He ate all 3 meals today with 2 boost pudding. Pt appetite is getting better. cta of neck and head tomorrow and possible barium swallow study. Family aware pt might be asirpating with meals and they do not want peg if he is eating even tho he might be aspirating .

## 2017-01-23 NOTE — IP AVS SNAPSHOT
[address] [address]  Phone: [phone]           Patient Discharge Instructions     Our goal is to set you up for success. This packet includes information on your condition, medications, and your home care. It will help you to care for yourself so you don't get sicker and need to go back to the hospital.     Please ask your nurse if you have any questions.        There are many details to remember when preparing to leave the hospital. Here is what you will need to do:    1. Take your medicine. If you are prescribed medications, review your Medication List in the following pages. You may have new medications to  at the pharmacy and others that you'll need to stop taking. Review the instructions for how and when to take your medications. Talk with your doctor or nurses if you are unsure of what to do.     2. Go to your follow-up appointments. Specific follow-up information is listed in the following pages. Your may be contacted by a transition nurse or clinical provider about future appointments. Be sure we have all of the phone numbers to reach you, if needed. Please contact your provider's office if you are unable to make an appointment.     3. Watch for warning signs. Your doctor or nurse will give you detailed warning signs to watch for and when to call for assistance. These instructions may also include educational information about your condition. If you experience any of warning signs to your health, call your doctor.               [facility]  Unless otherwise directed by your provider, please contact [facility], our nurse care line that is available for 24/7 assistance.     [phone] (toll-free)    Registered nurses in the [facility] provide clinical advisement, health education, appointment booking, and other advisory services.                    ** Verify the list of medication(s) below is accurate and up to date. Carry  this with you in case of emergency. If your medications have changed, please notify your healthcare provider.             Medication List      START taking these medications        Additional Info                      megestrol 400 mg/10 mL (10 mL) Susp   Commonly known as:  MEGACE   Quantity:  600 mL   Refills:  0   Dose:  200 mg    Last time this was given:  200 mg on 1/27/2017  8:44 AM   Instructions:  Take 5 mLs (200 mg total) by mouth once daily.     Begin Date    AM    Noon    PM    Bedtime       tamsulosin 0.4 mg Cp24   Commonly known as:  FLOMAX   Quantity:  30 capsule   Refills:  11   Dose:  0.4 mg    Last time this was given:  0.4 mg on 1/27/2017  8:41 AM   Instructions:  Take 1 capsule (0.4 mg total) by mouth once daily.     Begin Date    AM    Noon    PM    Bedtime         CONTINUE taking these medications        Additional Info                      clotrimazole 1 % cream   Commonly known as:  LOTRIMIN   Quantity:  45 g   Refills:  1    Instructions:  Apply topically 2 (two) times daily.     Begin Date    AM    Noon    PM    Bedtime       mupirocin 2 % ointment   Commonly known as:  BACTROBAN   Refills:  0   Indications:  to back of head    Instructions:  Apply topically 2 (two) times daily.     Begin Date    AM    Noon    PM    Bedtime       oxcarbazepine 300 MG Tab   Commonly known as:  TRILEPTAL   Quantity:  60 tablet   Refills:  11   Dose:  300 mg    Last time this was given:  300 mg on 1/27/2017  8:44 AM   Instructions:  Take 1 tablet (300 mg total) by mouth 2 (two) times daily.     Begin Date    AM    Noon    PM    Bedtime       trazodone 50 MG tablet   Commonly known as:  DESYREL   Quantity:  90 tablet   Refills:  1   Dose:  50 mg    Instructions:  Take 1 tablet (50 mg total) by mouth every evening.     Begin Date    AM    Noon    PM    Bedtime       VITAMIN B-12 50 mcg tablet   Refills:  0   Dose:  50 mcg   Generic drug:  cyanocobalamin (vitamin B-12)    Instructions:  Take 50 mcg by mouth once  daily.     Begin Date    AM    Noon    PM    Bedtime            Where to Get Your Medications      These medications were sent to [facility] [address]  [address] [place] [address]     Phone:  [phone]     megestrol 400 mg/10 mL (10 mL) Susp    tamsulosin 0.4 mg Cp24                  Please bring to all follow up appointments:    1. A copy of your discharge instructions.  2. All medicines you are currently taking in their original bottles.  3. Identification and insurance card.    Please arrive 15 minutes ahead of scheduled appointment time.    Please call 24 hours in advance if you must reschedule your appointment and/or time.        Your Scheduled Appointments     May 29, 2017 10:30 AM CDT   Established Patient Visit with Bib Correa MD   [facility]    [address] [facility] [id number] [phone]              Follow-up Information     Follow up with Chetan Alvarez MD In 2 weeks.    Specialty:  Family Medicine    Contact information:    [address] [place] [address]  [phone]          Follow up with Bib Correa MD.    Specialty:  Neurology    Why:  Follow-up with NP in 2-3 weeks     Contact information:    [address]  [facility] [address]  [phone]        Referrals     Future Orders    Ambulatory consult to Physical Therapy         Discharge Instructions     Future Orders    Activity as tolerated     Diet general     Questions:    Total calories:      Fat restriction, if any:      Protein restriction, if any:      Na restriction, if any:      Fluid restriction:      Additional restrictions:        Discharge References/Attachments     ANKLE PUMP (ENGLISH)        Primary Diagnosis     Your primary diagnosis was:  Debility      Admission Information     Date & Time Provider Department CSN    1/23/2017  6:57 PM Italia Vargas MD [facility] [id number]      Care Providers   "   Provider Role Specialty Primary office phone    Italia Vargas MD Attending Provider Internal Medicine [phone]    Bib Correa MD Consulting Physician  Neurology  [phone]      Your Vitals Were     BP Pulse Temp Resp    129/85 (BP Location: Left arm, Patient Position: Lying, BP Method: Automatic) 83 98.9 °F (37.2 °C) (Axillary) 16    Height Weight SpO2 BMI    5' 3" (1.6 m) 64.5 kg (142 lb 3.2 oz) 98% 25.19 kg/m2      Recent Lab Values     No lab values to display.      Allergies as of 1/27/2017     No Known Allergies      Advance Directives     An advance directive is a document which, in the event you are no longer able to make decisions for yourself, tells your healthcare team what kind of treatment you do or do not want to receive, or who you would like to make those decisions for you.  If you do not currently have an advance directive, [facility] encourages you to create one.  For more information call:  [phone]), [phone] ([phone]),  or log on to [email or web address].        Language Assistance Services     ATTENTION: Language assistance services are available, free of charge. Please call [phone].      ATENCIÓN: Si habla español, tiene a pugh disposición servicios gratuitos de asistencia lingüística. Llame al [phone].     CHÚ Ý: N?u b?n nói Ti?ng Vi?t, có các d?ch v? h? tr? ngôn ng? mi?n phí dành cho b?n. G?i s? [phone].        Stroke Education              Pneumonmia Discharge Instructions                 [facility] complies with applicable Federal civil rights laws and does not discriminate on the basis of race, color, national origin, age, disability, or sex.        "

## 2017-01-23 NOTE — ASSESSMENT & PLAN NOTE
Neurology following.  CT x 3 all stable.    Scopolamine patch seems to be helping.     Will transfer back to [facility] care for continued close monitoring    Initially with feeding issues, now more awake and participating well. Family has decided against artificial means of feeding

## 2017-01-23 NOTE — DISCHARGE SUMMARY
[facility] Medicine  Discharge Summary      Patient Name: Mack Will  MRN: [id number]  Admission Date: 1/20/2017  Hospital Length of Stay: 3 days  Discharge Date and Time: 1/23/2017 10:01 AM  Attending Physician: Makenzie Mckeon MD   Discharging Provider: Eva Brasher NP  Primary Care Provider: Chetan Alvarez MD      HPI:   51 year old non verbal and demented Downs syndrome patient was sent here via ambulance from his group home because of worsening cough, fever and fatigue. His condition was called to Dr. Alvarez who advised ER evaluation.     Patient was seen and evaluated in the ER and was found to be hypoxic and in respiratory distress. He was admitted to floor with left lower lobe pneumonia. Placed on zithromax and zosyn-day 6 today. Questionable aspiration. Speech consulted for swallow assessment, limited eval, but no gross aspiration noted. CT scan; no PE. Bilateral infiltrates. No longer having a fever. Pt's WBC down to normal and remains there      He is non verbal now-had limited speech before. Most info comes from brother at bedside      Pt had traumatic gabriel placement for urinary retention. Started on Flomax. He voided last night and again this am a large amount.      He is eating minimal even with family's coaxing. We have changed his IVF to clinimix on Sunday. Brother at bedside he will try and push.       Pt has not gotten out of bed, was previously walking. PT started. Resistant to them but when he was ready he got into bed by himself      * No surgery found *      Indwelling Lines/Drains at time of discharge:   Lines/Drains/Airways          No matching active lines, drains, or airways        Hospital Course:   He was placed on SWING unit for continued 10 day course of abx and cont PT.      Dr Nevarez following. He is currently on 2L  NC tks> 90%--97% at present. - completed abx     Not eating. Very acute change in MS. Dr. Correa consulted, CT head noted:  New hemorrhage measuring 2.1 x 2.6 x 2.2 cm within the cerebellar vermis 2017. There is mass effect upon the posterior aspect of the fourth ventricle and mild adjacent vasogenic edema. No evidence for herniation or hydrocephalus at this time. Ct repeated x 2.  last ct- head remains stable.    Now with improved awareness and eating better.       This morning much more awake. He is agreeing to eat. Did not like having respiratory in room doing CPT.       Yesterday, asymmetric pupils noted on exam and repeat CT scan showed stability of brain bleed.  Doing much better with resp, speech and overall much more active and responsive. EATING!      He is still doing well. Eating, brother at bedside. CTA head and neck ordered  for today. Speech at bedside this am. He  Is  Eating well, no Aspiration noted. Much more awake and alert this am.        Consults:   Consults         Status Ordering Provider     Inpatient consult to General Surgery  Once     Provider:  (Not yet assigned)    Acknowledged ARASH CARSON          Significant Diagnostic Studies:     CT of brain:       1.  No interval detrimental change in the imaging appearance of the brain as compared to the previous study.  There is continued demonstration of a large hyperdense parenchymal hematoma centered within the cerebellar vermis.  No interval change in the size of the ventricular system as compared to the previous examination.    2.  Cerebral volume loss which is greater than expected for a patient of this chronologic age.  This could be do to a /in utero insult given the presence of prominent basal ganglia and caudate head calcifications.      Pending Diagnostic Studies:     Procedure Component Value Units Date/Time    CTA Head and Neck (xpd) [[id number]]     Order Status:  Sent Lab Status:  No result         Final Active Diagnoses:    Diagnosis Date Noted POA    PRINCIPAL PROBLEM:  Cerebrovascular accident (CVA) with intracranial  hemorrhage [I61.9] 01/20/2017 Yes    Debility [R53.81] 01/16/2017 Yes    Pneumonia of both lungs due to infectious organism [J18.9] 01/12/2017 Yes    Down's syndrome [Q90.9] 03/26/2015 Not Applicable      Problems Resolved During this Admission:    Diagnosis Date Noted Date Resolved POA      * Cerebrovascular accident (CVA) with intracranial hemorrhage  Neurology following.  CT x 3 all stable.    Scopolamine patch seems to be helping.     Will transfer back to SWING care for continued close monitoring    Initially with feeding issues, now more awake and participating well. Family has decided against artificial means of feeding. Speech still following and appetite better with megace           Down's syndrome  Monitor, no active intervention.      Pneumonia of both lungs due to infectious organism  Completed 10 day course of abx - 1/21  Will stop CPT.  Continue resp care.  This is resolved.  Will f/u with swallow study to prove no aspiration speech at bedside this am.        Debility  Physical therapy  Will need  SKILL again for debiility        Discharged Condition: good    Disposition: Swing Bed    Follow Up:  Follow-up Information     Follow up with Chetan Alvarez MD.    Specialty:  Family Medicine    Why:  Outpatient Services    Contact information:    [address] [place], [place] [address] [id number] [phone]          Patient Instructions:   No discharge procedures on file.  Medications:  Transfer Medications (for Discharge Readmit only):   Current Facility-Administered Medications   Medication Dose Route Frequency Provider Last Rate Last Dose    acetaminophen tablet 650 mg  650 mg Oral Q8H PRN Neeru Guillenlair, NP        albuterol sulfate nebulizer solution 2.5 mg  2.5 mg Nebulization Q6H Neeru Cerdair, NP   2.5 mg at 01/23/17 0738    guaifenesin 12 hr tablet 600 mg  600 mg Oral BID Neeru Horowitzsclair, NP   600 mg at 01/23/17 0823    hydrocodone-acetaminophen 5-325mg per tablet 1 tablet  1 tablet Oral  Q4H PRN Neeru L. Trosclair, NP        megestrol 400 mg/10 mL (10 mL) suspension 200 mg  200 mg Oral Daily Neeru L. Trosclair, NP   200 mg at 01/23/17 0824    ondansetron injection 4 mg  4 mg Intravenous Q8H PRN Neeru L. Trosclair, NP        oxcarbazepine tablet 300 mg  300 mg Oral BID Neeru L. Trosclair, NP   300 mg at 01/23/17 0823    pantoprazole injection 40 mg  40 mg Intravenous Daily Neeru L. Trosclair, NP   40 mg at 01/23/17 0824    promethazine (PHENERGAN) 12.5 mg in dextrose 5 % 50 mL IVPB  12.5 mg Intravenous Q6H PRN Neeru L. Trosclair, NP        tamsulosin 24 hr capsule 0.4 mg  0.4 mg Oral Daily Neeru L. Trosclair, NP   0.4 mg at 01/23/17 0823     Time spent on the discharge of patient: 20 minutes    Eva Brasher NP  Department of Hospital Medicine  [facility]

## 2017-01-23 NOTE — PROGRESS NOTES
"Chief Complaint:  AMS       HPI: Mack Will is a 51 y.o. Male known to me for Alzheimer's dementia associated with Down Syndrome.  Patient is admitted to Swing bed after hospitalization for pneumonia and has had altered mental status since admit. His usual baseline is ambulatory and non-verbal with some behavioral disturbance although often smiling and interactive. This has not been the case since admit. He has been eating only at times and only interactive with extreme encouragement.       Interval history:  Patient is improved again today. More awake and interacting more/speaking a bit more again today  ST saw patient and recommended, "Puree Liquid Diet Level: Thin Feed only when awake/alert, Small bites/sips and Assistance with meals"                Past Medical History   Diagnosis Date    Alzheimer disease       Down syndrome       Seizure                            Past Surgical History   Procedure Laterality Date    Cholecystectomy          Cyst removal                 Review of patient's allergies indicates:  No Known Allergies                                 Family History       Problem Relation (Age of Onset)      Heart disease Father                       Social History Main Topics    Smoking status: Never Smoker    Smokeless tobacco: Never Used    Alcohol use No    Drug use: Not on file    Sexual activity: Not on file   I have reviewed all of this patient's past medical and surgical histories as well as family and social histories and active allergies and medications as documented in the electronic medical record.      Review of Systems   Unable to perform ROS: Dementia       Objective:       Vitals:    01/23/17 0738 01/23/17 0800 01/23/17 1000 01/23/17 1118   BP: 129/64   121/63   BP Location: Left arm      Patient Position: Lying      BP Method: Automatic      Pulse: (!) 58 (!) 56 (!) 59    Resp: 16   18   Temp: 96.4 °F (35.8 °C)   96.4 °F (35.8 °C)   TempSrc: Oral   Oral   SpO2: (!) 91%   " (!) 92%   Weight:       Height:                       Weight: 67.4 kg (148 lb 9.4 oz)  Body mass index is 26.32 kg/(m^2).      Physical Exam        Gen Appearance, well developed/nourished in no apparent distress  CV: 2+ distal pulses with no edema or swelling  Neuro:  MS: Awake, alert and sustains attention and happy with  mood,  Speaks his name And nods yes to some questions which are inappropriate answers at times as prior but laughs and cooperates more with exam  Cooperates with very simple commands. Does not speak much at baseline   CN: Optic discs are flat with normal vasculature, right pupil is 3mm and left pupil is 2mm again today and ptosis on the left eye (brother stated that was chronic), face symmetric and tongue is midline and strong  Motor: Does move both arms and legs to command equally and fully   Sensory: Can't cooperate  Cerebellar:Can't cooperate  Gait: Not done- remains flat likely some truncal ataxia from hemorrhage          Significant Labs:CMP  reviewed      Significant Imaging: CTA head and neck report pending      Assessment and Plan:       Mack Will is a 51 y.o. male admitted with pneumonia and has AMS  I recommend:    Cerebellar hemorrhage  -pupil asymmetry noted but no changes with hemorrhage  -Hemorrhage Likely due to amyloid angiopathy. Avoid ASA/anticoagulation, BP not elevated  -Follow up final  CTA head and neck report  to rule out other causes of hemorrhage  -ST following/ brother  does not want tube feeding tubes for patient and we are advancing diet with ST's guidance.     Down's syndrome  Patient with expanded life span with Down's syndrome who has developed associated Alzheimer's dementia.       Seizure disorder  Continue home med, trileptal for seizure prevention      Dementia without behavioral disturbance  -long term prognosis reviewed with brother   - trazodone for insomnia, sundowning given pre admit not currently being used- likely due to more sedated state since  admission. Can continue to hold        B12 deficiency  -Patient has B12 deficiency known prior, well corrected now      Disposition  will need some rehab due to likely ataxia (truncal) from this hemorrhagic CVA-- Back to Swing bed today as planned      Bib Correa MD  Neurology  [facility]

## 2017-01-23 NOTE — PLAN OF CARE
Problem: Nutrition, Imbalanced: Inadequate Oral Intake (Adult)  Intervention: Promote/Optimize Nutrition  Recommendation/Interventions:  1. Continue Low Sodium, Puree diet with snacks as tolerated with strong encouragement good intake   2. Continue Megace to increase appetite     Goals: increase oral intake to 75% of meals  Nutrition Goal Status: progressing

## 2017-01-23 NOTE — PT/OT/SLP PROGRESS
Physical Therapy      Mack Will  MRN: [id number]    Patient not seen today secondary to X-ray. Will follow-up 01/23/2017.    Lulú Sagastume, PT

## 2017-01-23 NOTE — ASSESSMENT & PLAN NOTE
Neurology following.  CT x 3 all stable.    Scopolamine patch seems to be helping.     Will transfer back to [facility] care for continued close monitoring    Initially with feeding issues, now more awake and participating well. Family has decided against artificial means of feeding. Speech still following and appetite better with megace

## 2017-01-24 PROCEDURE — 97163 PT EVAL HIGH COMPLEX 45 MIN: CPT

## 2017-01-24 PROCEDURE — 27000339 *HC DAILY SUPPLY KIT

## 2017-01-24 PROCEDURE — 97802 MEDICAL NUTRITION INDIV IN: CPT

## 2017-01-24 PROCEDURE — 11000004 HC SNF PRIVATE

## 2017-01-24 PROCEDURE — 25000003 PHARM REV CODE 250: Performed by: NURSE PRACTITIONER

## 2017-01-24 PROCEDURE — 99233 SBSQ HOSP IP/OBS HIGH 50: CPT | Mod: ,,, | Performed by: PSYCHIATRY & NEUROLOGY

## 2017-01-24 PROCEDURE — 94640 AIRWAY INHALATION TREATMENT: CPT

## 2017-01-24 PROCEDURE — 25000242 PHARM REV CODE 250 ALT 637 W/ HCPCS: Performed by: NURSE PRACTITIONER

## 2017-01-24 PROCEDURE — C9113 INJ PANTOPRAZOLE SODIUM, VIA: HCPCS | Performed by: NURSE PRACTITIONER

## 2017-01-24 PROCEDURE — G8981 BODY POS CURRENT STATUS: HCPCS | Mod: CM

## 2017-01-24 PROCEDURE — 97110 THERAPEUTIC EXERCISES: CPT

## 2017-01-24 PROCEDURE — 92610 EVALUATE SWALLOWING FUNCTION: CPT

## 2017-01-24 PROCEDURE — 99232 SBSQ HOSP IP/OBS MODERATE 35: CPT | Mod: ,,, | Performed by: INTERNAL MEDICINE

## 2017-01-24 PROCEDURE — 27000221 HC OXYGEN, UP TO 24 HOURS

## 2017-01-24 PROCEDURE — 63600175 PHARM REV CODE 636 W HCPCS: Performed by: NURSE PRACTITIONER

## 2017-01-24 PROCEDURE — 94761 N-INVAS EAR/PLS OXIMETRY MLT: CPT

## 2017-01-24 PROCEDURE — G8982 BODY POS GOAL STATUS: HCPCS | Mod: CL

## 2017-01-24 RX ORDER — TRAZODONE HYDROCHLORIDE 50 MG/1
50 TABLET ORAL NIGHTLY PRN
Status: DISCONTINUED | OUTPATIENT
Start: 2017-01-24 | End: 2017-01-27 | Stop reason: HOSPADM

## 2017-01-24 RX ADMIN — ALBUTEROL SULFATE 2.5 MG: 2.5 SOLUTION RESPIRATORY (INHALATION) at 07:01

## 2017-01-24 RX ADMIN — OXCARBAZEPINE 300 MG: 150 TABLET, FILM COATED ORAL at 08:01

## 2017-01-24 RX ADMIN — GUAIFENESIN 600 MG: 600 TABLET, EXTENDED RELEASE ORAL at 08:01

## 2017-01-24 RX ADMIN — PANTOPRAZOLE SODIUM 40 MG: 40 INJECTION, POWDER, FOR SOLUTION INTRAVENOUS at 08:01

## 2017-01-24 RX ADMIN — GUAIFENESIN 600 MG: 600 TABLET, EXTENDED RELEASE ORAL at 09:01

## 2017-01-24 RX ADMIN — ALBUTEROL SULFATE 2.5 MG: 2.5 SOLUTION RESPIRATORY (INHALATION) at 01:01

## 2017-01-24 RX ADMIN — MEGESTROL ACETATE 200 MG: 40 SUSPENSION ORAL at 08:01

## 2017-01-24 RX ADMIN — OXCARBAZEPINE 300 MG: 150 TABLET, FILM COATED ORAL at 05:01

## 2017-01-24 RX ADMIN — TAMSULOSIN HYDROCHLORIDE 0.4 MG: 0.4 CAPSULE ORAL at 08:01

## 2017-01-24 RX ADMIN — ALBUTEROL SULFATE 2.5 MG: 2.5 SOLUTION RESPIRATORY (INHALATION) at 12:01

## 2017-01-24 NOTE — PT/OT/SLP DISCHARGE
Physical Therapy Discharge Summary    Mack Will  MRN: [id number]   Cerebrovascular accident (CVA) with intracranial hemorrhage   Patient Discharged from acute Physical Therapy on 1/23/2017.  Please refer to prior PT noted date on 1/23/2017 for functional status.     Assessment:   Patient was discharge unexpectedly.  Information required to complete and accurate discharge summary is unknown.  Refer to therapy initial evaluation and last progress note for initial and most recent functional status and goal achievement.  Recommendations made may be found in medical record. Patient appropriate for care in another setting.     Attempted to see pt. 1/24/2017, 10:05 a.m.  GOALS:   Physical Therapy Goals        Problem: Physical Therapy Goal    Goal Priority Disciplines Outcome Goal Variances Interventions   Physical Therapy Goal     PT/OT, PT      Description:  PT Goal  1.   Pt will assist supine  to sit with minimal assist.  2.   Pt will assist sit to supine with minimal assist.  3.  Pt will assist with transfers from bed to chair with minimal assiist.  4.  Pt will assist and inititate walking  for 10 - ft and the progress as able.            Reasons for Discontinuation of Therapy Services  Transfer to alternate level of care.      Plan:  Patient Discharged to: Swing Bed Unit.

## 2017-01-24 NOTE — NURSING
"Patient pointed to TV, put cartoon on for patient to watch; when patient asked if he liked that show, responded, "yes" and continued to watch television. Patient also "worked out" with me, bending arms and legs in bed. Patient smiling and interacting with me.   "

## 2017-01-24 NOTE — PT/OT/SLP PROGRESS
Physical Therapy      Mack Will  MRN: [id number]    Patient not seen 1/24/2017 a.m. secondary to Unavailable (Comment) (Pt. transferred to Swing Bed Unit). Will follow-up 1/24/2017 p.m. Per Swing Bed PT orders.  Please see IP PT discharge note.    Matthew Riggs, PT

## 2017-01-24 NOTE — SUBJECTIVE & OBJECTIVE
Past Medical History   Diagnosis Date    Alzheimer disease     Down syndrome     Seizure        Past Surgical History   Procedure Laterality Date    Cholecystectomy      Cyst removal         Review of patient's allergies indicates:  No Known Allergies    Current Facility-Administered Medications on File Prior to Encounter   Medication    [COMPLETED] omnipaque 350 iohexol 100 mL    [DISCONTINUED] acetaminophen tablet 650 mg    [DISCONTINUED] albuterol sulfate nebulizer solution 2.5 mg    [DISCONTINUED] guaifenesin 12 hr tablet 600 mg    [DISCONTINUED] hydrocodone-acetaminophen 5-325mg per tablet 1 tablet    [DISCONTINUED] megestrol 400 mg/10 mL (10 mL) suspension 200 mg    [DISCONTINUED] ondansetron injection 4 mg    [DISCONTINUED] oxcarbazepine tablet 300 mg    [DISCONTINUED] pantoprazole injection 40 mg    [DISCONTINUED] promethazine (PHENERGAN) 12.5 mg in dextrose 5 % 50 mL IVPB    [DISCONTINUED] tamsulosin 24 hr capsule 0.4 mg     Current Outpatient Prescriptions on File Prior to Encounter   Medication Sig    clotrimazole (LOTRIMIN) 1 % cream Apply topically 2 (two) times daily.    cyanocobalamin, vitamin B-12, (VITAMIN B-12) 50 mcg tablet Take 50 mcg by mouth once daily.     mupirocin (BACTROBAN) 2 % ointment Apply topically 2 (two) times daily.    oxcarbazepine (TRILEPTAL) 300 MG Tab Take 1 tablet (300 mg total) by mouth 2 (two) times daily.    trazodone (DESYREL) 50 MG tablet Take 1 tablet (50 mg total) by mouth every evening.     Family History     Problem Relation (Age of Onset)    Heart disease Father        Social History Main Topics    Smoking status: Never Smoker    Smokeless tobacco: Never Used    Alcohol use No    Drug use: Not on file    Sexual activity: Not on file     Review of Systems   Constitutional: Negative for chills and fever.   HENT: Negative for congestion, ear pain, postnasal drip, rhinorrhea, sore throat and trouble swallowing.    Eyes: Negative for redness and  itching.   Respiratory: Negative for cough, shortness of breath and wheezing.    Cardiovascular: Negative for chest pain and palpitations.   Gastrointestinal: Negative for abdominal pain, diarrhea, nausea and vomiting.   Genitourinary: Negative for dysuria and frequency.   Skin: Negative for rash.   Neurological: Negative for weakness and headaches.     Objective:     Vital Signs (Most Recent):  Temp: 96.6 °F (35.9 °C) (01/24/17 0804)  Pulse: 84 (01/24/17 0804)  Resp: 16 (01/24/17 0804)  BP: (!) 127/58 (01/24/17 0804)  SpO2: 100 % (01/24/17 0815) Vital Signs (24h Range):  Temp:  [96.3 °F (35.7 °C)-97.9 °F (36.6 °C)] 96.6 °F (35.9 °C)  Pulse:  [50-88] 84  Resp:  [16-20] 16  SpO2:  [92 %-100 %] 100 %  BP: (116-138)/(58-74) 127/58     Weight: 63.1 kg (139 lb 1.8 oz)  Body mass index is 24.64 kg/(m^2).    Physical Exam   Constitutional: He appears well-developed. No distress.   HENT:   Head: Normocephalic and atraumatic.   Downs facies   Eyes: Conjunctivae are normal. Pupils are equal, round, and reactive to light. Right eye exhibits no discharge. Left eye exhibits no discharge.   Neck: Normal range of motion. Neck supple. No tracheal deviation present.   Cardiovascular: Normal rate, regular rhythm and intact distal pulses.    Murmur heard.  Pulmonary/Chest: Effort normal and breath sounds normal. No respiratory distress. He has no wheezes. He has no rales.          Abdominal: Soft. Bowel sounds are normal. He exhibits no distension. There is no tenderness.   Musculoskeletal: Normal range of motion. He exhibits no edema.   Neurological: He is alert.   Much more awake and makes eye contact with me but non-verbal   Skin: Skin is warm and dry. No rash noted.   Nursing note and vitals reviewed.       Significant Labs:  Lab Results   Component Value Date    WBC 10.40 01/20/2017    HGB 15.2 01/20/2017    HCT 44.8 01/20/2017    MCV 91 01/20/2017     (H) 01/20/2017     BMP  Lab Results   Component Value Date      2017    K 3.4 (L) 2017     2017    CO2 25 2017    BUN 13 2017    CREATININE 0.9 2017    CALCIUM 9.1 2017    ANIONGAP 12 2017    ESTGFRAFRICA >60 2017    EGFRNONAA >60 2017         Significant Imaging:   CTA head and neck   1.  Stable findings related to recent prior intracranial hemorrhage.  No significant change when compare with the prior study of 2017.  2.  Normal head and neck CTA.    Ct head 2017  1.  No interval detrimental change in the imaging appearance of the brain as compared to the previous study.  There is continued demonstration of a large hyperdense parenchymal hematoma centered within the cerebellar vermis.  No interval change in the size of the ventricular system as compared to the previous examination.    2.  Cerebral volume loss which is greater than expected for a patient of this chronologic age.  This could be do to a /in utero insult given the presence of prominent basal ganglia and caudate head calcifications.

## 2017-01-24 NOTE — PLAN OF CARE
Problem: Patient Care Overview  Goal: Plan of Care Review  Outcome: Ongoing (interventions implemented as appropriate)  Mack was alert tonight but not wanting to participate. Did not take his pill nor any apple sauce for me tonight. Resting comfortably, turn q2, neuro checks, Pt family aware of plan of care. No questions or concerns at this time.    Problem: Fall Risk (Adult)  Goal: Absence of Falls  Patient will demonstrate the desired outcomes by discharge/transition of care.   Outcome: Ongoing (interventions implemented as appropriate)  Pt free of falls/incidents. Fall precautions maintained. Bed alarm engaged.

## 2017-01-24 NOTE — NURSING
Patient awake and alert this morning. Following simple commands such as squeezing my hands, opening and closing eyes and mouth. Patient took all medications with coke and water without any problems.

## 2017-01-24 NOTE — NURSING
Patient sitting up in gerichair. Transfer to chair with maximal assist from 4 staff members. Sister-in-law at bedside; attentive to patient and participating in care. Speech therapist at bedside for evaluation.

## 2017-01-24 NOTE — PT/OT/SLP EVAL
Speech Language Pathology  Clinical Swallow Evaluation    Mack Will   MRN: [id number]   Admitting Diagnosis: <principal problem not specified>    Diet recommendations: Solid Diet Level: Puree  Liquid Diet Level: Nectar Thick Feed only when awake/alert, Small bites/sips, Alternating bites/sips, Meds crushed in puree, Avoid talking while eating, Assistance with meals and Assistance with thickening liquids    SLP Treatment Date: 17  Speech Start Time: 0850     Speech Stop Time: 0940     Speech Total (min): 50 min       TREATMENT BILLABLE MINUTES:  Eval Swallow and Oral Function  25 minutes    Diagnosis: <principal problem not specified>  Mack Will is a 51 y.o. male who initially presented to this facility with bilateral lower lobe pneumonia and later found to have a hemorrhage measuring 2.1 x 2.6 x 2.2 cm within the cerebellar vermis with resultant decline in status.  Pt's baseline included ambulatory but dependent for most ADL's (Pts brother reported that he fed himself a little but required assistance and was dependent for all other ADL's), living in a group home, and eating a regular diet.     Past Medical History   Diagnosis Date    Alzheimer disease     Down syndrome     Seizure      Past Surgical History   Procedure Laterality Date    Cholecystectomy      Cyst removal         Has the patient been evaluated by SLP for swallowing? : Yes  Keep patient NPO?: No   General Precautions: Standard, aspiration, fall    Current Respiratory Status: nasal cannula     Social Hx: Patient lived with his brother and sister-in-law until 2016 when he was moved to a group home.    Prior diet: pureed diet with thin liquids.    Subjective:  Pt with improved cooperation with eating today.      Patient goals: none stated.    Pain Ratin/10    Objective:     Oral Musculature Evaluation  Oral Musculature: general weakness  Dentition: edentulous  Mucosal Quality: adequate  Mandibular Strength and Mobility:  WFL  Oral Labial Strength and Mobility: WFL  Lingual Strength and Mobility: WFL  Oral Musculature Comments: minimal oral residue with pureed consistencies.       Bedside Swallow Eval:  Consistencies Assessed: Thin liquids - small and large straw sips , Nectar thick liquids large straw sips and Puree large and small teaspoons  Oral Phase: minimal residue, excess chewing and slow oral transit time  Pharyngeal Phase: coughing/choking inconsistently with straw sips of thin liquids.  Consistently demonstrated clear vocal quality after small and large sips of nectar thick liquids and large teaspoons of pureed foods.      Assessment:  Mack Will is a 51 y.o. male with a medical diagnosis of <principal problem not specified> and presents with s/s of aspiration with thin liquids.  Pt would benefit from diet changes as noted above.  Recommend continued skilled speech services to determine if Pt may tolerate less restrictive diet and or participate in a trial of therapy.      Do you have any cultural, spiritual, Roman Catholic conflicts, given your current situation?: none verbalized     Goals:   SLP Goals        Problem: SLP Goal    Goal Priority Disciplines Outcome   SLP Goal     SLP    Description:    1) Pt will tolerate chopped/soft solids without s/s of aspiration  2) Pt will complete laryngeal exercises at fair level with mod cueing.                 Plan:   Patient to be seen Therapy Frequency: 3 x/week, 4 x/week, 5 x/week   Plan of Care expires: 02/07/17  Plan of Care reviewed with: patient (sister-in-law)  SLP Follow-up?: Yes  SLP - Next Visit Date: 01/25/17      JEAN Seals, CCC-SLP  01/24/2017

## 2017-01-24 NOTE — H&P
[facility] Medicine  History & Physical    Patient Name: Mack Will  MRN: [id number]  Admission Date: 1/23/2017  Attending Physician: Italia Vargas MD   Primary Care Provider: Chetan Alvarez MD         Patient information was obtained from past medical records and ER records.     Subjective:     Principal Problem:<principal problem not specified>    Chief Complaint:  Debility      HPI: Pt was admitted to [facility] after inpatient treatment for Acute CVA (large hyperdense parenchymal hematoma centered within the cerebellar vermis).  He looks to be back to his baseline   He was also treated for pneumonia which has resolved. He is doing well. He needs more PT to get him ambulatory to return to his group home.         Past Medical History   Diagnosis Date    Alzheimer disease     Down syndrome     Seizure        Past Surgical History   Procedure Laterality Date    Cholecystectomy      Cyst removal         Review of patient's allergies indicates:  No Known Allergies    Current Facility-Administered Medications on File Prior to Encounter   Medication    [COMPLETED] omnipaque 350 iohexol 100 mL    [DISCONTINUED] acetaminophen tablet 650 mg    [DISCONTINUED] albuterol sulfate nebulizer solution 2.5 mg    [DISCONTINUED] guaifenesin 12 hr tablet 600 mg    [DISCONTINUED] hydrocodone-acetaminophen 5-325mg per tablet 1 tablet    [DISCONTINUED] megestrol 400 mg/10 mL (10 mL) suspension 200 mg    [DISCONTINUED] ondansetron injection 4 mg    [DISCONTINUED] oxcarbazepine tablet 300 mg    [DISCONTINUED] pantoprazole injection 40 mg    [DISCONTINUED] promethazine (PHENERGAN) 12.5 mg in dextrose 5 % 50 mL IVPB    [DISCONTINUED] tamsulosin 24 hr capsule 0.4 mg     Current Outpatient Prescriptions on File Prior to Encounter   Medication Sig    clotrimazole (LOTRIMIN) 1 % cream Apply topically 2 (two) times daily.    cyanocobalamin, vitamin B-12, (VITAMIN B-12) 50 mcg tablet Take 50  mcg by mouth once daily.     mupirocin (BACTROBAN) 2 % ointment Apply topically 2 (two) times daily.    oxcarbazepine (TRILEPTAL) 300 MG Tab Take 1 tablet (300 mg total) by mouth 2 (two) times daily.    trazodone (DESYREL) 50 MG tablet Take 1 tablet (50 mg total) by mouth every evening.     Family History     Problem Relation (Age of Onset)    Heart disease Father        Social History Main Topics    Smoking status: Never Smoker    Smokeless tobacco: Never Used    Alcohol use No    Drug use: Not on file    Sexual activity: Not on file     Review of Systems   Constitutional: Negative for chills and fever.   HENT: Negative for congestion, ear pain, postnasal drip, rhinorrhea, sore throat and trouble swallowing.    Eyes: Negative for redness and itching.   Respiratory: Negative for cough, shortness of breath and wheezing.    Cardiovascular: Negative for chest pain and palpitations.   Gastrointestinal: Negative for abdominal pain, diarrhea, nausea and vomiting.   Genitourinary: Negative for dysuria and frequency.   Skin: Negative for rash.   Neurological: Negative for weakness and headaches.     Objective:     Vital Signs (Most Recent):  Temp: 96.6 °F (35.9 °C) (01/24/17 0804)  Pulse: 84 (01/24/17 0804)  Resp: 16 (01/24/17 0804)  BP: (!) 127/58 (01/24/17 0804)  SpO2: 100 % (01/24/17 0815) Vital Signs (24h Range):  Temp:  [96.3 °F (35.7 °C)-97.9 °F (36.6 °C)] 96.6 °F (35.9 °C)  Pulse:  [50-88] 84  Resp:  [16-20] 16  SpO2:  [92 %-100 %] 100 %  BP: (116-138)/(58-74) 127/58     Weight: 63.1 kg (139 lb 1.8 oz)  Body mass index is 24.64 kg/(m^2).    Physical Exam   Constitutional: He appears well-developed. No distress.   HENT:   Head: Normocephalic and atraumatic.   Downs facies   Eyes: Conjunctivae are normal. Pupils are equal, round, and reactive to light. Right eye exhibits no discharge. Left eye exhibits no discharge.   Neck: Normal range of motion. Neck supple. No tracheal deviation present.   Cardiovascular:  Normal rate, regular rhythm and intact distal pulses.    Murmur heard.  Pulmonary/Chest: Effort normal and breath sounds normal. No respiratory distress. He has no wheezes. He has no rales.          Abdominal: Soft. Bowel sounds are normal. He exhibits no distension. There is no tenderness.   Musculoskeletal: Normal range of motion. He exhibits no edema.   Neurological: He is alert.   Much more awake and makes eye contact with me but non-verbal   Skin: Skin is warm and dry. No rash noted.   Nursing note and vitals reviewed.       Significant Labs:  Lab Results   Component Value Date    WBC 10.40 2017    HGB 15.2 2017    HCT 44.8 2017    MCV 91 2017     (H) 2017     BMP  Lab Results   Component Value Date     2017    K 3.4 (L) 2017     2017    CO2 25 2017    BUN 13 2017    CREATININE 0.9 2017    CALCIUM 9.1 2017    ANIONGAP 12 2017    ESTGFRAFRICA >60 2017    EGFRNONAA >60 2017         Significant Imaging:   CTA head and neck   1.  Stable findings related to recent prior intracranial hemorrhage.  No significant change when compare with the prior study of 2017.  2.  Normal head and neck CTA.    Ct head 2017  1.  No interval detrimental change in the imaging appearance of the brain as compared to the previous study.  There is continued demonstration of a large hyperdense parenchymal hematoma centered within the cerebellar vermis.  No interval change in the size of the ventricular system as compared to the previous examination.    2.  Cerebral volume loss which is greater than expected for a patient of this chronologic age.  This could be do to a /in utero insult given the presence of prominent basal ganglia and caudate head calcifications.        Assessment/Plan:     Down's syndrome  Will need to work on trust to get pt to participate      Seizure disorder  Cont oxcarbazepine 300mg po  BID      Pneumonia of both lungs due to infectious organism  resolved      Urinary retention  Cont flomax       Debility  S/p CVA and prolonged hospitalization  PT, OT, ST       B12 deficiency        Early onset Alzheimer's disease with behavioral disturbance  Cont to follow with Dr Correa   Added megace to help stimulate appetite     Cerebrovascular accident (CVA) with intracranial hemorrhage  Cont SWING for debility, need OT, PT, speech      VTE Risk Mitigation         Ordered     Medium Risk of VTE  Once      01/23/17 1908     Place NAYLA hose  Until discontinued      01/23/17 1908        Hunter Morfin MD  Department of Hospital Medicine   [facility]

## 2017-01-24 NOTE — CONSULTS
"Food & Nutrition  INITITAL Note    Recommendation/Interventions:  1. Continue Low Sodium, Puree diet with snacks as tolerated with assistance, strong encouragement good intake   2. Continue Megace to increase appetite   3. Continue Daily Calorie Counts    Goals: increase oral intake to 75% of meals  Nutrition Goal Status: progressing    Reason for Assessment:Identified at risk/ changed to SWING  Diagnosis: Pulmonary diease (pneumonia)  Medical History: Down Syndrome, Alzheimers  Level of Care: High 3    Pt changed back to SWING; appetite increased since Friday, calorie counts re-initiated; Family does not want PEG or any TF, No NH, plan to return to Group Home    Nutrition Risk Screen: reduced oral intake over last month    Nutrition/Diet History:  Meal Patterns:prior to admit only consumed 1 meal/day  Typical Activity Pattern: sedentary  Functional Status:non ambulatory, not able to prepare meals, not able to purchase food  Factors Affecting Nutritional intake:impaired cognitive status/motor control; compromised airway    Anthropometrics:  Ht: 63"   Wt: 63.1kg  IBW: 123.94lb  %IBW: 112.24  BMI: 24.65  BMI Grade: 18.5-24.9 Normal    Labs: reviewed  Medications:   Current Facility-Administered Medications:     acetaminophen tablet 650 mg, 650 mg, Oral, Q8H PRN, Eva Brasher NP    albuterol sulfate nebulizer solution 2.5 mg, 2.5 mg, Nebulization, Q6H, Eva Brasher NP, 2.5 mg at 01/24/17 1330    guaifenesin 12 hr tablet 600 mg, 600 mg, Oral, BID, Eva Brasher NP, 600 mg at 01/24/17 0826    hydrocodone-acetaminophen 5-325mg per tablet 1 tablet, 1 tablet, Oral, Q4H PRN, Eva Brasher NP    megestrol 400 mg/10 mL (10 mL) suspension 200 mg, 200 mg, Oral, Daily, Eva Brasher NP, 200 mg at 01/24/17 0825    ondansetron injection 4 mg, 4 mg, Intravenous, Q8H PRN, Eva Brasher NP    oxcarbazepine tablet 300 mg, 300 mg, Oral, BID, Eva Brasher NP, 300 mg at 01/24/17 0826    pantoprazole " injection 40 mg, 40 mg, Intravenous, Daily, Eva Brasher NP, 40 mg at 01/24/17 0825    promethazine (PHENERGAN) 12.5 mg in dextrose 5 % 50 mL IVPB, 12.5 mg, Intravenous, Q6H PRN, Eva Brasher NP    tamsulosin 24 hr capsule 0.4 mg, 0.4 mg, Oral, Daily, Eva Brasher NP, 0.4 mg at 01/24/17 0826      Estimated Needs:  Wt used: 63.1 kg  Energy Calorie Requirement: 1700 (mifflin 1.2)  Protein Requirement: 80 (1.3)  Fluid:1700 (1ml/kcal)    Current Diet:  Low Sodium, Puree with Snacks  Nutrition Order Comments: adequate    Nutrition Risk: High    Malnutrition Diagnosis:  Malnutrition Reason: Chronic    Nutrition Diagnosis:  Inadequate energy intake R/T hx of down syndrome/ current Dx AEB labs, not eating, recent use of clinimix   Status: continues    Monitor & Evaluate:  Intake: food and beverage intake  Nutrition Administration: diet order  Physical Activity & Function: nutrition related ADL's  Anthropometric Measurements: weight, weight change  Biochemical Data, Medical Tests and Procedures: electrolyte and renal panel, gi profile  Nutrition-Focused Physical Findings: overall appearance    Nutrition Follow-Up: 01/26/2017

## 2017-01-24 NOTE — NURSING
Report received. Patient in bed. Respiratory at bedside. Side rails up x 3. Bed alarms set. Room to door will remain open. Patient stable.

## 2017-01-24 NOTE — PLAN OF CARE
01/24/17 0920   Discharge Assessment   Assessment Type Discharge Planning Assessment   Confirmed/corrected address and phone number on facesheet? Yes   Assessment information obtained from? Caregiver   Expected Length of Stay (days) 5   Communicated expected length of stay with patient/caregiver yes   Type of Healthcare Directive Received Advance Directive   If Healthcare Directive is received, is it scanned into Epic? yes   Prior to hospitilization cognitive status: Unable to Assess   Prior to hospitalization functional status: Needs Assistance;Partially Dependent   Current cognitive status: Unable to Assess   Current Functional Status: Needs Assistance;Partially Dependent   Arrived From group home   Lives With facility resident   Able to Return to Prior Arrangements yes   Is patient able to care for self after discharge? No   How many people do you have in your home that can help with your care after discharge? other (see comments)  (Group Home)   Who are your caregiver(s) and their phone number(s)? Brother Giovanni [phone]   Patient's perception of discharge disposition group home   Readmission Within The Last 30 Days planned readmission   Patient currently being followed by outpatient case management? No   Patient currently receives home health services? No   Does the patient currently use HME? No   Patient currently receives private duty nursing? No   Patient currently receives any other outside agency services? No   Equipment Currently Used at Home none   Do you have any problems affording any of your prescribed medications? No   Is the patient taking medications as prescribed? yes   Do you have any financial concerns preventing you from receiving the healthcare you need? No   Does the patient have transportation to healthcare appointments? Yes   Transportation Available family or friend will provide   On Dialysis? No   Does the patient receive services at the [facility]madin Clinic? No   Are there any open  cases? No   Discharge Plan A Group Home   Discharge Plan B Group home   Patient/Family In Agreement With Plan yes   Patient is a 51 year old male coming to the hospital from home.  He has been in a group home before admission to the hospital.  His health has declined and family was considering nursing home placement.  Patient has been showing improvement and family is now saying patient will go back to the group home.  His brother Giovanni has been very supportive and working with patient.  Brother has been in contact with the group home and anticipates patient's return to the group home.

## 2017-01-24 NOTE — PT/OT/SLP EVAL
Physical Therapy  Evaluation    Mack Will   MRN: [id number]   Admitting Diagnosis: <principal problem not specified>    PT Received On: 17  PT Start Time: 1245     PT Stop Time: 1315    PT Total Time (min): 30 min       Billable Minutes:  Evaluation 15 minutes and Therapeutic Exercise 15 minutes    Diagnosis: <principal problem not specified>      Past Medical History   Diagnosis Date    Alzheimer disease     Down syndrome     Seizure       Past Surgical History   Procedure Laterality Date    Cholecystectomy      Cyst removal         Referring physician: PATRICIA Brasher NP  Date referred to PT: 2017    General Precautions: Standard, aspiration, fall  Orthopedic Precautions: N/A   Braces: N/A            Patient History:  Lives With: facility resident  Living Arrangements: group home  Stair Railings at Home: none  Equipment Currently Used at Home: none  DME owned (not currently used): none    Previous Level of Function:  Ambulation Skills: needs assist  Transfer Skills: needs assist  ADL Skills: needs assist  Work/Leisure Activity: needs assist    Subjective:  Communicated with patient prior to session.  No patient verbalization this p.m.      Chief Complaint: Pt. Had no c/o pain.  Patient goals: None stated    Pain Ratin/10               Pain Rating Post-Intervention: 0/10    Objective:   Patient found with: peripheral IV, oxygen (BLE heel protectors)     Cognitive Exam:  Oriented to: Person    Follows Commands/attention: Inattentive and Easily distracted  Communication: Minimal verbalization.  Intermittent head nodding.  Safety awareness/insight to disability: impaired    Physical Exam:  Postural examination/scapula alignment: Rounded shoulder and Head forward    Skin integrity: Visible skin intact  Edema: None noted    Sensation:   Intact    Upper Extremity Range of Motion:  Right Upper Extremity: WFL  Left Upper Extremity: WFL    Upper Extremity Strength:  Right Upper Extremity: Deficits: Grossly  3+/5  Left Upper Extremity: Deficits: Grossly 3+/5    Lower Extremity Range of Motion:  Right Lower Extremity: WFL  Left Lower Extremity: WFL    Lower Extremity Strength:  Right Lower Extremity: Deficits: Grossly 3+/5  Left Lower Extremity: Deficits: Grossly 3+/5       Gross motor coordination: Poor    Functional Mobility:  Bed Mobility:  Rolling/Turning to Left: Maximum assistance  Rolling/Turning Right: Maximum assistance  Scooting/Bridging: Maximum Assistance    Transfers: N/A       Gait:   Gait Distance:  (N/A)          Balance:   Static Sit: N/A  Dynamic Sit: N/A  Static Stand: N/A  Dynamic stand: N/A    Therapeutic Activities and Exercises:  There. Ex.:  Pt. Performed gentle A/A exercises of BLE's while supine in bed, for N.M. Tone and strength and fluid dynamics.  1-on-1 BLE HC stretches performed with pt.  BLE : ankle pumps, hip abd/add, heel slides (2x10) performed with pt.  VC's, manual guidance and tactile cues given in attempt to increase patient alertness and participation.    AM-PAC 6 CLICK MOBILITY  How much help from another person does this patient currently need?   1 = Unable, Total/Dependent Assistance  2 = A lot, Maximum/Moderate Assistance  3 = A little, Minimum/Contact Guard/Supervision  4 = None, Modified [place]/Independent    Turning over in bed (including adjusting bedclothes, sheets and blankets)?: 2  Sitting down on and standing up from a chair with arms (e.g., wheelchair, bedside commode, etc.): 1  Moving from lying on back to sitting on the side of the bed?: 1  Moving to and from a bed to a chair (including a wheelchair)?: 1  Need to walk in hospital room?: 1  Climbing 3-5 steps with a railing?: 1  Total Score: 7     AM-PAC Raw Score CMS G-Code Modifier Level of Impairment Assistance   6 % Total / Unable   7 - 9 CM 80 - 100% Maximal Assist   10 - 14 CL 60 - 80% Moderate Assist   15 - 19 CK 40 - 60% Moderate Assist   20 - 22 CJ 20 - 40% Minimal Assist   23 CI 1-20% SBA / CGA    24 CH 0% Independent/ Mod I     Patient left HOB elevated with all lines intact, call button in reach and RN notified.    Assessment:     Factors that may affect patient's status:  [x] Patient's age [x] Time since onset of injury/illness/exacerbation  [x]Prior Level of function       [x] Current level of function [x] Status of current condition [x]Patient's cognitive status and safety concerns      [x] Patient's level of motivation    [x] Patient's home situation (environment and family support)  [x] Objective examination findings [x] Goals and goal agreement with the patient        [x] Rehab potential (prognosis) and probable outcome    [x] Expected progression of patient    Criteria:     History:    > 3  Personal Factors and/or co-morbidity - 20038  Examination of Body System:  addressing a total of 4 or more elements - 91455  Clinical Presentation:  Unstable - 90204  Clinical Decision Making (Complexity):  High - 46906      On examination of body system using standardized tests and measures patient presents with 4 or more elements from any of the following: body structures and functions, activity limitations, and/or participation restrictions.  Leading to a clinical presentation that is considered unstable with unpredictable characteristics      Mack Will is a 51 y.o. male with a medical diagnosis of <principal problem not specified> and presents with mobility deficits, decreased strength and decreased endurance.  Pt. Would benefit from PT to increase independence with ADL's.    Rehab identified problem list/impairments: Rehab identified problem list/impairments: weakness, impaired balance, impaired endurance, impaired functional mobilty, decreased lower extremity function, gait instability, impaired self care skills, decreased upper extremity function, decreased safety awareness, impaired cognition    Rehab potential is poor.    Activity tolerance: Poor    Discharge recommendations: Discharge  Facility/Level Of Care Needs: adult foster care/group home     Barriers to discharge: Barriers to Discharge: None    Equipment recommendations: Equipment Needed After Discharge: none     GOALS:   Physical Therapy Goals        Problem: Physical Therapy Goal    Goal Priority Disciplines Outcome Goal Variances Interventions   Physical Therapy Goal     PT/OT, PT      Description:  Goals to be met by: 2017     Patient will increase functional independence with mobility by performin. Supine to sit with MInimal Assistance  2. Sit to supine with MInimal Assistance  3. Rolling to Left and Right with Minimal Assistance.  4. Sit to stand transfer with Minimal Assistance, HHA  5. Bed to chair transfer with Minimal Assistance, HHA              PLAN:    Patient to be seen  (PRN(Sat.,Sun.);1-2x/day(M-F)) to address the above listed problems via gait training, therapeutic activities, therapeutic exercises  Plan of Care expires:  (Upon D/C from facility)  Plan of Care reviewed with: patient    Functional Assessment Tool Used: AMPAC  Score: 7  Functional Limitation: Changing and maintaining body position  Changing and Maintaining Body Position Current Status (): CM  Changing and Maintaining Body Position Goal Status (): BERTHA Riggs, PT  2017

## 2017-01-24 NOTE — NURSING
Giovanni (brother) helped Mack stand at edge of bed, maximal assist with 3 staff members present. Giovanni instructed to only get Mack out of bed when staff is present to offer assistance and promote safety.

## 2017-01-24 NOTE — PLAN OF CARE
Problem: Patient Care Overview  Goal: Plan of Care Review  Outcome: Ongoing (interventions implemented as appropriate)  Oxygen continues at 2l per nasal cannula. Telemetry monitoring continues. Nebs continue every 6 hours. Rested will throughout the night.     Problem: Fall Risk (Adult)  Goal: Absence of Falls  Patient will demonstrate the desired outcomes by discharge/transition of care.   Outcome: Ongoing (interventions implemented as appropriate)  Fall precautions maintained. No falls this shift. Bed alarm engaged at all times.     Problem: Pressure Ulcer Risk (Morales Scale) (Adult,Obstetrics,Pediatric)  Goal: Skin Integrity  Patient will demonstrate the desired outcomes by discharge/transition of care.   Outcome: Ongoing (interventions implemented as appropriate)  Patient turned and repositioned every two hours. Skin integrity intact.

## 2017-01-24 NOTE — PLAN OF CARE
"Problem: Patient Care Overview  Goal: Plan of Care Review  Outcome: Ongoing (interventions implemented as appropriate)  Fall precautions maintained. Patient remains free from falls/injuries. Assisted patient with position changes. Patient seen by speech and physical therapy. Patient minimally participated in physical therapy. Patient moved arms and legs in bed with me (see previous note). Patient up to rommel chair for about an hour (see previous note). Neuro checks WDL. Plan of care reviewed with sister-in-law; she agrees with plan of care and would like staff to "push" patient to participate in therapy. Patient alert today and responding to simple commands. Patient ate meals today.       "

## 2017-01-25 PROCEDURE — 25000003 PHARM REV CODE 250: Performed by: NURSE PRACTITIONER

## 2017-01-25 PROCEDURE — 27000339 *HC DAILY SUPPLY KIT

## 2017-01-25 PROCEDURE — 94640 AIRWAY INHALATION TREATMENT: CPT

## 2017-01-25 PROCEDURE — 25000242 PHARM REV CODE 250 ALT 637 W/ HCPCS: Performed by: NURSE PRACTITIONER

## 2017-01-25 PROCEDURE — 27000221 HC OXYGEN, UP TO 24 HOURS

## 2017-01-25 PROCEDURE — 97110 THERAPEUTIC EXERCISES: CPT

## 2017-01-25 PROCEDURE — 11000004 HC SNF PRIVATE

## 2017-01-25 PROCEDURE — 94761 N-INVAS EAR/PLS OXIMETRY MLT: CPT

## 2017-01-25 RX ORDER — ONDANSETRON 8 MG/1
8 TABLET, ORALLY DISINTEGRATING ORAL EVERY 8 HOURS PRN
Status: DISCONTINUED | OUTPATIENT
Start: 2017-01-25 | End: 2017-01-27 | Stop reason: HOSPADM

## 2017-01-25 RX ORDER — PROMETHAZINE HYDROCHLORIDE 12.5 MG/1
12.5 TABLET ORAL EVERY 6 HOURS PRN
Status: DISCONTINUED | OUTPATIENT
Start: 2017-01-25 | End: 2017-01-27 | Stop reason: HOSPADM

## 2017-01-25 RX ORDER — PANTOPRAZOLE SODIUM 40 MG/1
40 TABLET, DELAYED RELEASE ORAL DAILY
Status: DISCONTINUED | OUTPATIENT
Start: 2017-01-25 | End: 2017-01-27 | Stop reason: HOSPADM

## 2017-01-25 RX ADMIN — ALBUTEROL SULFATE 2.5 MG: 2.5 SOLUTION RESPIRATORY (INHALATION) at 07:01

## 2017-01-25 RX ADMIN — TAMSULOSIN HYDROCHLORIDE 0.4 MG: 0.4 CAPSULE ORAL at 09:01

## 2017-01-25 RX ADMIN — ALBUTEROL SULFATE 2.5 MG: 2.5 SOLUTION RESPIRATORY (INHALATION) at 12:01

## 2017-01-25 RX ADMIN — GUAIFENESIN 600 MG: 600 TABLET, EXTENDED RELEASE ORAL at 09:01

## 2017-01-25 RX ADMIN — OXCARBAZEPINE 300 MG: 150 TABLET, FILM COATED ORAL at 09:01

## 2017-01-25 RX ADMIN — PANTOPRAZOLE SODIUM 40 MG: 40 TABLET, DELAYED RELEASE ORAL at 09:01

## 2017-01-25 RX ADMIN — MEGESTROL ACETATE 200 MG: 40 SUSPENSION ORAL at 09:01

## 2017-01-25 RX ADMIN — ALBUTEROL SULFATE 2.5 MG: 2.5 SOLUTION RESPIRATORY (INHALATION) at 01:01

## 2017-01-25 RX ADMIN — OXCARBAZEPINE 300 MG: 150 TABLET, FILM COATED ORAL at 05:01

## 2017-01-25 NOTE — PROGRESS NOTES
Called [facility] [phone] to speak with Yojana.  She was not there today but should be there tomorrow.  Will call Yojana tomorrow.  Questions on transportation of patient home when discharged and paper work needed.

## 2017-01-25 NOTE — PROGRESS NOTES
Inpatient Speech Pathology Note        Time: 0922 - 0932    Pt was seen at bedside for dysphagia treatment. Pt was given max cueing and encouragement to participate in laryngeal and oral motor exercises. Pt closed eyes and turned head away pretending to be asleep. Nursing reported tolerating current diet well. Will continue to follow at this time.     JEAN Seals, CCC-SLP  1/25/2017

## 2017-01-25 NOTE — PLAN OF CARE
Problem: Patient Care Overview  Goal: Plan of Care Review  Outcome: Ongoing (interventions implemented as appropriate)  Pt doing well. No question/concerns at this time. Agrees with poc. No pain/falls. Sat in rommel chair for 3 hours. Ate all of breakfast and a little of lunch. Group home is willing to take pt back on Monday if no improvement .

## 2017-01-25 NOTE — PROGRESS NOTES
Chief Complaint:  AMS       HPI: Mack Will is a 51 y.o. Male known to me for Alzheimer's dementia associated with Down Syndrome.  Patient is admitted to Swing bed after hospitalization for pneumonia and has had altered mental status since admit. His usual baseline is ambulatory and non-verbal with some behavioral disturbance although often smiling and interactive. This has not been the case since admit. He has been eating only at times and only interactive with extreme encouragement.       Interval history:  Patient is having some yelling at night and agitated mood which is typical of his prior known sundowning  His trazodone was held after admit due to AMS                 Past Medical History   Diagnosis Date    Alzheimer disease       Down syndrome       Seizure                            Past Surgical History   Procedure Laterality Date    Cholecystectomy          Cyst removal                 Review of patient's allergies indicates:  No Known Allergies                                 Family History       Problem Relation (Age of Onset)      Heart disease Father                       Social History Main Topics    Smoking status: Never Smoker    Smokeless tobacco: Never Used    Alcohol use No    Drug use: Not on file    Sexual activity: Not on file   I have reviewed all of this patient's past medical and surgical histories as well as family and social histories and active allergies and medications as documented in the electronic medical record.      Review of Systems   Unable to perform ROS: Dementia       Objective:        Vitals     Vitals:    01/24/17 1500 01/24/17 1600 01/24/17 1800 01/24/17 1904   BP: (!) 144/89      BP Location: Left arm      Patient Position: Lying      BP Method: Automatic      Pulse:  77 82 80   Resp: 18   18   Temp: 98.3 °F (36.8 °C)      TempSrc: Axillary      SpO2:    95%   Weight:       Height:                          Weight: 67.4 kg (148 lb 9.4 oz)  Body mass index is  26.32 kg/(m^2).      Physical Exam        Gen Appearance, well developed/nourished in no apparent distress  CV: 2+ distal pulses with no edema or swelling  Neuro:  MS: Awake, alert and sustains attention and is yelling, upset, Speaks his name And nods yes to some questions which are inappropriate answers at times as prior but is not cooperating with exam.   Cooperates with very simple commands. Does not speak much at baseline   CN: Optic discs are flat with normal vasculature, right pupil is 3mm and left pupil is 2mm again today and ptosis on the left eye (brother stated that was chronic), face symmetric and tongue is midline and strong  Motor: Does move both arms and legs to command equally and fully   Sensory: Can't cooperate  Cerebellar:Can't cooperate  Gait: Not done- tolerates up a bit more with some truncal ataxia from hemorrhage          Significant Labs:BMP reviewed today      Significant Imaging: CTA head and neck report: 1.  Stable findings related to recent prior intracranial hemorrhage.  No significant change when compare with the prior study of 01/21/2017.  2.  Normal head and neck CTA.        Assessment and Plan:       Mack Will is a 51 y.o. male admitted with pneumonia and has AMS  I recommend:    Cerebellar hemorrhage  -pupil asymmetry noted but no changes with hemorrhage  -Hemorrhage Likely due to amyloid angiopathy. Avoid ASA/anticoagulation, BP not elevated, CTA head and neck unremarkable  -ST following/ brother  does not want tube feeding tubes for patient and we are advancing diet with ST's guidance.      Down's syndrome  Patient with expanded life span with Down's syndrome who has developed associated Alzheimer's dementia.       Seizure disorder  Continue home med, trileptal for seizure prevention      Dementia without behavioral disturbance  -long term prognosis reviewed with brother prior.   - trazodone for insomnia, sundowning given pre admit not currently being used-will restart given  signs of sundowning/ agitation tonight. Watch for sedation.           B12 deficiency  -Patient has B12 deficiency known prior, well corrected now      Disposition  will need some rehab due to likely ataxia (truncal) from this hemorrhagic CVA-- on Swing bed    Will follow up patient later in the week. Call with any acute concerns.       Bib Correa MD  Neurology  [facility]

## 2017-01-25 NOTE — PT/OT/SLP PROGRESS
Physical Therapy  Treatment    Mack Will   MRN: [id number]   Admitting Diagnosis: <principal problem not specified>    PT Received On: 17  PT Start Time: 1415     PT Stop Time: 1430    PT Total Time (min): 15 min       Billable Minutes:  Therapeutic Exercise 15 minutes    Treatment Type: Treatment  PT/PTA: PT             General Precautions: Standard, fall, aspiration  Orthopedic Precautions: N/A   Braces: N/A         Subjective:  Communicated with patient prior to session.  Pt. Had no new c/o pain.    Pain Ratin/10              Pain Rating Post-Intervention: 0/10    Objective:   Patient found with: peripheral IV               Therapeutic Activities and Exercises:  There. Ex.:  Pt. Performed gentle A/A exercises of BLE's while supine in bed, for N.M. Tone and strength and fluid dynamics. 1-on-1 BLE HC stretches performed with pt.  BLE: ankle pumps, hip abd/add, heel slides (3x10) performed with pt.     AM-PAC 6 CLICK MOBILITY  How much help from another person does this patient currently need?   1 = Unable, Total/Dependent Assistance  2 = A lot, Maximum/Moderate Assistance  3 = A little, Minimum/Contact Guard/Supervision  4 = None, Modified [place]/Independent    Turning over in bed (including adjusting bedclothes, sheets and blankets)?: 2  Sitting down on and standing up from a chair with arms (e.g., wheelchair, bedside commode, etc.): 1  Moving from lying on back to sitting on the side of the bed?: 2  Moving to and from a bed to a chair (including a wheelchair)?: 2  Need to walk in hospital room?: 1  Climbing 3-5 steps with a railing?: 1  Total Score: 9    AM-PAC Raw Score CMS G-Code Modifier Level of Impairment Assistance   6 % Total / Unable   7 - 9 CM 80 - 100% Maximal Assist   10 - 14 CL 60 - 80% Moderate Assist   15 - 19 CK 40 - 60% Moderate Assist   20 - 22 CJ 20 - 40% Minimal Assist   23 CI 1-20% SBA / CGA   24 CH 0% Independent/ Mod I     Patient left supine with all lines intact,  call button in reach and RN notified.    Assessment:  Mack Will is a 51 y.o. male with a medical diagnosis of <principal problem not specified> and presents with increased alertness and participation with PT this p.m.  Pt. Is progressing towards PT POC goals.    Rehab identified problem list/impairments: Rehab identified problem list/impairments: weakness, impaired balance, decreased safety awareness, impaired endurance, impaired cognition, impaired self care skills, impaired functional mobilty, gait instability, decreased lower extremity function    Rehab potential is poor.    Activity tolerance: Poor    Discharge recommendations: Discharge Facility/Level Of Care Needs: adult foster care/group home, nursing facility, basic     Barriers to discharge: Barriers to Discharge: None    Equipment recommendations: Equipment Needed After Discharge: none     GOALS:   Physical Therapy Goals        Problem: Physical Therapy Goal    Goal Priority Disciplines Outcome Goal Variances Interventions   Physical Therapy Goal     PT/OT, PT      Description:  Goals to be met by: 2017     Patient will increase functional independence with mobility by performin. Supine to sit with MInimal Assistance  2. Sit to supine with MInimal Assistance  3. Rolling to Left and Right with Minimal Assistance.  4. Sit to stand transfer with Minimal Assistance, HHA  5. Bed to chair transfer with Minimal Assistance, HHA              PLAN:    Patient to be seen  (PRN(Sat.,Sun.);1-2x/day(M-F))  to address the above listed problems via gait training, therapeutic activities, therapeutic exercises  Plan of Care expires:  (Upon D/C from facility)  Plan of Care reviewed with: patient         Matthew Riggs, PT  2017

## 2017-01-25 NOTE — PROGRESS NOTES
Report received. Pt agitated and constantly yelling wanting someone to stay with  Him. PCT sitting with him at this time to calm him down. Cartoons put on TV.

## 2017-01-25 NOTE — PROGRESS NOTES
Visited patient.Patient sitting in stretched chair eating snack.Patient is now sitting at the nursing station.

## 2017-01-25 NOTE — PLAN OF CARE
Problem: Patient Care Overview  Goal: Plan of Care Review  Outcome: Ongoing (interventions implemented as appropriate)  Vitals remained stable, afebrile. Agitated at beginning of shift, wanted someone to sit with him. PCT sat with him for a while and pt calmed down and fell asleep. Voided x2, once in urinal. Ate chocolate pudding. Redness to sacrum from incontinence. Turned Q2. Rested well the rest of the night. trazodone ordered for agitation at night.

## 2017-01-25 NOTE — PT/OT/SLP PROGRESS
Physical Therapy  Treatment    Mack Will   MRN: [id number]   Admitting Diagnosis: <principal problem not specified>    PT Received On: 17  PT Start Time: 1040     PT Stop Time: 1055    PT Total Time (min): 15 min       Billable Minutes:  Therapeutic Exercise 15 minutes    Treatment Type: Treatment  PT/PTA: PT             General Precautions: Standard, fall, aspiration  Orthopedic Precautions: N/A   Braces:           Subjective:  Communicated with patient prior to session.    Pain Ratin/10                   Objective:        Functional Mobility:    Therapeutic Activities and Exercises:  PROM to BUE/BLE at all joints in available planes of motion with rest breaks as needed to encourage participation to increase strength and endurance with all gross mobility skills and prevent contractures.     AM-PAC 6 CLICK MOBILITY  How much help from another person does this patient currently need?   1 = Unable, Total/Dependent Assistance  2 = A lot, Maximum/Moderate Assistance  3 = A little, Minimum/Contact Guard/Supervision  4 = None, Modified [place]/Independent    Turning over in bed (including adjusting bedclothes, sheets and blankets)?: 2  Sitting down on and standing up from a chair with arms (e.g., wheelchair, bedside commode, etc.): 1  Moving from lying on back to sitting on the side of the bed?: 2  Moving to and from a bed to a chair (including a wheelchair)?: 2  Need to walk in hospital room?: 1  Climbing 3-5 steps with a railing?: 1  Total Score: 9    AM-PAC Raw Score CMS G-Code Modifier Level of Impairment Assistance   6 % Total / Unable   7 - 9 CM 80 - 100% Maximal Assist   10 - 14 CL 60 - 80% Moderate Assist   15 - 19 CK 40 - 60% Moderate Assist   20 - 22 CJ 20 - 40% Minimal Assist   23 CI 1-20% SBA / CGA   24 CH 0% Independent/ Mod I     Patient left up in chair with all lines intact, call button in reach and NSG notified.    Assessment:  Mack Will is a 51 y.o. male with a medical  diagnosis of <principal problem not specified> and presents with no active participation with PT today. Pt very drowsy with difficulty staying awake to participate.    Rehab identified problem list/impairments: Rehab identified problem list/impairments: weakness, impaired endurance, impaired self care skills, impaired functional mobilty, impaired balance, decreased lower extremity function, decreased upper extremity function, decreased safety awareness, impaired cognition    Rehab potential is fair.    Activity tolerance: Fair    Discharge recommendations: Discharge Facility/Level Of Care Needs: adult foster care/group home, nursing facility, basic     Barriers to discharge: Barriers to Discharge: None    Equipment recommendations: Equipment Needed After Discharge: none     GOALS:   Physical Therapy Goals        Problem: Physical Therapy Goal    Goal Priority Disciplines Outcome Goal Variances Interventions   Physical Therapy Goal     PT/OT, PT      Description:  Goals to be met by: 2017     Patient will increase functional independence with mobility by performin. Supine to sit with MInimal Assistance  2. Sit to supine with MInimal Assistance  3. Rolling to Left and Right with Minimal Assistance.  4. Sit to stand transfer with Minimal Assistance, HHA  5. Bed to chair transfer with Minimal Assistance, HHA              PLAN:    Patient to be seen  (1-2x/day Monday-Friday; PRN Weekends/Holidays)  to address the above listed problems via therapeutic activities, therapeutic exercises, gait training  Plan of Care expires:  (upon D/C from facility)  Plan of Care reviewed with: patient         Lulú Tanika, PT  2017

## 2017-01-26 PROCEDURE — 97530 THERAPEUTIC ACTIVITIES: CPT

## 2017-01-26 PROCEDURE — 25000242 PHARM REV CODE 250 ALT 637 W/ HCPCS: Performed by: NURSE PRACTITIONER

## 2017-01-26 PROCEDURE — 94761 N-INVAS EAR/PLS OXIMETRY MLT: CPT

## 2017-01-26 PROCEDURE — 97803 MED NUTRITION INDIV SUBSEQ: CPT

## 2017-01-26 PROCEDURE — 27000339 *HC DAILY SUPPLY KIT

## 2017-01-26 PROCEDURE — 92526 ORAL FUNCTION THERAPY: CPT

## 2017-01-26 PROCEDURE — 94640 AIRWAY INHALATION TREATMENT: CPT

## 2017-01-26 PROCEDURE — 25000003 PHARM REV CODE 250: Performed by: NURSE PRACTITIONER

## 2017-01-26 PROCEDURE — 11000004 HC SNF PRIVATE

## 2017-01-26 PROCEDURE — 97110 THERAPEUTIC EXERCISES: CPT

## 2017-01-26 RX ADMIN — ALBUTEROL SULFATE 2.5 MG: 2.5 SOLUTION RESPIRATORY (INHALATION) at 06:01

## 2017-01-26 RX ADMIN — MEGESTROL ACETATE 200 MG: 40 SUSPENSION ORAL at 08:01

## 2017-01-26 RX ADMIN — TAMSULOSIN HYDROCHLORIDE 0.4 MG: 0.4 CAPSULE ORAL at 08:01

## 2017-01-26 RX ADMIN — GUAIFENESIN 600 MG: 600 TABLET, EXTENDED RELEASE ORAL at 08:01

## 2017-01-26 RX ADMIN — OXCARBAZEPINE 300 MG: 150 TABLET, FILM COATED ORAL at 05:01

## 2017-01-26 RX ADMIN — PANTOPRAZOLE SODIUM 40 MG: 40 TABLET, DELAYED RELEASE ORAL at 08:01

## 2017-01-26 RX ADMIN — ALBUTEROL SULFATE 2.5 MG: 2.5 SOLUTION RESPIRATORY (INHALATION) at 07:01

## 2017-01-26 RX ADMIN — OXCARBAZEPINE 300 MG: 150 TABLET, FILM COATED ORAL at 08:01

## 2017-01-26 RX ADMIN — ALBUTEROL SULFATE 2.5 MG: 2.5 SOLUTION RESPIRATORY (INHALATION) at 01:01

## 2017-01-26 RX ADMIN — GUAIFENESIN 600 MG: 600 TABLET, EXTENDED RELEASE ORAL at 09:01

## 2017-01-26 RX ADMIN — ALBUTEROL SULFATE 2.5 MG: 2.5 SOLUTION RESPIRATORY (INHALATION) at 12:01

## 2017-01-26 NOTE — PLAN OF CARE
Problem: Patient Care Overview  Goal: Plan of Care Review  Outcome: Ongoing (interventions implemented as appropriate)  Patient upset/crying; sad;  Misses  brother. Denies any pain; had large soft brown stool. Afebrile; breath sounds clear; 02 saturation 97% on room air. Patient turned every two hours. Redness noted to groin and sacral area; barrier cream applied. Fall precautions maintained. Plan of care reviewed with patient; reinforcement needed. Bed locked and low; call bell in reach. Bed exit alarm in use.

## 2017-01-26 NOTE — PT/OT/SLP PROGRESS
Physical Therapy  Treatment    Mack Will   MRN: [id number]   Admitting Diagnosis: <principal problem not specified>    PT Received On: 17  PT Start Time: 0840     PT Stop Time: 858    PT Total Time (min): 18 min       Billable Minutes:  Therapeutic Exercise 18 minutes    Treatment Type: Treatment  PT/PTA: PT             General Precautions: Standard, aspiration, fall  Orthopedic Precautions: N/A   Braces: N/A         Subjective:  Communicated with patient prior to session.  Pt. Had no new c/o pain    Pain Ratin/10              Pain Rating Post-Intervention: 0/10    Objective:                               Therapeutic Activities and Exercises:  There. Ex.: Pt. Performed gentle A/A exercises of BLE's while supine in bed, for N.M. Tone and strength and fluid dynamics. 1-on-1 BLE HC stretches performed with pt.  BLE: ankle pumps, A/A heel slides, A/A SLR, A/A hip abd/add (3x10) performed with pt.     AM-PAC 6 CLICK MOBILITY  How much help from another person does this patient currently need?   1 = Unable, Total/Dependent Assistance  2 = A lot, Maximum/Moderate Assistance  3 = A little, Minimum/Contact Guard/Supervision  4 = None, Modified [place]/Independent    Turning over in bed (including adjusting bedclothes, sheets and blankets)?: 2  Sitting down on and standing up from a chair with arms (e.g., wheelchair, bedside commode, etc.): 1  Moving from lying on back to sitting on the side of the bed?: 2  Moving to and from a bed to a chair (including a wheelchair)?: 2  Need to walk in hospital room?: 1  Climbing 3-5 steps with a railing?: 1  Total Score: 9    AM-PAC Raw Score CMS G-Code Modifier Level of Impairment Assistance   6 % Total / Unable   7 - 9 CM 80 - 100% Maximal Assist   10 - 14 CL 60 - 80% Moderate Assist   15 - 19 CK 40 - 60% Moderate Assist   20 - 22 CJ 20 - 40% Minimal Assist   23 CI 1-20% SBA / CGA   24 CH 0% Independent/ Mod I     Patient left HOB elevated with all lines intact,  call button in reach and RN notified.    Assessment:  Mack Will is a 51 y.o. male with a medical diagnosis of <principal problem not specified> and presents with increased endurance.  Pt. Is progressing towards PT POC goals.    Rehab identified problem list/impairments: Rehab identified problem list/impairments: weakness, impaired balance, decreased safety awareness, impaired endurance, gait instability, decreased lower extremity function, impaired functional mobilty, impaired self care skills, impaired cognition    Rehab potential is poor.    Activity tolerance: Fair    Discharge recommendations: Discharge Facility/Level Of Care Needs: adult foster care/group home, nursing facility, basic     Barriers to discharge: Barriers to Discharge: None    Equipment recommendations: Equipment Needed After Discharge: none     GOALS:   Physical Therapy Goals        Problem: Physical Therapy Goal    Goal Priority Disciplines Outcome Goal Variances Interventions   Physical Therapy Goal     PT/OT, PT      Description:  Goals to be met by: 2017     Patient will increase functional independence with mobility by performin. Supine to sit with MInimal Assistance  2. Sit to supine with MInimal Assistance  3. Rolling to Left and Right with Minimal Assistance.  4. Sit to stand transfer with Minimal Assistance, HHA  5. Bed to chair transfer with Minimal Assistance, HHA              PLAN:    Patient to be seen  (PRN(Sat.,Sun.);1-2x/day(M-F))  to address the above listed problems via therapeutic activities, therapeutic exercises, gait training  Plan of Care expires:  (Upon D/C from facility)  Plan of Care reviewed with: patient         Matthew Riggs, PT  2017

## 2017-01-26 NOTE — PT/OT/SLP PROGRESS
Speech Language Pathology  Treatment    Mack Will   MRN: [id number]   Admitting Diagnosis: <principal problem not specified>    Diet recommendations: Solid Diet Level: Puree  Liquid Diet Level: Nectar Thick Feed only when awake/alert, Small bites/sips, Avoid talking while eating and Assistance with meals    SLP Treatment Date: 01/26/17  Speech Start Time: 0922     Speech Stop Time: 0938     Speech Total (min): 16 min       TREATMENT BILLABLE MINUTES:  Treatment Swallowing Dysfunction 16 minutes    Has the patient been evaluated by SLP for swallowing? : Yes  Keep patient NPO?: No   General Precautions: Standard, aspiration, fall  Current Respiratory Status: nasal cannula       Subjective:  Pt was alert and interactive with therapist this morning.     Objective:   Patient found with: peripheral IV   Pt independently held cup and took sips of coke via straw. Pt demonstrated cough 2X after taking large sips. No throat clear or cough demonstrated after small sips. Turned head away when offered pudding.     Assessment:  Mack Will is a 51 y.o. male with a medical diagnosis of <principal problem not specified> and presents with no significant change. Recommend continue current diet with small bites and sips. Will continue to follow.     Discharge recommendations:       Goals:   SLP Goals        Problem: SLP Goal    Goal Priority Disciplines Outcome   SLP Goal     SLP Ongoing (interventions implemented as appropriate)   Description:    1) Pt will tolerate chopped/soft solids without s/s of aspiration  2) Pt will complete laryngeal exercises at fair level with mod cueing.                 Plan:   Patient to be seen Therapy Frequency: 3 x/week, 4 x/week, 5 x/week   Plan of Care expires: 02/07/17  Plan of Care reviewed with: patient  SLP Follow-up?: Yes  SLP - Next Visit Date: 01/27/17           JEAN Seals, CCC-SLP  01/26/2017

## 2017-01-26 NOTE — PLAN OF CARE
Problem: Patient Care Overview  Goal: Plan of Care Review  Outcome: Ongoing (interventions implemented as appropriate)  Pt doing well. Family  Has no questions at this time and Agrees with poc. They are concern about morteza refusing therapy. His brother edvin would like him in the wheelchair tomorrow. Ju Davila's wife will be here tomorrow to speak with the doctors to possibly get him back to the group home. They think he is too comfortable IN THIS ENVIRONMENT. Edvin did get patient in wheelchair and standing this afternoon. Pt stayed in wheelchair about 30-45mintues. He stood with Edvin only holding him on one side. He also took 3 steps with Edvin.  Pt has had no pain or falls.

## 2017-01-26 NOTE — PLAN OF CARE
Problem: Nutrition, Imbalanced: Inadequate Oral Intake (Adult)  Goal: Improved Oral Intake  Patient will demonstrate the desired outcomes by discharge/transition of care.  Outcome: Ongoing (interventions implemented as appropriate)  Recommendation/Interventions:  1. Continue Low Sodium, Puree, nectar thick diet with snacks as tolerated with assistance, strong encouragement balanced intake   2. Continue Megace to increase appetite   3. Continue Daily Calorie Counts     Goals: increase oral intake to 75% of meals  Nutrition Goal Status: progressing

## 2017-01-26 NOTE — PT/OT/SLP PROGRESS
Physical Therapy  Treatment    Mack Will   MRN: [id number]   Admitting Diagnosis: <principal problem not specified>    PT Received On: 17  PT Start Time: 1200     PT Stop Time: 1223    PT Total Time (min): 23 min       Billable Minutes:  Therapeutic Activity 23 minutes    Treatment Type: Treatment  PT/PTA: PT             General Precautions: Standard, aspiration, fall  Orthopedic Precautions: N/A   Braces: N/A         Subjective:  Communicated with patient prior to session.  Pt. Requested to return to bed.    Pain Ratin/10              Pain Rating Post-Intervention: 0/10    Objective:        Functional Mobility:  Bed Mobility:   Rolling/Turning to Left: Maximum assistance  Rolling/Turning Right: Maximum assistance  Scooting/Bridging: Maximum Assistance  Supine to Sit: Maximum Assistance  Sit to Supine: Maximum Assistance    Transfers:  Sit <> Stand Assistance: Maximum Assistance  Sit <> Stand Assistive Device: No Assistive Device  Bed <> Chair Technique: Stand Pivot  Bed <> Chair Assistance: Maximum Assistance    Gait:   Gait Distance:  (N/A)        Balance:   Static Sit: 0: Needs MAXIMAL assist to maintain sitting without back support  Dynamic Sit: 0: N/A  Static Stand: 0: Needs MAXIMAL assist to maintain   Dynamic stand: 0: N/A     Therapeutic Activities and Exercises:  There. Act.:  Pt. Seated in Sweetie-Chair upon PT arrival.  Pt. Transferred seated in chair <> EOB, max. A. X 2, stand pivot with BLE's blocked at knees.  Pt. Transferred sit<>stand with max. A. X 2, x 2 bouts.  VC's and manual guidance given to pt. For sequencing.  VC's given to pt. To increase participation. Trunk control tasks performed with max. A. While pt. Seated in bedside chair and seated EOB. 1-on-1 bed mobility tasks performed with max. A. X 2 and manual guidance and VC's for sequencing.     AM-PAC 6 CLICK MOBILITY  How much help from another person does this patient currently need?   1 = Unable, Total/Dependent Assistance  2 = A  lot, Maximum/Moderate Assistance  3 = A little, Minimum/Contact Guard/Supervision  4 = None, Modified [place]/Independent    Turning over in bed (including adjusting bedclothes, sheets and blankets)?: 2  Sitting down on and standing up from a chair with arms (e.g., wheelchair, bedside commode, etc.): 2  Moving from lying on back to sitting on the side of the bed?: 2  Moving to and from a bed to a chair (including a wheelchair)?: 2  Need to walk in hospital room?: 1  Climbing 3-5 steps with a railing?: 1  Total Score: 10    AM-PAC Raw Score CMS G-Code Modifier Level of Impairment Assistance   6 % Total / Unable   7 - 9 CM 80 - 100% Maximal Assist   10 - 14 CL 60 - 80% Moderate Assist   15 - 19 CK 40 - 60% Moderate Assist   20 - 22 CJ 20 - 40% Minimal Assist   23 CI 1-20% SBA / CGA   24 CH 0% Independent/ Mod I     Patient left HOB elevated with all lines intact, call button in reach and RN notified.    Assessment:  Mack Will is a 51 y.o. male with a medical diagnosis of <principal problem not specified> and presents with increased participation with PT today.  Pt. Is progressing towards PT POC goals.    Rehab identified problem list/impairments: Rehab identified problem list/impairments: weakness, impaired balance, decreased safety awareness, impaired endurance, impaired functional mobilty, impaired self care skills, gait instability, decreased lower extremity function    Rehab potential is poor.    Activity tolerance: Fair    Discharge recommendations: Discharge Facility/Level Of Care Needs: adult foster care/group home, nursing facility, basic     Barriers to discharge: Barriers to Discharge: None    Equipment recommendations: Equipment Needed After Discharge: none     GOALS:   Physical Therapy Goals        Problem: Physical Therapy Goal    Goal Priority Disciplines Outcome Goal Variances Interventions   Physical Therapy Goal     PT/OT, PT      Description:  Goals to be met by: 2/17/2017      Patient will increase functional independence with mobility by performin. Supine to sit with MInimal Assistance  2. Sit to supine with MInimal Assistance  3. Rolling to Left and Right with Minimal Assistance.  4. Sit to stand transfer with Minimal Assistance, HHA  5. Bed to chair transfer with Minimal Assistance, HHA              PLAN:    Patient to be seen  (Daily(Sat.,Sun.);1-2x/day(M-F))  to address the above listed problems via gait training, therapeutic activities, therapeutic exercises  Plan of Care expires:  (Upon D/C from facility)  Plan of Care reviewed with: patient         Matthew Riggs, PT  2017

## 2017-01-26 NOTE — PROGRESS NOTES
Patient up in stretch chair in [facility]. Visited with patient who is more engaged today. Taking note of his surroundings and waving to visitors. Appears to enjoy being up in chair.

## 2017-01-26 NOTE — PROGRESS NOTES
Called [facility] [phone] and they report they do have wheelchairs with seatbelts and will  patient.  Anticipated discharge is Monday.

## 2017-01-26 NOTE — CONSULTS
"Food & Nutrition  FOLLOW-UP Note    Recommendation/Interventions:  1. Continue Low Sodium, Puree, nectar thick diet with snacks as tolerated with assistance, strong encouragement good intake   2. Continue Megace to increase appetite   3. Continue Daily Calorie Counts    Goals: increase oral intake to 75% of meals  Nutrition Goal Status: progressing    Reason for Assessment:Follow-Up  Rounds: Attended  Level of Care: High 3    Pt significant increased intake per calorie counts, pt prefers applesauce and pudding over proteins; nectar consistency added per ST    Anthropometrics:  Ht: 63"   Wt: 65.8kg (6# increase)    Labs: reviewed   Medications:     Current Facility-Administered Medications:     acetaminophen tablet 650 mg, 650 mg, Oral, Q8H PRN, Eva Brasher NP    albuterol sulfate nebulizer solution 2.5 mg, 2.5 mg, Nebulization, Q6H, Eva Brasher NP, 2.5 mg at 01/26/17 0656    guaifenesin 12 hr tablet 600 mg, 600 mg, Oral, BID, Eva Brasher NP, 600 mg at 01/26/17 0842    hydrocodone-acetaminophen 5-325mg per tablet 1 tablet, 1 tablet, Oral, Q4H PRN, Eva Brasher NP    megestrol 400 mg/10 mL (10 mL) suspension 200 mg, 200 mg, Oral, Daily, Eva Brasher NP, 200 mg at 01/26/17 0842    ondansetron disintegrating tablet 8 mg, 8 mg, Oral, Q8H PRN, Eva Brasher NP    oxcarbazepine tablet 300 mg, 300 mg, Oral, BID, Eva Brasher NP, 300 mg at 01/26/17 0842    pantoprazole EC tablet 40 mg, 40 mg, Oral, Daily, Eav Brasher NP, 40 mg at 01/26/17 0842    promethazine tablet 12.5 mg, 12.5 mg, Oral, Q6H PRN, Eva Brasher NP    tamsulosin 24 hr capsule 0.4 mg, 0.4 mg, Oral, Daily, Eva Brasher NP, 0.4 mg at 01/26/17 0842    trazodone tablet 50 mg, 50 mg, Oral, Nightly PRN, Bib Correa MD      Estimated Needs:  Wt used: 63.1 kg  Energy Calorie Requirement: 1700 (mifflin 1.2)  Protein Requirement: 80 (1.3)  Fluid:1700 (1ml/kcal)    Current Diet:  Low Sodium, Puree, " nectar thick with Snacks  Nutrition Order Comments: adequate    Evaluation of Received Intake:  Energy Calories Required: Progressing to needs  Protein Calories Required: Progressing to needs  Fluid Calories Required: Progressing to needs  Comments: daily calorie counts completed  Tolerance: Tolerating    Nutrition Risk: High    Nutrition Diagnosis:  Inadequate energy intake R/T hx of down syndrome/ current Dx AEB labs, not eating, recent use of clinimix   Status: improving    Monitor & Evaluate:  Intake: food and beverage intake  Nutrition Administration: diet order  Physical Activity & Function: nutrition related ADL's  Anthropometric Measurements: weight, weight change  Biochemical Data, Medical Tests and Procedures: electrolyte and renal panel, gi profile  Nutrition-Focused Physical Findings: overall appearance    Nutrition Follow-Up: 01/30/2017

## 2017-01-27 VITALS
OXYGEN SATURATION: 98 % | HEART RATE: 83 BPM | TEMPERATURE: 99 F | WEIGHT: 142.19 LBS | BODY MASS INDEX: 25.2 KG/M2 | HEIGHT: 63 IN | RESPIRATION RATE: 16 BRPM | DIASTOLIC BLOOD PRESSURE: 85 MMHG | SYSTOLIC BLOOD PRESSURE: 129 MMHG

## 2017-01-27 PROCEDURE — G8983 BODY POS D/C STATUS: HCPCS | Mod: CL

## 2017-01-27 PROCEDURE — 25000003 PHARM REV CODE 250: Performed by: NURSE PRACTITIONER

## 2017-01-27 PROCEDURE — 94640 AIRWAY INHALATION TREATMENT: CPT

## 2017-01-27 PROCEDURE — G8982 BODY POS GOAL STATUS: HCPCS | Mod: CL

## 2017-01-27 PROCEDURE — 99239 HOSP IP/OBS DSCHRG MGMT >30: CPT | Mod: ,,, | Performed by: FAMILY MEDICINE

## 2017-01-27 PROCEDURE — 25000242 PHARM REV CODE 250 ALT 637 W/ HCPCS: Performed by: NURSE PRACTITIONER

## 2017-01-27 RX ORDER — TAMSULOSIN HYDROCHLORIDE 0.4 MG/1
0.4 CAPSULE ORAL DAILY
Qty: 30 CAPSULE | Refills: 11 | Status: SHIPPED | OUTPATIENT
Start: 2017-01-27 | End: 2017-01-27

## 2017-01-27 RX ORDER — MEGESTROL ACETATE 40 MG/ML
200 SUSPENSION ORAL DAILY
Qty: 600 ML | Refills: 0 | Status: SHIPPED | OUTPATIENT
Start: 2017-01-27 | End: 2017-01-27

## 2017-01-27 RX ORDER — TAMSULOSIN HYDROCHLORIDE 0.4 MG/1
0.4 CAPSULE ORAL DAILY
Qty: 30 CAPSULE | Refills: 11 | Status: ON HOLD | OUTPATIENT
Start: 2017-01-27 | End: 2018-03-20 | Stop reason: HOSPADM

## 2017-01-27 RX ORDER — MEGESTROL ACETATE 40 MG/ML
200 SUSPENSION ORAL DAILY
Qty: 600 ML | Refills: 0 | Status: ON HOLD | OUTPATIENT
Start: 2017-01-27 | End: 2019-07-11 | Stop reason: HOSPADM

## 2017-01-27 RX ADMIN — ALBUTEROL SULFATE 2.5 MG: 2.5 SOLUTION RESPIRATORY (INHALATION) at 12:01

## 2017-01-27 RX ADMIN — PANTOPRAZOLE SODIUM 40 MG: 40 TABLET, DELAYED RELEASE ORAL at 08:01

## 2017-01-27 RX ADMIN — TAMSULOSIN HYDROCHLORIDE 0.4 MG: 0.4 CAPSULE ORAL at 08:01

## 2017-01-27 RX ADMIN — ALBUTEROL SULFATE 2.5 MG: 2.5 SOLUTION RESPIRATORY (INHALATION) at 07:01

## 2017-01-27 RX ADMIN — GUAIFENESIN 600 MG: 600 TABLET, EXTENDED RELEASE ORAL at 08:01

## 2017-01-27 RX ADMIN — OXCARBAZEPINE 300 MG: 150 TABLET, FILM COATED ORAL at 08:01

## 2017-01-27 NOTE — PLAN OF CARE
01/27/17 1335   Final Note   Assessment Type Final Discharge Note   Discharge Disposition Discharged   Discharge planning education complete? Yes   Hospital Follow Up  Appt(s) scheduled? Yes   Discharge plans and expectations educations in teach back method with documentation complete? Yes   Offered [facility] -- Bedside Delivery? n/a   Discharge/Hospital Encounter Summary to (non-[facility]) PCP n/a   Referral to Outpatient Case Management complete? n/a   Referral to / orders for Home Health Complete? n/a   30 day supply of medicines given at discharge, if documented non-compliance / non-adherence? n/a   Any social issues identified prior to discharge? n/a   Did you assess the readiness or willingness of the family or caregiver to support self management of care? n/a   Right Care Referral Info   Post Acute Recommendation Other   Facility Name [facility]

## 2017-01-27 NOTE — SUBJECTIVE & OBJECTIVE
Interval History: overall doing well today    Review of Systems   Constitutional: Negative for chills and fever.   HENT: Negative for congestion, ear pain, postnasal drip, rhinorrhea, sore throat and trouble swallowing.    Eyes: Negative for redness and itching.   Respiratory: Negative for cough, shortness of breath and wheezing.    Cardiovascular: Negative for chest pain and palpitations.   Gastrointestinal: Negative for abdominal pain, diarrhea, nausea and vomiting.   Genitourinary: Negative for dysuria and frequency.   Skin: Negative for rash.   Neurological: Negative for weakness and headaches.     Objective:     Vital Signs (Most Recent):  Temp: 96.1 °F (35.6 °C) (01/27/17 0715)  Pulse: 76 (01/27/17 0715)  Resp: 18 (01/27/17 0715)  BP: 106/75 (01/27/17 0715)  SpO2: 96 % (01/27/17 0715) Vital Signs (24h Range):  Temp:  [96.1 °F (35.6 °C)-97.9 °F (36.6 °C)] 96.1 °F (35.6 °C)  Pulse:  [57-86] 76  Resp:  [18] 18  SpO2:  [92 %-98 %] 96 %  BP: (106-161)/(62-75) 106/75     Weight: 64.5 kg (142 lb 3.2 oz)  Body mass index is 25.19 kg/(m^2).    Intake/Output Summary (Last 24 hours) at 01/27/17 0817  Last data filed at 01/26/17 1736   Gross per 24 hour   Intake              660 ml   Output              700 ml   Net              -40 ml      Physical Exam   Constitutional: He appears well-developed. No distress.   HENT:   Head: Normocephalic and atraumatic.   Downs facies   Eyes: Conjunctivae are normal. Pupils are equal, round, and reactive to light. Right eye exhibits no discharge. Left eye exhibits no discharge.   Neck: Normal range of motion. Neck supple. No tracheal deviation present. No thyromegaly present.   Cardiovascular: Normal rate, regular rhythm and intact distal pulses.    Murmur heard.  Pulmonary/Chest: Effort normal. No respiratory distress. He has no wheezes. He has rhonchi. He has no rales.   clear   Abdominal: Soft. Bowel sounds are normal. He exhibits no distension. There is no tenderness.    Musculoskeletal: Normal range of motion. He exhibits no edema.   Lymphadenopathy:     He has no cervical adenopathy.   Neurological: He is alert.   Skin: Skin is warm and dry. No rash noted.   Psychiatric: He has a normal mood and affect. His behavior is normal.   Nursing note and vitals reviewed.      Significant Labs: no recent labs  Significant Imaging: no recent imaging.

## 2017-01-27 NOTE — NURSING
Glenny here from [facility]. Community  group  Home here with clothes. Patient changed into clothes and assisted to bedside wheelchair X 3 assist. Tolerated fairly well, patient not wanting to cooperate. Safety and comfort maintained.

## 2017-01-27 NOTE — PLAN OF CARE
Problem: Patient Care Overview  Goal: Plan of Care Review  Outcome: Ongoing (interventions implemented as appropriate)  Patient discharged to [facility]. Spoke with brother edvin about discharge plans and follow-up care. Agrees with plan of care. No further questions.   Patient up out of bed today with maria elena lift. Transported to [facility] for family/social visit. No distress noted.

## 2017-01-27 NOTE — NURSING
Patient discharged to [facility] Community group home in no distress. Transported via WC to ramp accessibility.

## 2017-01-27 NOTE — NURSING
"Spoke with Yojana @ [facility] to inform her of corrections of Pharmacy and diet listed on MD discharge summary. States" will come with clothes and transportation in approx 30 minutes"  "

## 2017-01-27 NOTE — NURSING
Report called to Yojana Modi @ [phone] ([facility]). Yojana informs me that they use [facility] [phone] for RX meds. Instructed Yojana I will call MD.

## 2017-01-27 NOTE — PLAN OF CARE
Problem: Patient Care Overview  Goal: Plan of Care Review  Outcome: Ongoing (interventions implemented as appropriate)  Patient denies any discomfort. Patient turned every two hours. Afebrile; Breath sounds clear;02 saturation 92% on room air. Fall  Precautions  Maintained.  Tolerating pudding and soda with medicine.  Bed low and locked; call bell in reach. Plan of  Care reviewed with patient; reenforcement needed.

## 2017-01-27 NOTE — DISCHARGE SUMMARY
[facility] Hospital Medicine  Discharge Summary      Patient Name: Mack Will  MRN: [id number]  Admission Date: 1/23/2017  Hospital Length of Stay: 4 days  Discharge Date and Time: 1/27/2017 10:59 AM  Attending Physician: Italia Vargas MD   Discharging Provider: Neeru eMndieta NP  Primary Care Provider: Chetan Alvarez MD      HPI:   Pt was admitted to [facility] after inpatient treatment for Acute CVA (large hyperdense parenchymal hematoma centered within the cerebellar vermis).  He looks to be back to his baseline   He was also treated for pneumonia which has resolved. He is doing well. He needs more PT to get him ambulatory to return to his group home.         * No surgery found *      Indwelling Lines/Drains at time of discharge:   Lines/Drains/Airways          No matching active lines, drains, or airways        Hospital Course:   He is in bed doing well this am. Eating well with megace. Awake and alert. Dr Correa following. Pt not doing much with PT but is up and moving with nurses. This will be a challenge with his mental capabilities. His brother has expressed that he would like him to be d/c'd back to the group home as he feels he will be more likely to walk in his usual atmosphere.     He currently lives at a group home where they have a lift and can provide him PT (oupt).        Consults:   Consults         Status Ordering Provider     IP consult to dietary  Once     Provider:  (Not yet assigned)    Completed ITALIA LIU              Pending Diagnostic Studies:     None        Final Active Diagnoses:    Diagnosis Date Noted POA    PRINCIPAL PROBLEM:  Debility [R53.81] 01/16/2017 Yes    Cerebrovascular accident (CVA) with intracranial hemorrhage [I61.9] 01/20/2017 Yes    Early onset Alzheimer's disease with behavioral disturbance [G30.0, F02.81] 01/19/2017 Yes    Pneumonia of both lungs due to infectious organism [J18.9] 01/12/2017 Yes    Urinary retention  [R33.9] 01/12/2017 Yes    Seizure disorder [G40.909] 03/28/2015 Yes    Down's syndrome [Q90.9] 03/26/2015 Not Applicable      Problems Resolved During this Admission:    Diagnosis Date Noted Date Resolved POA      Hospital Problem List           Codes Noted - Resolved POA     * (Principal)Debility ICD-10-CM: R53.81  ICD-9-CM: 799.3 1/16/2017 - Present Yes     Current Assessment & Plan 1/23/2017 Hospital Encounter Written 1/27/2017 10:58 AM by Neeru Mendieta NP      S/p CVA and prolonged hospitalization  PT, OT, ST-family feels he would do better in HOME environment-will set up outpt therapy            Down's syndrome ICD-10-CM: Q90.9  ICD-9-CM: 758.0 3/26/2015 - Present Not Applicable     Current Assessment & Plan 1/23/2017 Hospital Encounter Written 1/27/2017 10:58 AM by Neeru Mendieta NP      Will need to work on trust to get pt to participate in PT at home             Seizure disorder ICD-10-CM: G40.909  ICD-9-CM: 345.90 3/28/2015 - Present Yes     Current Assessment & Plan 1/23/2017 Hospital Encounter Written 1/27/2017 10:58 AM by Neeru Mendieta NP      Cont oxcarbazepine 300mg po BID            Pneumonia of both lungs due to infectious organism ICD-10-CM: J18.9  ICD-9-CM: 483.8 1/12/2017 - Present Yes     Current Assessment & Plan 1/23/2017 Hospital Encounter Written 1/27/2017 10:58 AM by Neeru Mendieta NP      resolved            Retention of urine ICD-10-CM: R33.9  ICD-9-CM: 788.20 1/12/2017 - Present Yes     Current Assessment & Plan 1/23/2017 Hospital Encounter Written 1/27/2017 10:58 AM by Neeru Mendieta NP      Cont flomax             Early onset Alzheimer's disease with behavioral disturbance ICD-10-CM: G30.0, F02.81  ICD-9-CM: 331.0, 294.11 1/19/2017 - Present Yes     Current Assessment & Plan 1/23/2017 Hospital Encounter Written 1/27/2017 10:58 AM by Neeru Mendieta NP      Cont to follow with Dr Sunny Calle megace to help stimulate appetite-will continue at  home.           Cerebrovascular accident (CVA) with intracranial hemorrhage ICD-10-CM: I61.9  ICD-9-CM: 431 1/20/2017 - Present Yes     Current Assessment & Plan 1/23/2017 Hospital Encounter Written 1/27/2017 10:58 AM by Neeru Mendieta NP      Cont PT at home              Diet:    Diet Adult Regular Low Sodium,2gm, Pureed Diet effective now       Comments: NECTAR THICK LIQUIDS   CRUSH MEDS IN PUDDING            Discharged Condition: good    Disposition: Another Health Care Inst*    Follow Up:  Follow-up Information     Follow up with Chetan Alvarez MD In 2 weeks.    Specialty:  Family Medicine    Contact information:    [address] [place] [address]  [phone]          Follow up with Bib Correa MD.    Specialty:  Neurology    Why:  Follow-up with NP in 2-3 weeks     Contact information:    [address]  [place] [address]  [phone]          Patient Instructions:     Ambulatory consult to Physical Therapy   Referral Priority: Routine Referral Type: Physical Medicine   Referral Reason: Specialty Services Required    Referred to Provider: [facility] Requested Specialty: Physical Therapy   Number of Visits Requested: 1      Diet general     Activity as tolerated       Medications:  Reconciled Home Medications:   Current Discharge Medication List      START taking these medications    Details   megestrol (MEGACE) 400 mg/10 mL (10 mL) Susp Take 5 mLs (200 mg total) by mouth once daily.  Qty: 600 mL, Refills: 0      tamsulosin (FLOMAX) 0.4 mg Cp24 Take 1 capsule (0.4 mg total) by mouth once daily.  Qty: 30 capsule, Refills: 11         CONTINUE these medications which have NOT CHANGED    Details   clotrimazole (LOTRIMIN) 1 % cream Apply topically 2 (two) times daily.  Qty: 45 g, Refills: 1    Associated Diagnoses: Tinea cruris      cyanocobalamin, vitamin B-12, (VITAMIN B-12) 50 mcg tablet Take 50 mcg by mouth once daily.       mupirocin (BACTROBAN) 2 % ointment Apply topically  2 (two) times daily.      oxcarbazepine (TRILEPTAL) 300 MG Tab Take 1 tablet (300 mg total) by mouth 2 (two) times daily.  Qty: 60 tablet, Refills: 11      trazodone (DESYREL) 50 MG tablet Take 1 tablet (50 mg total) by mouth every evening.  Qty: 90 tablet, Refills: 1    Associated Diagnoses: B12 deficiency           Time spent on the discharge of patient: 20 minutes    Neeru Mendieta NP  Department of Hospital Medicine  [facility]

## 2017-01-27 NOTE — NURSING
"Spoke with dr. Correa for neurology F/U appt. States" he can come see me in 2-3 weeks and F/U with NP"  "

## 2017-01-27 NOTE — PROGRESS NOTES
[facility] Medicine  Progress Note    Patient Name: Mack Will  MRN: [id number]  Patient Class: IP- Swing   Admission Date: 1/23/2017  Length of Stay: 4 days  Attending Physician: Italia Vargas MD  Primary Care Provider: Chetan Alvarez MD        Subjective:     Principal Problem:<principal problem not specified>    HPI:  Pt was admitted to [facility] after inpatient treatment for Acute CVA (large hyperdense parenchymal hematoma centered within the cerebellar vermis).  He looks to be back to his baseline   He was also treated for pneumonia which has resolved. He is doing well. He needs more PT to get him ambulatory to return to his group home.         Hospital Course:  He is in bed doing well this am. Eating well with megace. Awake and alert. Dr Correa following. Pt not doing much with PT but is up and moving with nurses. This will be a challenge with his mental capabilities. His brother has expressed that he would like him to be d/c'd back to the group home as he feels he will be more likely to walk in his usual atmosphere.     He currently lives at a group home where they have a lift and can provide him PT (oupt).       Interval History: overall doing well today    Review of Systems   Constitutional: Negative for chills and fever.   HENT: Negative for congestion, ear pain, postnasal drip, rhinorrhea, sore throat and trouble swallowing.    Eyes: Negative for redness and itching.   Respiratory: Negative for cough, shortness of breath and wheezing.    Cardiovascular: Negative for chest pain and palpitations.   Gastrointestinal: Negative for abdominal pain, diarrhea, nausea and vomiting.   Genitourinary: Negative for dysuria and frequency.   Skin: Negative for rash.   Neurological: Negative for weakness and headaches.     Objective:     Vital Signs (Most Recent):  Temp: 96.1 °F (35.6 °C) (01/27/17 0715)  Pulse: 76 (01/27/17 0715)  Resp: 18 (01/27/17 0715)  BP: 106/75 (01/27/17  0715)  SpO2: 96 % (01/27/17 0715) Vital Signs (24h Range):  Temp:  [96.1 °F (35.6 °C)-97.9 °F (36.6 °C)] 96.1 °F (35.6 °C)  Pulse:  [57-86] 76  Resp:  [18] 18  SpO2:  [92 %-98 %] 96 %  BP: (106-161)/(62-75) 106/75     Weight: 64.5 kg (142 lb 3.2 oz)  Body mass index is 25.19 kg/(m^2).    Intake/Output Summary (Last 24 hours) at 01/27/17 0817  Last data filed at 01/26/17 1736   Gross per 24 hour   Intake              660 ml   Output              700 ml   Net              -40 ml      Physical Exam   Constitutional: He appears well-developed. No distress.   HENT:   Head: Normocephalic and atraumatic.   Downs facies   Eyes: Conjunctivae are normal. Pupils are equal, round, and reactive to light. Right eye exhibits no discharge. Left eye exhibits no discharge.   Neck: Normal range of motion. Neck supple. No tracheal deviation present. No thyromegaly present.   Cardiovascular: Normal rate, regular rhythm and intact distal pulses.    Murmur heard.  Pulmonary/Chest: Effort normal. No respiratory distress. He has no wheezes. He has rhonchi. He has no rales.   clear   Abdominal: Soft. Bowel sounds are normal. He exhibits no distension. There is no tenderness.   Musculoskeletal: Normal range of motion. He exhibits no edema.   Lymphadenopathy:     He has no cervical adenopathy.   Neurological: He is alert.   Skin: Skin is warm and dry. No rash noted.   Psychiatric: He has a normal mood and affect. His behavior is normal.   Nursing note and vitals reviewed.      Significant Labs: no recent labs  Significant Imaging: no recent imaging.     Assessment/Plan:      Down's syndrome  Will need to work on trust to get pt to participate in PT at home       Seizure disorder  Cont oxcarbazepine 300mg po BID      Pneumonia of both lungs due to infectious organism  resolved      Urinary retention  Cont flomax       Debility  S/p CVA and prolonged hospitalization  PT, OT, ST-family feels he would do better in HOME environment-will set up  outpt therapy      Early onset Alzheimer's disease with behavioral disturbance  Cont to follow with Dr Correa   Added megace to help stimulate appetite     Cerebrovascular accident (CVA) with intracranial hemorrhage  Cont PT at home     VTE Risk Mitigation         Ordered     Medium Risk of VTE  Once      01/23/17 1908     Place NAYLA hose  Until discontinued      01/23/17 1908          Chetan Alvarez MD  Department of Hospital Medicine   [facility]

## 2017-01-27 NOTE — PROGRESS NOTES
Patient not seen prior to D/C today. Informed nurse, Lisa, that patient will need follow up in Neurology in 2-3 weeks to consider if  follow up CT scan is needed  for cerebellar hemorrhage  Group home will arrange follow up per nursing.

## 2017-01-27 NOTE — ASSESSMENT & PLAN NOTE
S/p CVA and prolonged hospitalization  PT, OT, ST-family feels he would do better in HOME environment-will set up outpt therapy

## 2017-01-27 NOTE — PROGRESS NOTES
Called [facility] [phone] to let them know patient is to be discharged to home today.  No one there to assist.  They will have someone call me back when they arrive.

## 2017-01-27 NOTE — NURSING
Spoke with Mack's brother Giovanni @ [phone] to inform him that I called report to so. [place]. [facility] and spoke with Yojana. [facility] will send transportation for pick-up.

## 2017-01-27 NOTE — PLAN OF CARE
Problem: Patient Care Overview  Goal: Plan of Care Review  Outcome: Ongoing (interventions implemented as appropriate)  Pt tolerating therapy much better.  No distress noted; resting comfortably.

## 2017-01-27 NOTE — NURSING
Notified Cecilio Mendieta NP of need to re-send RX to [facility] for discharge. Discussed diet for discharge.

## 2017-01-27 NOTE — PT/OT/SLP DISCHARGE
Physical Therapy Discharge Summary    Mack Will  MRN: [id number]   Debility   Patient Discharged from acute Physical Therapy on 2017.  Please refer to prior PT noted date on 2017 for functional status.     Assessment:   Patient has not met goals.  GOALS:   Physical Therapy Goals     Not on file      Multidisciplinary Problems (Resolved)        Problem: Physical Therapy Goal    Goal Priority Disciplines Outcome Goal Variances Interventions   Physical Therapy Goal   (Resolved)     PT/OT, PT Outcome(s) achieved     Description:  Goals to be met by: 2017     Patient will increase functional independence with mobility by performin. Supine to sit with MInimal Assistance -- NOT MET  2. Sit to supine with MInimal Assistance -- NOT MET  3. Rolling to Left and Right with Minimal Assistance. -- NOT MET  4. Sit to stand transfer with Minimal Assistance, HHA -- NOT MET  5. Bed to chair transfer with Minimal Assistance, HHA -- NOT MET            Reasons for Discontinuation of Therapy Services  Transfer to alternate level of care.      Plan:  Patient Discharged to: Another medical facility.

## 2017-01-27 NOTE — PROGRESS NOTES
Staff Handoff  Bedside report completed with Iva Kwok LPN. Patient in no distress. Bed exit alarm set. Call bell in reach.       Resident Handoff

## 2017-02-09 ENCOUNTER — OFFICE VISIT (OUTPATIENT)
Dept: FAMILY MEDICINE | Facility: CLINIC | Age: 52
End: 2017-02-09
Payer: MEDICARE

## 2017-02-09 ENCOUNTER — OFFICE VISIT (OUTPATIENT)
Dept: NEUROLOGY | Facility: CLINIC | Age: 52
End: 2017-02-09
Payer: MEDICARE

## 2017-02-09 ENCOUNTER — HOSPITAL ENCOUNTER (OUTPATIENT)
Dept: RADIOLOGY | Facility: HOSPITAL | Age: 52
Discharge: HOME | End: 2017-02-09
Attending: NURSE PRACTITIONER
Payer: MEDICARE

## 2017-02-09 VITALS
DIASTOLIC BLOOD PRESSURE: 60 MMHG | HEIGHT: 64 IN | WEIGHT: 154 LBS | HEART RATE: 80 BPM | BODY MASS INDEX: 26.29 KG/M2 | OXYGEN SATURATION: 99 % | SYSTOLIC BLOOD PRESSURE: 100 MMHG

## 2017-02-09 VITALS
RESPIRATION RATE: 16 BRPM | BODY MASS INDEX: 24.77 KG/M2 | HEART RATE: 68 BPM | HEIGHT: 64 IN | SYSTOLIC BLOOD PRESSURE: 106 MMHG | DIASTOLIC BLOOD PRESSURE: 74 MMHG | WEIGHT: 145.06 LBS

## 2017-02-09 DIAGNOSIS — F02.818 EARLY ONSET ALZHEIMER'S DISEASE WITH BEHAVIORAL DISTURBANCE: ICD-10-CM

## 2017-02-09 DIAGNOSIS — R13.10 DYSPHAGIA, UNSPECIFIED TYPE: ICD-10-CM

## 2017-02-09 DIAGNOSIS — G40.909 SEIZURE DISORDER: ICD-10-CM

## 2017-02-09 DIAGNOSIS — I61.9 HEMORRHAGIC CEREBROVASCULAR ACCIDENT (CVA): Primary | ICD-10-CM

## 2017-02-09 DIAGNOSIS — Z09 HOSPITAL DISCHARGE FOLLOW-UP: Primary | ICD-10-CM

## 2017-02-09 DIAGNOSIS — G30.0 EARLY ONSET ALZHEIMER'S DISEASE WITH BEHAVIORAL DISTURBANCE: ICD-10-CM

## 2017-02-09 DIAGNOSIS — I61.9 HEMORRHAGIC CEREBROVASCULAR ACCIDENT (CVA): ICD-10-CM

## 2017-02-09 DIAGNOSIS — Q90.9 DOWN'S SYNDROME: ICD-10-CM

## 2017-02-09 PROCEDURE — 99999 PR PBB SHADOW E&M-EST. PATIENT-LVL II: CPT | Mod: PBBFAC,,, | Performed by: FAMILY MEDICINE

## 2017-02-09 PROCEDURE — 70450 CT HEAD/BRAIN W/O DYE: CPT | Mod: 26,,, | Performed by: RADIOLOGY

## 2017-02-09 PROCEDURE — 70450 CT HEAD/BRAIN W/O DYE: CPT | Mod: TC

## 2017-02-09 PROCEDURE — 99214 OFFICE O/P EST MOD 30 MIN: CPT | Mod: S$PBB,,, | Performed by: NURSE PRACTITIONER

## 2017-02-09 PROCEDURE — 99213 OFFICE O/P EST LOW 20 MIN: CPT | Mod: S$PBB,,, | Performed by: FAMILY MEDICINE

## 2017-02-09 PROCEDURE — 99999 PR PBB SHADOW E&M-EST. PATIENT-LVL IV: CPT | Mod: PBBFAC,,, | Performed by: NURSE PRACTITIONER

## 2017-02-09 RX ORDER — MEGESTROL ACETATE 40 MG/ML
SUSPENSION ORAL
Refills: 3 | COMMUNITY
Start: 2017-01-27 | End: 2017-02-09 | Stop reason: SDUPTHER

## 2017-02-09 NOTE — MR AVS SNAPSHOT
[facility]  [address]  [facility] [address]  Phone: [phone]  Fax: [phone]                  Mack Will   2017 10:00 AM   Office Visit    Description:  Male : 1965   Provider:  Chetan Alvarez MD   Department:  [facility]           Reason for Visit     Follow-up     Pneumonia           Diagnoses this Visit        Comments    Hospital discharge follow-up    -  Primary     Dysphagia, unspecified type                To Do List           Future Appointments        Provider Department Dept Phone    2017  5:00 PM [facility] CT1 LIMIT [address] [facility] [phone]    3/9/2017 9:40 AM Jes Eugene NP [facility] [phone]    2017 9:15 AM Chetan Alvarez MD [facility] [phone]    2017 10:30 AM Bib Correa MD [facility] [phone]      Goals (5 Years of Data)     None      [facility]     [facility] -  Assistance  Registered nurses in the [facility] provide clinical advisement, health education, appointment booking, and other advisory services.  Call for this free service at [phone].             Medications           Message regarding Medications     Verify the changes and/or additions to your medication regime listed below are the same as discussed with your clinician today.  If any of these changes or additions are incorrect, please notify your healthcare provider.             Verify that the below list of medications is an accurate representation of the medications you are currently taking.  If none reported, the list may be blank. If incorrect, please contact your healthcare provider. Carry this list with you in case of emergency.           Current Medications     clotrimazole (LOTRIMIN) 1 % cream Apply topically 2 (two) times daily.    cyanocobalamin, vitamin B-12, (VITAMIN B-12) 50 mcg tablet  "Take 50 mcg by mouth once daily.     megestrol (MEGACE) 400 mg/10 mL (10 mL) Susp Take 5 mLs (200 mg total) by mouth once daily.    oxcarbazepine (TRILEPTAL) 300 MG Tab Take 1 tablet (300 mg total) by mouth 2 (two) times daily.    tamsulosin (FLOMAX) 0.4 mg Cp24 Take 1 capsule (0.4 mg total) by mouth once daily.    trazodone (DESYREL) 50 MG tablet Take 1 tablet (50 mg total) by mouth every evening.    mupirocin (BACTROBAN) 2 % ointment Apply topically 2 (two) times daily.           Clinical Reference Information           Your Vitals Were     BP Pulse Height Weight SpO2 BMI    100/60 (BP Location: Left arm, Patient Position: Sitting, BP Method: Manual) 80 5' 4" (1.626 m) 69.9 kg (154 lb) 99% 26.43 kg/m2      Blood Pressure          Most Recent Value    BP  100/60      Allergies as of 2/9/2017     No Known Allergies      Immunizations Administered on Date of Encounter - 2/9/2017     None      Orders Placed During Today's Visit      Normal Orders This Visit    Ambulatory consult to Speech Therapy       Language Assistance Services     ATTENTION: Language assistance services are available, free of charge. Please call [phone].      ATENCIÓN: Si madison helms, tiene a pugh disposición servicios gratuitos de asistencia lingüística. Llame al [phone].     ZAHIRA Ý: N?u b?n nói Ti?ng Vi?t, có các d?ch v? h? tr? ngôn ng? mi?n phí dành cho b?n. G?i s? [phone].         [facility] complies with applicable Federal civil rights laws and does not discriminate on the basis of race, color, national origin, age, disability, or sex.        "

## 2017-02-09 NOTE — MR AVS SNAPSHOT
[facility]  [address] [facility] [address]  Phone: [phone]  Fax: [phone]                  Mack Will   2017 9:20 AM   Office Visit    Description:  Male : 1965   Provider:  Jes Eugene NP   Department:  [facility]           Reason for Visit     Hospital Follow Up           Diagnoses this Visit        Comments    Hemorrhagic cerebrovascular accident (CVA)    -  Primary            To Do List           Future Appointments        Provider Department Dept Phone    2017 10:00 AM Chetan Alvarez MD [facility] [phone]    2017  5:00 PM [facility] CT1 LIMIT [address] [facility] [phone]    3/9/2017 9:40 AM Jes Eugene NP [facility] [phone]    2017 10:30 AM Bib Correa MD [facility] [phone]      Goals (5 Years of Data)     None      Follow-Up and Disposition     Return in about 4 weeks (around 3/9/2017).      [facility]     [facility] -  Assistance  Registered nurses in the [facility] provide clinical advisement, health education, appointment booking, and other advisory services.  Call for this free service at [phone].             Medications           Message regarding Medications     Verify the changes and/or additions to your medication regime listed below are the same as discussed with your clinician today.  If any of these changes or additions are incorrect, please notify your healthcare provider.             Verify that the below list of medications is an accurate representation of the medications you are currently taking.  If none reported, the list may be blank. If incorrect, please contact your healthcare provider. Carry this list with you in case of emergency.           Current Medications     clotrimazole (LOTRIMIN) 1 % cream Apply topically 2 (two) times daily.     "cyanocobalamin, vitamin B-12, (VITAMIN B-12) 50 mcg tablet Take 50 mcg by mouth once daily.     megestrol (MEGACE) 400 mg/10 mL (10 mL) Susp Take 5 mLs (200 mg total) by mouth once daily.    mupirocin (BACTROBAN) 2 % ointment Apply topically 2 (two) times daily.    oxcarbazepine (TRILEPTAL) 300 MG Tab Take 1 tablet (300 mg total) by mouth 2 (two) times daily.    tamsulosin (FLOMAX) 0.4 mg Cp24 Take 1 capsule (0.4 mg total) by mouth once daily.    trazodone (DESYREL) 50 MG tablet Take 1 tablet (50 mg total) by mouth every evening.           Clinical Reference Information           Your Vitals Were     BP Pulse Resp Height Weight BMI    106/74 (BP Location: Left arm, Patient Position: Sitting, BP Method: Manual) 68 16 5' 4" (1.626 m) 65.8 kg (145 lb 1 oz) 24.9 kg/m2      Blood Pressure          Most Recent Value    BP  106/74      Allergies as of 2/9/2017     No Known Allergies      Immunizations Administered on Date of Encounter - 2/9/2017     None      Orders Placed During Today's Visit      Normal Orders This Visit    Ambulatory consult to Physical Therapy     Future Labs/Procedures Expected by Expires    CT Head Without Contrast  2/9/2017 2/9/2018      Language Assistance Services     ATTENTION: Language assistance services are available, free of charge. Please call [phone].      ATENCIÓN: Si habla sonyaañol, tiene a pugh disposición servicios gratuitos de asistencia lingüística. Llame al [phone].     CHÚ Ý: N?u b?n nói Ti?ng Vi?t, có các d?ch v? h? tr? ngôn ng? mi?n phí dành cho b?n. G?i s? [phone].         [facility] complies with applicable Federal civil rights laws and does not discriminate on the basis of race, color, national origin, age, disability, or sex.        "

## 2017-02-09 NOTE — PROGRESS NOTES
Subjective:       Patient ID: Mack Will is a 51 y.o. male.    Chief Complaint: Follow-up (hospital) and Pneumonia    Pt is a 51 y.o. male who presents for evaluation and management of   Encounter Diagnoses   Name Primary?    Hospital discharge follow-up Yes    Dysphagia, unspecified type    .pneumonia and hemorhagic CVA in house   Sent homee on purred diet but brother has been giving him hamburgers ????    Doing well on current meds. Denies any side effects. Prevention is up to date.  Review of Systems   Constitutional: Negative for chills and fever.   Respiratory: Negative for cough.    Psychiatric/Behavioral:        Labile mood. Cries frequently. Brother does not want him on SSRI right now          Objective:      Physical Exam   Constitutional: He is oriented to person, place, and time. He appears well-developed and well-nourished.   Downs syndrome features   Happy, smiling, hugging    HENT:   Head: Normocephalic and atraumatic.   Right Ear: External ear normal.   Left Ear: External ear normal.   Nose: Nose normal.   Mouth/Throat: Oropharynx is clear and moist.   Eyes: Conjunctivae and EOM are normal. Pupils are equal, round, and reactive to light. Right eye exhibits no discharge. Left eye exhibits no discharge. No scleral icterus.   Neck: Normal range of motion. Neck supple. No JVD present. No tracheal deviation present. No thyromegaly present.   Cardiovascular: Normal rate, regular rhythm, normal heart sounds and intact distal pulses.    No murmur heard.  Pulmonary/Chest: Effort normal and breath sounds normal. No respiratory distress. He has no wheezes. He has no rales. He exhibits no tenderness.   Abdominal: Soft. Bowel sounds are normal. He exhibits no distension and no mass. There is no tenderness. There is no rebound and no guarding.   Musculoskeletal: Normal range of motion.   Lymphadenopathy:     He has no cervical adenopathy.   Neurological: He is alert and oriented to person, place, and  time. No cranial nerve deficit. He exhibits normal muscle tone. Coordination normal.   Stares, answers yes/no questions only.    Skin: Skin is warm and dry. No rash noted.   Psychiatric: He has a normal mood and affect. His behavior is normal. Judgment and thought content normal.       Assessment:       1. Hospital discharge follow-up    2. Dysphagia, unspecified type        Plan:   Mack was seen today for follow-up and pneumonia.    Diagnoses and all orders for this visit:    Hospital discharge follow-up    Dysphagia, unspecified type  -     Ambulatory consult to Speech Therapy    continue PT outpt   ST consult to see if he can be advanced to regular diet as he seems much more neurologically intact/alert today compared to in house   RTC 3 months     No Follow-up on file.

## 2017-02-09 NOTE — PROGRESS NOTES
HPI:  Mack Will is a 51 y.o. male with Down Syndrome and associated early Alzheimer's like memory loss, as well as seizures, which began in .     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 He is more interactive, and is behaving his norm, with the exception of a few episodes of inappropriate crying.     He has been walking for one week now. His relative reports that he seems to be quite off balance and lacks general strength since his CVA.     There have been no seizures to report. He is sleeping well at night.     ROS is not reliable    Exam:  Gen Appearance, well developed in stigma of down syndrome/nourished in no apparent distress  CV: 2+ distal pulses with no edema or swelling  Neuro:  MS: Awake, alert, Sustains attention. Recent/remote memory are gravely impaired    Language is reduced  to spontaneous speech/comprehension but he does speak minimally when spoken to. Fund of Knowledge is less than  expected-- patient is calm and pleasant and hugs frequently  CN: Optic discs are flat with normal vasculature, right pupil is 3mm and left pupil is 2mm again today and ptosis on the left eye (brother stated that was chronic), face symmetric and tongue is midline and strong  Motor: Normal bulk, tone, no abnormal movements. Generalized weakness today, but does move all four extremities.   Sensory: Can't cooperate  Cerebellar: Can't cooperate, but can finger tap without difficulty  Gait: not assessed; seated in wheelchair today, due to gait imbalance since CVA.    Imagin/2016 CT head:   Significant bilateral basal ganglial calcifications as detailed above in this patient with age advanced generalized cerebral atrophy most probably affecting the bilateral parietal lobes. The mid kidneys in the setting of congenital processes as well as metabolic diseases. No acute intracranial  process is identified.    2017 CT Head:   1.  No interval detrimental change in the imaging appearance of the brain as compared to the previous study.  There is continued demonstration of a large hyperdense parenchymal hematoma centered within the cerebellar vermis.  No interval change in the size of the ventricular system as compared to the previous examination.  2.  Cerebral volume loss which is greater than expected for a patient of this chronologic age.  This could be do to a /in utero insult given the presence of prominent basal ganglia and caudate head calcifications.    2017 CTA Head/Neck:  1.  Stable findings related to recent prior intracranial hemorrhage.  No significant change when compare with the prior study of 2017.  2.  Normal head and neck CTA.    EEG 2016: : Abnormal EEG secondary to the presence of  mild generalized slowing which is etiologically non-specific.      Assessment/Plan: Mack Will is a 51 y.o. male with Down Syndrome and associated early Alzheimer's like memory loss, as well as seizures, which began in .     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 His behavior is back to baseline with the exception of some crying, which is suggestive of pseudobulbar affect. He does exhibit generalized weakness, likely due to his hospital admit/inactivity.    I recommend:     1. Repeat Head CT to assess for any resolution/reduction of his hemorrhage.   2. Order PT for gait/balance and strength.   3. Continue Trileptal for seizure prevention. He had mood changes with Keppra. Avoid treatment for AD, as AD medications lower the seizure threshold.   4. Continue Trazodone for insomnia and sundowning.  5. Continue to assess pseudobulbar complaints, which may resolve with time as his CVA is relatively recent.      Follow up in 1 month.

## 2017-03-09 ENCOUNTER — OFFICE VISIT (OUTPATIENT)
Dept: NEUROLOGY | Facility: CLINIC | Age: 52
End: 2017-03-09
Payer: MEDICARE

## 2017-03-09 VITALS
DIASTOLIC BLOOD PRESSURE: 68 MMHG | RESPIRATION RATE: 14 BRPM | HEIGHT: 64 IN | HEART RATE: 72 BPM | SYSTOLIC BLOOD PRESSURE: 104 MMHG

## 2017-03-09 DIAGNOSIS — R53.81 DEBILITY: ICD-10-CM

## 2017-03-09 DIAGNOSIS — G40.909 SEIZURE DISORDER: Primary | ICD-10-CM

## 2017-03-09 DIAGNOSIS — I61.9 CEREBROVASCULAR ACCIDENT (CVA) WITH INTRACRANIAL HEMORRHAGE: ICD-10-CM

## 2017-03-09 DIAGNOSIS — E53.8 B12 DEFICIENCY: ICD-10-CM

## 2017-03-09 DIAGNOSIS — G30.0 EARLY ONSET ALZHEIMER'S DISEASE WITH BEHAVIORAL DISTURBANCE: ICD-10-CM

## 2017-03-09 DIAGNOSIS — F02.818 EARLY ONSET ALZHEIMER'S DISEASE WITH BEHAVIORAL DISTURBANCE: ICD-10-CM

## 2017-03-09 PROCEDURE — 1160F RVW MEDS BY RX/DR IN RCRD: CPT | Performed by: NURSE PRACTITIONER

## 2017-03-09 PROCEDURE — 99213 OFFICE O/P EST LOW 20 MIN: CPT | Mod: PBBFAC | Performed by: NURSE PRACTITIONER

## 2017-03-09 PROCEDURE — 99999 PR PBB SHADOW E&M-EST. PATIENT-LVL III: CPT | Mod: PBBFAC,,, | Performed by: NURSE PRACTITIONER

## 2017-03-09 PROCEDURE — 99214 OFFICE O/P EST MOD 30 MIN: CPT | Mod: S$PBB | Performed by: NURSE PRACTITIONER

## 2017-03-09 RX ORDER — KETOCONAZOLE 20 MG/G
CREAM TOPICAL DAILY
COMMUNITY
End: 2018-07-27

## 2017-03-09 RX ORDER — UBIDECARENONE 75 MG
500 CAPSULE ORAL DAILY
COMMUNITY
End: 2017-07-11

## 2017-03-09 RX ORDER — TRAZODONE HYDROCHLORIDE 50 MG/1
50 TABLET ORAL NIGHTLY
Qty: 90 TABLET | Refills: 1 | Status: ON HOLD | OUTPATIENT
Start: 2017-03-09 | End: 2018-03-20 | Stop reason: HOSPADM

## 2017-03-09 RX ORDER — OXCARBAZEPINE 300 MG/1
300 TABLET, FILM COATED ORAL 2 TIMES DAILY
Qty: 60 TABLET | Refills: 11 | Status: SHIPPED | OUTPATIENT
Start: 2017-03-09 | End: 2019-01-22

## 2017-03-09 NOTE — PROGRESS NOTES
HPI:  Mack Will is a 51 y.o. male with Down Syndrome and associated early Alzheimer's like memory loss, as well as seizures, which began in . He had a hemorrhagic CVA, suspected to be related to amyloid angiopathy, as well as pneumonia on 2017.    He is more interactive since his last visit. He was only able to attend two PT sessions, as he was unable to cooperate. He does ambulate at times; however, this is difficult, due to his gait imbalance. He is seated in a wheelchair for the most part.     He has not had any seizures, and continues on Trileptal.     He does continue to cry inappropriately at times; however, this is rare, compared to his last visit.     ROS is not reliable    Exam:  Gen Appearance, well developed in stigma of down syndrome/nourished in no apparent distress  CV: 2+ distal pulses with no edema or swelling  Neuro:  MS: Awake, alert, Sustains attention. Recent/remote memory are gravely impaired    Language is reduced  to spontaneous speech/comprehension but he does speak minimally when spoken to. Fund of Knowledge is less than  expected-- patient is calm and pleasant.  CN: Optic discs are flat with normal vasculature, right pupil is 3mm and left pupil is 2mm again today and ptosis on the left eye (brother stated that was chronic), face symmetric and tongue is midline and strong  Motor: Normal bulk, tone, no abnormal movements. Generalized weakness today, but does move all four extremities.   Sensory: Can't cooperate  Cerebellar: Can't cooperate, but can finger tap without difficulty  Gait: not assessed; seated in wheelchair today, due to gait imbalance since CVA.    Imagin/2016 CT head:   Significant bilateral basal ganglial calcifications as detailed above in this patient with age advanced generalized cerebral atrophy most probably affecting the bilateral parietal lobes. The mid kidneys in the setting of congenital processes as well as metabolic diseases. No acute intracranial  process is identified.    2017 CT Head:   1.  No interval detrimental change in the imaging appearance of the brain as compared to the previous study.  There is continued demonstration of a large hyperdense parenchymal hematoma centered within the cerebellar vermis.  No interval change in the size of the ventricular system as compared to the previous examination.  2.  Cerebral volume loss which is greater than expected for a patient of this chronologic age.  This could be do to a /in utero insult given the presence of prominent basal ganglia and caudate head calcifications.    2017 CTA Head/Neck:  1.  Stable findings related to recent prior intracranial hemorrhage.  No significant change when compare with the prior study of 2017.  2.  Normal head and neck CTA.    2017 CT Head  Continued evolution of the previously described hemorrhage within the superior cerebellar vermis with very minimal residual hyperdensity noted.  There is some continued mild vasogenic edema    EEG 2016: : Abnormal EEG secondary to the presence of  mild generalized slowing which is etiologically non-specific.      Last labs: BMP and B12 level from 2017 reviewed    Assessment/Plan: Mack Will is a 51 y.o. male with Down Syndrome and associated early Alzheimer's like memory loss, as well as seizures, which began in .     His repeat Head CT did show resolution of the hemorrhage, with a very minimal residual hyperdensity. He continues with some generalized weakness as well as gait imbalance after his CVA. He was unable to cooperate with the PT that was ordered at his last visit. His pseudobulbar symptoms have improved since his last visit, and do not seem to impair him.     I recommend:   1. Continue Trileptal for seizure prevention. He had mood changes with Keppra. Avoid treatment for AD, as AD medications lower the seizure threshold.   2. Continue Trazodone for insomnia and sundowning.     Follow up in 6  months.

## 2017-03-09 NOTE — MR AVS SNAPSHOT
[facility]  [address] [facility] [address]  Phone: [phone]  Fax: [phone]                  Mack Will   3/9/2017 9:40 AM   Office Visit    Description:  Male : 1965   Provider:  Jes Eugene NP   Department:  [facility]           Reason for Visit     Stroke           Diagnoses this Visit        Comments    Seizure disorder    -  Primary     B12 deficiency         Early onset Alzheimer's disease with behavioral disturbance                To Do List           Future Appointments        Provider Department Dept Phone    2017 9:15 AM Chetan Alvarez MD [place] [facility] [phone]    2017 10:30 AM Bib Correa MD [facility] [phone]      Goals (5 Years of Data)     None      Follow-Up and Disposition     Return in about 6 months (around 2017).       These Medications        Disp Refills Start End    trazodone (DESYREL) 50 MG tablet 90 tablet 1 3/9/2017     Take 1 tablet (50 mg total) by mouth every evening. - Oral    Pharmacy: [facility] - [address]. Ph #: [phone]       oxcarbazepine (TRILEPTAL) 300 MG Tab 60 tablet 11 3/9/2017 3/9/2018    Take 1 tablet (300 mg total) by mouth 2 (two) times daily. - Oral    Pharmacy: [facility] - [place] [address]. Ph #: [phone]         [facility] On Call     [facility] -  Assistance  Registered nurses in the [facility] provide clinical advisement, health education, appointment booking, and other advisory services.  Call for this free service at [phone].             Medications           Message regarding Medications     Verify the changes and/or additions to your medication regime listed below are the same as discussed with your clinician today.  If any of these changes or additions are incorrect, please notify your healthcare provider.            "  Verify that the below list of medications is an accurate representation of the medications you are currently taking.  If none reported, the list may be blank. If incorrect, please contact your healthcare provider. Carry this list with you in case of emergency.           Current Medications     clotrimazole (LOTRIMIN) 1 % cream Apply topically 2 (two) times daily.    cyanocobalamin (VITAMIN B-12) 500 MCG tablet Take 500 mcg by mouth once daily.    ketoconazole (NIZORAL) 2 % cream Apply topically once daily.    megestrol (MEGACE) 400 mg/10 mL (10 mL) Susp Take 5 mLs (200 mg total) by mouth once daily.    mupirocin (BACTROBAN) 2 % ointment Apply topically 2 (two) times daily.    oxcarbazepine (TRILEPTAL) 300 MG Tab Take 1 tablet (300 mg total) by mouth 2 (two) times daily.    tamsulosin (FLOMAX) 0.4 mg Cp24 Take 1 capsule (0.4 mg total) by mouth once daily.    trazodone (DESYREL) 50 MG tablet Take 1 tablet (50 mg total) by mouth every evening.           Clinical Reference Information           Your Vitals Were     BP Pulse Resp Height          104/68 (BP Location: Left arm, Patient Position: Sitting, BP Method: Manual) 72 14 5' 4" (1.626 m)        Blood Pressure          Most Recent Value    BP  104/68      Allergies as of 3/9/2017     No Known Allergies      Immunizations Administered on Date of Encounter - 3/9/2017     None      Language Assistance Services     ATTENTION: Language assistance services are available, free of charge. Please call [phone].      ATENCIÓN: Si madison scooter, tiene a pugh disposición servicios gratuitos de asistencia lingüística. Llame al [phone].     [facility] Ý: N?u b?n nói Ti?ng Vi?t, có các d?ch v? h? tr? ngôn ng? mi?n phí dành cho b?n. G?i s? [phone].         [facility] complies with applicable Federal civil rights laws and does not discriminate on the basis of race, color, national origin, age, disability, or sex.        "

## 2017-03-21 ENCOUNTER — TELEPHONE (OUTPATIENT)
Dept: REHABILITATION | Facility: HOSPITAL | Age: 52
End: 2017-03-21

## 2017-03-22 ENCOUNTER — HOSPITAL ENCOUNTER (EMERGENCY)
Facility: HOSPITAL | Age: 52
Discharge: HOME | End: 2017-03-22
Attending: SURGERY
Payer: MEDICARE

## 2017-03-22 ENCOUNTER — TELEPHONE (OUTPATIENT)
Dept: FAMILY MEDICINE | Facility: CLINIC | Age: 52
End: 2017-03-22

## 2017-03-22 VITALS
OXYGEN SATURATION: 93 % | DIASTOLIC BLOOD PRESSURE: 65 MMHG | WEIGHT: 150 LBS | RESPIRATION RATE: 16 BRPM | HEART RATE: 70 BPM | BODY MASS INDEX: 25.75 KG/M2 | SYSTOLIC BLOOD PRESSURE: 102 MMHG | TEMPERATURE: 99 F

## 2017-03-22 DIAGNOSIS — J10.1 INFLUENZA A: Primary | ICD-10-CM

## 2017-03-22 LAB
ALBUMIN SERPL BCP-MCNC: 3.2 G/DL
ALLENS TEST: ABNORMAL
ALP SERPL-CCNC: 79 U/L
ALT SERPL W/O P-5'-P-CCNC: 35 U/L
ANION GAP SERPL CALC-SCNC: 10 MMOL/L
APTT BLDCRRT: 26.4 SEC
AST SERPL-CCNC: 21 U/L
BASOPHILS # BLD AUTO: 0.08 K/UL
BASOPHILS NFR BLD: 1.1 %
BILIRUB SERPL-MCNC: 0.2 MG/DL
BNP SERPL-MCNC: 22 PG/ML
BUN SERPL-MCNC: 10 MG/DL
CALCIUM SERPL-MCNC: 8.9 MG/DL
CHLORIDE SERPL-SCNC: 105 MMOL/L
CK MB SERPL-MCNC: 0.5 NG/ML
CK MB SERPL-RTO: 1.6 %
CK SERPL-CCNC: 31 U/L
CK SERPL-CCNC: 31 U/L
CO2 SERPL-SCNC: 24 MMOL/L
CREAT SERPL-MCNC: 0.9 MG/DL
DELSYS: ABNORMAL
DIFFERENTIAL METHOD: ABNORMAL
EOSINOPHIL # BLD AUTO: 0 K/UL
EOSINOPHIL NFR BLD: 0.6 %
ERYTHROCYTE [DISTWIDTH] IN BLOOD BY AUTOMATED COUNT: 16.7 %
EST. GFR  (AFRICAN AMERICAN): >60 ML/MIN/1.73 M^2
EST. GFR  (NON AFRICAN AMERICAN): >60 ML/MIN/1.73 M^2
FIO2: 21 (ref 21–100)
FLUAV AG SPEC QL IA: POSITIVE
FLUBV AG SPEC QL IA: NEGATIVE
GLUCOSE SERPL-MCNC: 79 MG/DL
HCO3 UR-SCNC: 21.4 MEQ/L (ref 22–26)
HCT VFR BLD AUTO: 43 %
HGB BLD-MCNC: 14.6 G/DL
INR PPP: 1.1
LACTATE SERPL-SCNC: 2.4 MMOL/L
LYMPHOCYTES # BLD AUTO: 0.9 K/UL
LYMPHOCYTES NFR BLD: 13.2 %
MCH RBC QN AUTO: 31.5 PG
MCHC RBC AUTO-ENTMCNC: 34 %
MCV RBC AUTO: 93 FL
MODE: ABNORMAL
MONOCYTES # BLD AUTO: 0.6 K/UL
MONOCYTES NFR BLD: 8.1 %
NEUTROPHILS # BLD AUTO: 5.4 K/UL
NEUTROPHILS NFR BLD: 77 %
PCO2 BLDA: 25 MMHG (ref 35–45)
PH SMN: 7.54 [PH] (ref 7.35–7.45)
PLATELET # BLD AUTO: 267 K/UL
PMV BLD AUTO: 11 FL
PO2 BLDA: 80 MMHG (ref 80–100)
POC BE: 0.1 MEQ/L (ref -2–2)
POC SATURATED O2: 97 % (ref 90–100)
POTASSIUM SERPL-SCNC: 4.2 MMOL/L
PROT SERPL-MCNC: 7.1 G/DL
PROTHROMBIN TIME: 11.6 SEC
RBC # BLD AUTO: 4.63 M/UL
SITE: ABNORMAL
SODIUM SERPL-SCNC: 139 MMOL/L
SPECIMEN SOURCE: ABNORMAL
TROPONIN I SERPL DL<=0.01 NG/ML-MCNC: <0.006 NG/ML
WBC # BLD AUTO: 7.05 K/UL

## 2017-03-22 PROCEDURE — 85025 COMPLETE CBC W/AUTO DIFF WBC: CPT

## 2017-03-22 PROCEDURE — 85730 THROMBOPLASTIN TIME PARTIAL: CPT

## 2017-03-22 PROCEDURE — 99285 EMERGENCY DEPT VISIT HI MDM: CPT

## 2017-03-22 PROCEDURE — 83605 ASSAY OF LACTIC ACID: CPT

## 2017-03-22 PROCEDURE — 82553 CREATINE MB FRACTION: CPT

## 2017-03-22 PROCEDURE — 84484 ASSAY OF TROPONIN QUANT: CPT

## 2017-03-22 PROCEDURE — 87400 INFLUENZA A/B EACH AG IA: CPT | Mod: 59

## 2017-03-22 PROCEDURE — 36600 WITHDRAWAL OF ARTERIAL BLOOD: CPT

## 2017-03-22 PROCEDURE — 87040 BLOOD CULTURE FOR BACTERIA: CPT

## 2017-03-22 PROCEDURE — 93010 ELECTROCARDIOGRAM REPORT: CPT | Mod: ,,, | Performed by: INTERNAL MEDICINE

## 2017-03-22 PROCEDURE — 93005 ELECTROCARDIOGRAM TRACING: CPT

## 2017-03-22 PROCEDURE — 80053 COMPREHEN METABOLIC PANEL: CPT

## 2017-03-22 PROCEDURE — 82803 BLOOD GASES ANY COMBINATION: CPT | Performed by: SURGERY

## 2017-03-22 PROCEDURE — 85610 PROTHROMBIN TIME: CPT

## 2017-03-22 PROCEDURE — 83880 ASSAY OF NATRIURETIC PEPTIDE: CPT

## 2017-03-22 RX ORDER — OSELTAMIVIR PHOSPHATE 75 MG/1
75 CAPSULE ORAL 2 TIMES DAILY
Qty: 10 CAPSULE | Refills: 0 | Status: SHIPPED | OUTPATIENT
Start: 2017-03-22 | End: 2017-03-27

## 2017-03-22 RX ORDER — ALBUTEROL SULFATE 0.83 MG/ML
2.5 SOLUTION RESPIRATORY (INHALATION) EVERY 6 HOURS PRN
Qty: 1 BOX | Refills: 0 | Status: SHIPPED | OUTPATIENT
Start: 2017-03-22 | End: 2017-05-09

## 2017-03-22 NOTE — TELEPHONE ENCOUNTER
Pts worker Molly called in stating pt has an O2 sat of 88%, and chills. Advised to bring pt to Er. Verbalized understanding.

## 2017-03-22 NOTE — DISCHARGE INSTRUCTIONS
Influenza     Viruses that cause influenza spread through the air in droplets when someone who has the flu coughs, sneezes, laughs, or talks.   Influenza (the flu) is an infection that affects your respiratory tract. This tract is made up of your mouth, nose, and lungs, and the passages between them. Unlike a cold, the flu can make you very ill. And it can lead to pneumonia, a serious lung infection. The flu can have serious complications and even be fatal for some people. These include older adults, young children, and people with certain chronic conditions.  Who is at risk for the flu?  Anyone can get the flu. But you are more likely to become infected if you:  · Have a weakened immune system  · Work in a healthcare setting where you may be exposed to flu germs  · Live or work with someone who has the flu  · Havent had an annual flu shot  How does the flu spread?  The flu is caused by viruses. The viruses spread through the air in droplets when someone who has the flu coughs, sneezes, laughs, or talks. You can become infected when you inhale these viruses directly. You can also become infected when you touch a surface on which the droplets have landed and then transfer the germs to your eyes, nose, or mouth. Touching used tissues, or sharing utensils, drinking glasses, or a toothbrush with an infected person can expose you to flu viruses, too.  What are the symptoms of the flu?  Flu symptoms tend to come on quickly and may last a few days to a few weeks. They include:  · Fever usually higher than 100.4°F  (38°C) and chills  · Sore throat and headache  · Dry cough  · Runny nose  · Tiredness and weakness  · Muscle aches  Things that make the flu worse  For some people, the flu can be very serious. The risk for complications is greater for:  · Children younger than age 5  · Adults ages 65 and older  · People with a chronic illness such as diabetes or heart, kidney, or lung disease  · People who live in a nursing  home or long-term care facility   How is the flu treated?  The flu usually gets better after 7 days or so. In some cases, your healthcare provider may prescribe an antiviral medicine. This may help you get well sooner. For the medicine to help, you need to take it as soon as possible (ideally within 48 hours) after your symptoms start. If you develop pneumonia or other serious illness, you may need to stay in the hospital.  Easing flu symptoms  · Drink lots of fluids such as water, juice, and warm soup. A good rule is to drink enough so that you urinate your normal amount.  · Get plenty of rest.  · Ask your healthcare provider what to take for fever and pain.  · Call your provider if your fever is 100.4°F (38°C) or higher, or you become dizzy, lightheaded, or short of breath.  Taking steps to protect others  · Wash your hands often, especially after coughing or sneezing. Or clean your hands with an alcohol-based hand  containing at least 60% alcohol.  · Cough or sneeze into a tissue. Then throw the tissue away and wash your hands. If you dont have a tissue, cough and sneeze into the crook of your elbow.  · Stay home until at least 24 hours after you no longer have a fever or chills. Be sure the fever isnt being hidden by fever-reducing medicine.  · Dont share food, utensils, drinking glasses, or a toothbrush with others.  · Ask your healthcare provider if others in your household should get antiviral medicine to help them avoid infection.  How can the flu be prevented?  · One of the best ways to avoid the flu is to get a flu vaccine each year. Viruses that cause the flu change from year to year. For that reason, doctors recommend getting the flu vaccine each year, as soon as it's available in your area. The vaccine may be given as a shot or as a nasal spray. Your healthcare provider can tell you which vaccine is right for you. The nasal spray is not recommended for the 7325-3120 flu season. The CDC says  this is because the nasal spray did not seem to protect against the flu over the last several flu seasons. In the past, it was meant for people ages 2 to 49.  · Wash your hands often. Frequent handwashing is a proven way to help prevent infection.  · Carry an alcohol-based hand gel containing at least 60% alcohol. Use it when you can't use soap and water. Then wash your hands as soon as you can.  · Avoid touching your eyes, nose, and mouth.  · At home and work, clean phones, computer keyboards, and toys often with disinfectant wipes.  · If possible, avoid close contact with others who have the flu or symptoms of the flu.  Handwashing tips  Handwashing is one of the best ways to prevent many common infections. If you are caring for or visiting someone with the flu, wash your hands each time you enter and leave the room. Follow these steps:  · Use warm water and plenty of soap. Rub your hands together well.  · Clean the whole hand, under your nails, between your fingers, and up the wrists.  · Wash for at least 15 seconds.  · Rinse, letting the water run down your fingers, not up your wrists.  · Dry your hands well. Use a paper towel to turn off the faucet and open the door.  Using alcohol-based hand   Alcohol-based hand  are also a good choice. Use them when you can't use soap and water. Follow these steps:  · Squeeze about a tablespoon of gel into the palm of one hand.  · Rub your hands together briskly, cleaning the backs of your hands, the palms, between your fingers, and up the wrists.  · Rub until the gel is gone and your hands are completely dry.  Preventing influenza in healthcare settings  The flu is a special concern for people in hospitals and long-term care facilities. To help prevent the spread of flu, many hospitals and nursing homes take these steps:  · Healthcare providers wash their hands or use an alcohol-based hand  before and after treating each patient.  · People with the flu  have private rooms and bathrooms or share a room with someone with the same infection.  · People at high-risk for the flu but don't have it are encouraged to get the flu and pneumonia vaccines.  · All healthcare workers are encouraged or required to get flu shots.   Date Last Reviewed: 8/27/2014  © 9742-8384 [employer]. [address] [place] [address], [place] [address]. All rights reserved. This information is not intended as a substitute for professional medical care. Always follow your healthcare professional's instructions.

## 2017-03-22 NOTE — ED PROVIDER NOTES
[facility]                                        March 22, 2017                   Chief Complaint  51 y.o. male with Cough (mild)    History of Present Illness  Mack Will presents to the emergency room with nasal congestion and cough  Patient is living currently in a group home, developed cold symptoms this week per staff  Patient on exam has a clear nasal drainage with nasal mucosa erythema and clear lungs  Pt has no wheezing, no sputum, no shortness of breath, 97% oxygenation on blood gas  Patient has no nausea vomiting or diarrhea, does not look toxic or dehydrated on exam  Patient tested positive for influenza a today, patient had a clear chest x-ray on evaluation    The history is provided by the patient    Past Medical History   -- Alzheimer disease    -- Brain bleed    -- Down syndrome    -- Seizure      Past Surgical History   -- CHOLECYSTECTOMY     -- CYST REMOVAL        No Known Allergies     Review of Systems and Physical Exam     Review of Systems  -- Constitution - no fever, denies fatigue, no weakness, no chills  -- Eyes - no tearing or redness, no visual disturbance  -- Ear, Nose - sneezing, nasal congestion and clear discharge   -- Mouth,Throat - no sore throat, no toothache, normal voice, normal swallowing  -- Respiratory - cough and congestion, no shortness of breath, no MCQUEEN  -- Cardiovascular - denies chest pain, no palpitations, denies claudication  -- Gastrointestinal - denies abdominal pain, nausea, vomiting, or diarrhea  -- Musculoskeletal - denies back pain, negative for myalgias and arthralgias   -- Neurological - no headache, denies weakness or seizure; no LOC  -- Skin - denies pallor, rash, or changes in skin. no hives or welts noted    Vital Signs  -- His blood pressure is 102/65 and his pulse is 70.   -- His respiration is 16 and oxygen saturation is 93% (abnormal).      Physical Exam  -- Nursing note and vitals reviewed  -- Head: Atraumatic. Normocephalic.  No obvious abnormality  -- Eyes: Pupils are equal and reactive to light. Normal conjunctiva and lids  -- Nose: nasal mucosa erythema and edema; clear nasal discharge noted   -- Throat: Mucous membranes moist, pharynx normal, normal tonsils. No lesions   -- Ears: External ears and TM normal bilaterally. Normal hearing and no drainage  -- Neck: Normal range of motion. Neck supple. No masses, trachea midline  -- Cardiac: Normal rate, regular rhythm and normal heart sounds  -- Pulmonary: Normal respiratory effort, breath sounds clear to auscultation  -- Abdominal: Soft, no tenderness. Normal bowel sounds. Normal liver edge  -- Musculoskeletal: Normal range of motion, no effusions. Joints stable   -- Neurological: No focal deficits. Showed good interaction with staff  -- Vascular: Posterior tibial, dorsalis pedis and radial pulses 2+ bilaterally      Emergency Room Course     Treatment and Evaluation  -- Chest x-ray showed no infiltrate and showed no acute pathology   -- The electrolytes drawn in the ER today were within normal limits   -- The CBC drawn in the ER today was within normal limits   -- The cardiac enzymes were within normal limits   -- The BNP drawn in the ER today were within normal limits   -- The PT, PTT, and INR were within normal limits.    -- Lactic acid was 2.4  -- Influenza A positive     Medical Decision Making  -- Diagnosis management comments: 51 y.o. male with influenza A today  -- Discussed with Chetan Alvarez at length, start Tamiflu and breathing treatments   -- Follow-up with family practice in 48 hours, appointment made    Diagnosis  -- The encounter diagnosis was Influenza A.    Disposition and Plan  -- Disposition: home  -- Condition: stable  -- Follow-up: Patient to follow up with family practice clinic on Friday  -- I advised the patient that we have found no life threatening condition today  -- At this time, I believe the patient is clinically stable for discharge.   -- The patient  acknowledges that close follow up with a MD is required   -- Patient agrees to comply with all instruction and direction given in the ER    This note is dictated on Dragon Natural Speaking word recognition program.  There are word recognition mistakes that are occasionally missed on review.           Dax Zavala MD  03/22/17 1665

## 2017-03-22 NOTE — ED AVS SNAPSHOT
[facility]  [address] [facility] [address]               Mack Will   3/22/2017 12:27 PM   ED    Description:  Male : 1965   Department:  [facility]           Your Care was Coordinated By:     Provider Role From To    Dax Zavala MD Attending Provider 17 1222 --      Reason for Visit     Cough           Diagnoses this Visit        Comments    Influenza A    -  Primary       ED Disposition     ED Disposition Condition Comment    Discharge             To Do List           Follow-up Information     Follow up with Makenzie Mckeon MD. Go in 2 days.    Specialty:  Family Medicine    Why:  1:15 PM on Friday, 2017    Contact information:    [address]  [facility] [address]  [phone]         These Medications        Disp Refills Start End    oseltamivir (TAMIFLU) 75 MG capsule 10 capsule 0 3/22/2017 3/27/2017    Take 1 capsule (75 mg total) by mouth 2 (two) times daily. - Oral    Pharmacy: [facility] - [facility] [address]. Ph #: [phone]       albuterol (PROVENTIL) 2.5 mg /3 mL (0.083 %) nebulizer solution 1 Box 0 3/22/2017 3/22/2018    Take 3 mLs (2.5 mg total) by nebulization every 6 (six) hours as needed for Wheezing. - Nebulization    Pharmacy: [facility] - [place] [facility] [address]. Ph #: [phone]         [facility]     [facility] -  Assistance  Registered nurses in the [facility] provide clinical advisement, health education, appointment booking, and other advisory services.  Call for this free service at [phone].             Medications           Message regarding Medications     Verify the changes and/or additions to your medication regime listed below are the same as discussed with your clinician today.  If any of these changes or additions are incorrect, please notify your healthcare provider.        START taking these NEW  medications        Refills    oseltamivir (TAMIFLU) 75 MG capsule 0    Sig: Take 1 capsule (75 mg total) by mouth 2 (two) times daily.    Class: Normal    Route: Oral    albuterol (PROVENTIL) 2.5 mg /3 mL (0.083 %) nebulizer solution 0    Sig: Take 3 mLs (2.5 mg total) by nebulization every 6 (six) hours as needed for Wheezing.    Class: Normal    Route: Nebulization           Verify that the below list of medications is an accurate representation of the medications you are currently taking.  If none reported, the list may be blank. If incorrect, please contact your healthcare provider. Carry this list with you in case of emergency.           Current Medications     albuterol (PROVENTIL) 2.5 mg /3 mL (0.083 %) nebulizer solution Take 3 mLs (2.5 mg total) by nebulization every 6 (six) hours as needed for Wheezing.    clotrimazole (LOTRIMIN) 1 % cream Apply topically 2 (two) times daily.    cyanocobalamin (VITAMIN B-12) 500 MCG tablet Take 500 mcg by mouth once daily.    ketoconazole (NIZORAL) 2 % cream Apply topically once daily.    megestrol (MEGACE) 400 mg/10 mL (10 mL) Susp Take 5 mLs (200 mg total) by mouth once daily.    mupirocin (BACTROBAN) 2 % ointment Apply topically 2 (two) times daily.    oseltamivir (TAMIFLU) 75 MG capsule Take 1 capsule (75 mg total) by mouth 2 (two) times daily.    oxcarbazepine (TRILEPTAL) 300 MG Tab Take 1 tablet (300 mg total) by mouth 2 (two) times daily.    tamsulosin (FLOMAX) 0.4 mg Cp24 Take 1 capsule (0.4 mg total) by mouth once daily.    trazodone (DESYREL) 50 MG tablet Take 1 tablet (50 mg total) by mouth every evening.           Clinical Reference Information           Your Vitals Were     BP Pulse Temp Resp Weight SpO2    101/60 (BP Location: Left arm, Patient Position: Sitting) 72 99 °F (37.2 °C) (Oral) 16 68 kg (150 lb) 93%    BMI                25.75 kg/m2          Allergies as of 3/22/2017     No Known Allergies      Immunizations Administered on Date of Encounter -  3/22/2017     None      ED Micro, Lab, POCT     Start Ordered       Status Ordering Provider    03/22/17 1232 03/22/17 1231  Comprehensive metabolic panel  STAT      Final result     03/22/17 1232 03/22/17 1231  CBC auto differential  STAT      Final result     03/22/17 1232 03/22/17 1231  Lactic acid, plasma  STAT      Final result     03/22/17 1232 03/22/17 1231  Blood culture  Once      In process     03/22/17 1232 03/22/17 1231  Troponin I  Now then every 6 hours     Start Status   03/22/17 1232 Final result   03/22/17 1832 Scheduled   03/23/17 0032 Scheduled   03/23/17 0632 Scheduled   03/23/17 1232 Scheduled   03/23/17 1832 Scheduled   03/24/17 0032 Scheduled   03/24/17 0632 Scheduled   03/24/17 1232 Scheduled   03/24/17 1832 Scheduled   03/25/17 0032 Scheduled   03/25/17 0632 Scheduled   03/25/17 1232 Scheduled   03/25/17 1832 Scheduled       Acknowledged     03/22/17 1232 03/22/17 1231  CK  STAT      Final result     03/22/17 1232 03/22/17 1231  CK-MB  STAT      Final result     03/22/17 1232 03/22/17 1231  Brain natriuretic peptide  STAT      Final result     03/22/17 1232 03/22/17 1231  Protime-INR  STAT      Final result     03/22/17 1232 03/22/17 1231  APTT  STAT      Final result     03/22/17 1232 03/22/17 1231  Urinalysis  STAT      Acknowledged     03/22/17 1232 03/22/17 1231  Influenza antigen Nasopharyngeal Swab  Once      Final result       ED Imaging Orders     Start Ordered       Status Ordering Provider    03/22/17 1232 03/22/17 1231  X-Ray Chest 1 View  1 time imaging      Final result         Discharge Instructions         Influenza     Viruses that cause influenza spread through the air in droplets when someone who has the flu coughs, sneezes, laughs, or talks.   Influenza (the flu) is an infection that affects your respiratory tract. This tract is made up of your mouth, nose, and lungs, and the passages between them. Unlike a cold, the flu can make you very ill. And it can lead to  pneumonia, a serious lung infection. The flu can have serious complications and even be fatal for some people. These include older adults, young children, and people with certain chronic conditions.  Who is at risk for the flu?  Anyone can get the flu. But you are more likely to become infected if you:  · Have a weakened immune system  · Work in a healthcare setting where you may be exposed to flu germs  · Live or work with someone who has the flu  · Havent had an annual flu shot  How does the flu spread?  The flu is caused by viruses. The viruses spread through the air in droplets when someone who has the flu coughs, sneezes, laughs, or talks. You can become infected when you inhale these viruses directly. You can also become infected when you touch a surface on which the droplets have landed and then transfer the germs to your eyes, nose, or mouth. Touching used tissues, or sharing utensils, drinking glasses, or a toothbrush with an infected person can expose you to flu viruses, too.  What are the symptoms of the flu?  Flu symptoms tend to come on quickly and may last a few days to a few weeks. They include:  · Fever usually higher than 100.4°F  (38°C) and chills  · Sore throat and headache  · Dry cough  · Runny nose  · Tiredness and weakness  · Muscle aches  Things that make the flu worse  For some people, the flu can be very serious. The risk for complications is greater for:  · Children younger than age 5  · Adults ages 65 and older  · People with a chronic illness such as diabetes or heart, kidney, or lung disease  · People who live in a nursing home or long-term care facility   How is the flu treated?  The flu usually gets better after 7 days or so. In some cases, your healthcare provider may prescribe an antiviral medicine. This may help you get well sooner. For the medicine to help, you need to take it as soon as possible (ideally within 48 hours) after your symptoms start. If you develop pneumonia or  other serious illness, you may need to stay in the hospital.  Easing flu symptoms  · Drink lots of fluids such as water, juice, and warm soup. A good rule is to drink enough so that you urinate your normal amount.  · Get plenty of rest.  · Ask your healthcare provider what to take for fever and pain.  · Call your provider if your fever is 100.4°F (38°C) or higher, or you become dizzy, lightheaded, or short of breath.  Taking steps to protect others  · Wash your hands often, especially after coughing or sneezing. Or clean your hands with an alcohol-based hand  containing at least 60% alcohol.  · Cough or sneeze into a tissue. Then throw the tissue away and wash your hands. If you dont have a tissue, cough and sneeze into the crook of your elbow.  · Stay home until at least 24 hours after you no longer have a fever or chills. Be sure the fever isnt being hidden by fever-reducing medicine.  · Dont share food, utensils, drinking glasses, or a toothbrush with others.  · Ask your healthcare provider if others in your household should get antiviral medicine to help them avoid infection.  How can the flu be prevented?  · One of the best ways to avoid the flu is to get a flu vaccine each year. Viruses that cause the flu change from year to year. For that reason, doctors recommend getting the flu vaccine each year, as soon as it's available in your area. The vaccine may be given as a shot or as a nasal spray. Your healthcare provider can tell you which vaccine is right for you. The nasal spray is not recommended for the 9537-5724 flu season. The CDC says this is because the nasal spray did not seem to protect against the flu over the last several flu seasons. In the past, it was meant for people ages 2 to 49.  · Wash your hands often. Frequent handwashing is a proven way to help prevent infection.  · Carry an alcohol-based hand gel containing at least 60% alcohol. Use it when you can't use soap and water. Then wash  your hands as soon as you can.  · Avoid touching your eyes, nose, and mouth.  · At home and work, clean phones, computer keyboards, and toys often with disinfectant wipes.  · If possible, avoid close contact with others who have the flu or symptoms of the flu.  Handwashing tips  Handwashing is one of the best ways to prevent many common infections. If you are caring for or visiting someone with the flu, wash your hands each time you enter and leave the room. Follow these steps:  · Use warm water and plenty of soap. Rub your hands together well.  · Clean the whole hand, under your nails, between your fingers, and up the wrists.  · Wash for at least 15 seconds.  · Rinse, letting the water run down your fingers, not up your wrists.  · Dry your hands well. Use a paper towel to turn off the faucet and open the door.  Using alcohol-based hand   Alcohol-based hand  are also a good choice. Use them when you can't use soap and water. Follow these steps:  · Squeeze about a tablespoon of gel into the palm of one hand.  · Rub your hands together briskly, cleaning the backs of your hands, the palms, between your fingers, and up the wrists.  · Rub until the gel is gone and your hands are completely dry.  Preventing influenza in healthcare settings  The flu is a special concern for people in hospitals and long-term care facilities. To help prevent the spread of flu, many hospitals and nursing homes take these steps:  · Healthcare providers wash their hands or use an alcohol-based hand  before and after treating each patient.  · People with the flu have private rooms and bathrooms or share a room with someone with the same infection.  · People at high-risk for the flu but don't have it are encouraged to get the flu and pneumonia vaccines.  · All healthcare workers are encouraged or required to get flu shots.   Date Last Reviewed: 8/27/2014  © 8523-2152 [employer]. [address] [place] [address],  JUANITA Terry [address]. All rights reserved. This information is not intended as a substitute for professional medical care. Always follow your healthcare professional's instructions.          Your Scheduled Appointments     Mar 24, 2017  1:15 PM CDT   Established Patient Visit with Makenzie Mckeon MD   [address] [address]   [phone]            May 02, 2017  9:15 AM CDT   Established Patient Visit with Chetan Alvarez MD   [facility]    [address] [facility] [address]   [phone]            May 29, 2017 10:30 AM CDT   Established Patient Visit with Bib Correa MD   [facility]    [address] [facility] [address]   [phone]            Sep 12, 2017  9:20 AM CDT   Established Patient Visit with Jes Eugene NP   [facility]    [address] [facility] [address]   [phone]               [facility] complies with applicable Federal civil rights laws and does not discriminate on the basis of race, color, national origin, age, disability, or sex.        Language Assistance Services     ATTENTION: Language assistance services are available, free of charge. Please call [phone].      ATENCIÓN: Si habla español, tiene a pugh disposición servicios gratuitos de asistencia lingüística. Llame al [phone].     CHÚ Ý: N?u b?n nói Ti?ng Vi?t, có các d?ch v? h? tr? ngôn ng? mi?n phí dành cho b?n. G?i s? [phone].

## 2017-03-24 ENCOUNTER — OFFICE VISIT (OUTPATIENT)
Dept: FAMILY MEDICINE | Facility: CLINIC | Age: 52
End: 2017-03-24
Payer: MEDICARE

## 2017-03-24 VITALS
WEIGHT: 154 LBS | BODY MASS INDEX: 26.29 KG/M2 | SYSTOLIC BLOOD PRESSURE: 112 MMHG | DIASTOLIC BLOOD PRESSURE: 72 MMHG | TEMPERATURE: 98 F | HEART RATE: 84 BPM | HEIGHT: 64 IN

## 2017-03-24 DIAGNOSIS — J10.1 INFLUENZA A: Primary | ICD-10-CM

## 2017-03-24 PROCEDURE — 99212 OFFICE O/P EST SF 10 MIN: CPT | Mod: PBBFAC | Performed by: FAMILY MEDICINE

## 2017-03-24 PROCEDURE — 99213 OFFICE O/P EST LOW 20 MIN: CPT | Mod: S$PBB | Performed by: FAMILY MEDICINE

## 2017-03-24 PROCEDURE — 1160F RVW MEDS BY RX/DR IN RCRD: CPT | Performed by: FAMILY MEDICINE

## 2017-03-24 PROCEDURE — 99999 PR PBB SHADOW E&M-EST. PATIENT-LVL II: CPT | Mod: PBBFAC,,, | Performed by: FAMILY MEDICINE

## 2017-03-24 NOTE — PROGRESS NOTES
Subjective:       Patient ID: Mack Will is a 51 y.o. male.    Chief Complaint: No chief complaint on file.    HPI  51 year old male with downs and alzheimers as well as recent hospitalization for respiratory failure and cva presented to the ER after starting with a cough and runny nose on Tuesday. He was brought into the ER and found to be flu +. He started on tamiflu at that time. His cough and rhinorrhea have improved but he has never had a fever. Now he is a little more emotional and seems to be 'seeing things.'    PMH, PSH, ALLERGIES, SH, FH reviewed in nurse's notes above  Medications reconciled in the nurse's notes      Review of Systems   Constitutional: Negative for chills and fever.   HENT: Positive for rhinorrhea. Negative for congestion, ear pain, postnasal drip, sore throat and trouble swallowing.    Eyes: Negative for redness and itching.   Respiratory: Positive for cough. Negative for shortness of breath and wheezing.    Cardiovascular: Negative for chest pain and palpitations.   Gastrointestinal: Negative for abdominal pain, diarrhea, nausea and vomiting.   Genitourinary: Negative for dysuria and frequency.   Skin: Negative for rash.   Neurological: Negative for weakness and headaches.   Psychiatric/Behavioral: Positive for dysphoric mood.       Objective:      Physical Exam   Constitutional: He appears well-developed. No distress.   HENT:   Head: Normocephalic and atraumatic.   Downs facies   Eyes: Conjunctivae are normal. Pupils are equal, round, and reactive to light.   Neck: Normal range of motion. Neck supple. No thyromegaly present.   Cardiovascular: Normal rate, regular rhythm, normal heart sounds and intact distal pulses.    Pulmonary/Chest: Effort normal. No respiratory distress. He has no wheezes. He has rales.   Abdominal: Soft. Bowel sounds are normal. There is no tenderness.   Musculoskeletal: Normal range of motion. He exhibits no edema.   Lymphadenopathy:     He has no cervical  adenopathy.   Neurological: He is alert.   Skin: Skin is warm and dry. No rash noted.   Psychiatric:   Emotionally labile   Nursing note and vitals reviewed.       Assessment/Plan:       Diagnoses and all orders for this visit:    Influenza A  complete course of tamiflu. Some concern for hallucinations. If emotional lability persists after stopping tamiflu on Sunday notify MD immediately.     Sats good, no fever, continue current treatment.    RTC if condition acutely worsens or any other concerns, otherwise RTC as scheduled

## 2017-03-28 LAB — BACTERIA BLD CULT: NORMAL

## 2017-03-29 ENCOUNTER — CLINICAL SUPPORT (OUTPATIENT)
Dept: REHABILITATION | Facility: HOSPITAL | Age: 52
End: 2017-03-29
Attending: FAMILY MEDICINE
Payer: MEDICARE

## 2017-03-29 DIAGNOSIS — I61.9 CEREBROVASCULAR ACCIDENT (CVA) WITH INTRACRANIAL HEMORRHAGE: Primary | ICD-10-CM

## 2017-03-29 DIAGNOSIS — R13.12 OROPHARYNGEAL DYSPHAGIA: ICD-10-CM

## 2017-03-29 PROCEDURE — 92610 EVALUATE SWALLOWING FUNCTION: CPT

## 2017-03-29 PROCEDURE — G8996 SWALLOW CURRENT STATUS: HCPCS | Mod: CJ

## 2017-03-29 PROCEDURE — G8998 SWALLOW D/C STATUS: HCPCS | Mod: CJ

## 2017-03-29 PROCEDURE — G8997 SWALLOW GOAL STATUS: HCPCS | Mod: CJ

## 2017-03-29 NOTE — Clinical Note
Dr. Alvarez, The home will need an order to upgrade diet:  Ground/Finely Chopped diet with nectar thick liquids.  Thanks! CBL

## 2017-03-29 NOTE — PROGRESS NOTES
Outpatient Speech Pathology Evaluation      Date: 03/29/17    Start Time:  1000  Stop Time:  1035  Total time: 35 minutes  Charges Billed/# of units:  1X swallow and oral function eval  Referring Physician: Chetan Alvarez MD    Onset Date:  01/12/17  Primary Diagnosis:  Pneumonia and hemorrhagic CVA  Treatment Diagnosis:  Dysphagia  Past Medical History:   Mr. Will is a 51-year-old male with a primary diagnosis of Down syndrome and Alzheimer's disease. Pt was admitted to ED on 01/12/17 with increasing cough and fever. Results of CT chest scan performed on 01/13/17 revealed bilateral lower lobe infiltrates greater on the right than left significant for pneumonia. Pt was seen by ST during the course of his stay. Pt was made NPO on 01/20/17 per SLP assessment due to signs and symptoms of aspiration. On 01/23/17, Pt was cleared for puree diet with thin liquids by ST. On 01/20/17, Pt presented with cough/choking on thin liquids and was recommended nectar thickened liquids with puree diet. Pt's group home caregivers who were in attendance for this session stated that they have followed this diet since his discharge from the hospital on 01/23/17. Mr. Will was referred by Dr. Alvarez during his hospital discharge follow-up appointment on 02/09/17 to address current status of swallow function.      Past Medical History:   Diagnosis Date    Alzheimer disease     Brain bleed     Down syndrome     Seizure      Prior Therapy: Speech, physical therapy  Medications:   Current Outpatient Prescriptions on File Prior to Visit   Medication Sig Dispense Refill    albuterol (PROVENTIL) 2.5 mg /3 mL (0.083 %) nebulizer solution Take 3 mLs (2.5 mg total) by nebulization every 6 (six) hours as needed for Wheezing. 1 Box 0    clotrimazole (LOTRIMIN) 1 % cream Apply topically 2 (two) times daily. 45 g 1    cyanocobalamin (VITAMIN B-12) 500 MCG tablet Take 500 mcg by mouth once daily.      ketoconazole (NIZORAL) 2 % cream  Apply topically once daily.      megestrol (MEGACE) 400 mg/10 mL (10 mL) Susp Take 5 mLs (200 mg total) by mouth once daily. 600 mL 0    mupirocin (BACTROBAN) 2 % ointment Apply topically 2 (two) times daily.      oxcarbazepine (TRILEPTAL) 300 MG Tab Take 1 tablet (300 mg total) by mouth 2 (two) times daily. 60 tablet 11    tamsulosin (FLOMAX) 0.4 mg Cp24 Take 1 capsule (0.4 mg total) by mouth once daily. 30 capsule 11    trazodone (DESYREL) 50 MG tablet Take 1 tablet (50 mg total) by mouth every evening. 90 tablet 1     No current facility-administered medications on file prior to visit.      Nutrition:  Currently pureed diet with nectar thick liquids  Prior Level of Function: Assistance - requires assistance with all ADLs  Signs of Abuse: No  Place of Residence (Steps/Adaptations):  Group home  Functional Deficits Leading to Referral/Nature of Injury: Aspiration pneumonia/decline in independent functioning  Patient Therapy Goals:  None verbalized  Pain:  None verbalized    ASSESSMENT:    Swallow:   Pt received the following consistencies: Thin liquid (X2, via cup sip), Nectar thick (X3, via cup sip and straw sip), Cracker (X2), Apple sauce (X3, via teaspoon). Pt presented with cough and throat clear on thin liquid consistency. Pt presented with no signs of vocal quality change, cough, or throat clear on all other consistencies assessed.     IMPRESSIONS/RECOMMENDATIONS:    Pt demonstrated cough and throat clear on both presentations of thin liquid. Pt did not present with any outward s/s of aspiration on all other consistencies assessed. Mr. Will appears to be tolerating current diet well. Pt does not warrant skilled speech services at this time. Recommend continuing nectar thick liquids and upgrade of solids to all foods finely chopped with gravy.       CASIMIRO Mar   Clinician  03/29/17

## 2017-03-30 PROBLEM — R13.12 OROPHARYNGEAL DYSPHAGIA: Status: ACTIVE | Noted: 2017-03-30

## 2017-04-19 ENCOUNTER — TELEPHONE (OUTPATIENT)
Dept: FAMILY MEDICINE | Facility: CLINIC | Age: 52
End: 2017-04-19

## 2017-04-19 NOTE — TELEPHONE ENCOUNTER
----- Message from Prasanth Lisa sent at 2017  9:06 AM CDT -----  Contact: Yojana @ [facility]  Mack Will  MRN: [id number]  : 1965  PCP: Chetan Alvarez  Home Phone      [phone]  Work Phone      Not on file.  Mobile          [phone]  Mobile          [phone]      MESSAGE: patient had swallow test done -- results were to be sent to Dr Alvarez for recommendations -- please check on this -- they still have not received anything for him @ the home     Call Yojana @ [phone]    PCP: Kim

## 2017-04-20 ENCOUNTER — TELEPHONE (OUTPATIENT)
Dept: NEUROLOGY | Facility: CLINIC | Age: 52
End: 2017-04-20

## 2017-04-20 DIAGNOSIS — R26.9 ABNORMAL GAIT: Primary | ICD-10-CM

## 2017-04-20 NOTE — TELEPHONE ENCOUNTER
----- Message from Kerry Kapadia sent at 2017 12:03 PM CDT -----  Contact: YOJANA  [facility]  aMck Will  MRN: [id number]  : 1965  PCP: Chetan Alvarez  Home Phone      [phone]  Work Phone      Not on file.  Mobile          [phone]  Mobile          [phone]      MESSAGE: Yojana states that the patient is possibly having small tia's.  She states that he has small blackouts at time and has trouble walking.      Phone: [phone]

## 2017-04-20 NOTE — TELEPHONE ENCOUNTER
Spoke with Yojana she states that they were having a family meeting at patients home and she noticed that when he got up to walk he was walking off blanaced and wanting to lean to one side. She states she went to help and when she was holding on to him its like his body just went limp like he blacked out and it only lasted for 30 secs or so and when she asked patient are you ok he told her yes. She states that was the only one she witnessed but family states that it happens frequently. Patient has not missed any doses of medication. She wasn't sure if patient may have been having a seizure or TIA.

## 2017-04-20 NOTE — TELEPHONE ENCOUNTER
Please advise to report to ED the next time he has a spell so that he can be further evaluated. He has had gait imbalance and veering to one side since his last hospital admit, but was unable to tolerate PT. He needs to be seen in order to be evaluated, but would have to be placed on Dr. Correa's schedule.

## 2017-04-21 ENCOUNTER — HOSPITAL ENCOUNTER (OUTPATIENT)
Dept: RADIOLOGY | Facility: HOSPITAL | Age: 52
Discharge: HOME | End: 2017-04-21
Attending: PSYCHIATRY & NEUROLOGY
Payer: MEDICARE

## 2017-04-21 DIAGNOSIS — R26.9 ABNORMAL GAIT: ICD-10-CM

## 2017-04-21 PROCEDURE — 70450 CT HEAD/BRAIN W/O DYE: CPT | Mod: 26,,, | Performed by: RADIOLOGY

## 2017-04-21 PROCEDURE — 70450 CT HEAD/BRAIN W/O DYE: CPT | Mod: TC

## 2017-04-21 NOTE — TELEPHONE ENCOUNTER
Yojana was notified of instructions  Per Jes Eugene NP. Yojana states pt's gait is better but leans to the right while walking. Appt for 6 mo follow up is scheduled with Dr Correa on 5/29/17. Is this date sufficient for an appt. Please advise.

## 2017-04-21 NOTE — TELEPHONE ENCOUNTER
Given this patient's recent brain bleeding (cerebellar hemorrhage), he needs a CT scan as soon as possible.  Will follow up more instructions after that.

## 2017-05-09 ENCOUNTER — OFFICE VISIT (OUTPATIENT)
Dept: FAMILY MEDICINE | Facility: CLINIC | Age: 52
End: 2017-05-09
Payer: MEDICARE

## 2017-05-09 VITALS
BODY MASS INDEX: 23.69 KG/M2 | HEART RATE: 88 BPM | DIASTOLIC BLOOD PRESSURE: 92 MMHG | RESPIRATION RATE: 20 BRPM | SYSTOLIC BLOOD PRESSURE: 136 MMHG | WEIGHT: 138 LBS

## 2017-05-09 DIAGNOSIS — I61.9 HEMORRHAGIC CEREBROVASCULAR ACCIDENT (CVA): ICD-10-CM

## 2017-05-09 DIAGNOSIS — G30.0 EARLY ONSET ALZHEIMER'S DISEASE WITH BEHAVIORAL DISTURBANCE: Primary | ICD-10-CM

## 2017-05-09 DIAGNOSIS — R45.89 DEPRESSED MOOD: ICD-10-CM

## 2017-05-09 DIAGNOSIS — F02.818 EARLY ONSET ALZHEIMER'S DISEASE WITH BEHAVIORAL DISTURBANCE: Primary | ICD-10-CM

## 2017-05-09 DIAGNOSIS — G40.909 SEIZURE DISORDER: ICD-10-CM

## 2017-05-09 DIAGNOSIS — Q90.9 DOWN'S SYNDROME: ICD-10-CM

## 2017-05-09 PROCEDURE — 99214 OFFICE O/P EST MOD 30 MIN: CPT | Mod: S$PBB | Performed by: FAMILY MEDICINE

## 2017-05-09 PROCEDURE — 99212 OFFICE O/P EST SF 10 MIN: CPT | Mod: PBBFAC | Performed by: FAMILY MEDICINE

## 2017-05-09 PROCEDURE — 99999 PR PBB SHADOW E&M-EST. PATIENT-LVL II: CPT | Mod: PBBFAC,,, | Performed by: FAMILY MEDICINE

## 2017-05-09 PROCEDURE — 1160F RVW MEDS BY RX/DR IN RCRD: CPT | Performed by: FAMILY MEDICINE

## 2017-05-09 RX ORDER — MIRTAZAPINE 15 MG/1
15 TABLET, FILM COATED ORAL NIGHTLY
Qty: 30 TABLET | Refills: 5 | Status: SHIPPED | OUTPATIENT
Start: 2017-05-09 | End: 2017-05-29

## 2017-05-09 NOTE — PROGRESS NOTES
Subjective:       Patient ID: Mack Will is a 51 y.o. male.    Chief Complaint: Follow-up (3 months)    Pt is a 51 y.o. male who presents for evaluation and management of   Encounter Diagnoses   Name Primary?    Early onset Alzheimer's disease with behavioral disturbance Yes    Hemorrhagic cerebrovascular accident (CVA)     Down's syndrome     Seizure disorder    .decreased appetite, eats more slowly. Losing weight. No seizures. No aspiration on current diet.  Labile mood. Cries a lot. Seems to be sleeping well     Doing well on current meds. Denies any side effects. Prevention is up to date.  Review of Systems   Constitutional: Positive for appetite change and unexpected weight change. Negative for chills and fever.   Neurological: Negative for seizures.   Psychiatric/Behavioral: Positive for confusion and dysphoric mood. Negative for sleep disturbance.       Objective:      Physical Exam   Constitutional: He appears well-developed. No distress.   HENT:   Head: Normocephalic and atraumatic.   Downs facies   Eyes: Conjunctivae are normal. Pupils are equal, round, and reactive to light.   Neck: Normal range of motion. Neck supple. No thyromegaly present.   Cardiovascular: Normal rate, regular rhythm, normal heart sounds and intact distal pulses.    Pulmonary/Chest: Effort normal. No respiratory distress. He has no wheezes. He has no rales.   Abdominal: Soft. Bowel sounds are normal. There is no tenderness.   Musculoskeletal: Normal range of motion. He exhibits no edema.   Lymphadenopathy:     He has no cervical adenopathy.   Neurological: He is alert.   Skin: Skin is warm and dry. No rash noted.   Psychiatric:   Emotionally labile   Nursing note and vitals reviewed.      Assessment:       1. Early onset Alzheimer's disease with behavioral disturbance    2. Hemorrhagic cerebrovascular accident (CVA)    3. Down's syndrome    4. Seizure disorder    5. Depressed mood        Plan:   Mack was seen today for  follow-up.    Diagnoses and all orders for this visit:    Early onset Alzheimer's disease with behavioral disturbance    Hemorrhagic cerebrovascular accident (CVA)    Down's syndrome    Seizure disorder    Depressed mood  -     mirtazapine (REMERON) 15 MG tablet; Take 1 tablet (15 mg total) by mouth every evening.     continue current diet  Will add remeron for appetite and dysphoric mood.  RTC 3 months      No Follow-up on file.

## 2017-05-09 NOTE — MR AVS SNAPSHOT
[facility]  [address]  [facility] [address]  Phone: [phone]  Fax: [phone]                  Mack Will   2017 2:15 PM   Office Visit    Description:  Male : 1965   Provider:  Chetan Alvarez MD   Department:  [facility]           Reason for Visit     Follow-up           Diagnoses this Visit        Comments    Early onset Alzheimer's disease with behavioral disturbance    -  Primary     Hemorrhagic cerebrovascular accident (CVA)         Down's syndrome         Seizure disorder         Depressed mood                To Do List           Future Appointments        Provider Department Dept Phone    2017 10:30 AM Bib Correa MD [facility] - Neurology [phone]    8/10/2017 10:00 AM Chetan Alvarez MD [facility] [phone]    2017 9:20 AM Jes Eugene NP [facility] - Neurology [phone]      Goals (5 Years of Data)     None      Follow-Up and Disposition     Return in about 3 months (around 2017).    Follow-up and Disposition History       These Medications        Disp Refills Start End    mirtazapine (REMERON) 15 MG tablet 30 tablet 5 2017    Take 1 tablet (15 mg total) by mouth every evening. - Oral    Pharmacy: [facility] [place] [address]. Ph #: [phone]         [facility]     [facility] On Call Nurse Care Line -  Assistance  Unless otherwise directed by your provider, please contact [facility], our nurse care line that is available for  assistance.     Registered nurses in the [facility] provide: appointment scheduling, clinical advisement, health education, and other advisory services.  Call: [phone] (toll free)               Medications           Message regarding Medications     Verify the changes and/or additions to your medication regime listed below are the same as discussed with your clinician  today.  If any of these changes or additions are incorrect, please notify your healthcare provider.        START taking these NEW medications        Refills    mirtazapine (REMERON) 15 MG tablet 5    Sig: Take 1 tablet (15 mg total) by mouth every evening.    Class: Normal    Route: Oral      STOP taking these medications     albuterol (PROVENTIL) 2.5 mg /3 mL (0.083 %) nebulizer solution Take 3 mLs (2.5 mg total) by nebulization every 6 (six) hours as needed for Wheezing.           Verify that the below list of medications is an accurate representation of the medications you are currently taking.  If none reported, the list may be blank. If incorrect, please contact your healthcare provider. Carry this list with you in case of emergency.           Current Medications     clotrimazole (LOTRIMIN) 1 % cream Apply topically 2 (two) times daily.    cyanocobalamin (VITAMIN B-12) 500 MCG tablet Take 500 mcg by mouth once daily.    ketoconazole (NIZORAL) 2 % cream Apply topically once daily.    megestrol (MEGACE) 400 mg/10 mL (10 mL) Susp Take 5 mLs (200 mg total) by mouth once daily.    mupirocin (BACTROBAN) 2 % ointment Apply topically 2 (two) times daily.    oxcarbazepine (TRILEPTAL) 300 MG Tab Take 1 tablet (300 mg total) by mouth 2 (two) times daily.    tamsulosin (FLOMAX) 0.4 mg Cp24 Take 1 capsule (0.4 mg total) by mouth once daily.    trazodone (DESYREL) 50 MG tablet Take 1 tablet (50 mg total) by mouth every evening.    mirtazapine (REMERON) 15 MG tablet Take 1 tablet (15 mg total) by mouth every evening.           Clinical Reference Information           Your Vitals Were     BP Pulse Resp Weight BMI    136/92 88 20 62.6 kg (138 lb) 23.69 kg/m2      Blood Pressure          Most Recent Value    BP  (!)  136/92      Allergies as of 5/9/2017     No Known Allergies      Immunizations Administered on Date of Encounter - 5/9/2017     None      Language Assistance Services     ATTENTION: Language assistance services are  available, free of charge. Please call [phone].      ATENCIÓN: Si habla scooter, tiene a pugh disposición servicios gratuitos de asistencia lingüística. Llame al [phone].     CHÚ Ý: N?u b?n nói Ti?ng Vi?t, có các d?ch v? h? tr? ngôn ng? mi?n phí dành cho b?n. G?i s? [phone].         [facility] complies with applicable Federal civil rights laws and does not discriminate on the basis of race, color, national origin, age, disability, or sex.

## 2017-05-15 ENCOUNTER — TELEPHONE (OUTPATIENT)
Dept: FAMILY MEDICINE | Facility: CLINIC | Age: 52
End: 2017-05-15

## 2017-05-15 NOTE — TELEPHONE ENCOUNTER
----- Message from Jes Chopra sent at 5/15/2017  8:34 AM CDT -----  Contact: [facility]  Mack Will  MRN: [id number]  : 1965  PCP: Chetan Alvarez  Home Phone      [phone]  Work Phone      Not on file.  Mobile          [phone]  Mobile          [phone]      MESSAGE:  Was put on REMERON and he is not wanting to walk, eat, or do pretty much anything. Brother of patient doesn't want him on this please advise and call back    Phone:   [phone]

## 2017-05-15 NOTE — TELEPHONE ENCOUNTER
Ok. Tell them to stop it.  I was trying to stimulate the appetite, but obviously it is not working. Ok to stop. continue megace for his appetite. Contact me if any new problems or questions

## 2017-05-17 NOTE — TELEPHONE ENCOUNTER
Spoke to Yojana with [facility], informed to stop the Remeron and resume the Megace. Verbalized understanding. Will call if any new problems arise.

## 2017-05-29 ENCOUNTER — OFFICE VISIT (OUTPATIENT)
Dept: NEUROLOGY | Facility: CLINIC | Age: 52
End: 2017-05-29
Payer: MEDICARE

## 2017-05-29 VITALS
SYSTOLIC BLOOD PRESSURE: 92 MMHG | DIASTOLIC BLOOD PRESSURE: 68 MMHG | HEART RATE: 72 BPM | RESPIRATION RATE: 16 BRPM | HEIGHT: 64 IN

## 2017-05-29 DIAGNOSIS — G40.909 SEIZURE DISORDER: ICD-10-CM

## 2017-05-29 DIAGNOSIS — I61.9 CEREBRAL HEMORRHAGE: ICD-10-CM

## 2017-05-29 DIAGNOSIS — G30.0 EARLY ONSET ALZHEIMER'S DISEASE WITH BEHAVIORAL DISTURBANCE: Primary | ICD-10-CM

## 2017-05-29 DIAGNOSIS — E53.8 B12 DEFICIENCY: ICD-10-CM

## 2017-05-29 DIAGNOSIS — F02.818 EARLY ONSET ALZHEIMER'S DISEASE WITH BEHAVIORAL DISTURBANCE: Primary | ICD-10-CM

## 2017-05-29 PROCEDURE — 99214 OFFICE O/P EST MOD 30 MIN: CPT | Mod: S$PBB | Performed by: PSYCHIATRY & NEUROLOGY

## 2017-05-29 PROCEDURE — 99999 PR PBB SHADOW E&M-EST. PATIENT-LVL III: CPT | Mod: PBBFAC,,, | Performed by: PSYCHIATRY & NEUROLOGY

## 2017-05-29 PROCEDURE — 99213 OFFICE O/P EST LOW 20 MIN: CPT | Mod: PBBFAC | Performed by: PSYCHIATRY & NEUROLOGY

## 2017-05-29 RX ORDER — DIVALPROEX SODIUM 125 MG/1
TABLET, DELAYED RELEASE ORAL
Qty: 60 TABLET | Refills: 5 | Status: ON HOLD | OUTPATIENT
Start: 2017-05-29 | End: 2018-03-20

## 2017-05-29 NOTE — PROGRESS NOTES
"HPI:   Mack Will is a 51 y.o. male   with Down Syndrome and associated early Alzheimer's like memory loss admitted to [facility] in 3/2015 with 2 episodes of seizure vs syncope-- spells with tonic posturing.  Then, another seizure in 1/2016 and cerebellar hemorrhage found in 2017  Patient was noted to have poor gait and attention in 4/2017  CT head was unchanged/ I felt this was late effect of his hemorrhage and dementia effects  PCP placed patient on remeron for appetite and dysphoric mood earlier this month- brother called thinking his made him worse/ this was removed.  Health care worker today states patient has outburst of crying for a few months on some days and also is resistant to care- tightening up and screams up. Some staff members have noticed some aggression. Patient sleeps a good bit of the day if not stimulated and is sleeping at night but report.   Appetitite has been poor  No seizures noted  He is on a modified diet which requires supervision and encouragement    ROS is not reliable      Exam:  Gen Appearance, well developed in stigma of down syndrome/nourished in no apparent distress  CV: 2+ distal pulses with no edema or swelling  Neuro:  MS: Awake, alert, Sustains attention. Recent/remote memory are gravely impaired    Language is reduced  to spontaneous speech/comprehension and he is not speaking today except for "yes" when asked if ok. Fund of Knowledge is less than  expected-- patient is crying at times which is worse when confronted   CN: Optic discs are flat with normal vasculature, PERRL, Extraoccular movements and visual fields are full. Normal facial sensation and strength,  Tongue and Palate are midline and strong. Shoulder Shrug symmetric and strong.  Motor: Normal bulk, tone, no abnormal movements. 1+ reflexes. Patient can't cooperate with motor testing but appears to be moving all extremities equally.   Sensory: Can't cooperate  Cerebellar: Can't Well  Cooperate   Gait: Not " done- patient not longer walks without assitanc      Labs: B12 well corrected in 2017  Sodium normal 3/2017    Imagin2017 CT head: 1. Continued evolution of the previously noted cerebellar hemorrhage with improvement in the adjacent edema.  Minimal residual linear increased attenuation remains.  2.  Atrophy and small vessel ischemic changes of the periventricular white matter.    Labs done: PTH and calcium noted    EEG2016: : Abnormal EEG secondary to the presence of  mild generalized slowing which is etiologically non-specific.        Due to calcifications, I requested PTH and TSH (calcium was normal prior)    Assessment/Plan: Mack Will is a 51 y.o. male   with Down Syndrome and associated early Alzheimer's like memory loss admitted to [facility] in 3/2015 with 2 episodes of seizure vs syncope-- spells with tonic posturing.  Then, another seizure in 2016 and cerebellar hemorrhage found in , suspected to be related to amyloid angiopathy  I recommend:     1. Patient's imbalance likely due to cerebellar hemorrhage, which shows continued improvement by 2017 CT head  2. B12 deficiency found--patient on po B12 with adequate repletion  3.He has probable progressive alzheimer's dementia associated with Down Syndrome    4. He has likely idiopathic intracerebral calcifications. Labs normal prior  5. He had mood changes on Keppra and switched to Trileptal and is tolerating this well now without seizures. Follow  BMP routinely given trileptal use with trileptal level   5. Will continue  trazodone for insomnia, sundowning- has some napping during the day with AD, will avoid raising dose.   6. Instead Will try Depakote, lower dose per orders for mood stabilization and to further help appetite unless sedation or worsening mood. Follow CMP, CBC if he remains on this. Consider changing to anti-psychotic if behavior worsens.   6. Patient is now living in a group home  RTC 2 months

## 2017-07-11 ENCOUNTER — OFFICE VISIT (OUTPATIENT)
Dept: FAMILY MEDICINE | Facility: CLINIC | Age: 52
End: 2017-07-11
Payer: MEDICARE

## 2017-07-11 VITALS
BODY MASS INDEX: 25.1 KG/M2 | RESPIRATION RATE: 20 BRPM | DIASTOLIC BLOOD PRESSURE: 72 MMHG | HEART RATE: 68 BPM | WEIGHT: 147 LBS | HEIGHT: 64 IN | TEMPERATURE: 98 F | SYSTOLIC BLOOD PRESSURE: 122 MMHG

## 2017-07-11 DIAGNOSIS — G30.0 EARLY ONSET ALZHEIMER'S DISEASE WITH BEHAVIORAL DISTURBANCE: ICD-10-CM

## 2017-07-11 DIAGNOSIS — Q90.9 DOWN'S SYNDROME: Primary | ICD-10-CM

## 2017-07-11 DIAGNOSIS — F02.818 EARLY ONSET ALZHEIMER'S DISEASE WITH BEHAVIORAL DISTURBANCE: ICD-10-CM

## 2017-07-11 DIAGNOSIS — G40.909 SEIZURE DISORDER: ICD-10-CM

## 2017-07-11 DIAGNOSIS — I61.9 CEREBROVASCULAR ACCIDENT (CVA) WITH INTRACRANIAL HEMORRHAGE: ICD-10-CM

## 2017-07-11 PROCEDURE — 99999 PR PBB SHADOW E&M-EST. PATIENT-LVL III: CPT | Mod: PBBFAC,,, | Performed by: FAMILY MEDICINE

## 2017-07-11 PROCEDURE — 99214 OFFICE O/P EST MOD 30 MIN: CPT | Mod: S$PBB | Performed by: FAMILY MEDICINE

## 2017-07-11 PROCEDURE — 99213 OFFICE O/P EST LOW 20 MIN: CPT | Mod: PBBFAC | Performed by: FAMILY MEDICINE

## 2017-07-11 RX ORDER — MEGESTROL ACETATE 40 MG/ML
SUSPENSION ORAL
Refills: 5 | COMMUNITY
Start: 2017-07-03 | End: 2017-07-11 | Stop reason: SDUPTHER

## 2017-07-11 RX ORDER — MAGNESIUM HYDROXIDE 400 MG/5ML
5000 SUSPENSION, ORAL (FINAL DOSE FORM) ORAL DAILY
Refills: 11 | Status: ON HOLD | COMMUNITY
Start: 2017-06-26 | End: 2019-07-11 | Stop reason: HOSPADM

## 2017-07-11 NOTE — PROGRESS NOTES
Subjective:       Patient ID: Mack Will is a 52 y.o. male.    Chief Complaint: Employment Physical (90 L)    Pt is a 52 y.o. male who presents for evaluation and management of   Encounter Diagnoses   Name Primary?    Down's syndrome Yes    Cerebrovascular accident (CVA) with intracranial hemorrhage     Seizure disorder     Early onset Alzheimer's disease with behavioral disturbance    .90L and eval today   Doing well on current meds. Denies any side effects. Prevention is up to date.  Review of Systems   Constitutional: Positive for appetite change and unexpected weight change. Negative for chills and fever.        Needs total assistance for all ADL's    Neurological: Negative for seizures.   Psychiatric/Behavioral: Positive for confusion and dysphoric mood. Negative for sleep disturbance.       Objective:      Physical Exam   Constitutional: He appears well-developed. No distress.   HENT:   Head: Normocephalic and atraumatic.   Downs facies   Eyes: Conjunctivae are normal. Pupils are equal, round, and reactive to light.   Neck: Normal range of motion. Neck supple. No thyromegaly present.   Cardiovascular: Normal rate, regular rhythm, normal heart sounds and intact distal pulses.    Pulmonary/Chest: Effort normal. No respiratory distress. He has no wheezes. He has no rales.   Abdominal: Soft. Bowel sounds are normal. There is no tenderness.   Musculoskeletal: Normal range of motion. He exhibits no edema.   Lymphadenopathy:     He has no cervical adenopathy.   Neurological: He is alert.   O to person only   Speaks very little    Skin: Skin is warm and dry. No rash noted.   Psychiatric:   Emotionally labile   Nursing note and vitals reviewed.      Assessment:       1. Down's syndrome    2. Cerebrovascular accident (CVA) with intracranial hemorrhage    3. Seizure disorder    4. Early onset Alzheimer's disease with behavioral disturbance        Plan:   Mack was seen today for employment  physical.    Diagnoses and all orders for this visit:    Down's syndrome    Cerebrovascular accident (CVA) with intracranial hemorrhage    Seizure disorder    Early onset Alzheimer's disease with behavioral disturbance      No Follow-up on file.

## 2017-07-13 ENCOUNTER — TELEPHONE (OUTPATIENT)
Dept: FAMILY MEDICINE | Facility: CLINIC | Age: 52
End: 2017-07-13

## 2017-07-13 DIAGNOSIS — R47.02 DYSPHASIA: ICD-10-CM

## 2017-07-13 DIAGNOSIS — Q90.9 DOWN'S SYNDROME: Primary | ICD-10-CM

## 2017-07-13 DIAGNOSIS — R79.9 ABNORMAL FINDING OF BLOOD CHEMISTRY: ICD-10-CM

## 2017-07-13 NOTE — TELEPHONE ENCOUNTER
----- Message from Sawyer Nash sent at 2017  9:40 AM CDT -----  Contact: JAYDON  [facility]  Mack Will  MRN: [id number]  : 1965  PCP: Chetan Alvarez  Home Phone      [phone]  Work Phone      Not on file.  Mobile          [phone]  Mobile          [phone]      MESSAGE: NEEDS ORDERS FOR FASTING LABS. WOULD WANT THEM FOR Monday. WANTS DONE AT [facility]. PLEASE CALL     PHONE: [phone]

## 2017-07-17 NOTE — TELEPHONE ENCOUNTER
Orders faxed to [facility]   Left message for Melinda that lab orders sent to [facility] when patient is ready for testing.

## 2017-07-19 ENCOUNTER — TELEPHONE (OUTPATIENT)
Dept: FAMILY MEDICINE | Facility: CLINIC | Age: 52
End: 2017-07-19

## 2017-07-19 NOTE — TELEPHONE ENCOUNTER
----- Message from [place] sent at 2017 11:02 AM CDT -----  Contact: brendan/ [facility]  Mack Will  MRN: [id number]  : 1965  PCP: Chetan Alvarez  Home Phone      [phone]  Work Phone      Not on file.  Mobile          [phone]  Mobile          [phone]      MESSAGE: we sent them orders, for labs but needs signature on it, pt is coming in the morning to get these labs done, please send over a signed by vicky copy to them, or have them e signed.    Phone: [phone]  Ext: [phone]    Fax; [phone]

## 2017-07-28 ENCOUNTER — OUTPATIENT CASE MANAGEMENT (OUTPATIENT)
Dept: ADMINISTRATIVE | Facility: OTHER | Age: 52
End: 2017-07-28

## 2017-07-28 NOTE — PROGRESS NOTES
Thank you for the referral.   For your information:    The following patient has been assigned to Bib Temple RN with [facility] for high risk screening.    Reason: Seizure disorder  Cerebrovascular accident (CVA), unspecified mechanism  Alzheimer's dementia with behavioral disturbance, unspecified timing of dementia onset    Please contact [facility] at [phone] with any questions.    Thank you,  Leesa Hatfield

## 2017-08-02 ENCOUNTER — OFFICE VISIT (OUTPATIENT)
Dept: NEUROLOGY | Facility: CLINIC | Age: 52
End: 2017-08-02
Payer: MEDICARE

## 2017-08-02 VITALS
SYSTOLIC BLOOD PRESSURE: 122 MMHG | HEART RATE: 94 BPM | RESPIRATION RATE: 16 BRPM | DIASTOLIC BLOOD PRESSURE: 68 MMHG | HEIGHT: 63 IN

## 2017-08-02 DIAGNOSIS — I61.9 CEREBROVASCULAR ACCIDENT (CVA) WITH INTRACRANIAL HEMORRHAGE: ICD-10-CM

## 2017-08-02 DIAGNOSIS — F02.818 EARLY ONSET ALZHEIMER'S DISEASE WITH BEHAVIORAL DISTURBANCE: Primary | ICD-10-CM

## 2017-08-02 DIAGNOSIS — Q90.9 DOWN'S SYNDROME: ICD-10-CM

## 2017-08-02 DIAGNOSIS — G30.0 EARLY ONSET ALZHEIMER'S DISEASE WITH BEHAVIORAL DISTURBANCE: Primary | ICD-10-CM

## 2017-08-02 DIAGNOSIS — E53.8 B12 DEFICIENCY: ICD-10-CM

## 2017-08-02 DIAGNOSIS — G40.909 SEIZURE DISORDER: ICD-10-CM

## 2017-08-02 PROCEDURE — 99999 PR STA SHADOW: CPT | Mod: PBBFAC,,, | Performed by: NURSE PRACTITIONER

## 2017-08-02 PROCEDURE — 99213 OFFICE O/P EST LOW 20 MIN: CPT | Mod: PBBFAC | Performed by: NURSE PRACTITIONER

## 2017-08-02 PROCEDURE — 99214 OFFICE O/P EST MOD 30 MIN: CPT | Mod: S$PBB | Performed by: NURSE PRACTITIONER

## 2017-08-02 PROCEDURE — 99999 PR PBB SHADOW E&M-EST. PATIENT-LVL III: CPT | Mod: PBBFAC,,, | Performed by: NURSE PRACTITIONER

## 2017-08-02 RX ORDER — GUAIFENESIN 600 MG/1
600 TABLET, EXTENDED RELEASE ORAL 2 TIMES DAILY
COMMUNITY
End: 2017-08-10

## 2017-08-02 NOTE — PROGRESS NOTES
"HPI:   Mack Will is a 51 y.o. male   with Down Syndrome and associated early Alzheimer's like memory loss admitted to [facility] in 3/2015 with 2 episodes of seizure vs syncope-- spells with tonic posturing.Then, another seizure in 2016 and cerebellar hemorrhage found in . Patient was noted to have poor gait and attention in 2017; CT head was unchanged/ I felt this was late effect of his hemorrhage and dementia effects.     He has had no outbursts of anger and has had a more stable mood since starting Depakote. His appetite has improved.     No recent seizures noted.     ROS is not reliable    Exam:  Gen Appearance, well developed in stigma of down syndrome/nourished in no apparent distress  CV: 2+ distal pulses with no edema or swelling  Neuro:  MS: Awake, alert, Sustains attention. Recent/remote memory are gravely impaired    Language is reduced  to spontaneous speech/comprehension and he is not speaking today except for "yes" when asked if ok. Fund of Knowledge is less than  expected-- patient is crying at times which is worse when confronted   CN: Optic discs are flat with normal vasculature, PERRL, Extraoccular movements and visual fields are full. Normal facial sensation and strength,  Tongue and Palate are midline and strong. Shoulder Shrug symmetric and strong.  Motor: Normal bulk, tone, no abnormal movements. 1+ reflexes. Patient can't cooperate with motor testing but appears to be moving all extremities equally.   Sensory: Can't cooperate  Cerebellar: Can't Well  Cooperate   Gait: Not done- patient not longer walks without assitanc    Labs: B12 well corrected in 2017  Sodium normal 3/2017    Imagin2017 CT head:   1. Continued evolution of the previously noted cerebellar hemorrhage with improvement in the adjacent edema.  Minimal residual linear increased attenuation remains.  2.  Atrophy and small vessel ischemic changes of the periventricular white matter.    Labs done: 2017 CMP, " Lipids, TSH WNL.    EEG 2/2016:   Abnormal EEG secondary to the presence of  mild generalized slowing which is etiologically non-specific.        Due to calcifications, I requested PTH and TSH (calcium was normal prior)    Assessment/Plan: Mack Will is a 51 y.o. male   with Down Syndrome and associated early Alzheimer's like memory loss admitted to [facility] in 3/2015 with 2 episodes of seizure vs syncope-- spells with tonic posturing.Then, another seizure in 1/2016 and cerebellar hemorrhage found in 2017. Patient was noted to have poor gait and attention in 4/2017; CT head was unchanged/ I felt this was late effect of his hemorrhage and dementia effects.     I recommend:     1. Check a CBC at next visit, give his Depakote use, as well as a Trileptal level. Recent Lipid panel, BMP, and TSH from PCP WNL.   1. Patient's imbalance likely due to cerebellar hemorrhage, which shows continued improvement by 4/2017 CT head  2. Continue B12 supplementation for B12 deficiency.   3.He has probable progressive alzheimer's dementia associated with Down Syndrome    4. He has likely idiopathic intracerebral calcifications.  5. He had mood changes on Keppra and switched to Trileptal and is tolerating this well now without seizures. Follow  BMP routinely given trileptal use.  5. Continue Trazodone for insomnia, sundowning, but will avoid increasing dose, due to daytime somnolence.  6. Continue Depakote for mood stabilization and to help appetite unless sedation or worsening mood, and monitor CBC and CMP. Consider changing to anti-psychotic if behavior worsens.     RTC 2 months

## 2017-08-03 ENCOUNTER — OFFICE VISIT (OUTPATIENT)
Dept: FAMILY MEDICINE | Facility: CLINIC | Age: 52
End: 2017-08-03
Payer: MEDICARE

## 2017-08-03 VITALS
BODY MASS INDEX: 26.05 KG/M2 | HEART RATE: 68 BPM | SYSTOLIC BLOOD PRESSURE: 110 MMHG | WEIGHT: 147 LBS | RESPIRATION RATE: 20 BRPM | DIASTOLIC BLOOD PRESSURE: 80 MMHG | HEIGHT: 63 IN

## 2017-08-03 DIAGNOSIS — J40 BRONCHITIS: ICD-10-CM

## 2017-08-03 DIAGNOSIS — R05.9 COUGH: Primary | ICD-10-CM

## 2017-08-03 PROCEDURE — 99999 PR PBB SHADOW E&M-EST. PATIENT-LVL II: CPT | Mod: PBBFAC,,, | Performed by: FAMILY MEDICINE

## 2017-08-03 PROCEDURE — 99999 PR STA SHADOW: CPT | Mod: PBBFAC,,, | Performed by: FAMILY MEDICINE

## 2017-08-03 PROCEDURE — 99212 OFFICE O/P EST SF 10 MIN: CPT | Mod: PBBFAC | Performed by: FAMILY MEDICINE

## 2017-08-03 PROCEDURE — 99213 OFFICE O/P EST LOW 20 MIN: CPT | Mod: S$PBB | Performed by: FAMILY MEDICINE

## 2017-08-03 RX ORDER — LEVOFLOXACIN 500 MG/1
500 TABLET, FILM COATED ORAL DAILY
Qty: 7 TABLET | Refills: 0 | Status: SHIPPED | OUTPATIENT
Start: 2017-08-03 | End: 2017-08-10 | Stop reason: ALTCHOICE

## 2017-08-03 NOTE — PROGRESS NOTES
Subjective:       Patient ID: Mack Will is a 52 y.o. male.    Chief Complaint: Cough and Follow-up (3 mo)    Pt is a 52 y.o. male who presents for evaluation and management of   Encounter Diagnosis   Name Primary?    Cough Yes   .for few weeks now not responding to OTC meds   Productive? Unsure   No fever     Doing well on current meds. Denies any side effects. Prevention is  up to date.    Review of Systems   Unable to perform ROS: Dementia       Objective:      Physical Exam   Constitutional: He appears well-developed. No distress.   HENT:   Head: Normocephalic and atraumatic.   Downs facies   Eyes: Conjunctivae are normal. Pupils are equal, round, and reactive to light.   Neck: Normal range of motion. Neck supple. No thyromegaly present.   Cardiovascular: Normal rate, regular rhythm, normal heart sounds and intact distal pulses.    Pulmonary/Chest: Effort normal. No respiratory distress. He has no wheezes. He has no rales.   94% RA   Abdominal: Soft. Bowel sounds are normal. There is no tenderness.   Musculoskeletal: Normal range of motion. He exhibits no edema.   Lymphadenopathy:     He has no cervical adenopathy.   Neurological: He is alert.   O to person only   Speaks very little    Skin: Skin is warm and dry. No rash noted.   Psychiatric:   Emotionally labile   Nursing note and vitals reviewed.      Assessment:       1. Cough    2. Bronchitis        Plan:   Mack was seen today for cough and follow-up.    Diagnoses and all orders for this visit:    Cough    Bronchitis  -     levoFLOXacin (LEVAQUIN) 500 MG tablet; Take 1 tablet (500 mg total) by mouth once daily.    RTC 1 week     No Follow-up on file.

## 2017-08-10 ENCOUNTER — OFFICE VISIT (OUTPATIENT)
Dept: FAMILY MEDICINE | Facility: CLINIC | Age: 52
End: 2017-08-10
Payer: MEDICARE

## 2017-08-10 VITALS — SYSTOLIC BLOOD PRESSURE: 100 MMHG | DIASTOLIC BLOOD PRESSURE: 64 MMHG | RESPIRATION RATE: 16 BRPM | HEART RATE: 60 BPM

## 2017-08-10 DIAGNOSIS — Q90.9 DOWN'S SYNDROME: ICD-10-CM

## 2017-08-10 DIAGNOSIS — J40 BRONCHITIS: Primary | ICD-10-CM

## 2017-08-10 PROCEDURE — 99213 OFFICE O/P EST LOW 20 MIN: CPT | Mod: S$PBB | Performed by: FAMILY MEDICINE

## 2017-08-10 PROCEDURE — 99213 OFFICE O/P EST LOW 20 MIN: CPT | Mod: PBBFAC | Performed by: FAMILY MEDICINE

## 2017-08-10 PROCEDURE — 99999 PR PBB SHADOW E&M-EST. PATIENT-LVL III: CPT | Mod: PBBFAC,,, | Performed by: FAMILY MEDICINE

## 2017-08-10 PROCEDURE — 99999 PR STA SHADOW: CPT | Mod: PBBFAC,,, | Performed by: FAMILY MEDICINE

## 2017-08-10 NOTE — PROGRESS NOTES
Subjective:       Patient ID: Mack Will is a 52 y.o. male.    Chief Complaint: Follow-up and Cough    Pt is a 52 y.o. male who presents for evaluation and management of   Encounter Diagnoses   Name Primary?    Bronchitis Yes    Down's syndrome    .  Doing well on current meds. Denies any side effects. Prevention is up to date.    Review of Systems   Constitutional: Negative for chills and fever.   Respiratory: Positive for cough.        Objective:      Physical Exam   Constitutional: He appears well-developed. No distress.   HENT:   Head: Normocephalic and atraumatic.   Downs facies   Eyes: Conjunctivae are normal. Pupils are equal, round, and reactive to light.   Neck: Normal range of motion. Neck supple. No thyromegaly present.   Cardiovascular: Normal rate, regular rhythm, normal heart sounds and intact distal pulses.    Pulmonary/Chest: Effort normal. No respiratory distress. He has no wheezes. He has no rales.   No wheezing or rhonchi      Abdominal: Soft. Bowel sounds are normal. There is no tenderness.   Musculoskeletal: Normal range of motion. He exhibits no edema.   Lymphadenopathy:     He has no cervical adenopathy.   Neurological: He is alert.   O to person only   Speaks very little    Skin: Skin is warm and dry. No rash noted.   Psychiatric:   Emotionally labile   Nursing note and vitals reviewed.      Assessment:       1. Bronchitis    2. Down's syndrome        Plan:   Mack was seen today for follow-up and cough.    Diagnoses and all orders for this visit:    Bronchitis    Down's syndrome    resolved bronchitis   Finish levaquin as prescribed     RTC if condition acutely worsens or any other concerns, otherwise RTC as scheduled    No Follow-up on file.

## 2017-08-16 ENCOUNTER — OUTPATIENT CASE MANAGEMENT (OUTPATIENT)
Dept: ADMINISTRATIVE | Facility: OTHER | Age: 52
End: 2017-08-16

## 2017-08-16 NOTE — PROGRESS NOTES
8/16/17-RN ISRAEL called [facility] where Patient resides and was provided contact information for Supervisor Melinda. RN ISRAEL called Melinda in attempt to complete assessment for OPCM. Melinda reported that at this time she and the DON of the [facility] assist with managing and coordinating patient's care and manages medical needs. RN CM will close case as needs are being met at residence at this time. Will mail information about OPCM as well as contact information for OPCM. Encouraged Melinda to contact OPCM RN in the event that needs develop. Verbalized understanding.

## 2017-08-22 DIAGNOSIS — Z12.11 COLON CANCER SCREENING: ICD-10-CM

## 2017-12-20 ENCOUNTER — TELEPHONE (OUTPATIENT)
Dept: FAMILY MEDICINE | Facility: CLINIC | Age: 52
End: 2017-12-20

## 2017-12-20 NOTE — TELEPHONE ENCOUNTER
----- Message from Katina Cornell sent at 2017 11:21 AM CST -----  Contact: Amelia Bhatti/ Fr[facility]  Mack Will  MRN: [id number]  : 1965  PCP: Chetan Alvarez  Home Phone      [phone]  Work Phone      Not on file.  Mobile          [phone]  Mobile          [phone]    MESSAGE:   Requesting a referral for patient to have Physical Therapy.  Does not know if he needs to come in for a visit for this, or can this be done over the phone.  Please call.    Phone:  [phone]  Amelia Bhatti

## 2018-01-03 ENCOUNTER — OFFICE VISIT (OUTPATIENT)
Dept: FAMILY MEDICINE | Facility: CLINIC | Age: 53
End: 2018-01-03
Payer: MEDICARE

## 2018-01-03 VITALS
WEIGHT: 147 LBS | HEIGHT: 63 IN | RESPIRATION RATE: 20 BRPM | DIASTOLIC BLOOD PRESSURE: 60 MMHG | HEART RATE: 60 BPM | SYSTOLIC BLOOD PRESSURE: 118 MMHG | BODY MASS INDEX: 26.05 KG/M2

## 2018-01-03 DIAGNOSIS — G30.0 EARLY ONSET ALZHEIMER'S DISEASE WITH BEHAVIORAL DISTURBANCE: ICD-10-CM

## 2018-01-03 DIAGNOSIS — Q90.9 DOWN'S SYNDROME: Primary | ICD-10-CM

## 2018-01-03 DIAGNOSIS — F02.818 EARLY ONSET ALZHEIMER'S DISEASE WITH BEHAVIORAL DISTURBANCE: ICD-10-CM

## 2018-01-03 DIAGNOSIS — I61.9 CEREBROVASCULAR ACCIDENT (CVA) WITH INTRACRANIAL HEMORRHAGE: ICD-10-CM

## 2018-01-03 PROCEDURE — 99213 OFFICE O/P EST LOW 20 MIN: CPT | Mod: PBBFAC,25 | Performed by: FAMILY MEDICINE

## 2018-01-03 PROCEDURE — 90686 IIV4 VACC NO PRSV 0.5 ML IM: CPT | Mod: PBBFAC

## 2018-01-03 PROCEDURE — 99999 PR STA SHADOW: CPT | Mod: PBBFAC,,, | Performed by: FAMILY MEDICINE

## 2018-01-03 PROCEDURE — 99999 FLU VACCINE (QUAD) GREATER THAN OR EQUAL TO 3YO PRESERVATIVE FREE IM: CPT | Mod: PBBFAC,,,

## 2018-01-03 PROCEDURE — 99999 PR PBB SHADOW E&M-EST. PATIENT-LVL III: CPT | Mod: PBBFAC,,, | Performed by: FAMILY MEDICINE

## 2018-01-03 PROCEDURE — 99213 OFFICE O/P EST LOW 20 MIN: CPT | Mod: S$PBB | Performed by: FAMILY MEDICINE

## 2018-01-04 NOTE — PROGRESS NOTES
Subjective:       Patient ID: Mack Will is a 52 y.o. male.    Chief Complaint: Employment Physical    Pt is a 52 y.o. male who presents for evaluation and management of   Encounter Diagnoses   Name Primary?    Down's syndrome Yes    Early onset Alzheimer's disease with behavioral disturbance     Cerebrovascular accident (CVA) with intracranial hemorrhage    .  Doing well on current meds. Denies any side effects. Prevention is up to date.    Review of Systems   Unable to perform ROS: Dementia       Objective:      Physical Exam   Constitutional: He appears well-developed. No distress.   HENT:   Head: Normocephalic and atraumatic.   Downs facies   Eyes: Conjunctivae are normal. Pupils are equal, round, and reactive to light.   Neck: Normal range of motion. Neck supple. No thyromegaly present.   Cardiovascular: Normal rate, regular rhythm, normal heart sounds and intact distal pulses.    Pulmonary/Chest: Effort normal. No respiratory distress. He has no wheezes. He has no rales.   No wheezing or rhonchi      Abdominal: Soft. Bowel sounds are normal. There is no tenderness.   Musculoskeletal: Normal range of motion. He exhibits no edema.   Lymphadenopathy:     He has no cervical adenopathy.   Neurological: He is alert.   O to person only   Speaks very little   Stares down   Skin: Skin is warm and dry. No rash noted.   Psychiatric:   Emotionally labile   Nursing note and vitals reviewed.      Assessment:       1. Down's syndrome    2. Early onset Alzheimer's disease with behavioral disturbance    3. Cerebrovascular accident (CVA) with intracranial hemorrhage        Plan:   Mack was seen today for employment physical.    Diagnoses and all orders for this visit:    Down's syndrome    Early onset Alzheimer's disease with behavioral disturbance    Cerebrovascular accident (CVA) with intracranial hemorrhage    Other orders  -     Influenza - Quadrivalent (3 years & older) (PF)    He is requiring total  assistance with all ADL's   Continues to decline   Consider hospice, but he is comfortable per aid     Flu vaccine today     No Follow-up on file.

## 2018-01-22 ENCOUNTER — TELEPHONE (OUTPATIENT)
Dept: FAMILY MEDICINE | Facility: CLINIC | Age: 53
End: 2018-01-22

## 2018-01-22 DIAGNOSIS — Q90.9 DOWN'S SYNDROME: Primary | ICD-10-CM

## 2018-01-22 NOTE — TELEPHONE ENCOUNTER
Giovanni was upset about Aid told her supervisior putting pt on Hospice  Do you think he needs hospice soon        He is requiring total assistance with all ADL's   Continues to decline   Consider hospice, but he is comfortable per aid

## 2018-01-22 NOTE — TELEPHONE ENCOUNTER
----- Message from Katina Cornell sent at 2018 12:08 PM CST -----  Contact: Bill/Brother  Mack Will  MRN: [id number]  : 1965  PCP: Chetan Alvarez  Home Phone      [phone]  Work Phone      Not on file.  Mobile          [phone]  Mobile          [phone]    MESSAGE:   Would like to speak to nurse about Hopsice care for his brother.  He has received different information from different sources, and would like some clarification. Please call to discuss.    Phone: [phone]

## 2018-03-01 ENCOUNTER — TELEPHONE (OUTPATIENT)
Dept: FAMILY MEDICINE | Facility: CLINIC | Age: 53
End: 2018-03-01

## 2018-03-01 NOTE — TELEPHONE ENCOUNTER
----- Message from Prasanth Lisa sent at 2018  1:09 PM CST -----  Contact: Amelia Bhatti (nurse) @ [facility]  Mack Will  MRN: [id number]  : 1965  PCP: Chetan Alvarez  Home Phone      [phone]  Work Phone      Not on file.  [employer]          [phone]  [employer]          [phone]      MESSAGE: have standing order list that needs to be signed by physician -- asking to speak with Kim' nurse    Call Amelia @ [phone]    PCP: Kim

## 2018-03-01 NOTE — TELEPHONE ENCOUNTER
Spoke with Amelia from [facility] and she stated that pt was seen on 1/3/2018 for a 90 L physical and none of the paper work was filled out. She said the only thing that you told them was to send the pt home on hospice.  Amelia said pt was coughing all weekend and one of the  Nurses just notified her of this on 2/27/2018. When amelia looked at pts paper work to see what PRN orders he has to give medication she noticed that he does not have any PRN orders signed off on.  She faxed over a list of PRN orders that are normally in his chart on 2/27/2018 for you to sign and send back to her.  They can not give pt any medication unless order is signed by you.  Also she is faxing over pts 90L today to be completed since it was not completed at time of visit.

## 2018-03-02 NOTE — TELEPHONE ENCOUNTER
She is faxing over 90 L over today.   Informed her that duplantis is off today and will be completed on Monday, verbalized understanding

## 2018-03-06 ENCOUNTER — OFFICE VISIT (OUTPATIENT)
Dept: FAMILY MEDICINE | Facility: CLINIC | Age: 53
End: 2018-03-06
Payer: MEDICARE

## 2018-03-06 ENCOUNTER — APPOINTMENT (OUTPATIENT)
Dept: RADIOLOGY | Facility: CLINIC | Age: 53
End: 2018-03-06
Attending: FAMILY MEDICINE
Payer: MEDICARE

## 2018-03-06 VITALS
TEMPERATURE: 99 F | BODY MASS INDEX: 21.26 KG/M2 | HEIGHT: 63 IN | DIASTOLIC BLOOD PRESSURE: 60 MMHG | HEART RATE: 88 BPM | SYSTOLIC BLOOD PRESSURE: 90 MMHG | WEIGHT: 120 LBS | RESPIRATION RATE: 20 BRPM

## 2018-03-06 DIAGNOSIS — R05.9 COUGH: ICD-10-CM

## 2018-03-06 DIAGNOSIS — J40 BRONCHITIS: ICD-10-CM

## 2018-03-06 DIAGNOSIS — Q90.9 DOWN'S SYNDROME: Primary | ICD-10-CM

## 2018-03-06 PROCEDURE — 99214 OFFICE O/P EST MOD 30 MIN: CPT | Mod: S$PBB | Performed by: FAMILY MEDICINE

## 2018-03-06 PROCEDURE — 99999 PR PBB SHADOW E&M-EST. PATIENT-LVL III: CPT | Mod: PBBFAC,,, | Performed by: FAMILY MEDICINE

## 2018-03-06 PROCEDURE — 99999 PR STA SHADOW: CPT | Mod: PBBFAC,,, | Performed by: FAMILY MEDICINE

## 2018-03-06 PROCEDURE — 71045 X-RAY EXAM CHEST 1 VIEW: CPT | Mod: 26,,, | Performed by: RADIOLOGY

## 2018-03-06 PROCEDURE — 99213 OFFICE O/P EST LOW 20 MIN: CPT | Mod: PBBFAC | Performed by: FAMILY MEDICINE

## 2018-03-06 PROCEDURE — 71045 X-RAY EXAM CHEST 1 VIEW: CPT | Mod: TC,PO

## 2018-03-06 RX ORDER — IPRATROPIUM BROMIDE AND ALBUTEROL SULFATE 2.5; .5 MG/3ML; MG/3ML
3 SOLUTION RESPIRATORY (INHALATION) EVERY 6 HOURS PRN
Qty: 1 BOX | Refills: 5 | Status: SHIPPED | OUTPATIENT
Start: 2018-03-06 | End: 2018-03-08 | Stop reason: SDUPTHER

## 2018-03-06 RX ORDER — AZITHROMYCIN 500 MG/1
500 TABLET, FILM COATED ORAL DAILY
Qty: 3 TABLET | Refills: 0 | Status: SHIPPED | OUTPATIENT
Start: 2018-03-06 | End: 2018-03-08 | Stop reason: SDUPTHER

## 2018-03-06 NOTE — PROGRESS NOTES
Subjective:       Patient ID: Mack Will is a 52 y.o. male.    Chief Complaint: Cough (started three days ago ) and Nasal Congestion    Pt is a 52 y.o. male who presents for evaluation and management of   Encounter Diagnoses   Name Primary?    Down's syndrome Yes    Cough    .for a little less then a week   Sounds productive but he can't get it up per brother     Doing well on current meds. Denies any side effects. Prevention is up to date.  Review of Systems   Constitutional: Negative for fever.   Respiratory: Positive for cough. Negative for wheezing.         Worse at night          Objective:      Physical Exam   Constitutional: He appears well-developed. No distress.   HENT:   Head: Normocephalic and atraumatic.   Downs facies   Eyes: Conjunctivae are normal. Pupils are equal, round, and reactive to light.   Neck: Normal range of motion. Neck supple. No thyromegaly present.   Cardiovascular: Normal rate, regular rhythm, normal heart sounds and intact distal pulses.    Pulmonary/Chest: Effort normal. No respiratory distress. He has no wheezes. He has no rales.   No wheezing or rhonchi but poor resp effort. Diminished BS      Abdominal: Soft. Bowel sounds are normal. There is no tenderness.   Musculoskeletal: Normal range of motion. He exhibits no edema.   Lymphadenopathy:     He has no cervical adenopathy.   Neurological: He is alert.   O to person only   Speaks very little   Stares down   Skin: Skin is warm and dry. No rash noted.   Psychiatric:   Emotionally labile   Nursing note and vitals reviewed.      Assessment:       1. Down's syndrome    2. Cough    3. Bronchitis        Plan:   Mack was seen today for cough and nasal congestion.    Diagnoses and all orders for this visit:    Down's syndrome    Cough  -     Cancel: X-Ray Chest PA And Lateral; Future    Bronchitis  -     NEBULIZER FOR HOME USE  -     albuterol-ipratropium 2.5mg-0.5mg/3mL (DUO-NEB) 0.5 mg-3 mg(2.5 mg base)/3 mL nebulizer  solution; Take 3 mLs by nebulization every 6 (six) hours as needed for Wheezing or Shortness of Breath. Rescue  -     azithromycin (ZITHROMAX) 500 MG tablet; Take 1 tablet (500 mg total) by mouth once daily.    He is high risk for pneumonia. Will try and avert this with above, but fly understands this is how these patients usually pass.  RTC if fever, condition acutely worsens or any other concerns, otherwise RTC as scheduled    No Follow-up on file.

## 2018-03-07 ENCOUNTER — TELEPHONE (OUTPATIENT)
Dept: FAMILY MEDICINE | Facility: CLINIC | Age: 53
End: 2018-03-07

## 2018-03-07 NOTE — TELEPHONE ENCOUNTER
----- Message from Makenzie Ybarra MA sent at 3/7/2018  9:06 AM CST -----  Contact: Tonia Banegas  Mack Will  MRN: [id number]  : 1965  PCP: Chetan Alvarez  Home Phone      [phone]  Work Phone      Not on file.  Mobile          [phone]  Mobile          [phone]      MESSAGE: [facility] needs to speak to nurse about patient appointment from yesterday. Please call  Phone: [phone]

## 2018-03-07 NOTE — TELEPHONE ENCOUNTER
Tonia with [facility] care called back. She states that they did not receive notes from office visit with any orders or plans. She states that she needs them faxed to [phone].

## 2018-03-08 ENCOUNTER — TELEPHONE (OUTPATIENT)
Dept: FAMILY MEDICINE | Facility: CLINIC | Age: 53
End: 2018-03-08

## 2018-03-08 DIAGNOSIS — J40 BRONCHITIS: ICD-10-CM

## 2018-03-08 RX ORDER — AZITHROMYCIN 500 MG/1
500 TABLET, FILM COATED ORAL DAILY
Qty: 3 TABLET | Refills: 0 | Status: SHIPPED | OUTPATIENT
Start: 2018-03-08 | End: 2018-03-08 | Stop reason: SDUPTHER

## 2018-03-08 RX ORDER — IPRATROPIUM BROMIDE AND ALBUTEROL SULFATE 2.5; .5 MG/3ML; MG/3ML
3 SOLUTION RESPIRATORY (INHALATION) EVERY 6 HOURS PRN
Qty: 1 BOX | Refills: 5 | Status: SHIPPED | OUTPATIENT
Start: 2018-03-08 | End: 2018-03-08 | Stop reason: SDUPTHER

## 2018-03-08 RX ORDER — IPRATROPIUM BROMIDE AND ALBUTEROL SULFATE 2.5; .5 MG/3ML; MG/3ML
3 SOLUTION RESPIRATORY (INHALATION) EVERY 6 HOURS PRN
Qty: 1 BOX | Refills: 5 | Status: SHIPPED | OUTPATIENT
Start: 2018-03-08 | End: 2019-04-30

## 2018-03-08 RX ORDER — AZITHROMYCIN 500 MG/1
500 TABLET, FILM COATED ORAL DAILY
Qty: 3 TABLET | Refills: 0 | Status: ON HOLD | OUTPATIENT
Start: 2018-03-08 | End: 2018-03-20 | Stop reason: HOSPADM

## 2018-03-08 NOTE — TELEPHONE ENCOUNTER
----- Message from Prasanth Lisa sent at 3/8/2018 11:46 AM CST -----  Contact: Caregiver - Amelia Davy Will  MRN: [id number]  : 1965  PCP: Chetan Alvarez  Home Phone      [phone]  Work Phone      Not on file.  [employer]          [phone]  [employer]          [phone]      MESSAGE: unable to get Azithromycin & Albuterol treatments without order -- fax to [facility] @ [phone]    Call Amelia @ [phone]    PCP: Kim

## 2018-03-08 NOTE — TELEPHONE ENCOUNTER
----- Message from Katina Cornell sent at 3/8/2018 10:23 AM CST -----  Contact: Toniakrystle Garcia/Caregiver  Mack Will  MRN: [id number]  : 1965  PCP: Chetan Alvarez  Home Phone      [phone]  Work Phone      Not on file.  Mobile          [phone]  Mobile          [phone]    MESSAGE:  Requesting written copies of the prescriptions with the changes that were made so that she can get them filled.    azithromycin (ZITHROMAX) 500 MG tablet  albuterol-ipratropium 2.5mg-0.5mg/3mL (DUO-NEB) 0.5 mg-3 mg(2.5 mg base)/3 mL nebulizer solution    Phone: [phone]

## 2018-03-08 NOTE — TELEPHONE ENCOUNTER
Prescriptions printed and signed, called number listed. States she will have nurse call to discuss the next step.

## 2018-03-11 ENCOUNTER — HOSPITAL ENCOUNTER (INPATIENT)
Facility: HOSPITAL | Age: 53
LOS: 8 days | Discharge: NURSING, REHAB OR LONG-TERM CARE | DRG: 194 | End: 2018-03-20
Attending: SURGERY | Admitting: INTERNAL MEDICINE
Payer: MEDICARE

## 2018-03-11 DIAGNOSIS — I50.30 (HFPEF) HEART FAILURE WITH PRESERVED EJECTION FRACTION: ICD-10-CM

## 2018-03-11 DIAGNOSIS — R05.9 COUGH: ICD-10-CM

## 2018-03-11 DIAGNOSIS — J18.9 PNEUMONIA OF RIGHT LOWER LOBE DUE TO INFECTIOUS ORGANISM: Primary | ICD-10-CM

## 2018-03-11 LAB
ALBUMIN SERPL BCP-MCNC: 3 G/DL
ALP SERPL-CCNC: 90 U/L
ALT SERPL W/O P-5'-P-CCNC: 15 U/L
ANION GAP SERPL CALC-SCNC: 12 MMOL/L
AST SERPL-CCNC: 15 U/L
BASOPHILS # BLD AUTO: 0.04 K/UL
BASOPHILS NFR BLD: 0.3 %
BILIRUB SERPL-MCNC: 0.3 MG/DL
BNP SERPL-MCNC: <10 PG/ML
BUN SERPL-MCNC: 12 MG/DL
CALCIUM SERPL-MCNC: 9.2 MG/DL
CHLORIDE SERPL-SCNC: 104 MMOL/L
CK MB SERPL-MCNC: 0.9 NG/ML
CK MB SERPL-RTO: 0.5 %
CK SERPL-CCNC: 193 U/L
CK SERPL-CCNC: 193 U/L
CO2 SERPL-SCNC: 23 MMOL/L
CREAT SERPL-MCNC: 0.8 MG/DL
DIFFERENTIAL METHOD: ABNORMAL
EOSINOPHIL # BLD AUTO: 0.1 K/UL
EOSINOPHIL NFR BLD: 0.8 %
ERYTHROCYTE [DISTWIDTH] IN BLOOD BY AUTOMATED COUNT: 14.9 %
EST. GFR  (AFRICAN AMERICAN): >60 ML/MIN/1.73 M^2
EST. GFR  (NON AFRICAN AMERICAN): >60 ML/MIN/1.73 M^2
FLUAV AG SPEC QL IA: NEGATIVE
FLUBV AG SPEC QL IA: NEGATIVE
GLUCOSE SERPL-MCNC: 80 MG/DL
HCT VFR BLD AUTO: 41.9 %
HGB BLD-MCNC: 13.9 G/DL
LACTATE SERPL-SCNC: 1.7 MMOL/L
LYMPHOCYTES # BLD AUTO: 2.3 K/UL
LYMPHOCYTES NFR BLD: 19.3 %
MCH RBC QN AUTO: 32.7 PG
MCHC RBC AUTO-ENTMCNC: 33.2 G/DL
MCV RBC AUTO: 99 FL
MONOCYTES # BLD AUTO: 1 K/UL
MONOCYTES NFR BLD: 8.5 %
NEUTROPHILS # BLD AUTO: 8.5 K/UL
NEUTROPHILS NFR BLD: 71.1 %
PLATELET # BLD AUTO: 263 K/UL
PMV BLD AUTO: 11.4 FL
POTASSIUM SERPL-SCNC: 4.2 MMOL/L
PROT SERPL-MCNC: 7.4 G/DL
RBC # BLD AUTO: 4.25 M/UL
SODIUM SERPL-SCNC: 139 MMOL/L
SPECIMEN SOURCE: NORMAL
TROPONIN I SERPL DL<=0.01 NG/ML-MCNC: <0.006 NG/ML
WBC # BLD AUTO: 11.93 K/UL

## 2018-03-11 PROCEDURE — 84484 ASSAY OF TROPONIN QUANT: CPT

## 2018-03-11 PROCEDURE — 85025 COMPLETE CBC W/AUTO DIFF WBC: CPT

## 2018-03-11 PROCEDURE — 63600175 PHARM REV CODE 636 W HCPCS: Performed by: INTERNAL MEDICINE

## 2018-03-11 PROCEDURE — 83880 ASSAY OF NATRIURETIC PEPTIDE: CPT

## 2018-03-11 PROCEDURE — 93005 ELECTROCARDIOGRAM TRACING: CPT

## 2018-03-11 PROCEDURE — 96375 TX/PRO/DX INJ NEW DRUG ADDON: CPT

## 2018-03-11 PROCEDURE — 63600175 PHARM REV CODE 636 W HCPCS: Performed by: SURGERY

## 2018-03-11 PROCEDURE — C9113 INJ PANTOPRAZOLE SODIUM, VIA: HCPCS | Performed by: INTERNAL MEDICINE

## 2018-03-11 PROCEDURE — 96365 THER/PROPH/DIAG IV INF INIT: CPT

## 2018-03-11 PROCEDURE — 25000242 PHARM REV CODE 250 ALT 637 W/ HCPCS: Performed by: SURGERY

## 2018-03-11 PROCEDURE — G0378 HOSPITAL OBSERVATION PER HR: HCPCS

## 2018-03-11 PROCEDURE — 94640 AIRWAY INHALATION TREATMENT: CPT

## 2018-03-11 PROCEDURE — 87040 BLOOD CULTURE FOR BACTERIA: CPT | Mod: 59

## 2018-03-11 PROCEDURE — 87400 INFLUENZA A/B EACH AG IA: CPT

## 2018-03-11 PROCEDURE — 83605 ASSAY OF LACTIC ACID: CPT

## 2018-03-11 PROCEDURE — 80053 COMPREHEN METABOLIC PANEL: CPT

## 2018-03-11 PROCEDURE — 93010 ELECTROCARDIOGRAM REPORT: CPT | Mod: ,,, | Performed by: INTERNAL MEDICINE

## 2018-03-11 PROCEDURE — 82553 CREATINE MB FRACTION: CPT

## 2018-03-11 PROCEDURE — 99285 EMERGENCY DEPT VISIT HI MDM: CPT | Mod: 25

## 2018-03-11 PROCEDURE — 36415 COLL VENOUS BLD VENIPUNCTURE: CPT

## 2018-03-11 PROCEDURE — 96361 HYDRATE IV INFUSION ADD-ON: CPT

## 2018-03-11 PROCEDURE — 25000003 PHARM REV CODE 250: Performed by: SURGERY

## 2018-03-11 PROCEDURE — 27000221 HC OXYGEN, UP TO 24 HOURS

## 2018-03-11 PROCEDURE — 82550 ASSAY OF CK (CPK): CPT

## 2018-03-11 PROCEDURE — 94761 N-INVAS EAR/PLS OXIMETRY MLT: CPT

## 2018-03-11 RX ORDER — SODIUM CHLORIDE 9 MG/ML
INJECTION, SOLUTION INTRAVENOUS CONTINUOUS
Status: DISCONTINUED | OUTPATIENT
Start: 2018-03-11 | End: 2018-03-12

## 2018-03-11 RX ORDER — IPRATROPIUM BROMIDE AND ALBUTEROL SULFATE 2.5; .5 MG/3ML; MG/3ML
3 SOLUTION RESPIRATORY (INHALATION)
Status: COMPLETED | OUTPATIENT
Start: 2018-03-11 | End: 2018-03-11

## 2018-03-11 RX ORDER — PANTOPRAZOLE SODIUM 40 MG/10ML
40 INJECTION, POWDER, LYOPHILIZED, FOR SOLUTION INTRAVENOUS DAILY
Status: DISCONTINUED | OUTPATIENT
Start: 2018-03-11 | End: 2018-03-20 | Stop reason: HOSPADM

## 2018-03-11 RX ORDER — IPRATROPIUM BROMIDE AND ALBUTEROL SULFATE 2.5; .5 MG/3ML; MG/3ML
3 SOLUTION RESPIRATORY (INHALATION) EVERY 4 HOURS
Status: DISCONTINUED | OUTPATIENT
Start: 2018-03-11 | End: 2018-03-20 | Stop reason: HOSPADM

## 2018-03-11 RX ORDER — ONDANSETRON 2 MG/ML
4 INJECTION INTRAMUSCULAR; INTRAVENOUS EVERY 8 HOURS PRN
Status: DISCONTINUED | OUTPATIENT
Start: 2018-03-11 | End: 2018-03-20 | Stop reason: HOSPADM

## 2018-03-11 RX ORDER — PANTOPRAZOLE SODIUM 40 MG/1
40 TABLET, DELAYED RELEASE ORAL DAILY
Status: DISCONTINUED | OUTPATIENT
Start: 2018-03-11 | End: 2018-03-11 | Stop reason: ALTCHOICE

## 2018-03-11 RX ORDER — ACETAMINOPHEN 325 MG/1
650 TABLET ORAL EVERY 8 HOURS PRN
Status: DISCONTINUED | OUTPATIENT
Start: 2018-03-11 | End: 2018-03-20 | Stop reason: HOSPADM

## 2018-03-11 RX ORDER — DIVALPROEX SODIUM 125 MG/1
125 TABLET, DELAYED RELEASE ORAL EVERY 12 HOURS
Status: DISCONTINUED | OUTPATIENT
Start: 2018-03-11 | End: 2018-03-20 | Stop reason: HOSPADM

## 2018-03-11 RX ORDER — CEFTRIAXONE 1 G/1
1 INJECTION, POWDER, FOR SOLUTION INTRAMUSCULAR; INTRAVENOUS
Status: DISCONTINUED | OUTPATIENT
Start: 2018-03-11 | End: 2018-03-11

## 2018-03-11 RX ADMIN — DIVALPROEX SODIUM 125 MG: 125 TABLET, DELAYED RELEASE ORAL at 09:03

## 2018-03-11 RX ADMIN — IPRATROPIUM BROMIDE AND ALBUTEROL SULFATE 3 ML: 2.5; .5 SOLUTION RESPIRATORY (INHALATION) at 03:03

## 2018-03-11 RX ADMIN — AZITHROMYCIN MONOHYDRATE 500 MG: 500 INJECTION, POWDER, LYOPHILIZED, FOR SOLUTION INTRAVENOUS at 01:03

## 2018-03-11 RX ADMIN — SODIUM CHLORIDE: 0.9 INJECTION, SOLUTION INTRAVENOUS at 02:03

## 2018-03-11 RX ADMIN — SODIUM CHLORIDE 75 ML/HR: 0.9 INJECTION, SOLUTION INTRAVENOUS at 11:03

## 2018-03-11 RX ADMIN — PANTOPRAZOLE SODIUM 40 MG: 40 INJECTION, POWDER, FOR SOLUTION INTRAVENOUS at 03:03

## 2018-03-11 RX ADMIN — IPRATROPIUM BROMIDE AND ALBUTEROL SULFATE 3 ML: .5; 3 SOLUTION RESPIRATORY (INHALATION) at 11:03

## 2018-03-11 RX ADMIN — CEFTRIAXONE SODIUM 1 G: 1 INJECTION, POWDER, FOR SOLUTION INTRAMUSCULAR; INTRAVENOUS at 01:03

## 2018-03-11 RX ADMIN — IPRATROPIUM BROMIDE AND ALBUTEROL SULFATE 3 ML: 2.5; .5 SOLUTION RESPIRATORY (INHALATION) at 07:03

## 2018-03-11 NOTE — ED PROVIDER NOTES
[facility]                                                  Chief Complaint  52 y.o. male with Shortness of Breath    History of Present Illness  Mack Will presents to the emergency room with shortness of breath and cough  Patient has mental retardation and Alzheimer's and lives in a group home for his brother  Pt was given antibiotic Zithromax earlier this week, patient not any better this weekend  Patient did not get breathing treatments as previously prescribed, 90% room air oxygen  Patient was afebrile but slightly listless, coarse cough with poor intake the last week now    The history is provided by the patient     Past Medical History   -- Alzheimer disease     -- Brain bleed     -- Down syndrome     -- Seizure        Past Surgical History   -- CHOLECYSTECTOMY       -- CYST REMOVAL          No Known Allergies      Review of Systems and Physical Exam      Review of Systems  -- Constitution - no fever, denies fatigue, no weakness, no chills  -- Eyes - no tearing or redness, no visual disturbance  -- Ear, Nose - no tinnitus or earache, no nasal congestion or discharge  -- Mouth,Throat - no sore throat, no toothache, normal voice, normal swallowing  -- Respiratory - cough and congestion, shortness of breath, no MCQUEEN  -- Cardiovascular - denies chest pain, no palpitations, denies claudication  -- Gastrointestinal - denies abdominal pain, nausea, vomiting, or diarrhea  -- Genitourinary - no dysuria, no denies flank pain, no hematuria or frequency   -- Musculoskeletal - denies back pain, negative for myalgias and arthralgias   -- Neurological - no headache, denies weakness or seizure; no LOC  -- Skin - denies pallor, rash, or changes in skin. no hives or welts noted    /65   Pulse 67  Temp 96.4 °F  Resp 19  SpO2 98%     Physical Exam  -- Nursing note and vitals reviewed  -- Constitutional: Appears well-developed and well-nourished  -- Head: Atraumatic. Normocephalic. No obvious  abnormality  -- Eyes: Pupils are equal and reactive to light. Normal conjunctiva and lids  -- Cardiac: Normal rate, regular rhythm and normal heart sounds  -- Pulmonary: Coarse breath sounds in all fields with no wheezing  -- Abdominal: Soft, no tenderness. Normal bowel sounds. Normal liver edge  -- Musculoskeletal: Normal range of motion, no effusions. Joints stable   -- Neurological: No focal deficits. Showed good interaction with staff  -- Vascular: Posterior tibial, dorsalis pedis and radial pulses 2+ bilaterally      Emergency Room Course      Treatment and Evaluation  -- The electrolytes drawn in the ER today were within normal limits  -- The CBC drawn in the ER today was within normal limits   -- The troponin drawn in the ER today was within normal limits  -- The BNP drawn in the ER today were within normal limits     EKG  -- The EKG findings today were without concerning findings from baseline    Radiology  -- Right lower lobe pneumonia    Additional Work up  -- Blood cultures have also been drawn, results are pending    Medications Given  -- azithromycin 500 mg in 0.9 % sodium chloride 250 mL IVPB    -- cefTRIAXone injection 1 g (not administered)   -- albuterol-ipratropium 2.5mg-0.5mg/3mL nebulizer solution 3 mL     Diagnosis  -- Pneumonia of right lower lobe due to infectious organism.   -- A diagnosis of Cough was also pertinent to this visit.    Disposition and Plan  -- Disposition: observation  -- Condition: stable  -- Telemetry monitoring  -- Morning labs  -- SCD hoses  -- Home medications  -- Nausea medication when necessary  -- Hep-Lock IV  -- Routine monitoring  -- Bed rest until otherwise stated  -- Protonix for GERD prophylaxis  -- Breathing treatments  -- IV antibiotics  -- Blood cultures pending    This note is dictated on Dragon Natural Speaking word recognition program.  There are word recognition mistakes that are occasionally missed on review.           Dax Zavala MD  03/11/18  [id number]

## 2018-03-11 NOTE — ED NOTES
Pt admitted to room 302. Pt transferred via stretcher, on oxygen and continuous cardiac monitor, by nurse in no distress. VSS. Bedside report given to PRUDENCE Bhatt.

## 2018-03-12 LAB
ALBUMIN SERPL BCP-MCNC: 2.6 G/DL
ALP SERPL-CCNC: 87 U/L
ALT SERPL W/O P-5'-P-CCNC: 12 U/L
ANION GAP SERPL CALC-SCNC: 9 MMOL/L
AST SERPL-CCNC: 13 U/L
BASOPHILS # BLD AUTO: 0.04 K/UL
BASOPHILS NFR BLD: 0.5 %
BILIRUB SERPL-MCNC: 0.2 MG/DL
BUN SERPL-MCNC: 9 MG/DL
CALCIUM SERPL-MCNC: 8.8 MG/DL
CHLORIDE SERPL-SCNC: 108 MMOL/L
CO2 SERPL-SCNC: 23 MMOL/L
CREAT SERPL-MCNC: 0.7 MG/DL
DIFFERENTIAL METHOD: ABNORMAL
EOSINOPHIL # BLD AUTO: 0.1 K/UL
EOSINOPHIL NFR BLD: 1.3 %
ERYTHROCYTE [DISTWIDTH] IN BLOOD BY AUTOMATED COUNT: 14.8 %
EST. GFR  (AFRICAN AMERICAN): >60 ML/MIN/1.73 M^2
EST. GFR  (NON AFRICAN AMERICAN): >60 ML/MIN/1.73 M^2
GLUCOSE SERPL-MCNC: 87 MG/DL
HCT VFR BLD AUTO: 38 %
HGB BLD-MCNC: 12.5 G/DL
LYMPHOCYTES # BLD AUTO: 2.6 K/UL
LYMPHOCYTES NFR BLD: 31.5 %
MCH RBC QN AUTO: 32.6 PG
MCHC RBC AUTO-ENTMCNC: 32.9 G/DL
MCV RBC AUTO: 99 FL
MONOCYTES # BLD AUTO: 0.7 K/UL
MONOCYTES NFR BLD: 8.2 %
NEUTROPHILS # BLD AUTO: 4.8 K/UL
NEUTROPHILS NFR BLD: 58.5 %
PLATELET # BLD AUTO: 259 K/UL
PMV BLD AUTO: 11.4 FL
POTASSIUM SERPL-SCNC: 4.3 MMOL/L
PROT SERPL-MCNC: 6.4 G/DL
RBC # BLD AUTO: 3.83 M/UL
SODIUM SERPL-SCNC: 140 MMOL/L
WBC # BLD AUTO: 8.19 K/UL

## 2018-03-12 PROCEDURE — G8982 BODY POS GOAL STATUS: HCPCS | Mod: CL

## 2018-03-12 PROCEDURE — 99222 1ST HOSP IP/OBS MODERATE 55: CPT | Mod: ,,, | Performed by: INTERNAL MEDICINE

## 2018-03-12 PROCEDURE — 94761 N-INVAS EAR/PLS OXIMETRY MLT: CPT

## 2018-03-12 PROCEDURE — 97162 PT EVAL MOD COMPLEX 30 MIN: CPT

## 2018-03-12 PROCEDURE — G8981 BODY POS CURRENT STATUS: HCPCS | Mod: CM

## 2018-03-12 PROCEDURE — 92610 EVALUATE SWALLOWING FUNCTION: CPT

## 2018-03-12 PROCEDURE — 63600175 PHARM REV CODE 636 W HCPCS: Performed by: INTERNAL MEDICINE

## 2018-03-12 PROCEDURE — 36415 COLL VENOUS BLD VENIPUNCTURE: CPT

## 2018-03-12 PROCEDURE — 11000001 HC ACUTE MED/SURG PRIVATE ROOM

## 2018-03-12 PROCEDURE — 97110 THERAPEUTIC EXERCISES: CPT

## 2018-03-12 PROCEDURE — 85025 COMPLETE CBC W/AUTO DIFF WBC: CPT

## 2018-03-12 PROCEDURE — 63600175 PHARM REV CODE 636 W HCPCS: Performed by: SURGERY

## 2018-03-12 PROCEDURE — 25000242 PHARM REV CODE 250 ALT 637 W/ HCPCS: Performed by: SURGERY

## 2018-03-12 PROCEDURE — 27000221 HC OXYGEN, UP TO 24 HOURS

## 2018-03-12 PROCEDURE — 94640 AIRWAY INHALATION TREATMENT: CPT

## 2018-03-12 PROCEDURE — 80053 COMPREHEN METABOLIC PANEL: CPT

## 2018-03-12 PROCEDURE — 25000003 PHARM REV CODE 250: Performed by: SURGERY

## 2018-03-12 PROCEDURE — C9113 INJ PANTOPRAZOLE SODIUM, VIA: HCPCS | Performed by: INTERNAL MEDICINE

## 2018-03-12 RX ADMIN — IPRATROPIUM BROMIDE AND ALBUTEROL SULFATE 3 ML: 2.5; .5 SOLUTION RESPIRATORY (INHALATION) at 03:03

## 2018-03-12 RX ADMIN — IPRATROPIUM BROMIDE AND ALBUTEROL SULFATE 3 ML: 2.5; .5 SOLUTION RESPIRATORY (INHALATION) at 11:03

## 2018-03-12 RX ADMIN — IPRATROPIUM BROMIDE AND ALBUTEROL SULFATE 3 ML: 2.5; .5 SOLUTION RESPIRATORY (INHALATION) at 12:03

## 2018-03-12 RX ADMIN — DIVALPROEX SODIUM 125 MG: 125 TABLET, DELAYED RELEASE ORAL at 09:03

## 2018-03-12 RX ADMIN — PANTOPRAZOLE SODIUM 40 MG: 40 INJECTION, POWDER, FOR SOLUTION INTRAVENOUS at 09:03

## 2018-03-12 RX ADMIN — IPRATROPIUM BROMIDE AND ALBUTEROL SULFATE 3 ML: 2.5; .5 SOLUTION RESPIRATORY (INHALATION) at 07:03

## 2018-03-12 RX ADMIN — CEFTRIAXONE 1 G: 1 INJECTION, SOLUTION INTRAVENOUS at 12:03

## 2018-03-12 RX ADMIN — AZITHROMYCIN MONOHYDRATE 500 MG: 500 INJECTION, POWDER, LYOPHILIZED, FOR SOLUTION INTRAVENOUS at 02:03

## 2018-03-12 NOTE — HPI
Pt presented to ER last night with c/o SOB and cough. Was treated last week with azithromycin/duonebs per PCP 3/6. No improvement. He is afebrile and had no elevated WBC. CXR shows small right lower lobe infiltrate. Blood cultures drawn and pending. He  Is laying in bed this am being fed by sister at bedside. Sister in law also at bedside. Lives at group home. Sister in law reports that he was not getting his treatments at group home. He was rattling more and having difficulty bringing up mucus. Eating well, but sister reports that this am he was getting a little gaggy with breakfast    He does not walk at home, sits in chair or w/c. Diapered.

## 2018-03-12 NOTE — PLAN OF CARE
Problem: Patient Care Overview  Goal: Plan of Care Review  Outcome: Ongoing (interventions implemented as appropriate)  Pt doing well. No question/concerns at this time. Agrees with poc. No pain/falls.  Iv abx on board. Patient is sound a little better. Nursing home placement in progress.

## 2018-03-12 NOTE — NURSING
Bedside report received from [facility].  No complaints of pain noted.  VS stable.  Ivf infusing as ordered.  Pt brother at bedside.  Pt cleaned and changed at this time.  SCD's applied.  Will continue to monitor.  Call bell in reach.  Bed alarm on.

## 2018-03-12 NOTE — ASSESSMENT & PLAN NOTE
Rocephin and azithromycin ordered IVPB  Duoneb every 3hr  D/c O2  Check swallow with speech    Medically, he is improving and can hopefully be discharged in next 24-48 hours (pending any decompensation today). However, CM/SW working with family today to determine where that discharge will happen. Family is not is please with group home

## 2018-03-12 NOTE — PLAN OF CARE
03/12/18 1419   Discharge Reassessment   Assessment Type Discharge Planning Reassessment     Sw consult- Sw spoke with the pt's family about health concerns at the group home where the pt is a resident. The pt's family placed a called to the advocacy center to file a complaint about health concerns at the group home.The advocacy group ([facility]) will come to the hospital tomorrow to assess the pt.  The pt 's family would like to seek new nursing home placement for the pt and are no longer interested in a group home setting. Sw provided family with a list of nursing homes in the area to consider for placement. The family will visit the nursing homes and contact Sw if theyre interested in pt placement. Sw will move forward and complete PASSR/142 for nursing home placement.    Esvin Rojas LMSW

## 2018-03-12 NOTE — PT/OT/SLP PROGRESS
"Physical Therapy Treatment    Patient Name:  Mack Will   MRN:  [id number]    Recommendations:     Discharge Recommendations:  nursing facility, basic   Discharge Equipment Recommendations: none   Barriers to discharge: None    Assessment:     Mack Will is a 52 y.o. male admitted with a medical diagnosis of Pneumonia of right lower lobe due to infectious organism.  He presents with the following impairments/functional limitations:  weakness, impaired endurance, impaired self care skills, impaired functional mobilty, impaired balance, impaired cognition, decreased upper extremity function, decreased lower extremity function, decreased safety awareness . Pt tolerated ROM well. Pt actively resisting attempts to sit EOB this PM.    Rehab Prognosis:  FAir; patient would benefit from acute skilled PT services to address these deficits and reach maximum level of function.      Recent Surgery: * No surgery found *      Plan:     During this hospitalization, patient to be seen  (1-2x/day Monday-Friday; PRN Weekends/Holidays) to address the above listed problems via therapeutic activities, therapeutic exercises  · Plan of Care Expires:   (upon D/C from facility)   Plan of Care Reviewed with: patient    Subjective     Communicated with patient prior to session.  Patient found supine in bed upon PT entry to room, agreeable to treatment.      Chief Complaint: No c/o  Patient comments/goals: "Family goals is to get stronger"  Pain/Comfort:  · Pain Rating 1: 0/10    Patients cultural, spiritual, Quaker conflicts given the current situation:      Objective:       General Precautions: Standard, fall, aspiration   Orthopedic Precautions:N/A   Braces: N/A     Pt actively resisted attempts at supine to sit at EOB.    AM-PAC 6 CLICK MOBILITY  Turning over in bed (including adjusting bedclothes, sheets and blankets)?: 2  Sitting down on and standing up from a chair with arms (e.g., wheelchair, bedside commode, etc.): " 1  Moving from lying on back to sitting on the side of the bed?: 2  Moving to and from a bed to a chair (including a wheelchair)?: 1  Need to walk in hospital room?: 1  Climbing 3-5 steps with a railing?: 1  Total Score: 8       Therapeutic Activities and Exercises:   A/A ROM to BUE/BLE at all joints in available planes of motion with rest breaks as needed to increase strength and endurance with all gross mobility skills.    Patient left supine with all lines intact, call button in reach and NSG notified..    GOALS:    Physical Therapy Goals        Problem: Physical Therapy Goal    Goal Priority Disciplines Outcome Goal Variances Interventions   Physical Therapy Goal     PT/OT, PT      Description:  Goals to be met by: upon D/C from facility     Patient will increase functional independence with mobility by performin. Supine to sit with MInimal Assistance  2. Sit to supine with MInimal Assistance  3. Bed to chair transfer with Minimal Assistance using any AD as needed.  4 Pt will tolerate PROM/A/AROM on BUE/BLE at all joints in available planes of motion with rest breaks as needed to improve functional mobility.                       Time Tracking:     PT Received On: 18  PT Start Time: 1345     PT Stop Time: 1400  PT Total Time (min): 15 min     Billable Minutes: Therapeutic Exercise 15 minutes    Treatment Type: Treatment  PT/PTA: PT           Lulú Sagastume, PT  2018

## 2018-03-12 NOTE — PT/OT/SLP EVAL
Physical Therapy Evaluation    Patient Name:  Mack Will   MRN:  [id number]    Recommendations:     Discharge Recommendations:  nursing facility, basic   Discharge Equipment Recommendations: none   Barriers to discharge: None    Assessment:     Mack Will is a 52 y.o. male admitted with a medical diagnosis of Pneumonia of right lower lobe due to infectious organism.  He presents with the following impairments/functional limitations:  weakness, impaired endurance, impaired self care skills, impaired functional mobilty, impaired balance, decreased upper extremity function, decreased lower extremity function, impaired cognition, decreased safety awareness. Pt presents with weakness and reduced functional activity tolerance. Pt with increased assistance required with bed mobility and T/F.    Rehab Prognosis:  FAir; patient would benefit from acute skilled PT services to address these deficits and reach maximum level of function.      Recent Surgery: * No surgery found *      Plan:     During this hospitalization, patient to be seen  (1-2x/day Monday-Friday; PRN Weekends/Holidays) to address the above listed problems via therapeutic activities, therapeutic exercises  · Plan of Care Expires:   (upon D/C from facility)   Plan of Care Reviewed with: patient    Subjective     Communicated with patient and NSG prior to session.  Patient found supine in bed upon PT entry to room, agreeable to evaluation.      Chief Complaint: Pt with no c/o. Little verbal communication  Patient comments/goals: Family goals per NSG to get stronger  Pain/Comfort:  · Pain Rating 1: 0/10    Patients cultural, spiritual, Restorationism conflicts given the current situation:      Living Environment:  Pt was living in a group home.  Prior to admission, patients level of function was assisted by facility staff with all mobility and ADLs.  Patient has the following equipment: wheelchair.  DME owned (not currently used): wheelchair.  Upon  discharge, patient will have assistance from facility and family.    Objective:     General Precautions: Standard, fall, aspiration   Orthopedic Precautions:N/A   Braces: N/A     Exams:  · Cognitive Exam:  Patient is oriented to Person and follows 10% of verbal/tactile  commands   · Fine Motor Coordination: -       Intact  · Gross Motor Coordination:  WFL  · Postural Exam:  Patient presented with the following abnormalities:    · -       No postural abnormalities identified  · RLE ROM: WFL  · RLE Strength: WFL  · LLE ROM: WFL  · LLE Strength: WFL    Functional Mobility:  · Bed Mobility:     · Rolling Left:  maximal assistance  · Rolling Right: maximal assistance  · Scooting: maximal assistance  · Bridging: maximal assistance  · Supine to Sit: maximal assistance  · Sit to Supine: maximal assistance    AM-PAC 6 CLICK MOBILITY  Total Score:8       Therapeutic Activities and Exercises:  Pt tolerated A/A ROM on BUE/BLE at all joints in available planes of motion with rest breaks as needed to maintain ROM and prevent contractures.    Patient left supine with all lines intact, call button in reach and NSG notified.    GOALS:    Physical Therapy Goals        Problem: Physical Therapy Goal    Goal Priority Disciplines Outcome Goal Variances Interventions   Physical Therapy Goal     PT/OT, PT      Description:  Goals to be met by: upon D/C from facility     Patient will increase functional independence with mobility by performin. Supine to sit with MInimal Assistance  2. Sit to supine with MInimal Assistance  3. Bed to chair transfer with Minimal Assistance using any AD as needed.  4 Pt will tolerate PROM/A/AROM on BUE/BLE at all joints in available planes of motion with rest breaks as needed to improve functional mobility.                       History:     Past Medical History:   Diagnosis Date    Alzheimer disease     Brain bleed     Down syndrome     Seizure        Past Surgical History:   Procedure Laterality  Date    CHOLECYSTECTOMY      CYST REMOVAL         Clinical Decision Making:     History  Co-morbidities and personal factors that may impact the plan of care Examination  Body Structures and Functions, activity limitations and participation restrictions that may impact the plan of care Clinical Presentation   Decision Making/ Complexity Score   Co-morbidities:   [] Time since onset of injury / illness / exacerbation  [x] Status of current condition  [x]Patient's cognitive status and safety concerns    [] Multiple Medical Problems (see med hx)  Personal Factors:   [] Patient's age  [x] Prior Level of function   [] Patient's home situation (environment and family support)  [x] Patient's level of motivation  [x] Expected progression of patient      HISTORY:(criteria)    [] 41295 - no personal factors/history    [] 69139 - has 1-2 personal factor/comorbidity     [x] 71251 - has >3 personal factor/comorbidity     Body Regions:  [] Objective examination findings  [] Head     []  Neck  [] Trunk   [x] Upper Extremity  [x] Lower Extremity    Body Systems:  [x] For communication ability, affect, cognition, language, and learning style: the assessment of the ability to make needs known, consciousness, orientation (person, place, and time), expected emotional /behavioral responses, and learning preferences (eg, learning barriers, education  needs)  [] For the neuromuscular system: a general assessment of gross coordinated movement (eg, balance, gait, locomotion, transfers, and transitions) and motor function  (motor control and motor learning)  [x] For the musculoskeletal system: the assessment of gross symmetry, gross range of motion, gross strength, height, and weight  [] For the integumentary system: the assessment of pliability(texture), presence of scar formation, skin color, and skin integrity  [] For cardiovascular/pulmonary system: the assessment of heart rate, respiratory rate, blood pressure, and edema     Activity  limitations:    [x] Patient's cognitive status and saf ety concerns          [] Status of current condition      [] Weight bearing restriction  [] Cardiopulmunary Restriction    Participation Restrictions:   [] Goals and goal agreement with the patient     [x] Rehab potential (prognosis) and probable outcome      Examination of Body System: (criteria)    [] 59366 - addressing 1-2 elements    [x] 68605 - addressing a total of 3 or more elements     [] 24269 -  Addressing a total of 4 or more elements         Clinical Presentation: (criteria)  Evolving - 76173     On examination of body system using standardized tests and measures patient presents with 3 or more elements from any of the following: body structures and functions, activity limitations, and/or participation restrictions.  Leading to a clinical presentation that is considered evolving with changing characteristics                              Clinical Decision Making  (Eval Complexity):  Moderate - 10877     Time Tracking:     PT Received On: 03/12/18  PT Start Time: 0916     PT Stop Time: 0939  PT Total Time (min): 23 min     Billable Minutes: Evaluation 15 minutes and Therapeutic Exercise 8 minutes      Lulú Sagastume, PT  03/12/2018

## 2018-03-12 NOTE — PLAN OF CARE
03/11/18 1330   Medicare Message   Important Message from Medicare regarding Discharge Appeal Rights Given to patient/caregiver;Explained to patient/caregiver;Signed/date by patient/caregiver   Date IMM was signed 03/11/18   Time IMM was signed [id number]

## 2018-03-12 NOTE — PLAN OF CARE
03/12/18 1559   Discharge Reassessment   Assessment Type Discharge Planning Reassessment     Daksha contacted [place] dept of health an was referred to contact [facility]. [facility]sita and spoke with Diego who stated that it takes about 10 days to approve LOCET. Daksha asked Diego to consider expediting the pt forms bc of health concerns/pt compliant at the group home. Pt's application will be assigned tomorrow and Sw will contact to f/up.

## 2018-03-12 NOTE — PT/OT/SLP EVAL
Speech Language Pathology Evaluation  Bedside Swallow    Patient Name:  Mack Will   MRN:  [id number]  Admitting Diagnosis: Pneumonia of right lower lobe due to infectious organism    Recommendations:                 General Recommendations:  Modified barium swallow study  Diet recommendations:  Puree, Nectar Thick   Aspiration Precautions:   · Watch for swallow between presentations  · 1 bite/sip at a time  · Assistance with meals  · Assistance with thickening liquids  · Check for pocketing/oral residue  · Meds crushed in puree  · Small bites/sips  · Standard aspiration precautions   General Precautions: Standard, aspiration, fall, nectar thick  Communication strategies:  provide increased time to answer    History:     Past Medical History:   Diagnosis Date    Alzheimer disease     Brain bleed     Down syndrome     Seizure        Past Surgical History:   Procedure Laterality Date    CHOLECYSTECTOMY      CYST REMOVAL         Social History: Patient lives in a group home.    Prior Intubation HX:  n/a    Modified Barium Swallow: no    Chest X-Rays: 03/11/18- A small infiltrate at the right lung base is consistent with pneumonia.  Borderline heart size.    Prior diet: pureed diet with thin liquids.    Occupation/hobbies/homemaking: none stated, family reports he has been declining and no longer recognizes them.    Subjective     Awake and alert    Patient goals: None stated     Pain/Comfort:  · Pain Rating 1: 0/10    Objective:     Oral Musculature Evaluation  · Oral Musculature: WFL  · Secretion Management: adequate  · Mucosal Quality: good  · Voice Prior to PO Intake: limited vocalizations, clear quality    Bedside Swallow Eval:   Consistencies Assessed:  · Thin liquids - small Straw sips  · Nectar thick liquids - Small and large straw sips  · Puree large teaspoons     Oral Phase:   · Excess chewing    Pharyngeal Phase:   · no overt clinical signs/symptoms of aspiration with nectar or pudding  · wet vocal  quality after swallow inconsistent after thin liquids    Compensatory Strategies  · None    Treatment: spoke with family and are agreement to plan    Assessment:     Mack Will is a 52 y.o. male with an SLP diagnosis of Dysphagia.  He presents with s/s of aspiration specifically with thin liquids.     Plan:     · Patient to be seen:  1 x/week   · Plan of Care expires:     · Plan of Care reviewed with:  patient, family   · SLP Follow-Up:  Yes (MBSS tomorrow)       Discharge recommendations:   (per family)   Barriers to Discharge:  None per speech    Time Tracking:     SLP Treatment Date:   03/12/18  Speech Start Time:  1050  Speech Stop Time:  1128     Speech Total Time (min):  38 min    Billable Minutes: Eval Swallow and Oral Function 38 minutes    JEAN Seals, CCC-SLP  03/12/2018

## 2018-03-12 NOTE — PLAN OF CARE
03/12/18 1412   Discharge Assessment   Assessment Type Discharge Planning Assessment   Confirmed/corrected address and phone number on facesheet? Yes   Assessment information obtained from? Caregiver;Medical Record   Expected Length of Stay (days) 2   Communicated expected length of stay with patient/caregiver yes   Prior to hospitilization cognitive status: Not Oriented to Person;Not Oriented to Place;Not Oriented to Time   Prior to hospitalization functional status: Wheelchair Bound;Completely Dependent   Current cognitive status: Not Oriented to Person;Not Oriented to Place;Not Oriented to Time   Current Functional Status: Wheelchair Bound;Completely Dependent   Lives With other (see comments)  (The pt lives in a Group home.)   Able to Return to Prior Arrangements unable to determine at this time (comments)   Is patient able to care for self after discharge? No   Who are your caregiver(s) and their phone number(s)? Beltran Will [phone]   Patient's perception of discharge disposition nursing home   Readmission Within The Last 30 Days no previous admission in last 30 days   Patient currently being followed by outpatient case management? No   Patient currently receives any other outside agency services? No   Equipment Currently Used at Home wheelchair   Do you have any problems affording any of your prescribed medications? Yes   Is the patient taking medications as prescribed? yes   Does the patient have transportation home? Yes   Transportation Available agency transportation   Does the patient receive services at the [facility]? No   Discharge Plan A New Nursing Home placement - jail [facility]   Discharge Plan B Group home   Patient/Family In Agreement With Plan yes   Readmission Questionnaire   Have you felt down, depressed, or hopeless? Unable to Assess     The pt is a 52 year old male who was admitted with pneumonia. The pt lives in a group home. The pt is unable to ambulate. The pt does  not use O2 at home. The pt does not have hh. The pt is able to afford his medications. The pt has a DNR on file. The pt has no other SW needs noted at this time. SW will continue to follow and offer support as needed.    Esvin Rojas LMSW

## 2018-03-12 NOTE — H&P
[facility] Hospital Medicine  History & Physical    Patient Name: Mack Will  MRN: [id number]  Admission Date: 3/11/2018  Attending Physician: Hunter Morfin MD   Primary Care Provider: Chetan Alvarez MD         Patient information was obtained from relative(s) and ER records.     Subjective:     Principal Problem:Pneumonia of right lower lobe due to infectious organism    Chief Complaint:   Chief Complaint   Patient presents with    Shortness of Breath        HPI: Pt presented to ER last night with c/o SOB and cough. Was treated last week with azithromycin/duonebs per PCP 3/6. No improvement. He is afebrile and had no elevated WBC. CXR shows small right lower lobe infiltrate. Blood cultures drawn and pending. He  Is laying in bed this am being fed by sister at bedside. Sister in law also at bedside. Lives at group home. Sister in law reports that he was not getting his treatments at group home. He was rattling more and having difficulty bringing up mucus. Eating well, but sister reports that this am he was getting a little gaggy with breakfast    He does not walk at home, sits in chair or w/c. Diapered.     Past Medical History:   Diagnosis Date    Alzheimer disease     Brain bleed     Down syndrome     Seizure        Past Surgical History:   Procedure Laterality Date    CHOLECYSTECTOMY      CYST REMOVAL         Review of patient's allergies indicates:  No Known Allergies    No current facility-administered medications on file prior to encounter.      Current Outpatient Prescriptions on File Prior to Encounter   Medication Sig    albuterol-ipratropium 2.5mg-0.5mg/3mL (DUO-NEB) 0.5 mg-3 mg(2.5 mg base)/3 mL nebulizer solution Take 3 mLs by nebulization every 6 (six) hours as needed for Wheezing or Shortness of Breath. Rescue    [] azithromycin (ZITHROMAX) 500 MG tablet Take 1 tablet (500 mg total) by mouth once daily.    clotrimazole (LOTRIMIN) 1 % cream Apply  topically 2 (two) times daily.    cyanocobalamin, vitamin B-12, 5,000 mcg TbDL Take 5,000 mcg by mouth once daily.     divalproex (DEPAKOTE) 125 MG EC tablet Take one nightly for 3 weeks, then one BID (Patient taking differently: Take 125 mg by mouth every 12 (twelve) hours. )    ketoconazole (NIZORAL) 2 % cream Apply topically once daily.    LACTOSE-REDUCED FOOD (BOOST ORAL) Take by mouth once daily at 6am.    megestrol (MEGACE) 400 mg/10 mL (10 mL) Susp Take 5 mLs (200 mg total) by mouth once daily.    mupirocin (BACTROBAN) 2 % ointment Apply topically 2 (two) times daily.    oxcarbazepine (TRILEPTAL) 300 MG Tab Take 1 tablet (300 mg total) by mouth 2 (two) times daily.    tamsulosin (FLOMAX) 0.4 mg Cp24 Take 1 capsule (0.4 mg total) by mouth once daily.    trazodone (DESYREL) 50 MG tablet Take 1 tablet (50 mg total) by mouth every evening.     Family History     Problem Relation (Age of Onset)    Heart disease Father        Social History Main Topics    Smoking status: Never Smoker    Smokeless tobacco: Never Used    Alcohol use No    Drug use: Unknown    Sexual activity: Not on file     Review of Systems   Unable to perform ROS: Dementia     Objective:     Vital Signs (Most Recent):  Temp: 97 °F (36.1 °C) (03/12/18 0732)  Pulse: (!) 56 (03/12/18 0732)  Resp: 16 (03/12/18 0732)  BP: (!) 146/82 (03/12/18 0732)  SpO2: 100 % (03/12/18 0728) Vital Signs (24h Range):  Temp:  [96.4 °F (35.8 °C)-99 °F (37.2 °C)] 97 °F (36.1 °C)  Pulse:  [] 56  Resp:  [14-25] 16  SpO2:  [88 %-100 %] 100 %  BP: ()/(56-82) 146/82     Weight: 58.9 kg (129 lb 13.6 oz)  Body mass index is 22.29 kg/m².    Physical Exam   Constitutional: He is oriented to person, place, and time. He appears well-developed and well-nourished. No distress.   HENT:   Head: Normocephalic and atraumatic.   Right Ear: External ear normal.   Left Ear: External ear normal.   Eyes: Conjunctivae and EOM are normal. Pupils are equal, round, and  reactive to light. Right eye exhibits no discharge. Left eye exhibits no discharge.   Neck: Neck supple. No tracheal deviation present.   Cardiovascular: Normal rate and regular rhythm.  Exam reveals no friction rub.    No murmur heard.  Pulmonary/Chest: Effort normal. No respiratory distress. He has wheezes (diffuse). He has no rales.   Abdominal: Soft. Bowel sounds are normal. He exhibits no distension. There is no tenderness.   Neurological: He is alert and oriented to person, place, and time. No cranial nerve deficit.   Skin: Skin is warm and dry.   Psychiatric: He has a normal mood and affect. His behavior is normal.   Nursing note and vitals reviewed.        CRANIAL NERVES     CN III, IV, VI   Pupils are equal, round, and reactive to light.  Extraocular motions are normal.        Significant Labs:   CBC:   Recent Labs  Lab 03/11/18  1155 03/12/18  0518   WBC 11.93 8.19   HGB 13.9* 12.5*   HCT 41.9 38.0*    259     CMP:   Recent Labs  Lab 03/11/18  1155 03/12/18  0518    140   K 4.2 4.3    108   CO2 23 23   GLU 80 87   BUN 12 9   CREATININE 0.8 0.7   CALCIUM 9.2 8.8   PROT 7.4 6.4   ALBUMIN 3.0* 2.6*   BILITOT 0.3 0.2   ALKPHOS 90 87   AST 15 13   ALT 15 12   ANIONGAP 12 9   EGFRNONAA >60 >60     Cardiac Markers:   Recent Labs  Lab 03/11/18  1155   BNP <10     Lactic Acid:   Recent Labs  Lab 03/11/18  1155   LACTATE 1.7       Recent Labs  Lab 03/11/18  1155     193   CPKMB 0.9   TROPONINI <0.006   MB 0.5     Flu negative    Blood cultures NGTD    Significant Imaging:     CXR A small infiltrate at the right lung base is consistent with pneumonia.  Borderline heart size.    EKG Normal sinus rhythm with sinus arrhythmia  Normal ECG    Assessment/Plan:     * Pneumonia of right lower lobe due to infectious organism    Rocephin and azithromycin ordered IVPB  Duoneb every 3hr  D/c O2  Check swallow with speech    Medically, he is improving and can hopefully be discharged in next 24-48 hours  (pending any decompensation today). However, CM/SW working with family today to determine where that discharge will happen. Family is not is please with group home          Debility    He is non ambulatory at home. Will have pt see him to cont his transferring ability           Seizure disorder    divalproex 125mg po BID            VTE Risk Mitigation         Ordered     Medium Risk of VTE  Once      03/11/18 1450     Place sequential compression device  Until discontinued      03/11/18 1450             Italia Vargas MD  Department of Hospital Medicine   [facility]

## 2018-03-12 NOTE — PLAN OF CARE
Problem: Patient Care Overview  Goal: Plan of Care Review  Outcome: Ongoing (interventions implemented as appropriate)  Pt does not follow commands.  Spoke with pt brother at beginning of shift about plan of care.  Will continue respiratory treatments and IV antibiotics as ordered.  Pt turn [id number].  VS stable.  Pt in diaper and incontinent care provided.  Call bell in reach.  Bed alarm on.  Will continue to monitor.

## 2018-03-12 NOTE — SUBJECTIVE & OBJECTIVE
Past Medical History:   Diagnosis Date    Alzheimer disease     Brain bleed     Down syndrome     Seizure        Past Surgical History:   Procedure Laterality Date    CHOLECYSTECTOMY      CYST REMOVAL         Review of patient's allergies indicates:  No Known Allergies    No current facility-administered medications on file prior to encounter.      Current Outpatient Prescriptions on File Prior to Encounter   Medication Sig    albuterol-ipratropium 2.5mg-0.5mg/3mL (DUO-NEB) 0.5 mg-3 mg(2.5 mg base)/3 mL nebulizer solution Take 3 mLs by nebulization every 6 (six) hours as needed for Wheezing or Shortness of Breath. Rescue    [] azithromycin (ZITHROMAX) 500 MG tablet Take 1 tablet (500 mg total) by mouth once daily.    clotrimazole (LOTRIMIN) 1 % cream Apply topically 2 (two) times daily.    cyanocobalamin, vitamin B-12, 5,000 mcg TbDL Take 5,000 mcg by mouth once daily.     divalproex (DEPAKOTE) 125 MG EC tablet Take one nightly for 3 weeks, then one BID (Patient taking differently: Take 125 mg by mouth every 12 (twelve) hours. )    ketoconazole (NIZORAL) 2 % cream Apply topically once daily.    LACTOSE-REDUCED FOOD (BOOST ORAL) Take by mouth once daily at 6am.    megestrol (MEGACE) 400 mg/10 mL (10 mL) Susp Take 5 mLs (200 mg total) by mouth once daily.    mupirocin (BACTROBAN) 2 % ointment Apply topically 2 (two) times daily.    oxcarbazepine (TRILEPTAL) 300 MG Tab Take 1 tablet (300 mg total) by mouth 2 (two) times daily.    tamsulosin (FLOMAX) 0.4 mg Cp24 Take 1 capsule (0.4 mg total) by mouth once daily.    trazodone (DESYREL) 50 MG tablet Take 1 tablet (50 mg total) by mouth every evening.     Family History     Problem Relation (Age of Onset)    Heart disease Father        Social History Main Topics    Smoking status: Never Smoker    Smokeless tobacco: Never Used    Alcohol use No    Drug use: Unknown    Sexual activity: Not on file     Review of Systems   Unable to perform ROS:  Dementia     Objective:     Vital Signs (Most Recent):  Temp: 97 °F (36.1 °C) (03/12/18 0732)  Pulse: (!) 56 (03/12/18 0732)  Resp: 16 (03/12/18 0732)  BP: (!) 146/82 (03/12/18 0732)  SpO2: 100 % (03/12/18 0728) Vital Signs (24h Range):  Temp:  [96.4 °F (35.8 °C)-99 °F (37.2 °C)] 97 °F (36.1 °C)  Pulse:  [] 56  Resp:  [14-25] 16  SpO2:  [88 %-100 %] 100 %  BP: ()/(56-82) 146/82     Weight: 58.9 kg (129 lb 13.6 oz)  Body mass index is 22.29 kg/m².    Physical Exam   Constitutional: He is oriented to person, place, and time. He appears well-developed and well-nourished. No distress.   HENT:   Head: Normocephalic and atraumatic.   Right Ear: External ear normal.   Left Ear: External ear normal.   Eyes: Conjunctivae and EOM are normal. Pupils are equal, round, and reactive to light. Right eye exhibits no discharge. Left eye exhibits no discharge.   Neck: Neck supple. No tracheal deviation present.   Cardiovascular: Normal rate and regular rhythm.  Exam reveals no friction rub.    No murmur heard.  Pulmonary/Chest: Effort normal. No respiratory distress. He has wheezes (diffuse). He has no rales.   Abdominal: Soft. Bowel sounds are normal. He exhibits no distension. There is no tenderness.   Neurological: He is alert and oriented to person, place, and time. No cranial nerve deficit.   Skin: Skin is warm and dry.   Psychiatric: He has a normal mood and affect. His behavior is normal.   Nursing note and vitals reviewed.        CRANIAL NERVES     CN III, IV, VI   Pupils are equal, round, and reactive to light.  Extraocular motions are normal.        Significant Labs:   CBC:   Recent Labs  Lab 03/11/18  1155 03/12/18  0518   WBC 11.93 8.19   HGB 13.9* 12.5*   HCT 41.9 38.0*    259     CMP:   Recent Labs  Lab 03/11/18  1155 03/12/18  0518    140   K 4.2 4.3    108   CO2 23 23   GLU 80 87   BUN 12 9   CREATININE 0.8 0.7   CALCIUM 9.2 8.8   PROT 7.4 6.4   ALBUMIN 3.0* 2.6*   BILITOT 0.3 0.2    ALKPHOS 90 87   AST 15 13   ALT 15 12   ANIONGAP 12 9   EGFRNONAA >60 >60     Cardiac Markers:   Recent Labs  Lab 03/11/18  1155   BNP <10     Lactic Acid:   Recent Labs  Lab 03/11/18  1155   LACTATE 1.7       Recent Labs  Lab 03/11/18  1155     193   CPKMB 0.9   TROPONINI <0.006   MB 0.5     Flu negative    Blood cultures NGTD    Significant Imaging:     CXR A small infiltrate at the right lung base is consistent with pneumonia.  Borderline heart size.    EKG Normal sinus rhythm with sinus arrhythmia  Normal ECG

## 2018-03-13 PROBLEM — K08.89 CHEWING DIFFICULTY: Status: ACTIVE | Noted: 2018-03-13

## 2018-03-13 LAB
ALBUMIN SERPL BCP-MCNC: 2.9 G/DL
ALP SERPL-CCNC: 103 U/L
ALT SERPL W/O P-5'-P-CCNC: 24 U/L
ANION GAP SERPL CALC-SCNC: 12 MMOL/L
ANISOCYTOSIS BLD QL SMEAR: SLIGHT
AST SERPL-CCNC: 21 U/L
BASOPHILS # BLD AUTO: ABNORMAL K/UL
BASOPHILS NFR BLD: 0 %
BILIRUB SERPL-MCNC: 0.2 MG/DL
BUN SERPL-MCNC: 10 MG/DL
CALCIUM SERPL-MCNC: 9.4 MG/DL
CHLORIDE SERPL-SCNC: 106 MMOL/L
CO2 SERPL-SCNC: 22 MMOL/L
CREAT SERPL-MCNC: 0.7 MG/DL
DIFFERENTIAL METHOD: ABNORMAL
EOSINOPHIL # BLD AUTO: ABNORMAL K/UL
EOSINOPHIL NFR BLD: 3 %
ERYTHROCYTE [DISTWIDTH] IN BLOOD BY AUTOMATED COUNT: 14.8 %
EST. GFR  (AFRICAN AMERICAN): >60 ML/MIN/1.73 M^2
EST. GFR  (NON AFRICAN AMERICAN): >60 ML/MIN/1.73 M^2
GLUCOSE SERPL-MCNC: 85 MG/DL
HCT VFR BLD AUTO: 42.4 %
HGB BLD-MCNC: 14.4 G/DL
LYMPHOCYTES # BLD AUTO: ABNORMAL K/UL
LYMPHOCYTES NFR BLD: 38 %
MAGNESIUM SERPL-MCNC: 2.1 MG/DL
MCH RBC QN AUTO: 33 PG
MCHC RBC AUTO-ENTMCNC: 34 G/DL
MCV RBC AUTO: 97 FL
MONOCYTES # BLD AUTO: ABNORMAL K/UL
MONOCYTES NFR BLD: 5 %
MYELOCYTES NFR BLD MANUAL: 2 %
NEUTROPHILS NFR BLD: 50 %
NEUTS BAND NFR BLD MANUAL: 2 %
PHOSPHATE SERPL-MCNC: 2.5 MG/DL
PLATELET # BLD AUTO: 288 K/UL
PLATELET BLD QL SMEAR: ABNORMAL
PMV BLD AUTO: 11.4 FL
POCT GLUCOSE: 110 MG/DL (ref 70–110)
POTASSIUM SERPL-SCNC: 4.1 MMOL/L
PROT SERPL-MCNC: 7.2 G/DL
RBC # BLD AUTO: 4.36 M/UL
SODIUM SERPL-SCNC: 140 MMOL/L
VALPROATE SERPL-MCNC: 18.2 UG/ML
WBC # BLD AUTO: 7.72 K/UL

## 2018-03-13 PROCEDURE — 97110 THERAPEUTIC EXERCISES: CPT

## 2018-03-13 PROCEDURE — 83735 ASSAY OF MAGNESIUM: CPT

## 2018-03-13 PROCEDURE — 84100 ASSAY OF PHOSPHORUS: CPT

## 2018-03-13 PROCEDURE — 27000221 HC OXYGEN, UP TO 24 HOURS

## 2018-03-13 PROCEDURE — 99233 SBSQ HOSP IP/OBS HIGH 50: CPT | Mod: ,,, | Performed by: INTERNAL MEDICINE

## 2018-03-13 PROCEDURE — C9113 INJ PANTOPRAZOLE SODIUM, VIA: HCPCS | Performed by: INTERNAL MEDICINE

## 2018-03-13 PROCEDURE — 94761 N-INVAS EAR/PLS OXIMETRY MLT: CPT

## 2018-03-13 PROCEDURE — 99223 1ST HOSP IP/OBS HIGH 75: CPT | Mod: ,,, | Performed by: PSYCHIATRY & NEUROLOGY

## 2018-03-13 PROCEDURE — 85027 COMPLETE CBC AUTOMATED: CPT

## 2018-03-13 PROCEDURE — 94640 AIRWAY INHALATION TREATMENT: CPT

## 2018-03-13 PROCEDURE — 25000003 PHARM REV CODE 250: Performed by: SURGERY

## 2018-03-13 PROCEDURE — 63600175 PHARM REV CODE 636 W HCPCS: Performed by: INTERNAL MEDICINE

## 2018-03-13 PROCEDURE — 36415 COLL VENOUS BLD VENIPUNCTURE: CPT

## 2018-03-13 PROCEDURE — 80164 ASSAY DIPROPYLACETIC ACD TOT: CPT

## 2018-03-13 PROCEDURE — 63600175 PHARM REV CODE 636 W HCPCS: Performed by: SURGERY

## 2018-03-13 PROCEDURE — 25000003 PHARM REV CODE 250: Performed by: PSYCHIATRY & NEUROLOGY

## 2018-03-13 PROCEDURE — 11000001 HC ACUTE MED/SURG PRIVATE ROOM

## 2018-03-13 PROCEDURE — 80053 COMPREHEN METABOLIC PANEL: CPT

## 2018-03-13 PROCEDURE — 63600175 PHARM REV CODE 636 W HCPCS: Performed by: NURSE PRACTITIONER

## 2018-03-13 PROCEDURE — 25000242 PHARM REV CODE 250 ALT 637 W/ HCPCS: Performed by: SURGERY

## 2018-03-13 PROCEDURE — 97802 MEDICAL NUTRITION INDIV IN: CPT

## 2018-03-13 PROCEDURE — 85007 BL SMEAR W/DIFF WBC COUNT: CPT

## 2018-03-13 RX ORDER — LORAZEPAM 2 MG/ML
1 INJECTION INTRAMUSCULAR ONCE AS NEEDED
Status: ACTIVE | OUTPATIENT
Start: 2018-03-13 | End: 2018-03-13

## 2018-03-13 RX ORDER — LORAZEPAM 2 MG/ML
1 INJECTION INTRAMUSCULAR ONCE
Status: COMPLETED | OUTPATIENT
Start: 2018-03-13 | End: 2018-03-13

## 2018-03-13 RX ORDER — LORAZEPAM 2 MG/ML
1 CONCENTRATE ORAL ONCE
Status: DISCONTINUED | OUTPATIENT
Start: 2018-03-13 | End: 2018-03-13

## 2018-03-13 RX ORDER — LORAZEPAM 2 MG/ML
1 INJECTION INTRAMUSCULAR
Status: DISCONTINUED | OUTPATIENT
Start: 2018-03-13 | End: 2018-03-13

## 2018-03-13 RX ORDER — OXCARBAZEPINE 150 MG/1
300 TABLET, FILM COATED ORAL 2 TIMES DAILY
Status: DISCONTINUED | OUTPATIENT
Start: 2018-03-13 | End: 2018-03-20 | Stop reason: HOSPADM

## 2018-03-13 RX ADMIN — IPRATROPIUM BROMIDE AND ALBUTEROL SULFATE 3 ML: 2.5; .5 SOLUTION RESPIRATORY (INHALATION) at 11:03

## 2018-03-13 RX ADMIN — OXCARBAZEPINE 300 MG: 150 TABLET, FILM COATED ORAL at 10:03

## 2018-03-13 RX ADMIN — LORAZEPAM 1 MG: 2 INJECTION INTRAMUSCULAR; INTRAVENOUS at 10:03

## 2018-03-13 RX ADMIN — DIVALPROEX SODIUM 125 MG: 125 TABLET, DELAYED RELEASE ORAL at 10:03

## 2018-03-13 RX ADMIN — IPRATROPIUM BROMIDE AND ALBUTEROL SULFATE 3 ML: 2.5; .5 SOLUTION RESPIRATORY (INHALATION) at 07:03

## 2018-03-13 RX ADMIN — IPRATROPIUM BROMIDE AND ALBUTEROL SULFATE 3 ML: 2.5; .5 SOLUTION RESPIRATORY (INHALATION) at 04:03

## 2018-03-13 RX ADMIN — PANTOPRAZOLE SODIUM 40 MG: 40 INJECTION, POWDER, FOR SOLUTION INTRAVENOUS at 09:03

## 2018-03-13 RX ADMIN — DEXTROSE 250 MG: 50 INJECTION, SOLUTION INTRAVENOUS at 01:03

## 2018-03-13 RX ADMIN — LORAZEPAM 1 MG: 2 INJECTION INTRAMUSCULAR; INTRAVENOUS at 05:03

## 2018-03-13 RX ADMIN — IPRATROPIUM BROMIDE AND ALBUTEROL SULFATE 3 ML: 2.5; .5 SOLUTION RESPIRATORY (INHALATION) at 03:03

## 2018-03-13 RX ADMIN — AZITHROMYCIN MONOHYDRATE 500 MG: 500 INJECTION, POWDER, LYOPHILIZED, FOR SOLUTION INTRAVENOUS at 03:03

## 2018-03-13 RX ADMIN — CEFTRIAXONE 1 G: 1 INJECTION, SOLUTION INTRAVENOUS at 02:03

## 2018-03-13 NOTE — SUBJECTIVE & OBJECTIVE
Review of Systems   Unable to perform ROS: Dementia     Objective:     Vital Signs (Most Recent):  Temp: 97 °F (36.1 °C) (03/13/18 0347)  Pulse: 95 (03/13/18 0600)  Resp: (!) 32 (03/13/18 0430)  BP: (!) 157/78 (03/13/18 0430)  SpO2: 96 % (03/13/18 0430) Vital Signs (24h Range):  Temp:  [97 °F (36.1 °C)-98.3 °F (36.8 °C)] 97 °F (36.1 °C)  Pulse:  [48-95] 95  Resp:  [16-32] 32  SpO2:  [96 %-100 %] 96 %  BP: (105-157)/(55-82) 157/78     Weight: 58.9 kg (129 lb 13.6 oz)  Body mass index is 22.29 kg/m².    Physical Exam   Constitutional: He is oriented to person, place, and time. He appears well-developed and well-nourished. No distress.   HENT:   Head: Normocephalic and atraumatic.   Right Ear: External ear normal.   Left Ear: External ear normal.   Eyes: Conjunctivae and EOM are normal. Pupils are equal, round, and reactive to light. Right eye exhibits no discharge. Left eye exhibits no discharge.   Neck: Neck supple. No tracheal deviation present.   Cardiovascular: Normal rate and regular rhythm.  Exam reveals no friction rub.    No murmur heard.  Pulmonary/Chest: Effort normal. No respiratory distress. He has wheezes (diffuse). He has no rales.   Abdominal: Soft. Bowel sounds are normal. He exhibits no distension. There is no tenderness.   Neurological: He is alert and oriented to person, place, and time. No cranial nerve deficit.   Skin: Skin is warm and dry.   Psychiatric: He has a normal mood and affect. His behavior is normal.   Nursing note and vitals reviewed.        CRANIAL NERVES     CN III, IV, VI   Pupils are equal, round, and reactive to light.  Extraocular motions are normal.        Significant Labs:   CBC:     Recent Labs  Lab 03/11/18  1155 03/12/18  0518 03/13/18  0528   WBC 11.93 8.19 7.72   HGB 13.9* 12.5* 14.4   HCT 41.9 38.0* 42.4    259 288     CMP:     Recent Labs  Lab 03/11/18  1155 03/12/18  0518 03/13/18  0528    140 140   K 4.2 4.3 4.1    108 106   CO2 23 23 22*   GLU 80 87  85   BUN 12 9 10   CREATININE 0.8 0.7 0.7   CALCIUM 9.2 8.8 9.4   PROT 7.4 6.4 7.2   ALBUMIN 3.0* 2.6* 2.9*   BILITOT 0.3 0.2 0.2   ALKPHOS 90 87 103   AST 15 13 21   ALT 15 12 24   ANIONGAP 12 9 12   EGFRNONAA >60 >60 >60    3/13/18- Mg-2.1+, Phosph 2.5  Cardiac Markers:     Recent Labs  Lab 03/11/18  1155   BNP <10     Lactic Acid:     Recent Labs  Lab 03/11/18  1155   LACTATE 1.7       Recent Labs  Lab 03/11/18  1155     193   CPKMB 0.9   TROPONINI <0.006   MB 0.5     Flu negative    Blood cultures NGTD  3/13/18 Valproic acid-     Significant Imaging:   Modified barium swallow- pending    CXR A small infiltrate at the right lung base is consistent with pneumonia.  Borderline heart size.    EKG Normal sinus rhythm with sinus arrhythmia  Normal ECG

## 2018-03-13 NOTE — PT/OT/SLP PROGRESS
Physical Therapy      Patient Name:  Mack Will   MRN:  [id number]    Patient not seen 3/13/2018 a.m. secondary to Other (Comment) (Imaging). Will follow-up 3/13/2018 p.m.    Matthew Riggs PT

## 2018-03-13 NOTE — NURSING
Pt had siezure. Dr. Vargas notified.  Ativan 1mg IV ordered and given.  Pt glucose 110.  /76,   REsp 22 .  Pt was incontinent during seizure.  Will continue to monitor.  Dr. Vargas ordered labs this am.

## 2018-03-13 NOTE — PROGRESS NOTES
" [facility]  Adult Nutrition  Progress Note    SUMMARY       Recommendations    Recommendation/Intervention: Continue Pureed diet with nectar thick liquids encouraging intake; monitor days with no intake due to seizers; Boost Plus at nectar consistency ONS with meals; RD to monitor  Goals: 1. Increase PO intake to >75% daily 2. Maintain current weight  Nutrition Goal Status: new  Communication of RD Recs:  (poc reviewed)    Nutrition Discharge Planning: Pureed diet with nectar thick liquids with   adequate intake to meet EEN & EPN    Reason for Assessment    Reason for Assessment: identified at risk by screening criteria  Diagnosis: other (see comments) (Pneumonia)  Relevant Medical History: dementia, brain bleed  General Information Comments: Per family, prior to today pt was consuming adequate intake when feed, however pt had seizures today and had not had any intake today; family alsostated pt would not eat tonight    Nutrition Risk Screen    Nutrition Risk Screen: dysphagia or difficulty swallowing    Nutrition/Diet History    Patient Reported Diet/Restrictions/Preferences: pureed  Do you have any cultural, spiritual, Yazidism conflicts, given your current situation?: none  Food Allergies: NKFA  Factors Affecting Nutritional Intake: impaired cognitive status/motor control, chewing difficulties/inability to chew food, difficulty/impaired swallowing, inability to feed self    Anthropometrics    Temp: 96.4 °F (35.8 °C)  Height: 5' 4" (162.6 cm)  Height (inches): 64 in  Weight Method: Bed Scale  Weight: 58.9 kg (129 lb 13.6 oz) (rd reviewed)  Weight (lb): 129.85 lb  Ideal Body Weight (IBW), Male: 130 lb  % Ideal Body Weight, Male (lb): 99.88 lb  BMI (Calculated): 22.3  BMI Grade: 18.5-24.9 - normal    Lab/Procedures/Meds    Pertinent Labs Reviewed: reviewed  Pertinent Medications Reviewed: reviewed    Physical Findings/Assessment    Skin: intact    Estimated/Assessed Needs    Weight Used For " "Calorie Calculations: 58.9 kg (129 lb 13.6 oz)  Height: 5' 4" (162.6 cm)  Energy Calorie Requirements (kcal): 1472 (25kcal/kg)  Energy Need Method: Kcal/kg  25 kcal/kg (kcal): 1472.5  RMR ([place]-St. Jeor Equation): 1345  Protein Requirements: 59-70.83 (10-1.2g/kg)  Weight Used For Protein Calculations: 58.9 kg (129 lb 13.6 oz)  1.0 gm Protein (gm): 59.02  1.2 gm Protein (gm): 70.83  Fluid Requirements (mL): 1472  Fluid Need Method: RDA Method  RDA Method (mL): 1472       Nutrition Prescription Ordered    Current Diet Order: Dysphagia pureed nectar thick    Evaluation of Received Nutrient/Fluid Intake    Tolerance: tolerating  % Intake of Estimated Energy Needs: 75 - 100 %  % Meal Intake: Other: 0% today    Nutrition Risk    Level of Risk/Frequency of Follow-up:  (F/U 2x/wk)     Assessment and Plan    Chewing difficulty    Contributing Nutrition Diagnosis  Mental Retardation and Dementia     Related to (etiology):   Inability to consume regular diet and adequate nutrition     Signs and Symptoms (as evidenced by):   Dysphagia Pureed Nectar Thick order    Interventions/Recommendations (treatment strategy):  Cont. Pureed diet and provide Nectar Thick Boost for snacks    Nutrition Diagnosis Status:   New               Monitor and Evaluation    Food and Nutrient Intake: energy intake, food and beverage intake  Food and Nutrient Adminstration: diet order  Physical Activity and Function: nutrition-related ADLs and IADLs  Anthropometric Measurements: weight, weight change, body mass index  Biochemical Data, Medical Tests and Procedures: electrolyte and renal panel, gastrointestinal profile, glucose/endocrine profile, inflammatory profile, lipid profile  Nutrition-Focused Physical Findings: overall appearance     Nutrition Follow-Up    RD Follow-up?: Yes    "

## 2018-03-13 NOTE — PT/OT/SLP PROGRESS
Speech Language Pathology      Mack Will  MRN: [id number]    Patient not seen today secondary to Other (Comment) (Spoke with family who requested to defer MBSS today and try again tomorrow.  Notified nsg who stated Pt going for CT of head 2o seizure like activity.). Will follow-up 03/14/18.    JEAN Seals, CCC-SLP

## 2018-03-13 NOTE — ASSESSMENT & PLAN NOTE
Contributing Nutrition Diagnosis  Mental Retardation and Dementia     Related to (etiology):   Inability to consume regular diet and adequate nutrition     Signs and Symptoms (as evidenced by):   Dysphagia Pureed Nectar Thick order    Interventions/Recommendations (treatment strategy):  Cont. Pureed diet and provide Nectar Thick Boost for snacks    Nutrition Diagnosis Status:   New

## 2018-03-13 NOTE — PLAN OF CARE
03/13/18 1202   Discharge Reassessment   Assessment Type Discharge Planning Reassessment     Daksha spoke with Bronwyn from [facility] and she stated that she will come out today to before an assessment. Daksha will send pt's chart information through [facility].

## 2018-03-13 NOTE — PLAN OF CARE
03/13/18 1044   Discharge Reassessment   Assessment Type Discharge Planning Reassessment       Sw informed family that [place] human services authority will come out today to complete an assessment for pt PASSR. The pt's family also informed the SW that they would like the pt to be a DNR.  Sw obtained POA documents to scan into pt's chart. The pt has no other SW needs noted at this time. SW will continue to follow and offer support as needed.    Esvin Rojas LMSW

## 2018-03-13 NOTE — NURSING
Bedside report received from Monique.  No complaints of pain noted.  VS stable.  Call bell in reach.  Family at bedside.  Bed alarm on.  Will continue to turn q 2hours.

## 2018-03-13 NOTE — HOSPITAL COURSE
"3/13/18 admitted for RLL pneumonia. Had a seizure this am after nebulizer tx. . + hx of seizure disorder but family reports had not had seizure in years> Home meds list trileptal 300mg twice daily- family reports he was only taking divalproex at home.   Last PN per Dr. Alvarez has trileptal listed as rx.  Since seizure he has been moaning and pulling legs up intermittently. Family reports that he does not usually do this. "not a complainer". They also report lots of problems with gabriel catheter during last visit.   Afebrile- Temp 97.3, /66, 158/78. Noisy breathing.  They are not planning to have him return to group home. Planning to admit to nursing home when discharged.     3/14/18 admitted for RLL pneumonia 3-11-18,  S/P seizure yesterday am. Subsequent CT brain  negative for acute findings; + hx of hemorrhagic CVA.   Neuro  consulted; meds adjusted.  Trileptal 300mg BID resumed as he is supposed to be on this in addition to Depakote.   Did much better overnight. Back to taking po meds and eating,  Awaiting swallow study.- hopefully today.   Still has very harsh cough.   Day 4 abx  Planning for discharge to ~2 days, NSG home placement in [place]. Allison Philippeebonne  Family concerned about excess sedation. Aware that was not getting trileptal and now resumed. Continued Depakote. They would like to see if could maybe decrease or stop Depakote- discussed will defer to neurology. Currently not getting trazodone which was home med.   3/15/18 admitted for RLL pneumonia 3-11-18,  S/P seizure 3/13 am. Subsequent CT brain  negative for acute findings; + hx of hemorrhagic CVA.   Neuro  Adjusted seizure Rx;  Trileptal  resumed as he is supposed to be on this in addition to Depakote.   Back to taking po meds and eating,    ST 3/13/ recommendations; Pureed diet with nectar thick liquids    Day 5 abx  Planning for  NSG home placement in [place]. Allison Nash  Still with junky cough and noisy breathing. O2 increased to " 5LNC- O2 sats 93% this am with concern for mucous plugging. PT following   Discussed with sisters at bedside and neurology - will keep on depakote and trileptal.  3/16/18 admitted for RLL pneumonia 3-11-18,  S/P seizure 3/13 am. CT Ok,  hx of hemorrhagic CVA.   Neuro  Adjusted seizure Rx;  Trileptal  Resumed. LOC better but worsening lungs yesterday  increasing O2 requirement. Added mucinex and CPT to help with thickened mucous.O2 sats 92% on 5LNC   Patient had a rough day yesterday.  He was placed on Bipap and now seems to be better.  The rattle has resolved.    3/18  He is tolerating bipap  Satting 90% on 4 liters   Atelectasis on CXR   Afebrile     3/19  Had uneventful night. Tolerated bipap over night. Sats 93-98% on 35 % O2 with bipap. Afebrile. Labs essentially normal with exception of low albumin. Speech following patient and per MBSS 3/14 remains on   puree diet with nectar thick liquids due to patient demonstrated mild oropharyngeal dysphagia characterized by delayed pharyngeal swallow and decreased hyolaryngeal elevation. Transferring well which is his baseline. Still with PT    3/20  This am pt was on RA when resp walked into room. Sat was 93%. He did not use bipap over night. No fever. No elevated WBC. Has placement at [facility] for long term care. He has no complaints today

## 2018-03-13 NOTE — CONSULTS
[facility]  Neurology  Consult Note    Patient Name: Mack Will  MRN: [id number]  Admission Date: 3/11/2018  Hospital Length of Stay: 1 days  Code Status: DNR   Attending Provider: Hunter Morfin MD   Consulting Provider: Huang Correa MD  Primary Care Physician: Chetan Alvarez MD  Principal Problem:Pneumonia of right lower lobe due to infectious organism    Inpatient consult to Neurology  Consult performed by: HUANG CORREA  Consult ordered by: RABIA ALEXANDRA         Subjective:     Chief Complaint:  seizure     HPI:   Mack Will is a 52 y.o. male admitted with pneumonia and was witnessed to have a seizure with GTC activity lasting about 5 minutes this am.  This responded to Ativan and CT head was ordered  The patient was using  Trileptal at baseline at the last visit at out neurology clinic. He is on Depakote now as well which was added when he was last seen at out clinic on 5/2017 for mood and appeptite  Trileptal was also continued but for reasons unknown the patient was not on this prior to admit per med list and this medication was not continued at his admission.     The patient is known to me for his  Down Syndrome and associated early Alzheimer's like memory loss. He was previously admitted to [facility] in 3/2015 with seizure and again in   1/2016 and cerebellar hemorrhage found in 2017. He has poor gait and attention due to this.     Sister at bedside notes patient is drowsy and at times appears mildly agitated. He was awake and pleasant  but confused last pm prior to seizure     Past Medical History:   Diagnosis Date    Alzheimer disease     Brain bleed     Down syndrome     Seizure        Past Surgical History:   Procedure Laterality Date    CHOLECYSTECTOMY      CYST REMOVAL         Review of patient's allergies indicates:  No Known Allergies    I have reviewed all of this patient's past medical and surgical histories as well as family and  social histories and active allergies and medications as documented in the electronic medical record.      No current facility-administered medications on file prior to encounter.      Current Outpatient Prescriptions on File Prior to Encounter   Medication Sig    albuterol-ipratropium 2.5mg-0.5mg/3mL (DUO-NEB) 0.5 mg-3 mg(2.5 mg base)/3 mL nebulizer solution Take 3 mLs by nebulization every 6 (six) hours as needed for Wheezing or Shortness of Breath. Rescue    clotrimazole (LOTRIMIN) 1 % cream Apply topically 2 (two) times daily.    cyanocobalamin, vitamin B-12, 5,000 mcg TbDL Take 5,000 mcg by mouth once daily.     divalproex (DEPAKOTE) 125 MG EC tablet Take one nightly for 3 weeks, then one BID (Patient taking differently: Take 125 mg by mouth every 12 (twelve) hours. )    ketoconazole (NIZORAL) 2 % cream Apply topically once daily.    LACTOSE-REDUCED FOOD (BOOST ORAL) Take by mouth once daily at 6am.    megestrol (MEGACE) 400 mg/10 mL (10 mL) Susp Take 5 mLs (200 mg total) by mouth once daily.    mupirocin (BACTROBAN) 2 % ointment Apply topically 2 (two) times daily.    oxcarbazepine (TRILEPTAL) 300 MG Tab Take 1 tablet (300 mg total) by mouth 2 (two) times daily.    tamsulosin (FLOMAX) 0.4 mg Cp24 Take 1 capsule (0.4 mg total) by mouth once daily.    trazodone (DESYREL) 50 MG tablet Take 1 tablet (50 mg total) by mouth every evening.     Family History     Problem Relation (Age of Onset)    Heart disease Father        Social History Main Topics    Smoking status: Never Smoker    Smokeless tobacco: Never Used    Alcohol use No    Drug use: Unknown    Sexual activity: Not on file     Review of Systems   Unable to perform ROS: Dementia     Objective:     Vital Signs (Most Recent):  Temp: 97 °F (36.1 °C) (03/13/18 0730)  Pulse: 73 (03/13/18 0813)  Resp: 18 (03/13/18 0730)  BP: (!) 148/83 (03/13/18 0730)  SpO2: 95 % (03/13/18 0730) Vital Signs (24h Range):  Temp:  [97 °F (36.1 °C)-98.3 °F (36.8 °C)]  97 °F (36.1 °C)  Pulse:  [51-95] 73  Resp:  [18-32] 18  SpO2:  [95 %-98 %] 95 %  BP: (105-157)/(55-83) 148/83     Weight: 58.9 kg (129 lb 13.6 oz)  Body mass index is 22.29 kg/m².    Physical Exam                 Exam:  Gen Appearance, well developed in stigma of down syndrome/nourished in no apparent distress  CV: 2+ distal pulses with no edema or swelling  Neuro:  MS: Sleeping but wakes to voice and moans. Does not sustain attention but localizes to pain Recent/remote memory are gravely impaired    Language is not testable  Fund of Knowledge is note testable  CN: Optic discs are flat with normal vasculature, PERRL, Extraoccular movements and visual fields are full. Normal facial sensation and strength,  Tongue and Palate are midline and strong. Shoulder Shrug symmetric and strong.  Motor: Normal bulk, tone, no abnormal movements. 1+ reflexes. Patient can't cooperate with motor testing but appears to be moving all extremities equally.   Sensory: Can't cooperate  Cerebellar: Can't Well  Cooperate   Gait: Not done- patient not longer walks without assitance             Significant Labs: Depakote level 18.2, CMP, CBC reviewed as well as mg, phos    Significant Imaging:  CT head: No acute changes    Assessment and Plan:   Mack Will is a 52 y.o. male admitted with pneumonia and had a seizure this am.  He is known to me for Down Syndrome and associated Alzheimer's,  seizures and prior cerebellar hemorrhage   I recommend:    * Pneumonia of right lower lobe due to infectious organism    -This infection also likely lowered his seizure threshold        Seizure disorder    -Resume Trileptal 300mg BID as he is supposed to be on this in addition to Depakote.  -Family is not sure why Trileptal was not on his med list at admit, but he has tolerated this medication well in the past but will need Ativen PRN seizures  - Continue Depakote as well which is being used for mood and appetite. Give 250mg IV depakote now given his  NPO status. May need to continue this if he remains NPO  - Reviewed Patient's prior declarations: He is a DNR  -Currently, he is likely having post itctal state in addition to sedation and agitation side effects of ativan. From past experience, he is known to be very sensitive to sedating medications.   -If he fails to wake fully in the next 24-48 hours or if there is concern of ongoing or clinically seizures, he will need transfer to facility with EEG monitoring. We currently do not have an EEG machine here.             Thank you for your consult. Will see patient again Thursday.     ADDENDUM:    Patient is still NPO due to sedation but appears more comfortable to nursing.  Will give Depakote 125mg IV BID to cover for seizures (after loading dose today) until he is awake enough for po. At that point, he will need d/c of Depakote IV and will just resumed ordered po Depakote and Trileptal   If he remains NPO tomorrow- he will need higher dose IVdepakote.   Bib Correa MD  Neurology  [facility]

## 2018-03-13 NOTE — PLAN OF CARE
Problem: Patient Care Overview  Goal: Plan of Care Review  Outcome: Ongoing (interventions implemented as appropriate)  Pt brother edvin and pt sister agree with plan of care.  No complaints of pain noted.  VS stable. Call bell in reach.  Incontinent care provided.  Pt turned q2 hours.  Will continue to monitor.  Bed alarm on.

## 2018-03-13 NOTE — PROGRESS NOTES
Bedside report received from PRUDENCE Sales. Patient lying in bed with intermittent moaning. Oxygen continued at 2L per nasal cannula. Bed alarm engaged.

## 2018-03-13 NOTE — ASSESSMENT & PLAN NOTE
divalproex 125mg po BID  + seizure this am  Awaiting valproic acid-   Not getting home trileptal- will consult neurology for clarification on Rx   CT of head this am   He looks like hurting ativan.

## 2018-03-13 NOTE — PLAN OF CARE
Problem: Patient Care Overview  Goal: Plan of Care Review  Outcome: Ongoing (interventions implemented as appropriate)  Intermittent alertness today. Noncooperative with swallowing. Depakote given IV. IV ordered for tonight and will reevaluate in the morning. Neuro consulted. No seizure activity today. Moans and groans at times. No specific pain located. Bed alarm engaged. Turned and repositioned. Oxygen continued at 2L per nasal cannula. IV antibiotics continued. Family understands plan of care and agrees.

## 2018-03-13 NOTE — HPI
Mack Will is a 52 y.o. male admitted with pneumonia and was witnessed to have a seizure with GTC activity lasting about 5 minutes this am.  This responded to Ativan and CT head was ordered  The patient was using  Trileptal at baseline at the last visit at out neurology clinic. He is on Depakote now as well which was added when he was last seen at out clinic on 5/2017 for mood and appeptite  Trileptal was also continued but for reasons unknown the patient was not on this prior to admit per med list and this medication was not continued at his admission.     The patient is known to me for his  Down Syndrome and associated early Alzheimer's like memory loss. He was previously admitted to [facility] in 3/2015 with seizure and again in   1/2016 and cerebellar hemorrhage found in 2017. He has poor gait and attention due to this.     Sister at bedside notes patient is drowsy and at times appears mildly agitated. He was awake and pleasant  but confused last pm prior to seizure

## 2018-03-13 NOTE — PROGRESS NOTES
"[facility] Medicine  Progress Note    Patient Name: Mack Will  MRN: [id number]  Patient Class: IP- Inpatient   Admission Date: 3/11/2018  Length of Stay: 1 days  Attending Physician: Hunter Morfin MD  Primary Care Provider: Chetan Alvarez MD        Subjective:     Principal Problem:Pneumonia of right lower lobe due to infectious organism    HPI:  Pt presented to ER last night with c/o SOB and cough. Was treated last week with azithromycin/duonebs per PCP 3/6. No improvement. He is afebrile and had no elevated WBC. CXR shows small right lower lobe infiltrate. Blood cultures drawn and pending. He  Is laying in bed this am being fed by sister at bedside. Sister in law also at bedside. Lives at group home. Sister in law reports that he was not getting his treatments at group home. He was rattling more and having difficulty bringing up mucus. Eating well, but sister reports that this am he was getting a little gaggy with breakfast    He does not walk at home, sits in chair or w/c. Diapered.     Hospital Course:  3/13/18 admitted for RLL pneumonia. Had a seizure this am after nebulizer tx. . + hx of seizure disorder but family reports had not had seizure in years> Home meds list trileptal 300mg twice daily- family reports he was only taking divalproex at home.   Last PN per Dr. Alvarez has trileptal listed as rx.  Since seizure he has been moaning and pulling legs up intermittently. Family reports that he does not usually do this. "not a complainer". They also report lots of problems with gabriel catheter during last visit.   Afebrile- Temp 97.3, /66, 158/78. Noisy breathing.  They are not planning to have him return to group home. Planning to admit to nursing home when discharged.     Review of Systems   Unable to perform ROS: Dementia     Objective:     Vital Signs (Most Recent):  Temp: 97 °F (36.1 °C) (03/13/18 0347)  Pulse: 95 (03/13/18 0600)  Resp: (!) 32 (03/13/18 " 0430)  BP: (!) 157/78 (03/13/18 0430)  SpO2: 96 % (03/13/18 0430) Vital Signs (24h Range):  Temp:  [97 °F (36.1 °C)-98.3 °F (36.8 °C)] 97 °F (36.1 °C)  Pulse:  [48-95] 95  Resp:  [16-32] 32  SpO2:  [96 %-100 %] 96 %  BP: (105-157)/(55-82) 157/78     Weight: 58.9 kg (129 lb 13.6 oz)  Body mass index is 22.29 kg/m².    Physical Exam   Constitutional: He is oriented to person, place, and time. He appears well-developed and well-nourished. No distress.   HENT:   Head: Normocephalic and atraumatic.   Right Ear: External ear normal.   Left Ear: External ear normal.   Eyes: Conjunctivae and EOM are normal. Pupils are equal, round, and reactive to light. Right eye exhibits no discharge. Left eye exhibits no discharge.   Neck: Neck supple. No tracheal deviation present.   Cardiovascular: Normal rate and regular rhythm.  Exam reveals no friction rub.    No murmur heard.  Pulmonary/Chest: Effort normal. No respiratory distress. He has wheezes (diffuse). He has no rales.   Abdominal: Soft. Bowel sounds are normal. He exhibits no distension. There is no tenderness.   Neurological: He is alert and oriented to person, place, and time. No cranial nerve deficit.   Skin: Skin is warm and dry.   Psychiatric: He has a normal mood and affect. His behavior is normal.   Nursing note and vitals reviewed.        CRANIAL NERVES     CN III, IV, VI   Pupils are equal, round, and reactive to light.  Extraocular motions are normal.        Significant Labs:   CBC:     Recent Labs  Lab 03/11/18  1155 03/12/18  0518 03/13/18  0528   WBC 11.93 8.19 7.72   HGB 13.9* 12.5* 14.4   HCT 41.9 38.0* 42.4    259 288     CMP:     Recent Labs  Lab 03/11/18  1155 03/12/18  0518 03/13/18  0528    140 140   K 4.2 4.3 4.1    108 106   CO2 23 23 22*   GLU 80 87 85   BUN 12 9 10   CREATININE 0.8 0.7 0.7   CALCIUM 9.2 8.8 9.4   PROT 7.4 6.4 7.2   ALBUMIN 3.0* 2.6* 2.9*   BILITOT 0.3 0.2 0.2   ALKPHOS 90 87 103   AST 15 13 21   ALT 15 12 24    ANIONGAP 12 9 12   EGFRNONAA >60 >60 >60    3/13/18- Mg-2.1+, Phosph 2.5  Cardiac Markers:     Recent Labs  Lab 03/11/18  1155   BNP <10     Lactic Acid:     Recent Labs  Lab 03/11/18  1155   LACTATE 1.7       Recent Labs  Lab 03/11/18  1155     193   CPKMB 0.9   TROPONINI <0.006   MB 0.5     Flu negative    Blood cultures NGTD  3/13/18 Valproic acid-     Significant Imaging:   Modified barium swallow- pending    CXR A small infiltrate at the right lung base is consistent with pneumonia.  Borderline heart size.    EKG Normal sinus rhythm with sinus arrhythmia  Normal ECG    Assessment/Plan:      * Pneumonia of right lower lobe due to infectious organism    Rocephin and azithromycin ordered IVPB  Duoneb every 3hr  D/c O2  Check swallow with speech            Debility    He is non ambulatory at home. Will have pt see him to cont his transferring ability           Seizure disorder    divalproex 125mg po BID  + seizure this am  Awaiting valproic acid-   Not getting home trileptal- will consult neurology for clarification on Rx   CT of head this am   He looks like hurting ativan.          Down's syndrome                VTE Risk Mitigation         Ordered     Medium Risk of VTE  Once      03/11/18 1450     Place sequential compression device  Until discontinued      03/11/18 1450        DNR; paperwork done; surrogate declaration reviewed   DNR status approriate      Hunter Morfin MD  Department of Hospital Medicine   [facility]

## 2018-03-13 NOTE — PT/OT/SLP PROGRESS
Physical Therapy Treatment    Patient Name:  Mack Will   MRN:  [id number]    Recommendations:     Discharge Recommendations:  other (see comments) (per family)   Discharge Equipment Recommendations: none   Barriers to discharge: None    Assessment:     Mack Will is a 52 y.o. male admitted with a medical diagnosis of Pneumonia of right lower lobe due to infectious organism.  He presents with the following impairments/functional limitations:  weakness, impaired endurance, impaired self care skills, impaired functional mobilty, impaired cognition, decreased lower extremity function, decreased upper extremity function, decreased safety awareness .  Pt. Is progressing towards PT POC goals.  Pt. Presented with decreased alertness this p.m.    Rehab Prognosis:  Fair; patient would benefit from acute skilled PT services to address these deficits and reach maximum level of function.      Recent Surgery: * No surgery found *      Plan:     During this hospitalization, patient to be seen  (1-2x/day(M-F); PRN(Sat.,Sun.)) to address the above listed problems via therapeutic activities, therapeutic exercises  · Plan of Care Expires:   (Upon D/C from facility)   Plan of Care Reviewed with: patient    Subjective     Communicated with patient prior to session.  Patient found supine in bed upon PT entry to room, agreeable to treatment.      Chief Complaint: None voiced  Patient comments/goals: Minimal pt. verbalization.  Pain/Comfort:  · Pain Rating 1: 0/10  · Pain Rating Post-Intervention 1: 0/10    Patients cultural, spiritual, Hinduism conflicts given the current situation:      Objective:     Patient found with:       General Precautions: Standard, aspiration, fall, nectar thick   Orthopedic Precautions:N/A   Braces: N/A     ·       AM-PAC 6 CLICK MOBILITY  Turning over in bed (including adjusting bedclothes, sheets and blankets)?: 2  Sitting down on and standing up from a chair with arms (e.g., wheelchair,  bedside commode, etc.): 1  Moving from lying on back to sitting on the side of the bed?: 2  Moving to and from a bed to a chair (including a wheelchair)?: 1  Need to walk in hospital room?: 1  Climbing 3-5 steps with a railing?: 1  Total Score: 8       Therapeutic Activities and Exercises:   There. Ex.:  Pt. performed gentle A/A ex. of BLE's, in available planes of motion, while pt. supine in bed, for N.M. tone and strength and fluid dynamics. 1-on-1 BLE HC stretches performed with pt.  BLE: ankle pumps, heel slides, hip abd/add (2x15) performed by pt.    Patient left HOB elevated with all lines intact, call button in reach and RN notified..    GOALS:    Physical Therapy Goals        Problem: Physical Therapy Goal    Goal Priority Disciplines Outcome Goal Variances Interventions   Physical Therapy Goal     PT/OT, PT Ongoing (interventions implemented as appropriate)     Description:  Goals to be met by: upon D/C from facility     Patient will increase functional independence with mobility by performin. Supine to sit with MInimal Assistance  2. Sit to supine with MInimal Assistance  3. Bed to chair transfer with Minimal Assistance using any AD as needed.  4 Pt will tolerate PROM/A/AROM on BUE/BLE at all joints in available planes of motion with rest breaks as needed to improve functional mobility.                       Time Tracking:     PT Received On: 18  PT Start Time: 1250     PT Stop Time: 1305  PT Total Time (min): 15 min     Billable Minutes: Therapeutic Exercise 15 minutes    Treatment Type: Treatment  PT/PTA: PT           Matthew Riggs, PT  2018

## 2018-03-13 NOTE — PLAN OF CARE
03/13/18 1545   Discharge Reassessment   Assessment Type Discharge Planning Reassessment     Daksha spoke with Agueda from [place] human services authority and she will complete the pt's forms for nursing home placement tomorrow. Daksha will fax forms to [facility] for placement.

## 2018-03-13 NOTE — PROGRESS NOTES
Called this AM as patient was having seizures. 1mg IV Ativan given and seizure resolved. + incontinence, no tongue biting. depakote level, CMP, CBC, Mg, Phos ordered for this AM.

## 2018-03-13 NOTE — NURSING
Pt brother Giovanni notified of pt having a siezure.  Will continue to monitor.  Lab at bedside drawing labs.

## 2018-03-13 NOTE — SUBJECTIVE & OBJECTIVE
Past Medical History:   Diagnosis Date    Alzheimer disease     Brain bleed     Down syndrome     Seizure        Past Surgical History:   Procedure Laterality Date    CHOLECYSTECTOMY      CYST REMOVAL         Review of patient's allergies indicates:  No Known Allergies    I have reviewed all of this patient's past medical and surgical histories as well as family and social histories and active allergies and medications as documented in the electronic medical record.      No current facility-administered medications on file prior to encounter.      Current Outpatient Prescriptions on File Prior to Encounter   Medication Sig    albuterol-ipratropium 2.5mg-0.5mg/3mL (DUO-NEB) 0.5 mg-3 mg(2.5 mg base)/3 mL nebulizer solution Take 3 mLs by nebulization every 6 (six) hours as needed for Wheezing or Shortness of Breath. Rescue    clotrimazole (LOTRIMIN) 1 % cream Apply topically 2 (two) times daily.    cyanocobalamin, vitamin B-12, 5,000 mcg TbDL Take 5,000 mcg by mouth once daily.     divalproex (DEPAKOTE) 125 MG EC tablet Take one nightly for 3 weeks, then one BID (Patient taking differently: Take 125 mg by mouth every 12 (twelve) hours. )    ketoconazole (NIZORAL) 2 % cream Apply topically once daily.    LACTOSE-REDUCED FOOD (BOOST ORAL) Take by mouth once daily at 6am.    megestrol (MEGACE) 400 mg/10 mL (10 mL) Susp Take 5 mLs (200 mg total) by mouth once daily.    mupirocin (BACTROBAN) 2 % ointment Apply topically 2 (two) times daily.    oxcarbazepine (TRILEPTAL) 300 MG Tab Take 1 tablet (300 mg total) by mouth 2 (two) times daily.    tamsulosin (FLOMAX) 0.4 mg Cp24 Take 1 capsule (0.4 mg total) by mouth once daily.    trazodone (DESYREL) 50 MG tablet Take 1 tablet (50 mg total) by mouth every evening.     Family History     Problem Relation (Age of Onset)    Heart disease Father        Social History Main Topics    Smoking status: Never Smoker    Smokeless tobacco: Never Used    Alcohol use No     Drug use: Unknown    Sexual activity: Not on file     Review of Systems   Unable to perform ROS: Dementia     Objective:     Vital Signs (Most Recent):  Temp: 97 °F (36.1 °C) (03/13/18 0730)  Pulse: 73 (03/13/18 0813)  Resp: 18 (03/13/18 0730)  BP: (!) 148/83 (03/13/18 0730)  SpO2: 95 % (03/13/18 0730) Vital Signs (24h Range):  Temp:  [97 °F (36.1 °C)-98.3 °F (36.8 °C)] 97 °F (36.1 °C)  Pulse:  [51-95] 73  Resp:  [18-32] 18  SpO2:  [95 %-98 %] 95 %  BP: (105-157)/(55-83) 148/83     Weight: 58.9 kg (129 lb 13.6 oz)  Body mass index is 22.29 kg/m².    Physical Exam         Exam:  Gen Appearance, well developed in stigma of down syndrome/nourished in no apparent distress  CV: 2+ distal pulses with no edema or swelling  Neuro:  MS: Sleeping but wakes to voice and moans. Does not sustain attention but localizes to pain Recent/remote memory are gravely impaired    Language is not testable  Fund of Knowledge is note testable  CN: Optic discs are flat with normal vasculature, PERRL, Extraoccular movements and visual fields are full. Normal facial sensation and strength,  Tongue and Palate are midline and strong. Shoulder Shrug symmetric and strong.  Motor: Normal bulk, tone, no abnormal movements. 1+ reflexes. Patient can't cooperate with motor testing but appears to be moving all extremities equally.   Sensory: Can't cooperate  Cerebellar: Can't Well  Cooperate   Gait: Not done- patient not longer walks without assitance             Significant Labs: Depakote level 18.2, CMP, CBC reviewed as well as mg, phos    Significant Imaging:  CT head: No acute changes

## 2018-03-13 NOTE — PLAN OF CARE
Problem: Nutrition, Imbalanced: Inadequate Oral Intake (Adult)  Intervention: Promote/Optimize Nutrition  Goals: 1. Increase PO intake to >75% daily 2. Maintain current weight  Nutrition Goal Status: new  Communication of RD Recs:  (poc reviewed)    Nutrition Discharge Planning: Pureed diet with nectar thick liquids with   adequate intake to meet EEN & EPN

## 2018-03-13 NOTE — ASSESSMENT & PLAN NOTE
-Resume Trileptal 300mg BID as he is supposed to be on this in addition to Depakote.  -Family is not sure why Trileptal was not on his med list at admit, but he has tolerated this medication well in the past but will need Ativen PRN seizures  - Continue Depakote as well which is being used for mood and appetite. Give 250mg IV depakote now given his NPO status. May need to continue this if he remains NPO  - Reviewed Patient's prior declarations: He is a DNR  -Currently, he is likely having post itctal state in addition to sedation and agitation side effects of ativan. From past experience, he is known to be very sensitive to sedating medications.   -If he fails to wake fully in the next 24-48 hours or if there is concern of ongoing or clinically seizures, he will need transfer to facility with EEG monitoring. We currently do not have an EEG machine here.

## 2018-03-14 LAB
ALBUMIN SERPL BCP-MCNC: 3.1 G/DL
ALP SERPL-CCNC: 100 U/L
ALT SERPL W/O P-5'-P-CCNC: 17 U/L
ANION GAP SERPL CALC-SCNC: 13 MMOL/L
AST SERPL-CCNC: 16 U/L
BASOPHILS # BLD AUTO: 0.05 K/UL
BASOPHILS NFR BLD: 0.6 %
BILIRUB SERPL-MCNC: 0.4 MG/DL
BUN SERPL-MCNC: 8 MG/DL
CALCIUM SERPL-MCNC: 9.6 MG/DL
CHLORIDE SERPL-SCNC: 104 MMOL/L
CO2 SERPL-SCNC: 22 MMOL/L
CREAT SERPL-MCNC: 0.7 MG/DL
DIFFERENTIAL METHOD: ABNORMAL
EOSINOPHIL # BLD AUTO: 0.2 K/UL
EOSINOPHIL NFR BLD: 2 %
ERYTHROCYTE [DISTWIDTH] IN BLOOD BY AUTOMATED COUNT: 14.6 %
EST. GFR  (AFRICAN AMERICAN): >60 ML/MIN/1.73 M^2
EST. GFR  (NON AFRICAN AMERICAN): >60 ML/MIN/1.73 M^2
GLUCOSE SERPL-MCNC: 96 MG/DL
HCT VFR BLD AUTO: 44.5 %
HGB BLD-MCNC: 14.9 G/DL
LYMPHOCYTES # BLD AUTO: 1.3 K/UL
LYMPHOCYTES NFR BLD: 16.4 %
MCH RBC QN AUTO: 32.8 PG
MCHC RBC AUTO-ENTMCNC: 33.5 G/DL
MCV RBC AUTO: 98 FL
MONOCYTES # BLD AUTO: 0.8 K/UL
MONOCYTES NFR BLD: 9.7 %
NEUTROPHILS # BLD AUTO: 5.7 K/UL
NEUTROPHILS NFR BLD: 71.3 %
PLATELET # BLD AUTO: 273 K/UL
PMV BLD AUTO: 11.8 FL
POTASSIUM SERPL-SCNC: 4.1 MMOL/L
PROT SERPL-MCNC: 7.8 G/DL
RBC # BLD AUTO: 4.54 M/UL
SODIUM SERPL-SCNC: 139 MMOL/L
WBC # BLD AUTO: 7.97 K/UL

## 2018-03-14 PROCEDURE — 27000221 HC OXYGEN, UP TO 24 HOURS

## 2018-03-14 PROCEDURE — 63600175 PHARM REV CODE 636 W HCPCS: Performed by: INTERNAL MEDICINE

## 2018-03-14 PROCEDURE — 25000242 PHARM REV CODE 250 ALT 637 W/ HCPCS: Performed by: SURGERY

## 2018-03-14 PROCEDURE — 92611 MOTION FLUOROSCOPY/SWALLOW: CPT

## 2018-03-14 PROCEDURE — 94761 N-INVAS EAR/PLS OXIMETRY MLT: CPT

## 2018-03-14 PROCEDURE — 80053 COMPREHEN METABOLIC PANEL: CPT

## 2018-03-14 PROCEDURE — 25000003 PHARM REV CODE 250: Performed by: PSYCHIATRY & NEUROLOGY

## 2018-03-14 PROCEDURE — 11000001 HC ACUTE MED/SURG PRIVATE ROOM

## 2018-03-14 PROCEDURE — 99232 SBSQ HOSP IP/OBS MODERATE 35: CPT | Mod: ,,, | Performed by: FAMILY MEDICINE

## 2018-03-14 PROCEDURE — 25000003 PHARM REV CODE 250: Performed by: SURGERY

## 2018-03-14 PROCEDURE — 63600175 PHARM REV CODE 636 W HCPCS: Performed by: SURGERY

## 2018-03-14 PROCEDURE — 94640 AIRWAY INHALATION TREATMENT: CPT

## 2018-03-14 PROCEDURE — 85025 COMPLETE CBC W/AUTO DIFF WBC: CPT

## 2018-03-14 PROCEDURE — 97110 THERAPEUTIC EXERCISES: CPT

## 2018-03-14 PROCEDURE — C9113 INJ PANTOPRAZOLE SODIUM, VIA: HCPCS | Performed by: INTERNAL MEDICINE

## 2018-03-14 PROCEDURE — 36415 COLL VENOUS BLD VENIPUNCTURE: CPT

## 2018-03-14 RX ADMIN — OXCARBAZEPINE 300 MG: 150 TABLET, FILM COATED ORAL at 10:03

## 2018-03-14 RX ADMIN — DIVALPROEX SODIUM 125 MG: 125 TABLET, DELAYED RELEASE ORAL at 09:03

## 2018-03-14 RX ADMIN — IPRATROPIUM BROMIDE AND ALBUTEROL SULFATE 3 ML: 2.5; .5 SOLUTION RESPIRATORY (INHALATION) at 04:03

## 2018-03-14 RX ADMIN — PANTOPRAZOLE SODIUM 40 MG: 40 INJECTION, POWDER, FOR SOLUTION INTRAVENOUS at 10:03

## 2018-03-14 RX ADMIN — AZITHROMYCIN MONOHYDRATE 500 MG: 500 INJECTION, POWDER, LYOPHILIZED, FOR SOLUTION INTRAVENOUS at 02:03

## 2018-03-14 RX ADMIN — IPRATROPIUM BROMIDE AND ALBUTEROL SULFATE 3 ML: 2.5; .5 SOLUTION RESPIRATORY (INHALATION) at 07:03

## 2018-03-14 RX ADMIN — DIVALPROEX SODIUM 125 MG: 125 TABLET, DELAYED RELEASE ORAL at 10:03

## 2018-03-14 RX ADMIN — IPRATROPIUM BROMIDE AND ALBUTEROL SULFATE 3 ML: 2.5; .5 SOLUTION RESPIRATORY (INHALATION) at 12:03

## 2018-03-14 RX ADMIN — CEFTRIAXONE 1 G: 1 INJECTION, SOLUTION INTRAVENOUS at 02:03

## 2018-03-14 RX ADMIN — IPRATROPIUM BROMIDE AND ALBUTEROL SULFATE 3 ML: 2.5; .5 SOLUTION RESPIRATORY (INHALATION) at 03:03

## 2018-03-14 RX ADMIN — OXCARBAZEPINE 300 MG: 150 TABLET, FILM COATED ORAL at 09:03

## 2018-03-14 NOTE — PROGRESS NOTES
"[facility] Medicine  Progress Note    Patient Name: Mack Will  MRN: [id number]  Patient Class: IP- Inpatient   Admission Date: 3/11/2018  Length of Stay: 2 days  Attending Physician: Hunter Morfin MD  Primary Care Provider: Chetan Alvarez MD        Subjective:     Principal Problem:Pneumonia of right lower lobe due to infectious organism    HPI:  Pt presented to ER last night with c/o SOB and cough. Was treated last week with azithromycin/duonebs per PCP 3/6. No improvement. He is afebrile and had no elevated WBC. CXR shows small right lower lobe infiltrate. Blood cultures drawn and pending. He  Is laying in bed this am being fed by sister at bedside. Sister in law also at bedside. Lives at group home. Sister in law reports that he was not getting his treatments at group home. He was rattling more and having difficulty bringing up mucus. Eating well, but sister reports that this am he was getting a little gaggy with breakfast    He does not walk at home, sits in chair or w/c. Diapered.     Hospital Course:  3/13/18 admitted for RLL pneumonia. Had a seizure this am after nebulizer tx. . + hx of seizure disorder but family reports had not had seizure in years> Home meds list trileptal 300mg twice daily- family reports he was only taking divalproex at home.   Last PN per Dr. Alvarez has trileptal listed as rx.  Since seizure he has been moaning and pulling legs up intermittently. Family reports that he does not usually do this. "not a complainer". They also report lots of problems with gabriel catheter during last visit.   Afebrile- Temp 97.3, /66, 158/78. Noisy breathing.  They are not planning to have him return to group home. Planning to admit to nursing home when discharged.     3/14/18 admitted for RLL pneumonia 3-11-18,  S/P seizure yesterday am. Subsequent CT brain  negative for acute findings; + hx of hemorrhagic CVA.   Neuro  consulted; meds adjusted.  " Trileptal 300mg BID resumed as he is supposed to be on this in addition to Depakote.   Did much better overnight. Back to taking po meds and eating,  Awaiting swallow study.- hopefully today.   Still has very harsh cough.   Day 4 abx  Planning for discharge to ~2 days, NSG home placement in [place]. Allison Nash  Family concerned about excess sedation. Aware that was not getting trileptal and now resumed. Continued Depakote. They would like to see if could maybe decrease or stop Depakote- discussed will defer to neurology. Currently not getting trazodone which was home med.     Review of Systems   Unable to perform ROS: Dementia     Objective:     Vital Signs (Most Recent):  Temp: 97.4 °F (36.3 °C) (03/14/18 0230)  Pulse: 68 (03/14/18 0600)  Resp: 17 (03/14/18 0320)  BP: 110/78 (03/14/18 0230)  SpO2: 95 % (03/14/18 0230) Vital Signs (24h Range):  Temp:  [96.4 °F (35.8 °C)-99 °F (37.2 °C)] 97.4 °F (36.3 °C)  Pulse:  [62-94] 68  Resp:  [17-23] 17  SpO2:  [92 %-100 %] 95 %  BP: (110-148)/(78-88) 110/78     Weight: 58.9 kg (129 lb 13.6 oz) (rd reviewed)  Body mass index is 22.29 kg/m².    Physical Exam   Constitutional: He is oriented to person, place, and time. He appears well-developed and well-nourished. No distress.   HENT:   Head: Normocephalic and atraumatic.   Right Ear: External ear normal.   Left Ear: External ear normal.   Eyes: Conjunctivae and EOM are normal. Pupils are equal, round, and reactive to light. Right eye exhibits no discharge. Left eye exhibits no discharge.   Neck: Neck supple. No tracheal deviation present.   Cardiovascular: Normal rate and regular rhythm.  Exam reveals no friction rub.    No murmur heard.  Pulmonary/Chest: Effort normal. No respiratory distress. He has no wheezes. He has no rales.   Rhonchi at right base    Abdominal: Soft. Bowel sounds are normal. He exhibits no distension. There is no tenderness.   Neurological: He is alert and oriented to person, place, and time. No  cranial nerve deficit.   Skin: Skin is warm and dry.   Psychiatric: He has a normal mood and affect. His behavior is normal.   Nursing note and vitals reviewed.        CRANIAL NERVES     CN III, IV, VI   Pupils are equal, round, and reactive to light.  Extraocular motions are normal.        Significant Labs:   CBC:     Recent Labs  Lab 03/13/18  0528   WBC 7.72   HGB 14.4   HCT 42.4        CMP:     Recent Labs  Lab 03/13/18  0528      K 4.1      CO2 22*   GLU 85   BUN 10   CREATININE 0.7   CALCIUM 9.4   PROT 7.2   ALBUMIN 2.9*   BILITOT 0.2   ALKPHOS 103   AST 21   ALT 24   ANIONGAP 12   EGFRNONAA >60    3/13/18- Mg-2.1+, Phosph 2.5  Cardiac Markers:   No results for input(s): CKMB, MYOGLOBIN, BNP, TROPISTAT in the last 48 hours.  Lactic Acid:   No results for input(s): LACTATE in the last 48 hours.    Recent Labs  Lab 03/11/18  1155     193   CPKMB 0.9   TROPONINI <0.006   MB 0.5     Flu negative    Blood cultures NGTD  3/13/18 Valproic acid-   Valproic Acid Lvl 50.0 - 100.0 ug/mL 18.2           Significant Imaging:   Modified barium swallow- pending    CXR A small infiltrate at the right lung base is consistent with pneumonia.  Borderline heart size.    EKG Normal sinus rhythm with sinus arrhythmia  Normal ECG  CT brain 3/13/18- No detrimental change from 4/21/17.     Assessment/Plan:      * Pneumonia of right lower lobe due to infectious organism    Rocephin and azithromycin ordered IVPB- day #4   Duoneb every 3hr  D/c O2  Check swallow with speech- pending- postponed due to seizure and decreased LOC             Chewing difficulty      Pureed diet - await swallow study        Debility    He is non ambulatory at home. Will have pt see him to cont his transferring ability           Somnolence    Family is concerned that depakote is making him to somnolent.   It is not technically prescribed for his seizures, but will defer discontinuing this to neurology.           Seizure disorder     divalproex 125mg po BID+ seizure this am, Trileptal 300mg BID resumed 3/13/18  Follow  neurology recs   CT of head - no acute findings   .          Down's syndrome                VTE Risk Mitigation         Ordered     Medium Risk of VTE  Once      03/11/18 1450     Place sequential compression device  Until discontinued      03/11/18 1450              Chetan Alvarez MD  Department of Hospital Medicine   [facility]

## 2018-03-14 NOTE — ASSESSMENT & PLAN NOTE
Rocephin and azithromycin ordered IVPB- day #4   Duoneb every 3hr  D/c O2  Check swallow with speech- pending- postponed due to seizure and decreased LOC

## 2018-03-14 NOTE — PLAN OF CARE
Problem: Patient Care Overview  Goal: Plan of Care Review  Outcome: Ongoing (interventions implemented as appropriate)  More alert today. Tolerating oral medication. Less agitated. Turned and repositioned. Modified swallow complete. Puree diet with nectar thick liquids. Oxygen continued at 2L per nasal cannula. Continued rhonchi. Patient unable to tolerate suction. Family understands plan of care and agrees.

## 2018-03-14 NOTE — PT/OT/SLP PROGRESS
"Physical Therapy Treatment    Patient Name:  Makc Will   MRN:  [id number]    Recommendations:     Discharge Recommendations:   (per family decision)   Discharge Equipment Recommendations: none   Barriers to discharge: None    Assessment:     Mack Will is a 52 y.o. male admitted with a medical diagnosis of Pneumonia of right lower lobe due to infectious organism.  He presents with the following impairments/functional limitations:  weakness, impaired endurance, impaired self care skills, impaired functional mobilty, gait instability, impaired balance, decreased upper extremity function, decreased lower extremity function, decreased safety awareness . Pt with minimal active participation today.    Rehab Prognosis:  Fair; patient would benefit from acute skilled PT services to address these deficits and reach maximum level of function.      Recent Surgery: * No surgery found *      Plan:     During this hospitalization, patient to be seen  (1-2x/day Monday-Friday; PRN Weekends/Holidays) to address the above listed problems via therapeutic activities, therapeutic exercises  · Plan of Care Expires:   (upon D/C from facility)   Plan of Care Reviewed with: patient, family    Subjective     Communicated with patient and family prior to session.  Patient found supine in bed upon PT entry to room, agreeable to treatment.      Chief Complaint: Pt with no c/o  Patient comments/goals: "Family goals to increase strength"  Pain/Comfort:  · Pain Rating 1: 0/10    Patients cultural, spiritual, Mandaen conflicts given the current situation:      Objective:     General Precautions: Standard, fall, aspiration   Orthopedic Precautions:N/A   Braces: N/A       AM-PAC 6 CLICK MOBILITY  Turning over in bed (including adjusting bedclothes, sheets and blankets)?: 2  Sitting down on and standing up from a chair with arms (e.g., wheelchair, bedside commode, etc.): 1  Moving from lying on back to sitting on the side of the " bed?: 2  Moving to and from a bed to a chair (including a wheelchair)?: 1  Need to walk in hospital room?: 1  Climbing 3-5 steps with a railing?: 1  Total Score: 8       Therapeutic Activities and Exercises:   Pt tolerated PROM to BUE/BLE at all joints in available planes of motion with rest breaks as needed to prevent contractures and promote health fluid dynamics.     Patient left supine with all lines intact, call button in reach, NSG notified and family present..    GOALS:    Physical Therapy Goals        Problem: Physical Therapy Goal    Goal Priority Disciplines Outcome Goal Variances Interventions   Physical Therapy Goal     PT/OT, PT Ongoing (interventions implemented as appropriate)     Description:  Goals to be met by: upon D/C from facility     Patient will increase functional independence with mobility by performin. Supine to sit with MInimal Assistance  2. Sit to supine with MInimal Assistance  3. Bed to chair transfer with Minimal Assistance using any AD as needed.  4 Pt will tolerate PROM/A/AROM on BUE/BLE at all joints in available planes of motion with rest breaks as needed to improve functional mobility.                       Time Tracking:     PT Received On: 18  PT Start Time: 1345     PT Stop Time: 1400  PT Total Time (min): 15 min     Billable Minutes: Therapeutic Exercise 15 minutes    Treatment Type: Treatment  PT/PTA: PT           Lulú Sagastume, PT  2018

## 2018-03-14 NOTE — ASSESSMENT & PLAN NOTE
divalproex 125mg po BID+ seizure this am, Trileptal 300mg BID resumed 3/13/18  Follow  neurology recs   CT of head - no acute findings   .

## 2018-03-14 NOTE — PLAN OF CARE
Problem: Patient Care Overview  Goal: Plan of Care Review  Outcome: Ongoing (interventions implemented as appropriate)  Pt vs stable.  Respiratory treatments continued.  Pt more alert this shift.  Pt tolerated meds po crushed with apple sauce.  Pt tolerated thickened juice as well.  Pt turned q 2hours.  Incontinent care performed.  Call bell in reach.  Bumpers  Cushions on bed.  Will continue to monitor.

## 2018-03-14 NOTE — PHYSICIAN QUERY
"PT Name: Mack Will  MR #: [id number]    Physician Query Form - Pneumonia Clarification     CDS/: Ag Kirkpatrick, CASIMIRON, RN, CCDS               Contact information: [phone]  This form is a permanent document in the medical record.    Query Date:  March 14, 2018    By submitting this query, we are merely seeking further clarification of documentation. Please utilize your independent clinical judgment when addressing the question(s) below.    The Medical record contains the following:   Indicators   Supporting Clinical Findings Location in Medical Record   X "Pneumonia" documented Pneumonia of right lower lobe due to infectious organism   Progress Note: 3/14   X Chest X-Ray: CXR shows small right lower lobe infiltrate   H & P: 3/12    PaO2    PaCO2     O2 sat     X Cultures  Blood culture: NGTD   Lab: 3/11   X Treatment  Rocephin and azithromycin ordered IVPB- day #4     treated last week with azithromycin/duonebs per PCP 3/6. No improvement.    Progress Note: 3/14      H & P: 3/12    Supplemental O2     X Other Check swallow with speech- pending- postponed due to seizure and decreased LOC    Progress Note: 3/14       Provider, please specify type of pneumonia.    [ x ] Bacterial Pneumonia (Specify organism): ____presumed strep pneumo __________________  [  ] Bacterial, Gram Negative organism Pneumonia  [  ] Viral Pneumonia (Specify virus): _______________________  [  ] Fungal Pneumonia (Specify organism): _______________________  [  ] Aspiration Pneumonia  [  ] Other type of pneumonia (please specify): ______________________________________  [  ] Clinically undetermined    Please document in your progress notes daily for the duration of treatment, until resolved, and include in your discharge summary.    .                                                                                    "

## 2018-03-14 NOTE — SUBJECTIVE & OBJECTIVE
Review of Systems   Unable to perform ROS: Dementia     Objective:     Vital Signs (Most Recent):  Temp: 97.4 °F (36.3 °C) (03/14/18 0230)  Pulse: 68 (03/14/18 0600)  Resp: 17 (03/14/18 0320)  BP: 110/78 (03/14/18 0230)  SpO2: 95 % (03/14/18 0230) Vital Signs (24h Range):  Temp:  [96.4 °F (35.8 °C)-99 °F (37.2 °C)] 97.4 °F (36.3 °C)  Pulse:  [62-94] 68  Resp:  [17-23] 17  SpO2:  [92 %-100 %] 95 %  BP: (110-148)/(78-88) 110/78     Weight: 58.9 kg (129 lb 13.6 oz) (rd reviewed)  Body mass index is 22.29 kg/m².    Physical Exam   Constitutional: He is oriented to person, place, and time. He appears well-developed and well-nourished. No distress.   HENT:   Head: Normocephalic and atraumatic.   Right Ear: External ear normal.   Left Ear: External ear normal.   Eyes: Conjunctivae and EOM are normal. Pupils are equal, round, and reactive to light. Right eye exhibits no discharge. Left eye exhibits no discharge.   Neck: Neck supple. No tracheal deviation present.   Cardiovascular: Normal rate and regular rhythm.  Exam reveals no friction rub.    No murmur heard.  Pulmonary/Chest: Effort normal. No respiratory distress. He has no wheezes. He has no rales.   Rhonchi at right base    Abdominal: Soft. Bowel sounds are normal. He exhibits no distension. There is no tenderness.   Neurological: He is alert and oriented to person, place, and time. No cranial nerve deficit.   Skin: Skin is warm and dry.   Psychiatric: He has a normal mood and affect. His behavior is normal.   Nursing note and vitals reviewed.        CRANIAL NERVES     CN III, IV, VI   Pupils are equal, round, and reactive to light.  Extraocular motions are normal.        Significant Labs:   CBC:     Recent Labs  Lab 03/13/18  0528   WBC 7.72   HGB 14.4   HCT 42.4        CMP:     Recent Labs  Lab 03/13/18  0528      K 4.1      CO2 22*   GLU 85   BUN 10   CREATININE 0.7   CALCIUM 9.4   PROT 7.2   ALBUMIN 2.9*   BILITOT 0.2   ALKPHOS 103   AST 21    ALT 24   ANIONGAP 12   EGFRNONAA >60    3/13/18- Mg-2.1+, Phosph 2.5  Cardiac Markers:   No results for input(s): CKMB, MYOGLOBIN, BNP, TROPISTAT in the last 48 hours.  Lactic Acid:   No results for input(s): LACTATE in the last 48 hours.    Recent Labs  Lab 03/11/18  1155     193   CPKMB 0.9   TROPONINI <0.006   MB 0.5     Flu negative    Blood cultures NGTD  3/13/18 Valproic acid-   Valproic Acid Lvl 50.0 - 100.0 ug/mL 18.2           Significant Imaging:   Modified barium swallow- pending    CXR A small infiltrate at the right lung base is consistent with pneumonia.  Borderline heart size.    EKG Normal sinus rhythm with sinus arrhythmia  Normal ECG  CT brain 3/13/18- No detrimental change from 4/21/17.

## 2018-03-14 NOTE — PT/OT/SLP PROGRESS
Physical Therapy      Patient Name:  Mack Will   MRN:  [id number]    Patient not seen today secondary to Unavailable (Comment), X-ray swallow study. Will follow-up this afternoon.    Lulú Sagastume, PT

## 2018-03-14 NOTE — NURSING
Bedside report received from Martha.  No complaints of pain noted.  VS stable.  Call bell in reach.  Bed alarm on.  Bumpers on bed.  Suction at bedside.  Will continue to monitor.

## 2018-03-14 NOTE — ASSESSMENT & PLAN NOTE
Family is concerned that depakote is making him to somnolent.   It is not technically prescribed for his seizures, but will defer discontinuing this to neurology.

## 2018-03-14 NOTE — PLAN OF CARE
03/14/18 1504   Discharge Assessment   Assessment Type Discharge Planning Reassessment     Sw faxed pt's PASSR to [facility]. [phone].

## 2018-03-14 NOTE — PROCEDURES
Modified Barium Swallow    Patient Name:  Mack Will   MRN:  [id number]      Recommendations:     Recommendations:                General Recommendations:  Follow up for diet tolerance  Diet recommendations: Solid Diet Level: Puree, Liquid Diet Level: Nectar Thick   Aspiration Precautions:   · Double swallow with each bite/sip  · Feed only when awake/alert   · Elevate HOB  · Assistance/observation during meals  General Precautions: Standard, aspiration, fall, nectar thick  Communication strategies:  none    Referral     Reason for Referral  Patient was referred for a Modified Barium Swallow Study to assess the efficiency of his/her swallow function, rule out aspiration, and make recommendations regarding safe dietary consistencies, effective compensatory strategies, and safe eating environment.     Diagnosis: Pneumonia of right lower lobe due to infectious organism       History:     Past Medical History:   Diagnosis Date    Alzheimer disease     Brain bleed     Down syndrome     Seizure        Objective:     Current Respiratory Status: 03/14/18    Alert: yes    Cooperative: yes    Follows Directions: inconsistent    Visualization  · Patient was seen in the lateral view    Oral Peripheral Examination  Oral Musculature: general weakness  Secretion Management: adequate  Mucosal Quality: adequate  Mandibular Strength and Mobility: WFL  Oral Labial Strength and Mobility: WFL  Voice Prior to PO Intake: limited vocalizations, clear quality    Consistencies Assessed  · Nectar thick - clinician-fed via straw sip  · Puree - clinician-fed via 1/2 and whole tsp    Oral Preparation/Oral Phase  Oral phase was characterized by decreased lingual coordination, resulting in oral residue present post swallow. Residue was cleared to trace amounts given multiple cued and spontaneous swallows.    Pharyngeal Phase   Pharyngeal phase was characterized by delayed pharyngeal swallow and decreased hyolaryngeal elevation, resulting  in spillage down to the pyriforms with nectar thick liquids and moderate pharyngeal residue with puree consistencies. Pressure was applied to the tongue with spoon to facilitate timely swallow of nectar thick liquids but was ineffective. Patient required moderate verbal cueing to elicit multiple swallows across consistencies. Nectar thick liquids were effective in clearing generalized pharyngeal residue from puree trials to trace amounts.      Cervical Esophageal Phase  Esophageal phase was characterized by adequate UES opening for acceptance of all bolus types without stasis or retrograde movement observed.     Assessment:     Impressions  Patient demonstrates mild oropharyngeal dysphagia characterized by delayed pharyngeal swallow and decreased hyolaryngeal elevation. Recommend puree diet with nectar thick liquids.    Prognosis: Fair    Barriers: Cognitive status    Education  Results were discussed with Medical Team who was in agreement with plan.     Goals:    SLP Goals        Problem: SLP Goal    Goal Priority Disciplines Outcome   SLP Goal     SLP    Description:    Long Term Goals:  1. Patient will tolerate least restrictive PO diet with no s/s of aspiration                    Plan:   · Patient to be seen:  Therapy Frequency: 1 x/week, 2 x/week   · Plan of Care expires:  03/28/18  · Plan of Care reviewed with:  patient, family        Discharge recommendations:   (per medical team)   Barriers to Discharge:  Level of Skilled Assistance Needed - patient continues to require skilled personel for management of medical diagnoses    Time Tracking:   SLP Treatment Date:   03/14/18  Speech Start Time:  1028  Speech Stop Time:  1048     Speech Total Time (min):  20 min    KENYA Chiu  03/14/2018

## 2018-03-15 LAB
ALBUMIN SERPL BCP-MCNC: 2.9 G/DL
ALP SERPL-CCNC: 92 U/L
ALT SERPL W/O P-5'-P-CCNC: 14 U/L
ANION GAP SERPL CALC-SCNC: 10 MMOL/L
AST SERPL-CCNC: 22 U/L
BILIRUB SERPL-MCNC: 0.4 MG/DL
BUN SERPL-MCNC: 10 MG/DL
CALCIUM SERPL-MCNC: 9.3 MG/DL
CHLORIDE SERPL-SCNC: 105 MMOL/L
CO2 SERPL-SCNC: 24 MMOL/L
CREAT SERPL-MCNC: 0.7 MG/DL
EST. GFR  (AFRICAN AMERICAN): >60 ML/MIN/1.73 M^2
EST. GFR  (NON AFRICAN AMERICAN): >60 ML/MIN/1.73 M^2
GLUCOSE SERPL-MCNC: 103 MG/DL
POTASSIUM SERPL-SCNC: 4.2 MMOL/L
PROT SERPL-MCNC: 7.3 G/DL
SODIUM SERPL-SCNC: 139 MMOL/L

## 2018-03-15 PROCEDURE — 97110 THERAPEUTIC EXERCISES: CPT

## 2018-03-15 PROCEDURE — 25000003 PHARM REV CODE 250: Performed by: PSYCHIATRY & NEUROLOGY

## 2018-03-15 PROCEDURE — 94668 MNPJ CHEST WALL SBSQ: CPT

## 2018-03-15 PROCEDURE — 99233 SBSQ HOSP IP/OBS HIGH 50: CPT | Mod: ,,, | Performed by: PSYCHIATRY & NEUROLOGY

## 2018-03-15 PROCEDURE — 63600175 PHARM REV CODE 636 W HCPCS: Performed by: INTERNAL MEDICINE

## 2018-03-15 PROCEDURE — 11000001 HC ACUTE MED/SURG PRIVATE ROOM

## 2018-03-15 PROCEDURE — 80053 COMPREHEN METABOLIC PANEL: CPT

## 2018-03-15 PROCEDURE — 97530 THERAPEUTIC ACTIVITIES: CPT

## 2018-03-15 PROCEDURE — 25000242 PHARM REV CODE 250 ALT 637 W/ HCPCS: Performed by: SURGERY

## 2018-03-15 PROCEDURE — 25000003 PHARM REV CODE 250: Performed by: SURGERY

## 2018-03-15 PROCEDURE — 94761 N-INVAS EAR/PLS OXIMETRY MLT: CPT

## 2018-03-15 PROCEDURE — 63600175 PHARM REV CODE 636 W HCPCS: Performed by: SURGERY

## 2018-03-15 PROCEDURE — 27000221 HC OXYGEN, UP TO 24 HOURS

## 2018-03-15 PROCEDURE — 36415 COLL VENOUS BLD VENIPUNCTURE: CPT

## 2018-03-15 PROCEDURE — 25000003 PHARM REV CODE 250: Performed by: INTERNAL MEDICINE

## 2018-03-15 PROCEDURE — 94667 MNPJ CHEST WALL 1ST: CPT

## 2018-03-15 PROCEDURE — C9113 INJ PANTOPRAZOLE SODIUM, VIA: HCPCS | Performed by: INTERNAL MEDICINE

## 2018-03-15 PROCEDURE — 99232 SBSQ HOSP IP/OBS MODERATE 35: CPT | Mod: ,,, | Performed by: FAMILY MEDICINE

## 2018-03-15 PROCEDURE — 94640 AIRWAY INHALATION TREATMENT: CPT

## 2018-03-15 RX ORDER — GUAIFENESIN 600 MG/1
600 TABLET, EXTENDED RELEASE ORAL 2 TIMES DAILY
Status: DISCONTINUED | OUTPATIENT
Start: 2018-03-15 | End: 2018-03-15

## 2018-03-15 RX ORDER — GUAIFENESIN 100 MG/5ML
200 SOLUTION ORAL EVERY 6 HOURS
Status: DISCONTINUED | OUTPATIENT
Start: 2018-03-15 | End: 2018-03-15

## 2018-03-15 RX ORDER — GUAIFENESIN 100 MG/5ML
200 SOLUTION ORAL EVERY 6 HOURS
Status: DISCONTINUED | OUTPATIENT
Start: 2018-03-15 | End: 2018-03-20 | Stop reason: HOSPADM

## 2018-03-15 RX ADMIN — OXCARBAZEPINE 300 MG: 150 TABLET, FILM COATED ORAL at 08:03

## 2018-03-15 RX ADMIN — PANTOPRAZOLE SODIUM 40 MG: 40 INJECTION, POWDER, FOR SOLUTION INTRAVENOUS at 08:03

## 2018-03-15 RX ADMIN — IPRATROPIUM BROMIDE AND ALBUTEROL SULFATE 3 ML: 2.5; .5 SOLUTION RESPIRATORY (INHALATION) at 07:03

## 2018-03-15 RX ADMIN — DIVALPROEX SODIUM 125 MG: 125 TABLET, DELAYED RELEASE ORAL at 08:03

## 2018-03-15 RX ADMIN — IPRATROPIUM BROMIDE AND ALBUTEROL SULFATE 3 ML: 2.5; .5 SOLUTION RESPIRATORY (INHALATION) at 12:03

## 2018-03-15 RX ADMIN — CEFTRIAXONE 1 G: 1 INJECTION, SOLUTION INTRAVENOUS at 02:03

## 2018-03-15 RX ADMIN — GUAIFENESIN 200 MG: 100 SOLUTION ORAL at 05:03

## 2018-03-15 RX ADMIN — GUAIFENESIN 200 MG: 100 SOLUTION ORAL at 12:03

## 2018-03-15 RX ADMIN — IPRATROPIUM BROMIDE AND ALBUTEROL SULFATE 3 ML: 2.5; .5 SOLUTION RESPIRATORY (INHALATION) at 03:03

## 2018-03-15 RX ADMIN — AZITHROMYCIN MONOHYDRATE 500 MG: 500 INJECTION, POWDER, LYOPHILIZED, FOR SOLUTION INTRAVENOUS at 03:03

## 2018-03-15 NOTE — ASSESSMENT & PLAN NOTE
Rocephin and azithromycin ordered IVPB- day #5; WBC ct okay, no fevers, increasing O2 requirement though. Add in mucinex and CPT to help with thickened mucous.  Duoneb every 3hr   O2  ST eval on 3/13; pureed thick recs

## 2018-03-15 NOTE — PROGRESS NOTES
"[facility] Hospital Medicine  Progress Note    Patient Name: Mack Will  MRN: [id number]  Patient Class: IP- Inpatient   Admission Date: 3/11/2018  Length of Stay: 3 days  Attending Physician: Hunter Morfin MD  Primary Care Provider: Chetan Alvarez MD        Subjective:     Principal Problem:Pneumonia of right lower lobe due to infectious organism    HPI:  Pt presented to ER last night with c/o SOB and cough. Was treated last week with azithromycin/duonebs per PCP 3/6. No improvement. He is afebrile and had no elevated WBC. CXR shows small right lower lobe infiltrate. Blood cultures drawn and pending. He  Is laying in bed this am being fed by sister at bedside. Sister in law also at bedside. Lives at group home. Sister in law reports that he was not getting his treatments at group home. He was rattling more and having difficulty bringing up mucus. Eating well, but sister reports that this am he was getting a little gaggy with breakfast    He does not walk at home, sits in chair or w/c. Diapered.     Hospital Course:  3/13/18 admitted for RLL pneumonia. Had a seizure this am after nebulizer tx. . + hx of seizure disorder but family reports had not had seizure in years> Home meds list trileptal 300mg twice daily- family reports he was only taking divalproex at home.   Last PN per Dr. Alvarez has trileptal listed as rx.  Since seizure he has been moaning and pulling legs up intermittently. Family reports that he does not usually do this. "not a complainer". They also report lots of problems with gabriel catheter during last visit.   Afebrile- Temp 97.3, /66, 158/78. Noisy breathing.  They are not planning to have him return to group home. Planning to admit to nursing home when discharged.     3/14/18 admitted for RLL pneumonia 3-11-18,  S/P seizure yesterday am. Subsequent CT brain  negative for acute findings; + hx of hemorrhagic CVA.   Neuro  consulted; meds adjusted.  " Trileptal 300mg BID resumed as he is supposed to be on this in addition to Depakote.   Did much better overnight. Back to taking po meds and eating,  Awaiting swallow study.- hopefully today.   Still has very harsh cough.   Day 4 abx  Planning for discharge to ~2 days, NSG home placement in [place]. [facility]  Family concerned about excess sedation. Aware that was not getting trileptal and now resumed. Continued Depakote. They would like to see if could maybe decrease or stop Depakote- discussed will defer to neurology. Currently not getting trazodone which was home med.   3/15/18 admitted for RLL pneumonia 3-11-18,  S/P seizure 3/13 am. Subsequent CT brain  negative for acute findings; + hx of hemorrhagic CVA.   Neuro  Adjusted seizure Rx;  Trileptal  resumed as he is supposed to be on this in addition to Depakote.   Back to taking po meds and eating,    ST 3/13/ recommendations; Pureed diet with nectar thick liquids    Day 5 abx  Planning for  NSG home placement in [place]. [facility]  Still with junky cough and noisy breathing. O2 increased to 5LNC- O2 sats 93% this am with concern for mucous plugging. PT following   Discussed with sisters at bedside and neurology - will keep on depakote and trileptal.    Review of Systems   Unable to perform ROS: Dementia     Objective:     Vital Signs (Most Recent):  Temp: 98.9 °F (37.2 °C) (03/15/18 0327)  Pulse: 80 (03/15/18 0604)  Resp: 20 (03/15/18 0354)  BP: 132/81 (03/15/18 0327)  SpO2: (!) 92 % (03/15/18 0354) Vital Signs (24h Range):  Temp:  [96.5 °F (35.8 °C)-99.2 °F (37.3 °C)] 98.9 °F (37.2 °C)  Pulse:  [] 80  Resp:  [18-24] 20  SpO2:  [85 %-97 %] 92 %  BP: (115-132)/(74-81) 132/81     Weight: 58.9 kg (129 lb 13.6 oz) (rd reviewed)  Body mass index is 22.29 kg/m².    Physical Exam   Constitutional: He is oriented to person, place, and time. He appears well-developed and well-nourished. No distress.   HENT:   Head: Normocephalic and atraumatic.    Right Ear: External ear normal.   Left Ear: External ear normal.   Eyes: Conjunctivae and EOM are normal. Right eye exhibits no discharge. Left eye exhibits no discharge.   Right pupil at 4mm and left at 2mm resting   Neck: Neck supple. No tracheal deviation present.   Cardiovascular: Normal rate and regular rhythm.  Exam reveals no friction rub.    No murmur heard.  Pulmonary/Chest: Effort normal. No respiratory distress. He has no wheezes. He has no rales.   Rhonchi at right base   UAN even audible at bedside.    No increased WOB - 4L NC in place with sats 93-95%   Abdominal: Soft. Bowel sounds are normal. He exhibits no distension. There is no tenderness.   Neurological: He is alert and oriented to person, place, and time. No cranial nerve deficit.   Skin: Skin is warm and dry.   Psychiatric: He has a normal mood and affect. His behavior is normal.   Nursing note and vitals reviewed.        CRANIAL NERVES     CN III, IV, VI   Extraocular motions are normal.        Significant Labs:   CBC:     Recent Labs  Lab 03/14/18  0626   WBC 7.97   HGB 14.9   HCT 44.5        CMP:     Recent Labs  Lab 03/14/18  0626      K 4.1      CO2 22*   GLU 96   BUN 8   CREATININE 0.7   CALCIUM 9.6   PROT 7.8   ALBUMIN 3.1*   BILITOT 0.4   ALKPHOS 100   AST 16   ALT 17   ANIONGAP 13   EGFRNONAA >60    3/13/18- Mg-2.1+, Phosph 2.5  Cardiac Markers:     Lactic Acid:   No results for input(s): LACTATE in the last 48 hours.    Recent Labs  Lab 03/11/18  1155     193   CPKMB 0.9   TROPONINI <0.006   MB 0.5     Flu negative    Blood cultures NGTD  3/13/18 Valproic acid-   Valproic Acid Lvl 50.0 - 100.0 ug/mL 18.2           Significant Imaging:   Modified barium swallow- pending    CXR 3/11- A small infiltrate at the right lung base is consistent with pneumonia.  Borderline heart size.    EKG Normal sinus rhythm with sinus arrhythmia  Normal ECG  CT brain 3/13/18- No detrimental change from 4/21/17.      Assessment/Plan:      * Pneumonia of right lower lobe due to infectious organism    Rocephin and azithromycin ordered IVPB- day #5; WBC ct okay, no fevers, increasing O2 requirement though. Add in mucinex and CPT to help with thickened mucous.  Duoneb every 3hr   O2  ST eval on 3/13; pureed thick recs        Chewing difficulty      ST rec=Pureed diet -           Debility    He is non ambulatory at home. Will have pt see him to cont his transferring ability           Somnolence    Family is concerned that depakote is making him to somnolent.   It is not technically prescribed for his seizures.  He is more awake this morning and answering questions.  Will NOT d/c depakote as it was prescribed to help with mood.          Seizure disorder    Seizure 3/13- divalproex 125mg po BID   Trileptal 300mg BID resumed 3/13/18  Follow  neurology recs   CT of head - no acute findings   .          Down's syndrome                VTE Risk Mitigation         Ordered     Medium Risk of VTE  Once      03/11/18 1450     Place sequential compression device  Until discontinued      03/11/18 1450              Makenzie Mckeon MD  Department of Hospital Medicine   [facility]

## 2018-03-15 NOTE — PLAN OF CARE
Problem: Fall Risk (Adult)  Goal: Absence of Falls  Patient will demonstrate the desired outcomes by discharge/transition of care.   Outcome: Ongoing (interventions implemented as appropriate)  Fall precautions maintained. Bed alarm engaged at all times.     Problem: Patient Care Overview  Goal: Plan of Care Review  Outcome: Ongoing (interventions implemented as appropriate)  Plan of care reviewed with patient and he agrees with the plan of care. Telemetry monitoring continues. SCDs in use. No acute distress noted.     Problem: Pneumonia (Adult)  Goal: Signs and Symptoms of Listed Potential Problems Will be Absent, Minimized or Managed (Pneumonia)  Signs and symptoms of listed potential problems will be absent, minimized or managed by discharge/transition of care (reference Pneumonia (Adult) CPG).   Outcome: Ongoing (interventions implemented as appropriate)  Oxygen continues at 5l per nasal cannula. Respiratory treatments continue. Lungs coarse and diminished. IV antibiotics continue. Low grade temp noted this shift.

## 2018-03-15 NOTE — PT/OT/SLP PROGRESS
Physical Therapy Treatment    Patient Name:  Mack Will   MRN:  [id number]    Recommendations:     Discharge Recommendations:  other (see comments) (pending)   Discharge Equipment Recommendations: none   Barriers to discharge: None    Assessment:     Mack Will is a 52 y.o. male admitted with a medical diagnosis of Pneumonia of right lower lobe due to infectious organism.  He presents with the following impairments/functional limitations:  weakness, gait instability, impaired balance, impaired endurance, decreased lower extremity function, decreased upper extremity function, impaired self care skills, impaired functional mobilty, impaired cognition, decreased safety awareness .  Pt. demonstrated increased endurance.  Pt. is progressing towards PT POC goals.    Rehab Prognosis:  Fair; patient would benefit from acute skilled PT services to address these deficits and reach maximum level of function.      Recent Surgery: * No surgery found *      Plan:     During this hospitalization, patient to be seen  (1-2x/day(M-F); PRN(Sat.,Sun.)) to address the above listed problems via therapeutic exercises, therapeutic activities  · Plan of Care Expires:   (Upon D/C from facility)   Plan of Care Reviewed with: patient, family    Subjective     Communicated with patient and patient's family prior to session.  Patient found supine in bed upon PT entry to room, agreeable to treatment.      Chief Complaint: None voiced  Patient comments/goals: Minimal pt. verbalization.  Pain/Comfort:  · Pain Rating 1: 0/10  · Pain Rating Post-Intervention 1: 0/10    Patients cultural, spiritual, Druze conflicts given the current situation:      Objective:     Patient found with:       General Precautions: Standard, aspiration, fall   Orthopedic Precautions:N/A   Braces: N/A     Functional Mobility:  · Bed Mobility:     · Rolling Left:  maximal assistance  · Rolling Right: maximal assistance  · Scooting: maximal  assistance  · Bridging: maximal assistance  · Supine to Sit: maximal assistance  · Sit to Supine: maximal assistance  Balance:   Static Sit: POOR+: Needs MINIMAL assist to maintain  Dynamic Sit: POOR: N/A      AM-PAC 6 CLICK MOBILITY  Turning over in bed (including adjusting bedclothes, sheets and blankets)?: 2  Sitting down on and standing up from a chair with arms (e.g., wheelchair, bedside commode, etc.): 1  Moving from lying on back to sitting on the side of the bed?: 2  Moving to and from a bed to a chair (including a wheelchair)?: 1  Need to walk in hospital room?: 1  Climbing 3-5 steps with a railing?: 1  Total Score: 8       Therapeutic Activities and Exercises:   There. Act.:  Transfer Training performed with assistance as noted.  VC's and manual guidance given to pt. for sequencing.  Trunk control tasks performed with mod. A. while pt. seated EOB.      There. Ex.:  Pt. rendered gentle there. exercises of BLE's while supine in bed, for N.M. tone and fluid dynamics, in available planes of motion at major joints.  1-on-1 BLE HC stretches performed with pt.    Patient left HOB elevated with all lines intact, call button in reach, RN notified and nursing staff present..    GOALS:    Physical Therapy Goals        Problem: Physical Therapy Goal    Goal Priority Disciplines Outcome Goal Variances Interventions   Physical Therapy Goal     PT/OT, PT Ongoing (interventions implemented as appropriate)     Description:  Goals to be met by: upon D/C from facility     Patient will increase functional independence with mobility by performin. Supine to sit with MInimal Assistance  2. Sit to supine with MInimal Assistance  3. Bed to chair transfer with Minimal Assistance using any AD as needed.  4 Pt will tolerate PROM/A/AROM on BUE/BLE at all joints in available planes of motion with rest breaks as needed to improve functional mobility.                       Time Tracking:     PT Received On: 03/15/18  PT Start  Time: 1231     PT Stop Time: 1254  PT Total Time (min): 23 min     Billable Minutes: Therapeutic Activity 15 minutes and Therapeutic Exercise 8 minutes    Treatment Type: Treatment  PT/PTA: PT           Matthew Riggs, PT  03/15/2018

## 2018-03-15 NOTE — SUBJECTIVE & OBJECTIVE
Review of Systems   Unable to perform ROS: Dementia     Objective:     Vital Signs (Most Recent):  Temp: 98.9 °F (37.2 °C) (03/15/18 0327)  Pulse: 80 (03/15/18 0604)  Resp: 20 (03/15/18 0354)  BP: 132/81 (03/15/18 0327)  SpO2: (!) 92 % (03/15/18 0354) Vital Signs (24h Range):  Temp:  [96.5 °F (35.8 °C)-99.2 °F (37.3 °C)] 98.9 °F (37.2 °C)  Pulse:  [] 80  Resp:  [18-24] 20  SpO2:  [85 %-97 %] 92 %  BP: (115-132)/(74-81) 132/81     Weight: 58.9 kg (129 lb 13.6 oz) (rd reviewed)  Body mass index is 22.29 kg/m².    Physical Exam   Constitutional: He is oriented to person, place, and time. He appears well-developed and well-nourished. No distress.   HENT:   Head: Normocephalic and atraumatic.   Right Ear: External ear normal.   Left Ear: External ear normal.   Eyes: Conjunctivae and EOM are normal. Right eye exhibits no discharge. Left eye exhibits no discharge.   Right pupil at 4mm and left at 2mm resting   Neck: Neck supple. No tracheal deviation present.   Cardiovascular: Normal rate and regular rhythm.  Exam reveals no friction rub.    No murmur heard.  Pulmonary/Chest: Effort normal. No respiratory distress. He has no wheezes. He has no rales.   Rhonchi at right base   UAN even audible at bedside.    No increased WOB - 4L NC in place with sats 93-95%   Abdominal: Soft. Bowel sounds are normal. He exhibits no distension. There is no tenderness.   Neurological: He is alert and oriented to person, place, and time. No cranial nerve deficit.   Skin: Skin is warm and dry.   Psychiatric: He has a normal mood and affect. His behavior is normal.   Nursing note and vitals reviewed.        CRANIAL NERVES     CN III, IV, VI   Extraocular motions are normal.        Significant Labs:   CBC:     Recent Labs  Lab 03/14/18  0626   WBC 7.97   HGB 14.9   HCT 44.5        CMP:     Recent Labs  Lab 03/14/18  0626      K 4.1      CO2 22*   GLU 96   BUN 8   CREATININE 0.7   CALCIUM 9.6   PROT 7.8   ALBUMIN 3.1*    BILITOT 0.4   ALKPHOS 100   AST 16   ALT 17   ANIONGAP 13   EGFRNONAA >60    3/13/18- Mg-2.1+, Phosph 2.5  Cardiac Markers:     Lactic Acid:   No results for input(s): LACTATE in the last 48 hours.    Recent Labs  Lab 03/11/18  1155     193   CPKMB 0.9   TROPONINI <0.006   MB 0.5     Flu negative    Blood cultures NGTD  3/13/18 Valproic acid-   Valproic Acid Lvl 50.0 - 100.0 ug/mL 18.2           Significant Imaging:   Modified barium swallow- pending    CXR 3/11- A small infiltrate at the right lung base is consistent with pneumonia.  Borderline heart size.    EKG Normal sinus rhythm with sinus arrhythmia  Normal ECG  CT brain 3/13/18- No detrimental change from 4/21/17.

## 2018-03-15 NOTE — ASSESSMENT & PLAN NOTE
Seizure 3/13- divalproex 125mg po BID   Trileptal 300mg BID resumed 3/13/18  Follow  neurology recs   CT of head - no acute findings   .

## 2018-03-15 NOTE — ASSESSMENT & PLAN NOTE
Family is concerned that depakote is making him to somnolent.   It is not technically prescribed for his seizures.  He is more awake this morning and answering questions.  Will NOT d/c depakote as it was prescribed to help with mood.

## 2018-03-15 NOTE — PROGRESS NOTES
[facility]  Neurology  Progress Note     Patient Name: Mack Will  MRN: [id number]  Admission Date: 3/11/2018  Code Status: DNR   Attending Provider: Hunter Morfin MD   Consulting Provider: Huang Correa MD  Primary Care Physician: Chetan Alvarez MD  Principal Problem:Pneumonia of right lower lobe due to infectious organism     Inpatient consult to Neurology  Consult performed by: HUANG CORREA  Consult ordered by: RABIA ALEXANDRA        Subjective:      Chief Complaint:  seizure      HPI:   Mack Will is a 52 y.o. male admitted with pneumonia and was witnessed to have a seizure with GTC activity lasting about 5 minutes this am.  This responded to Ativan and CT head was ordered  The patient was using  Trileptal at baseline at the last visit at out neurology clinic. He is on Depakote now as well which was added when he was last seen at out clinic on 5/2017 for mood and appeptite  Trileptal was also continued but for reasons unknown the patient was not on this prior to admit per med list and this medication was not continued at his admission.      The patient is known to me for his  Down Syndrome and associated early Alzheimer's like memory loss. 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 He has poor gait and attention due to this.      Sister at bedside notes patient is drowsy and at times appears mildly agitated. He was awake and pleasant  but confused last pm prior to seizure    Interval history 3/15/18: Tolerating diet per speech including po meds.  Was more awake yesterday and family asking to reduce VPA which was started originally for agitated mood and poor appetite.   Sister at bedside states he is more awake and alert today. They are concerned about him being sedated appearing at times at the NH          Past Medical History:   Diagnosis Date    Alzheimer disease       Brain bleed      Down syndrome      Seizure                 Past Surgical History:   Procedure Laterality Date    CHOLECYSTECTOMY        CYST REMOVAL             Review of patient's allergies indicates:  No Known Allergies     I have reviewed all of this patient's past medical and surgical histories as well as family and social histories and active allergies and medications as documented in the electronic medical record.        No current facility-administered medications on file prior to encounter.            Current Outpatient Prescriptions on File Prior to Encounter   Medication Sig    albuterol-ipratropium 2.5mg-0.5mg/3mL (DUO-NEB) 0.5 mg-3 mg(2.5 mg base)/3 mL nebulizer solution Take 3 mLs by nebulization every 6 (six) hours as needed for Wheezing or Shortness of Breath. Rescue    clotrimazole (LOTRIMIN) 1 % cream Apply topically 2 (two) times daily.    cyanocobalamin, vitamin B-12, 5,000 mcg TbDL Take 5,000 mcg by mouth once daily.     divalproex (DEPAKOTE) 125 MG EC tablet Take one nightly for 3 weeks, then one BID (Patient taking differently: Take 125 mg by mouth every 12 (twelve) hours. )    ketoconazole (NIZORAL) 2 % cream Apply topically once daily.    LACTOSE-REDUCED FOOD (BOOST ORAL) Take by mouth once daily at 6am.    megestrol (MEGACE) 400 mg/10 mL (10 mL) Susp Take 5 mLs (200 mg total) by mouth once daily.    mupirocin (BACTROBAN) 2 % ointment Apply topically 2 (two) times daily.    oxcarbazepine (TRILEPTAL) 300 MG Tab Take 1 tablet (300 mg total) by mouth 2 (two) times daily.    tamsulosin (FLOMAX) 0.4 mg Cp24 Take 1 capsule (0.4 mg total) by mouth once daily.    trazodone (DESYREL) 50 MG tablet Take 1 tablet (50 mg total) by mouth every evening.           Family History      Problem Relation (Age of Onset)     Heart disease Father               Social History Main Topics    Smoking status: Never Smoker    Smokeless tobacco: Never Used    Alcohol use No    Drug use: Unknown     Sexual activity: Not on file      Review of Systems   Unable to perform ROS: Dementia      Objective:      Vitals:    03/15/18 0604 03/15/18 0705 03/15/18 0730 03/15/18 0803   BP:  114/68     BP Location:  Right arm     Patient Position:  Lying     Pulse: 80 85 76 78   Resp:  (!) 22 20    Temp:  98 °F (36.7 °C)     TempSrc:  Axillary     SpO2:  (!) 93% (!) 94%    Weight:       Height:                Weight: 58.9 kg (129 lb 13.6 oz)  Body mass index is 22.29 kg/m².     Physical Exam                       Exam:  Gen Appearance, well developed in stigma of down syndrome/nourished in no apparent distress  CV: 2+ distal pulses with no edema or swelling  Neuro:  MS: Awake and alert today, improved. Nods yes and says yes to at least one question and  not sustains attention and localizes to pain Recent/remote memory are gravely impaired    Language is not testable  Fund of Knowledge is note testable  CN: Optic discs are flat with normal vasculature, left pupil is smaller than right by 1mm today in ambient light (this is his baseline from prior cerebellar bleeding), Extraoccular movements and visual fields are full. Normal facial sensation and strength,  Tongue and Palate are midline and strong. Shoulder Shrug symmetric and strong.  Motor: Normal bulk, tone, no abnormal movements. 1+ reflexes. Patient can't cooperate with motor testing but appears to be moving all extremities equally.   Sensory: Can't cooperate  Cerebellar: Can't Well  Cooperate   Gait: Not done- patient not longer walks without assitance               Significant Labs: CMP today reviewed     Significant Imaging:  CT head: No acute changes     Assessment and Plan:   Mack Will is a 52 y.o. male admitted with pneumonia and had a seizure this am.  He is known to me for Down Syndrome and associated Alzheimer's,  seizures and prior cerebellar hemorrhage   I recommend:         * Pneumonia of right lower lobe due to infectious organism     -This  infection also likely lowered his seizure threshold          Seizure disorder     -Resumed Trileptal 300mg BID as he is supposed to be on this in addition to Depakote.  -Family is not sure why Trileptal was not on his med list at admit, but he has tolerated this medication well in the past but will need Ativen PRN seizures  - Continue Depakote as well which is being used for mood and appetite and will also cover seizures in his acute illness. Will need to switch to IV again if he is not able to maintain po  - family is interested in stopping depakote at some point; however, at this time it will be continued given his recent seizure and need for trileptal levels to rise for adequate seizure protection. If there are long term concerns for sedation, the medication can perhaps be tapered then  -Currently, he is likely having post itctal state (improving) in addition to sedation and agitation side effects of ativan a few days ago as well as from having systemic illness. From past experience, he is known to be very sensitive to sedating medications and can become agitated easily.                    Bib Crorea MD  Neurology  [facility]

## 2018-03-15 NOTE — PT/OT/SLP PROGRESS
Physical Therapy      Patient Name:  Mack Will   MRN:  [id number]    Patient not seen 3/15/2018 a.m. secondary to Nursing care. Will follow-up 3/15/2018 p.mian.    Matthew Riggs PT

## 2018-03-16 LAB
ALBUMIN SERPL BCP-MCNC: 2.8 G/DL
ALP SERPL-CCNC: 98 U/L
ALT SERPL W/O P-5'-P-CCNC: 13 U/L
ANION GAP SERPL CALC-SCNC: 10 MMOL/L
AST SERPL-CCNC: 12 U/L
BACTERIA BLD CULT: NORMAL
BACTERIA BLD CULT: NORMAL
BILIRUB SERPL-MCNC: 0.3 MG/DL
BNP SERPL-MCNC: <10 PG/ML
BUN SERPL-MCNC: 10 MG/DL
CALCIUM SERPL-MCNC: 9.3 MG/DL
CHLORIDE SERPL-SCNC: 104 MMOL/L
CO2 SERPL-SCNC: 25 MMOL/L
CREAT SERPL-MCNC: 0.7 MG/DL
DIASTOLIC DYSFUNCTION: NO
EST. GFR  (AFRICAN AMERICAN): >60 ML/MIN/1.73 M^2
EST. GFR  (NON AFRICAN AMERICAN): >60 ML/MIN/1.73 M^2
ESTIMATED PA SYSTOLIC PRESSURE: 24.8
GLOBAL PERICARDIAL EFFUSION: ABNORMAL
GLUCOSE SERPL-MCNC: 104 MG/DL
MITRAL VALVE REGURGITATION: ABNORMAL
POTASSIUM SERPL-SCNC: 4 MMOL/L
PROT SERPL-MCNC: 7.2 G/DL
RETIRED EF AND QEF - SEE NOTES: 55 (ref 55–65)
SODIUM SERPL-SCNC: 139 MMOL/L
TRICUSPID VALVE REGURGITATION: ABNORMAL

## 2018-03-16 PROCEDURE — C9113 INJ PANTOPRAZOLE SODIUM, VIA: HCPCS | Performed by: INTERNAL MEDICINE

## 2018-03-16 PROCEDURE — 63600175 PHARM REV CODE 636 W HCPCS: Performed by: INTERNAL MEDICINE

## 2018-03-16 PROCEDURE — 27000190 HC CPAP FULL FACE MASK W/VALVE

## 2018-03-16 PROCEDURE — 11000001 HC ACUTE MED/SURG PRIVATE ROOM

## 2018-03-16 PROCEDURE — 93306 TTE W/DOPPLER COMPLETE: CPT | Mod: 26,,, | Performed by: INTERNAL MEDICINE

## 2018-03-16 PROCEDURE — 99233 SBSQ HOSP IP/OBS HIGH 50: CPT | Mod: ,,, | Performed by: FAMILY MEDICINE

## 2018-03-16 PROCEDURE — 97110 THERAPEUTIC EXERCISES: CPT

## 2018-03-16 PROCEDURE — 93306 TTE W/DOPPLER COMPLETE: CPT

## 2018-03-16 PROCEDURE — 25000003 PHARM REV CODE 250: Performed by: INTERNAL MEDICINE

## 2018-03-16 PROCEDURE — 94660 CPAP INITIATION&MGMT: CPT

## 2018-03-16 PROCEDURE — 27000221 HC OXYGEN, UP TO 24 HOURS

## 2018-03-16 PROCEDURE — 94640 AIRWAY INHALATION TREATMENT: CPT

## 2018-03-16 PROCEDURE — 36415 COLL VENOUS BLD VENIPUNCTURE: CPT

## 2018-03-16 PROCEDURE — 99233 SBSQ HOSP IP/OBS HIGH 50: CPT | Mod: ,,, | Performed by: PSYCHIATRY & NEUROLOGY

## 2018-03-16 PROCEDURE — 25000003 PHARM REV CODE 250: Performed by: SURGERY

## 2018-03-16 PROCEDURE — 25000242 PHARM REV CODE 250 ALT 637 W/ HCPCS: Performed by: SURGERY

## 2018-03-16 PROCEDURE — 94668 MNPJ CHEST WALL SBSQ: CPT

## 2018-03-16 PROCEDURE — 97530 THERAPEUTIC ACTIVITIES: CPT

## 2018-03-16 PROCEDURE — 99900035 HC TECH TIME PER 15 MIN (STAT)

## 2018-03-16 PROCEDURE — 94761 N-INVAS EAR/PLS OXIMETRY MLT: CPT

## 2018-03-16 PROCEDURE — 83880 ASSAY OF NATRIURETIC PEPTIDE: CPT

## 2018-03-16 PROCEDURE — 25000003 PHARM REV CODE 250: Performed by: PSYCHIATRY & NEUROLOGY

## 2018-03-16 PROCEDURE — 63600175 PHARM REV CODE 636 W HCPCS: Performed by: SURGERY

## 2018-03-16 PROCEDURE — 80053 COMPREHEN METABOLIC PANEL: CPT

## 2018-03-16 RX ADMIN — ACETAMINOPHEN 650 MG: 325 TABLET ORAL at 08:03

## 2018-03-16 RX ADMIN — GUAIFENESIN 200 MG: 100 SOLUTION ORAL at 05:03

## 2018-03-16 RX ADMIN — DIVALPROEX SODIUM 125 MG: 125 TABLET, DELAYED RELEASE ORAL at 09:03

## 2018-03-16 RX ADMIN — GUAIFENESIN 200 MG: 100 SOLUTION ORAL at 11:03

## 2018-03-16 RX ADMIN — IPRATROPIUM BROMIDE AND ALBUTEROL SULFATE 3 ML: 2.5; .5 SOLUTION RESPIRATORY (INHALATION) at 11:03

## 2018-03-16 RX ADMIN — IPRATROPIUM BROMIDE AND ALBUTEROL SULFATE 3 ML: 2.5; .5 SOLUTION RESPIRATORY (INHALATION) at 07:03

## 2018-03-16 RX ADMIN — IPRATROPIUM BROMIDE AND ALBUTEROL SULFATE 3 ML: 2.5; .5 SOLUTION RESPIRATORY (INHALATION) at 03:03

## 2018-03-16 RX ADMIN — IPRATROPIUM BROMIDE AND ALBUTEROL SULFATE 3 ML: 2.5; .5 SOLUTION RESPIRATORY (INHALATION) at 12:03

## 2018-03-16 RX ADMIN — AZITHROMYCIN MONOHYDRATE 500 MG: 500 INJECTION, POWDER, LYOPHILIZED, FOR SOLUTION INTRAVENOUS at 01:03

## 2018-03-16 RX ADMIN — DIVALPROEX SODIUM 125 MG: 125 TABLET, DELAYED RELEASE ORAL at 08:03

## 2018-03-16 RX ADMIN — PANTOPRAZOLE SODIUM 40 MG: 40 INJECTION, POWDER, FOR SOLUTION INTRAVENOUS at 09:03

## 2018-03-16 RX ADMIN — OXCARBAZEPINE 300 MG: 150 TABLET, FILM COATED ORAL at 09:03

## 2018-03-16 RX ADMIN — GUAIFENESIN 200 MG: 100 SOLUTION ORAL at 12:03

## 2018-03-16 RX ADMIN — OXCARBAZEPINE 300 MG: 150 TABLET, FILM COATED ORAL at 08:03

## 2018-03-16 RX ADMIN — GUAIFENESIN 200 MG: 100 SOLUTION ORAL at 06:03

## 2018-03-16 RX ADMIN — CEFTRIAXONE 1 G: 1 INJECTION, SOLUTION INTRAVENOUS at 12:03

## 2018-03-16 NOTE — ASSESSMENT & PLAN NOTE
Rocephin and azithromycin ordered IVPB- day #6; WBC ct okay, no fevers, increasing O2 requirement though. mucinex and CPT to help with thickened mucous.  Duoneb every 3hr   O2  ST eval on 3/13; pureed thick recs  Rule out any fluid volume overload; BNP nml on admit- add BNP and check Echo today-   Prior Echo in 2015 EF nml  Trial of BiPap  Patient is a DNR

## 2018-03-16 NOTE — PT/OT/SLP PROGRESS
"Physical Therapy Treatment    Patient Name:  Mack Will   MRN:  [id number]    Recommendations:     Discharge Recommendations:  nursing facility, basic   Discharge Equipment Recommendations: none   Barriers to discharge: None    Assessment:     Mack Will is a 52 y.o. male admitted with a medical diagnosis of Pneumonia of right lower lobe due to infectious organism.  He presents with the following impairments/functional limitations:  weakness, impaired endurance, impaired self care skills, impaired functional mobilty, impaired balance, decreased upper extremity function, decreased lower extremity function, decreased safety awareness, impaired cognition . Pt tolerated treatment well today.    Rehab Prognosis:  Fair; patient would benefit from acute skilled PT services to address these deficits and reach maximum level of function.      Recent Surgery: * No surgery found *      Plan:     During this hospitalization, patient to be seen  (1-2x/day Monday-Friday; PRN Weekends/Holidays) to address the above listed problems via therapeutic activities, therapeutic exercises  · Plan of Care Expires:   (upon D/C from facility)   Plan of Care Reviewed with: patient, family    Subjective     Communicated with family and patient prior to session.  Patient found supine in bed upon PT entry to room, agreeable to treatment.      Chief Complaint: Pt with no c/o  Patient comments/goals: "Go home stronger" per family  Pain/Comfort:  · Pain Rating 1: 0/10    Patients cultural, spiritual, Holiness conflicts given the current situation:      Objective:     Patient found with: oxygen     General Precautions: Standard, fall   Orthopedic Precautions:N/A   Braces: N/A     Functional Mobility:  · Bed Mobility:     · Rolling Left:  maximal assistance  · Rolling Right: maximal assistance  · Scooting: maximal assistance  · Bridging: maximal assistance  · Supine to Sit: maximal assistance  · Sit to Supine: maximal " assistance      AM-PAC 6 CLICK MOBILITY  Turning over in bed (including adjusting bedclothes, sheets and blankets)?: 2  Sitting down on and standing up from a chair with arms (e.g., wheelchair, bedside commode, etc.): 1  Moving from lying on back to sitting on the side of the bed?: 2  Moving to and from a bed to a chair (including a wheelchair)?: 1  Need to walk in hospital room?: 1  Climbing 3-5 steps with a railing?: 1  Total Score: 8       Therapeutic Activities and Exercises:   PROM to BUE/BLE at all joints in available planes of motion with rest breaks as needed to prevent contractures and promote health fluid dynamics. Some motion is active assistive but limited ability to follow commands.     Patient left supine with all lines intact, call button in reach, NSG notified and family present..    GOALS:    Physical Therapy Goals        Problem: Physical Therapy Goal    Goal Priority Disciplines Outcome Goal Variances Interventions   Physical Therapy Goal     PT/OT, PT Ongoing (interventions implemented as appropriate)     Description:  Goals to be met by: upon D/C from facility     Patient will increase functional independence with mobility by performin. Supine to sit with MInimal Assistance  2. Sit to supine with MInimal Assistance  3. Bed to chair transfer with Minimal Assistance using any AD as needed.  4 Pt will tolerate PROM/A/AROM on BUE/BLE at all joints in available planes of motion with rest breaks as needed to improve functional mobility.                       Time Tracking:     PT Received On: 18  PT Start Time: 1040     PT Stop Time: 1105  PT Total Time (min): 25 min     Billable Minutes: Therapeutic Activity 15 minutes and Therapeutic Exercise 10 minutes    Treatment Type: Treatment  PT/PTA: PT           Lulú Sagastume, PT  2018

## 2018-03-16 NOTE — PLAN OF CARE
Problem: Nutrition, Imbalanced: Inadequate Oral Intake (Adult)  Intervention: Promote/Optimize Nutrition  Goals: 1. Progress towards PO intake of 100% 2. Advance diet as tolerated by speech consult    Nutrition Goal Status: progressing towards goal  Communication of RD Recs:  (poc reviewed)    Nutrition Discharge Plannin. Progress towards goal of 100% PO intake to meet EEN/EPN 2. Progress diet as toelrated by Speech consult.

## 2018-03-16 NOTE — PLAN OF CARE
Problem: Patient Care Overview  Goal: Plan of Care Review  Outcome: Ongoing (interventions implemented as appropriate)  Pt doing well. No question/concerns at this time. Agrees with poc. No pain/falls.  bnp normal. Echo shows small pericardial effusion. bipap ordered and he is tolerating well.  Still on iv abx for pneumonia.  Will evenually go to the [facility].

## 2018-03-16 NOTE — ASSESSMENT & PLAN NOTE
Family is concerned that depakote is making him to somnolent.   It is not technically prescribed for his seizures.  He is more awake this morning and answering questions.  Will NOT d/c depakote as it was prescribed to help with mood- discussed with Neuro.

## 2018-03-16 NOTE — PROGRESS NOTES
[facility]  Neurology  Progress Note     Patient Name: Mack Will  MRN: [id number]  Admission Date: 3/11/2018  Code Status: DNR   Attending Provider: Hunter Morfin MD   Consulting Provider: Huang Correa MD  Primary Care Physician: Chetan Alvarez MD  Principal Problem:Pneumonia of right lower lobe due to infectious organism     Inpatient consult to Neurology  Consult performed by: HUANG CORERA  Consult ordered by: RABIA ALEXANDRA        Subjective:      Chief Complaint:  seizure      HPI:   Mack Will is a 52 y.o. male admitted with pneumonia and was witnessed to have a seizure with GTC activity lasting about 5 minutes this am.  This responded to Ativan and CT head was ordered  The patient was using  Trileptal at baseline at the last visit at out neurology clinic. He is on Depakote now as well which was added when he was last seen at out clinic on 5/2017 for mood and appeptite  Trileptal was also continued but for reasons unknown the patient was not on this prior to admit per med list and this medication was not continued at his admission.      The patient is known to me for his  Down Syndrome and associated early Alzheimer's like memory loss. 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 He has poor gait and attention due to this.      Sister at bedside notes patient is drowsy and at times appears mildly agitated. He was awake and pleasant  but confused last pm prior to seizure     Interval history 3/15/18: Tolerating diet per speech including po meds.  Was more awake yesterday and family asking to reduce VPA which was started originally for agitated mood and poor appetite.   Sister at bedside states he is more awake and alert today. They are concerned about him being sedated appearing at times at the NH    Interval history 3/16/18: Primary team addressing ongoing respiratory  symptoms. NO further seizures noted by nursing. Patient has been tolerating diet             Past Medical History:   Diagnosis Date    Alzheimer disease      Brain bleed      Down syndrome      Seizure                     Past Surgical History:   Procedure Laterality Date    CHOLECYSTECTOMY        CYST REMOVAL             Review of patient's allergies indicates:  No Known Allergies     I have reviewed all of this patient's past medical and surgical histories as well as family and social histories and active allergies and medications as documented in the electronic medical record.        No current facility-administered medications on file prior to encounter.               Current Outpatient Prescriptions on File Prior to Encounter   Medication Sig    albuterol-ipratropium 2.5mg-0.5mg/3mL (DUO-NEB) 0.5 mg-3 mg(2.5 mg base)/3 mL nebulizer solution Take 3 mLs by nebulization every 6 (six) hours as needed for Wheezing or Shortness of Breath. Rescue    clotrimazole (LOTRIMIN) 1 % cream Apply topically 2 (two) times daily.    cyanocobalamin, vitamin B-12, 5,000 mcg TbDL Take 5,000 mcg by mouth once daily.     divalproex (DEPAKOTE) 125 MG EC tablet Take one nightly for 3 weeks, then one BID (Patient taking differently: Take 125 mg by mouth every 12 (twelve) hours. )    ketoconazole (NIZORAL) 2 % cream Apply topically once daily.    LACTOSE-REDUCED FOOD (BOOST ORAL) Take by mouth once daily at 6am.    megestrol (MEGACE) 400 mg/10 mL (10 mL) Susp Take 5 mLs (200 mg total) by mouth once daily.    mupirocin (BACTROBAN) 2 % ointment Apply topically 2 (two) times daily.    oxcarbazepine (TRILEPTAL) 300 MG Tab Take 1 tablet (300 mg total) by mouth 2 (two) times daily.    tamsulosin (FLOMAX) 0.4 mg Cp24 Take 1 capsule (0.4 mg total) by mouth once daily.    trazodone (DESYREL) 50 MG tablet Take 1 tablet (50 mg total) by mouth every evening.              Family History      Problem Relation (Age of Onset)     Heart  disease Father                  Social History Main Topics    Smoking status: Never Smoker    Smokeless tobacco: Never Used    Alcohol use No    Drug use: Unknown    Sexual activity: Not on file      Review of Systems   Unable to perform ROS: Dementia      Objective:      Vitals:    03/16/18 0734 03/16/18 0800 03/16/18 1001 03/16/18 1103   BP: 107/77      BP Location:       Patient Position:       Pulse: 93 81 105 81   Resp: 18   18   Temp: 98.4 °F (36.9 °C)      TempSrc:       SpO2: (!) 92%   96%   Weight:       Height:                 Weight: 58.9 kg (129 lb 13.6 oz)  Body mass index is 22.29 kg/m².     Physical Exam                    Exam:  Gen Appearance, well developed in stigmata of down syndrome/nourished in no apparent distress  CV: 2+ distal pulses with no edema or swelling  Neuro:  MS: Awake and alert today  and localizes to pain. He is able to cooperate well with eating however and understands cues from PTA. The patient has observed tremulousness of the arms and legs when awake and attentive which could be a side effect of VPA. Recent/remote memory are gravely impaired    Language is not testable  Fund of Knowledge is not testable  CN: Optic discs are flat with normal vasculature, left pupil is smaller than right by 1mm today in ambient light (this is his baseline from prior cerebellar bleeding), Extraoccular movements and visual fields are full. Normal facial sensation and strength,  Tongue and Palate are midline and strong. Shoulder Shrug symmetric and strong.  Motor: Normal bulk, tone, no abnormal movements. 1+ reflexes. Patient can't cooperate with motor testing but appears to be moving all extremities equally.   Sensory: Can't cooperate  Cerebellar: Can't Well  Cooperate   Gait: Not done- patient not longer walks without assitance               Significant Labs: CMP today reviewed- unremarkable     Significant Imaging 3/1/18:  CT head: No acute changes     Assessment and Plan:   Mack Jack  Suman is a 52 y.o. male admitted with pneumonia and had a seizure this am.  He is known to me for Down Syndrome and associated Alzheimer's,  seizures and prior cerebellar hemorrhage   I recommend:           * Pneumonia of right lower lobe due to infectious organism     -This infection also likely lowered his seizure threshold and may continue to cause some somnolence and worsened confusion in this patient           Seizure disorder     -Resumed Trileptal 300mg BID since seizures  in addition to Depakote as it was off of his med list for unclear reasons   - Continue Depakote as well which is being used for mood and appetite and will also cover seizures in his acute illness. Will need to switch to IV again if he is not able to maintain po  - family is interested in stopping depakote at some point; however, at this time it will be continued given his recent seizure and need for trileptal levels to rise for adequate seizure protection. If there are long term concerns for sedation, the medication can perhaps be tapered then. It may be causing a mild tremor in this patient  -However, he is likely having post itctal state (improving) in addition to sedation and agitation side effects of ativan a few days ago as well as from having ongoing systemic illness. From past experience, he is known to be very sensitive to sedating medications and can become agitated easily.    -If there is any concern for ongoing seizures, patient will need transfer to facility with EEG. However, currently, the patient is awake and cooperative.               Will see Patient Monday     Bib Correa MD  Neurology  [facility]

## 2018-03-16 NOTE — PROGRESS NOTES
" [facility]  Adult Nutrition  Progress Note    SUMMARY       Recommendations    Recommendation/Intervention: 1. Cont PO intake of >75% 2. Cont Nectar thick Boost as snack to supplement intake 3. Maintain current weight   Goals: 1. Progress towards PO intake of 100% 2. Advance diet as tolerated by speech consult    Nutrition Goal Status: progressing towards goal  Communication of RD Recs:  (poc reviewed)    Nutrition Discharge Plannin. Progress towards goal of 100% PO intake to meet EEN/EPN 2. Progress diet as toelrated by Speech consult.     Reason for Assessment    Reason for Assessment: RD follow-up  Diagnosis: other (see comments) (Pneumonia)  Relevant Medical History: dementia, brain bleed  General Information Comments: PO intake 75%      Nutrition Risk Screen    Nutrition Risk Screen: dysphagia or difficulty swallowing    Nutrition/Diet History    Patient Reported Diet/Restrictions/Preferences: pureed  Do you have any cultural, spiritual, Jainism conflicts, given your current situation?:  (none voiced)  Food Allergies: NKFA  Factors Affecting Nutritional Intake: impaired cognitive status/motor control, chewing difficulties/inability to chew food, difficulty/impaired swallowing, inability to feed self    Anthropometrics    Temp: 98.4 °F (36.9 °C)  Height: 5' 4" (162.6 cm)  Height (inches): 64 in  Weight Method: Bed Scale  Weight: 58.9 kg (129 lb 13.6 oz) (rd reviewed)  Weight (lb): 129.85 lb  Ideal Body Weight (IBW), Male: 130 lb  % Ideal Body Weight, Male (lb): 99.88 lb  BMI (Calculated): 22.3  BMI Grade: 18.5-24.9 - normal    Lab/Procedures/Meds    Pertinent Labs Reviewed: reviewed  Pertinent Medications Reviewed: reviewed    Physical Findings/Assessment    Skin: intact    Estimated/Assessed Needs    Weight Used For Calorie Calculations: 58.9 kg (129 lb 13.6 oz)  Height: 5' 4" (162.6 cm)  Energy Calorie Requirements (kcal): 1472 (25kcal/kg)  Energy Need Method: Kcal/kg  20 kcal/kg " (kcal): 1178  25 kcal/kg (kcal): 1472.5    RMR ([place]-St. Jeor Equation): 1345  Protein Requirements: 59-70.83 (10-1.2g/kg)  Weight Used For Protein Calculations: 58.9 kg (129 lb 13.6 oz)    0.8 gm Protein (gm): 47.22  1.0 gm Protein (gm): 59.02    Fluid Requirements (mL): 1472  Fluid Need Method: RDA Method  RDA Method (mL): 1472       Nutrition Prescription Ordered    Current Diet Order: Pureed netar thick   Nutrition Order Comments: Cont. To monitor for tolerance of diet order    Evaluation of Received Nutrient/Fluid Intake    Tolerance: tolerating  % Intake of Estimated Energy Needs: 50 - 75 %  % Meal Intake: 50 - 75 %    Nutrition Risk    Level of Risk/Frequency of Follow-up: low - moderate     Assessment and Plan    Chewing difficulty    Contributing Nutrition Diagnosis  Mental Retardation and Dementia     Related to (etiology):   Inability to consume regular diet and adequate nutrition     Signs and Symptoms (as evidenced by):   Dysphagia Pureed Nectar Thick order    Interventions/Recommendations (treatment strategy):  Cont. Pureed diet and provide Nectar Thick Boost for snacks    Nutrition Diagnosis Status:   New               Monitor and Evaluation    Food and Nutrient Intake: energy intake, food and beverage intake  Food and Nutrient Adminstration: diet order  Physical Activity and Function: nutrition-related ADLs and IADLs  Anthropometric Measurements: weight, weight change, body mass index  Biochemical Data, Medical Tests and Procedures: electrolyte and renal panel, gastrointestinal profile, glucose/endocrine profile, inflammatory profile, lipid profile  Nutrition-Focused Physical Findings: overall appearance     Nutrition Follow-Up    RD Follow-up?: Yes

## 2018-03-16 NOTE — PT/OT/SLP PROGRESS
"Physical Therapy Treatment    Patient Name:  Mack Will   MRN:  [id number]    Recommendations:     Discharge Recommendations:  nursing facility, basic   Discharge Equipment Recommendations: none   Barriers to discharge: None    Assessment:     Mack Will is a 52 y.o. male admitted with a medical diagnosis of Pneumonia of right lower lobe due to infectious organism.  He presents with the following impairments/functional limitations:  weakness, impaired endurance, impaired self care skills, impaired functional mobilty, impaired balance, impaired cognition, decreased lower extremity function, decreased upper extremity function, decreased safety awareness . Pt progressing with POC goals.    Rehab Prognosis:  Fair; patient would benefit from acute skilled PT services to address these deficits and reach maximum level of function.      Recent Surgery: * No surgery found *      Plan:     During this hospitalization, patient to be seen  (1-2x/day Monday-Friday; PRN Weekends/Holidays) to address the above listed problems via therapeutic activities, therapeutic exercises  · Plan of Care Expires:   (upon D/C from facility)   Plan of Care Reviewed with: patient, family    Subjective     Communicated with patient prior to session.  Patient found supine in bed upon PT entry to room, agreeable to treatment.      Chief Complaint: Pt with no c/o  Patient comments/goals: "Go home stronger" per family  Pain/Comfort:  · Pain Rating 1: 0/10    Patients cultural, spiritual, Yarsani conflicts given the current situation:      Objective:     Patient found with: oxygen     General Precautions: Standard, fall   Orthopedic Precautions:N/A   Braces: N/A       AM-PAC 6 CLICK MOBILITY  Turning over in bed (including adjusting bedclothes, sheets and blankets)?: 2  Sitting down on and standing up from a chair with arms (e.g., wheelchair, bedside commode, etc.): 1  Moving from lying on back to sitting on the side of the bed?: " 2  Moving to and from a bed to a chair (including a wheelchair)?: 1  Need to walk in hospital room?: 1  Climbing 3-5 steps with a railing?: 1  Total Score: 8       Therapeutic Activities and Exercises:   PROM to BUE/BLE at all joints in available planes of motion supine in bed with rest breaks as needed to prevent contractures and promote fluid dynamics.     Patient left supine with all lines intact, call button in reach and NSG notified..    GOALS:    Physical Therapy Goals        Problem: Physical Therapy Goal    Goal Priority Disciplines Outcome Goal Variances Interventions   Physical Therapy Goal     PT/OT, PT Ongoing (interventions implemented as appropriate)     Description:  Goals to be met by: upon D/C from facility     Patient will increase functional independence with mobility by performin. Supine to sit with MInimal Assistance  2. Sit to supine with MInimal Assistance  3. Bed to chair transfer with Minimal Assistance using any AD as needed.  4 Pt will tolerate PROM/A/AROM on BUE/BLE at all joints in available planes of motion with rest breaks as needed to improve functional mobility.                       Time Tracking:     PT Received On: 18  PT Start Time: 1315     PT Stop Time: 1330  PT Total Time (min): 15 min     Billable Minutes: Therapeutic Exercise 15 minutes    Treatment Type: Treatment  PT/PTA: PT           Lulú Sagastume, PT  2018

## 2018-03-16 NOTE — PLAN OF CARE
03/16/18 1046   Medicare Message   Important Message from Medicare regarding Discharge Appeal Rights Given to patient/caregiver;Explained to patient/caregiver;Signed/date by patient/caregiver   Date IMM was signed 03/16/18   Time IMM was signed 1049

## 2018-03-16 NOTE — ASSESSMENT & PLAN NOTE
Seizure 3/13- divalproex 125mg po BID   Trileptal 300mg BID resumed 3/13/18  Follow  neurology recs - discussed transient unequal pupils with Dr. Correa - this is known since after last CVA and not new  CT of head - no acute findings   .

## 2018-03-16 NOTE — PROGRESS NOTES
"[facility] Hospital Medicine  Progress Note    Patient Name: Mack Will  MRN: [id number]  Patient Class: IP- Inpatient   Admission Date: 3/11/2018  Length of Stay: 4 days  Attending Physician: Jhony Finn MD  Primary Care Provider: Chetan Alvarez MD        Subjective:     Principal Problem:Pneumonia of right lower lobe due to infectious organism    HPI:  Pt presented to ER last night with c/o SOB and cough. Was treated last week with azithromycin/duonebs per PCP 3/6. No improvement. He is afebrile and had no elevated WBC. CXR shows small right lower lobe infiltrate. Blood cultures drawn and pending. He  Is laying in bed this am being fed by sister at bedside. Sister in law also at bedside. Lives at group home. Sister in law reports that he was not getting his treatments at group home. He was rattling more and having difficulty bringing up mucus. Eating well, but sister reports that this am he was getting a little gaggy with breakfast    He does not walk at home, sits in chair or w/c. Diapered.     Hospital Course:  3/13/18 admitted for RLL pneumonia. Had a seizure this am after nebulizer tx. . + hx of seizure disorder but family reports had not had seizure in years> Home meds list trileptal 300mg twice daily- family reports he was only taking divalproex at home.   Last PN per Dr. Alvarez has trileptal listed as rx.  Since seizure he has been moaning and pulling legs up intermittently. Family reports that he does not usually do this. "not a complainer". They also report lots of problems with gabriel catheter during last visit.   Afebrile- Temp 97.3, /66, 158/78. Noisy breathing.  They are not planning to have him return to group home. Planning to admit to nursing home when discharged.     3/14/18 admitted for RLL pneumonia 3-11-18,  S/P seizure yesterday am. Subsequent CT brain  negative for acute findings; + hx of hemorrhagic CVA.   Neuro  consulted; meds adjusted.  " Trileptal 300mg BID resumed as he is supposed to be on this in addition to Depakote.   Did much better overnight. Back to taking po meds and eating,  Awaiting swallow study.- hopefully today.   Still has very harsh cough.   Day 4 abx  Planning for discharge to ~2 days, NSG home placement in [place]. Allison Nash  Family concerned about excess sedation. Aware that was not getting trileptal and now resumed. Continued Depakote. They would like to see if could maybe decrease or stop Depakote- discussed will defer to neurology. Currently not getting trazodone which was home med.   3/15/18 admitted for RLL pneumonia 3-11-18,  S/P seizure 3/13 am. Subsequent CT brain  negative for acute findings; + hx of hemorrhagic CVA.   Neuro  Adjusted seizure Rx;  Trileptal  resumed as he is supposed to be on this in addition to Depakote.   Back to taking po meds and eating,    ST 3/13/ recommendations; Pureed diet with nectar thick liquids    Day 5 abx  Planning for  NSG home placement in [place]. Allison Nash  Still with junky cough and noisy breathing. O2 increased to 5LNC- O2 sats 93% this am with concern for mucous plugging. PT following   Discussed with sisters at bedside and neurology - will keep on depakote and trileptal.  3/16/18 admitted for RLL pneumonia 3-11-18,  S/P seizure 3/13 am. CT Ok,  hx of hemorrhagic CVA.   Neuro  Adjusted seizure Rx;  Trileptal  Resumed. LOC better but worsening lungs yesterday  increasing O2 requirement. Added mucinex and CPT to help with thickened mucous.O2 sats 92% on 5LNC    CXR demonstrating -lungs are underexpanded with crowding of the pulmonary vascularity.  There is congestion of the pulmonary vasculature.  This is identified opacity in the right lung base could represent a pneumonia in the appropriate clinical setting.  BNP wa normal on admit. 2015  Echo- EF 55-60%- repeat echo; BNP  Still noisy breathing, course audible breath sounds    Review of Systems   Unable to perform ROS:  Dementia     Objective:     Vital Signs (Most Recent):  Temp: 97.1 °F (36.2 °C) (03/16/18 0324)  Pulse: 92 (03/16/18 0600)  Resp: 18 (03/16/18 0356)  BP: 105/64 (03/16/18 0324)  SpO2: (!) 93 % (03/16/18 0356) Vital Signs (24h Range):  Temp:  [97 °F (36.1 °C)-98.1 °F (36.7 °C)] 97.1 °F (36.2 °C)  Pulse:  [] 92  Resp:  [11-22] 18  SpO2:  [92 %-97 %] 93 %  BP: (105-127)/(64-76) 105/64     Weight: 58.9 kg (129 lb 13.6 oz) (rd reviewed)  Body mass index is 22.29 kg/m².    Physical Exam   Constitutional: He is oriented to person, place, and time. He appears well-developed and well-nourished. No distress.   HENT:   Head: Normocephalic and atraumatic.   Right Ear: External ear normal.   Left Ear: External ear normal.   Eyes: Conjunctivae and EOM are normal. Right eye exhibits no discharge. Left eye exhibits no discharge.   Right pupil at 4mm and left at 2mm resting   Neck: Neck supple. No tracheal deviation present.   Cardiovascular: Normal rate and regular rhythm.  Exam reveals no friction rub.    No murmur heard.  Unable to hear heart sounds due to respiratory exp ronchi   Pulmonary/Chest: Accessory muscle usage present. Tachypnea noted. No respiratory distress. He has no wheezes. He has no rales.   Tachypnea.  Mild expiratory rhonchi and rattle  No increased WOB - 4L NC in place with sats 93-95%   Abdominal: Soft. Bowel sounds are normal. He exhibits no distension. There is no tenderness.   Neurological: He is alert and oriented to person, place, and time. No cranial nerve deficit.   Skin: Skin is warm and dry.   Psychiatric: He has a normal mood and affect. His behavior is normal.   Nursing note and vitals reviewed.        CRANIAL NERVES     CN III, IV, VI   Extraocular motions are normal.        Significant Labs:   CBC:   No results for input(s): WBC, HGB, HCT, PLT in the last 48 hours.  CMP:     Recent Labs  Lab 03/15/18  0525      K 4.2      CO2 24      BUN 10   CREATININE 0.7   CALCIUM 9.3    PROT 7.3   ALBUMIN 2.9*   BILITOT 0.4   ALKPHOS 92   AST 22   ALT 14   ANIONGAP 10   EGFRNONAA >60    3/13/18- Mg-2.1+, Phosph 2.5  Cardiac Markers:     Lactic Acid:   No results for input(s): LACTATE in the last 48 hours.    Recent Labs  Lab 03/11/18  1155     193   CPKMB 0.9   TROPONINI <0.006   MB 0.5     Flu negative    Blood cultures NGTD  3/13/18 Valproic acid-   Valproic Acid Lvl 50.0 - 100.0 ug/mL 18.2           Significant Imaging:     3/15/18 Chest, 1 view: The lungs are underexpanded with crowding of the pulmonary vascularity.  There is congestion of the pulmonary vasculature.  This is identified opacity in the right lung base could represent a pneumonia in the appropriate clinical setting.  The heart is at the upper limits of normal in size.  The skeletal structures show age-appropriate degenerative change.  Modified barium swallow- pending    CXR 3/11- A small infiltrate at the right lung base is consistent with pneumonia.  Borderline heart size.    EKG Normal sinus rhythm with sinus arrhythmia  Normal ECG  CT brain 3/13/18- No detrimental change from 4/21/17.     Assessment/Plan:      * Pneumonia of right lower lobe due to infectious organism    Rocephin and azithromycin ordered IVPB- day #6; WBC ct okay, no fevers, increasing O2 requirement though. mucinex and CPT to help with thickened mucous.  Duoneb every 3hr   O2  ST eval on 3/13; pureed thick recs  Rule out any fluid volume overload; BNP nml on admit- add BNP and check Echo today-   Prior Echo in 2015 EF nml  Trial of BiPap  Patient is a DNR        Chewing difficulty      ST rec=Pureed diet -           Debility    He is non ambulatory at home. PT to cont his transferring ability           Somnolence    Family is concerned that depakote is making him to somnolent.   It is not technically prescribed for his seizures.  He is more awake this morning and answering questions.  Will NOT d/c depakote as it was prescribed to help with mood-  discussed with Neuro.          Seizure disorder    Seizure 3/13- divalproex 125mg po BID   Trileptal 300mg BID resumed 3/13/18  Follow  neurology recs - discussed transient unequal pupils with Dr. Correa - this is known since after last CVA and not new  CT of head - no acute findings   .          Down's syndrome                VTE Risk Mitigation         Ordered     Medium Risk of VTE  Once      03/11/18 1450     Place sequential compression device  Until discontinued      03/11/18 1450              Jhony Finn MD  Department of Hospital Medicine   [facility]

## 2018-03-16 NOTE — PLAN OF CARE
Problem: Fall Risk (Adult)  Goal: Absence of Falls  Patient will demonstrate the desired outcomes by discharge/transition of care.   Fall precautions maintained. Bed alarm engaged at all times.     Problem: Patient Care Overview  Goal: Plan of Care Review  Plan of care reviewed with patient and he agrees with the plan of care. Telemetry monitoring continues. SCDs in use. No acute distress noted.     Problem: Pressure Ulcer Risk (Morales Scale) (Adult,Obstetrics,Pediatric)  Goal: Skin Integrity  Patient will demonstrate the desired outcomes by discharge/transition of care.   Outcome: Ongoing (interventions implemented as appropriate)  Patient turned and repositioned every 2 hours.     Problem: Pneumonia (Adult)  Goal: Signs and Symptoms of Listed Potential Problems Will be Absent, Minimized or Managed (Pneumonia)  Signs and symptoms of listed potential problems will be absent, minimized or managed by discharge/transition of care (reference Pneumonia (Adult) CPG).   Outcome: Ongoing (interventions implemented as appropriate)  Oxygen continues at 4l per nasal cannula. Respiratory treatments continue. Lungs coarse and diminished. IV antibiotics continue. Frequent non-productive cough noted.  Mucinex liquid cough syrup continues.

## 2018-03-16 NOTE — SUBJECTIVE & OBJECTIVE
Review of Systems   Unable to perform ROS: Dementia     Objective:     Vital Signs (Most Recent):  Temp: 97.1 °F (36.2 °C) (03/16/18 0324)  Pulse: 92 (03/16/18 0600)  Resp: 18 (03/16/18 0356)  BP: 105/64 (03/16/18 0324)  SpO2: (!) 93 % (03/16/18 0356) Vital Signs (24h Range):  Temp:  [97 °F (36.1 °C)-98.1 °F (36.7 °C)] 97.1 °F (36.2 °C)  Pulse:  [] 92  Resp:  [11-22] 18  SpO2:  [92 %-97 %] 93 %  BP: (105-127)/(64-76) 105/64     Weight: 58.9 kg (129 lb 13.6 oz) (rd reviewed)  Body mass index is 22.29 kg/m².    Physical Exam   Constitutional: He is oriented to person, place, and time. He appears well-developed and well-nourished. No distress.   HENT:   Head: Normocephalic and atraumatic.   Right Ear: External ear normal.   Left Ear: External ear normal.   Eyes: Conjunctivae and EOM are normal. Right eye exhibits no discharge. Left eye exhibits no discharge.   Right pupil at 4mm and left at 2mm resting   Neck: Neck supple. No tracheal deviation present.   Cardiovascular: Normal rate and regular rhythm.  Exam reveals no friction rub.    No murmur heard.  Unable to hear heart sounds due to respiratory exp ronchi   Pulmonary/Chest: Accessory muscle usage present. Tachypnea noted. No respiratory distress. He has no wheezes. He has no rales.   Tachypnea.  Mild expiratory rhonchi and rattle  No increased WOB - 4L NC in place with sats 93-95%   Abdominal: Soft. Bowel sounds are normal. He exhibits no distension. There is no tenderness.   Neurological: He is alert and oriented to person, place, and time. No cranial nerve deficit.   Skin: Skin is warm and dry.   Psychiatric: He has a normal mood and affect. His behavior is normal.   Nursing note and vitals reviewed.        CRANIAL NERVES     CN III, IV, VI   Extraocular motions are normal.        Significant Labs:   CBC:   No results for input(s): WBC, HGB, HCT, PLT in the last 48 hours.  CMP:     Recent Labs  Lab 03/15/18  0525      K 4.2      CO2 24   GLU  103   BUN 10   CREATININE 0.7   CALCIUM 9.3   PROT 7.3   ALBUMIN 2.9*   BILITOT 0.4   ALKPHOS 92   AST 22   ALT 14   ANIONGAP 10   EGFRNONAA >60    3/13/18- Mg-2.1+, Phosph 2.5  Cardiac Markers:     Lactic Acid:   No results for input(s): LACTATE in the last 48 hours.    Recent Labs  Lab 03/11/18  1155     193   CPKMB 0.9   TROPONINI <0.006   MB 0.5     Flu negative    Blood cultures NGTD  3/13/18 Valproic acid-   Valproic Acid Lvl 50.0 - 100.0 ug/mL 18.2           Significant Imaging:     3/15/18 Chest, 1 view: The lungs are underexpanded with crowding of the pulmonary vascularity.  There is congestion of the pulmonary vasculature.  This is identified opacity in the right lung base could represent a pneumonia in the appropriate clinical setting.  The heart is at the upper limits of normal in size.  The skeletal structures show age-appropriate degenerative change.  Modified barium swallow- pending    CXR 3/11- A small infiltrate at the right lung base is consistent with pneumonia.  Borderline heart size.    EKG Normal sinus rhythm with sinus arrhythmia  Normal ECG  CT brain 3/13/18- No detrimental change from 4/21/17.

## 2018-03-17 LAB
ALBUMIN SERPL BCP-MCNC: 2.6 G/DL
ALP SERPL-CCNC: 98 U/L
ALT SERPL W/O P-5'-P-CCNC: 13 U/L
ANION GAP SERPL CALC-SCNC: 10 MMOL/L
AST SERPL-CCNC: 13 U/L
BASOPHILS # BLD AUTO: 0.08 K/UL
BASOPHILS NFR BLD: 0.8 %
BILIRUB SERPL-MCNC: 0.3 MG/DL
BUN SERPL-MCNC: 12 MG/DL
CALCIUM SERPL-MCNC: 9.1 MG/DL
CHLORIDE SERPL-SCNC: 105 MMOL/L
CO2 SERPL-SCNC: 25 MMOL/L
CREAT SERPL-MCNC: 0.7 MG/DL
DIFFERENTIAL METHOD: ABNORMAL
EOSINOPHIL # BLD AUTO: 0.3 K/UL
EOSINOPHIL NFR BLD: 2.7 %
ERYTHROCYTE [DISTWIDTH] IN BLOOD BY AUTOMATED COUNT: 14.6 %
EST. GFR  (AFRICAN AMERICAN): >60 ML/MIN/1.73 M^2
EST. GFR  (NON AFRICAN AMERICAN): >60 ML/MIN/1.73 M^2
GLUCOSE SERPL-MCNC: 92 MG/DL
HCT VFR BLD AUTO: 41.2 %
HGB BLD-MCNC: 13.4 G/DL
LYMPHOCYTES # BLD AUTO: 1.7 K/UL
LYMPHOCYTES NFR BLD: 15.8 %
MCH RBC QN AUTO: 32.4 PG
MCHC RBC AUTO-ENTMCNC: 32.5 G/DL
MCV RBC AUTO: 100 FL
MONOCYTES # BLD AUTO: 0.7 K/UL
MONOCYTES NFR BLD: 7.1 %
NEUTROPHILS # BLD AUTO: 7.7 K/UL
NEUTROPHILS NFR BLD: 73.6 %
PLATELET # BLD AUTO: 278 K/UL
PMV BLD AUTO: 11.8 FL
POTASSIUM SERPL-SCNC: 4.1 MMOL/L
PROT SERPL-MCNC: 6.9 G/DL
RBC # BLD AUTO: 4.13 M/UL
SODIUM SERPL-SCNC: 140 MMOL/L
WBC # BLD AUTO: 10.42 K/UL

## 2018-03-17 PROCEDURE — 25000003 PHARM REV CODE 250: Performed by: SURGERY

## 2018-03-17 PROCEDURE — 97110 THERAPEUTIC EXERCISES: CPT

## 2018-03-17 PROCEDURE — C9113 INJ PANTOPRAZOLE SODIUM, VIA: HCPCS | Performed by: INTERNAL MEDICINE

## 2018-03-17 PROCEDURE — 36415 COLL VENOUS BLD VENIPUNCTURE: CPT

## 2018-03-17 PROCEDURE — 94640 AIRWAY INHALATION TREATMENT: CPT

## 2018-03-17 PROCEDURE — 25000003 PHARM REV CODE 250: Performed by: PSYCHIATRY & NEUROLOGY

## 2018-03-17 PROCEDURE — 99900035 HC TECH TIME PER 15 MIN (STAT)

## 2018-03-17 PROCEDURE — 11000001 HC ACUTE MED/SURG PRIVATE ROOM

## 2018-03-17 PROCEDURE — 63600175 PHARM REV CODE 636 W HCPCS: Performed by: INTERNAL MEDICINE

## 2018-03-17 PROCEDURE — 94660 CPAP INITIATION&MGMT: CPT

## 2018-03-17 PROCEDURE — 99232 SBSQ HOSP IP/OBS MODERATE 35: CPT | Mod: ,,, | Performed by: FAMILY MEDICINE

## 2018-03-17 PROCEDURE — 63600175 PHARM REV CODE 636 W HCPCS: Performed by: SURGERY

## 2018-03-17 PROCEDURE — 27000221 HC OXYGEN, UP TO 24 HOURS

## 2018-03-17 PROCEDURE — 94761 N-INVAS EAR/PLS OXIMETRY MLT: CPT

## 2018-03-17 PROCEDURE — 25000003 PHARM REV CODE 250: Performed by: INTERNAL MEDICINE

## 2018-03-17 PROCEDURE — 25000242 PHARM REV CODE 250 ALT 637 W/ HCPCS: Performed by: SURGERY

## 2018-03-17 PROCEDURE — 80053 COMPREHEN METABOLIC PANEL: CPT

## 2018-03-17 PROCEDURE — 94668 MNPJ CHEST WALL SBSQ: CPT

## 2018-03-17 PROCEDURE — 85025 COMPLETE CBC W/AUTO DIFF WBC: CPT

## 2018-03-17 RX ADMIN — IPRATROPIUM BROMIDE AND ALBUTEROL SULFATE 3 ML: 2.5; .5 SOLUTION RESPIRATORY (INHALATION) at 07:03

## 2018-03-17 RX ADMIN — GUAIFENESIN 200 MG: 100 SOLUTION ORAL at 05:03

## 2018-03-17 RX ADMIN — IPRATROPIUM BROMIDE AND ALBUTEROL SULFATE 3 ML: 2.5; .5 SOLUTION RESPIRATORY (INHALATION) at 12:03

## 2018-03-17 RX ADMIN — DIVALPROEX SODIUM 125 MG: 125 TABLET, DELAYED RELEASE ORAL at 08:03

## 2018-03-17 RX ADMIN — IPRATROPIUM BROMIDE AND ALBUTEROL SULFATE 3 ML: 2.5; .5 SOLUTION RESPIRATORY (INHALATION) at 03:03

## 2018-03-17 RX ADMIN — OXCARBAZEPINE 300 MG: 150 TABLET, FILM COATED ORAL at 08:03

## 2018-03-17 RX ADMIN — CEFTRIAXONE 1 G: 1 INJECTION, SOLUTION INTRAVENOUS at 01:03

## 2018-03-17 RX ADMIN — DIVALPROEX SODIUM 125 MG: 125 TABLET, DELAYED RELEASE ORAL at 09:03

## 2018-03-17 RX ADMIN — OXCARBAZEPINE 300 MG: 150 TABLET, FILM COATED ORAL at 09:03

## 2018-03-17 RX ADMIN — IPRATROPIUM BROMIDE AND ALBUTEROL SULFATE 3 ML: 2.5; .5 SOLUTION RESPIRATORY (INHALATION) at 04:03

## 2018-03-17 RX ADMIN — PANTOPRAZOLE SODIUM 40 MG: 40 INJECTION, POWDER, FOR SOLUTION INTRAVENOUS at 09:03

## 2018-03-17 RX ADMIN — ACETAMINOPHEN 650 MG: 325 TABLET ORAL at 08:03

## 2018-03-17 RX ADMIN — GUAIFENESIN 200 MG: 100 SOLUTION ORAL at 06:03

## 2018-03-17 RX ADMIN — IPRATROPIUM BROMIDE AND ALBUTEROL SULFATE 3 ML: 2.5; .5 SOLUTION RESPIRATORY (INHALATION) at 11:03

## 2018-03-17 RX ADMIN — GUAIFENESIN 200 MG: 100 SOLUTION ORAL at 11:03

## 2018-03-17 RX ADMIN — GUAIFENESIN 200 MG: 100 SOLUTION ORAL at 12:03

## 2018-03-17 RX ADMIN — AZITHROMYCIN MONOHYDRATE 500 MG: 500 INJECTION, POWDER, LYOPHILIZED, FOR SOLUTION INTRAVENOUS at 02:03

## 2018-03-17 NOTE — SUBJECTIVE & OBJECTIVE
Interval History: BiPap helpful    Review of Systems   Unable to perform ROS: Dementia     Objective:     Vital Signs (Most Recent):  Temp: 98 °F (36.7 °C) (03/17/18 0748)  Pulse: 99 (03/17/18 0812)  Resp: 19 (03/17/18 0748)  BP: (!) 120/55 (03/17/18 0748)  SpO2: (!) 93 % (03/17/18 0748) Vital Signs (24h Range):  Temp:  [97.8 °F (36.6 °C)-98.5 °F (36.9 °C)] 98 °F (36.7 °C)  Pulse:  [] 99  Resp:  [16-20] 19  SpO2:  [92 %-98 %] 93 %  BP: (100-120)/(55-80) 120/55     Weight: 58.9 kg (129 lb 13.6 oz) (rd reviewed)  Body mass index is 22.29 kg/m².    Intake/Output Summary (Last 24 hours) at 03/17/18 0840  Last data filed at 03/17/18 0000   Gross per 24 hour   Intake             1080 ml   Output                0 ml   Net             1080 ml      Physical Exam   Constitutional: No distress.   Downs affect, makes eye contact.  Sedated.  Wearing Bipap   HENT:   Head: Normocephalic and atraumatic.   Eyes: Pupils are equal, round, and reactive to light.   Neck: Normal range of motion. Neck supple. No JVD present.   Cardiovascular: Regular rhythm, normal heart sounds and intact distal pulses.  Exam reveals no gallop and no friction rub.    No murmur heard.  Pulmonary/Chest: Effort normal.   Scattered ronchi, no wheezes, no rales, resolved tachypnea and retractions   Musculoskeletal: He exhibits no edema.   Neurological:   Dementia, unable to ambulate.  Now bed bound   Skin: He is not diaphoretic.       Significant Labs:   BMP:   Recent Labs  Lab 03/17/18  0601   GLU 92      K 4.1      CO2 25   BUN 12   CREATININE 0.7   CALCIUM 9.1     CBC:   Recent Labs  Lab 03/17/18  0601   WBC 10.42   HGB 13.4*   HCT 41.2          Significant Imaging: I have reviewed all pertinent imaging results/findings within the past 24 hours.

## 2018-03-17 NOTE — ASSESSMENT & PLAN NOTE
Seizure 3/13- divalproex 125mg po BID  Trileptal 300mg BID resumed 3/13/18  Follow  neurology recs - discussed transient unequal pupils with Dr. Correa - this is known since after last CVA and not new  CT of head - no acute findings   Follow with Neurology  .

## 2018-03-17 NOTE — PT/OT/SLP PROGRESS
Physical Therapy Treatment    Patient Name:  Mack Will   MRN:  [id number]    Recommendations:     Discharge Recommendations:      Discharge Equipment Recommendations:     Barriers to discharge: None    Assessment:     Mack Will is a 52 y.o. male admitted with a medical diagnosis of Pneumonia of right lower lobe due to infectious organism.  He presents with the following impairments/functional limitations:    weakness,impaired endurance,impaired quadrilateral extremity functionality and strenght  Impaired balance, cognition,and decreased safety awareness.    Rehab Prognosis:  Fair; patient would benefit from acute skilled PT services to address these deficits and reach maximum level of function.      Recent Surgery: * No surgery found *      Plan:     During this hospitalization, patient to be seen  (1-2x/day(M-F); PRN(Sat.,Sun.)) to address the above listed problems via therapeutic exercises, therapeutic activities  · Plan of Care Expires:   (Upon D/C from facility)   Plan of Care Reviewed with: patient    Subjective     Communicated with RN staff and patient prior to session.  Patient found supine  upon PT entry to room, agreeable to treatment.      Chief Complaint: voices no complaints  Patient comments/goals: voices no goals at present  Pain/Comfort:  ·      Patients cultural, spiritual, Hindu conflicts given the current situation:      Objective:     Patient found with: BIPAP     General Precautions: Standard, fall   Orthopedic Precautions:N/A   Braces:       Functional Mobility:  · Maximal assistance for all transfers and. Bed mobility tasks      AM-PAC 6 CLICK MOBILITY          Therapeutic Activities and Exercises:   Pt had been up in wheelchair  90 [place] [facility] [place]  PT  renderedf therapeutic, assistive exercises for all four extremities as allowed and per  participation levels by patient.   Activities designed to maintain and improve Motion all four extremities and promote fluid  dynamics    Patient left supine with all lines intact, call button in reach, bed alarm on and RN notified..    GOALS:    Physical Therapy Goals        Problem: Physical Therapy Goal    Goal Priority Disciplines Outcome Goal Variances Interventions   Physical Therapy Goal     PT/OT, PT Ongoing (interventions implemented as appropriate)     Description:  Goals to be met by: upon D/C from facility     Patient will increase functional independence with mobility by performin. Supine to sit with MInimal Assistance  2. Sit to supine with MInimal Assistance  3. Bed to chair transfer with Minimal Assistance using any AD as needed.  4 Pt will tolerate PROM/A/AROM on BUE/BLE at all joints in available planes of motion with rest breaks as needed to improve functional mobility.              Problem: Physical Therapy Goal    Goal Priority Disciplines Outcome Goal Variances Interventions   Physical Therapy Goal     PT/OT, PT      Description:  Continue established goals as documented previously                    Time Tracking:     PT Received On: 18  PT Start Time: 1100     PT Stop Time: 1115  PT Total Time (min): 15 min     Billable Minutes: Therapeutic Exercise 15 min and Total Time 15 min    Treatment Type: Treatment  PT/PTA: PT           To Riggs, PT  2018

## 2018-03-17 NOTE — PLAN OF CARE
Problem: Patient Care Overview  Goal: Plan of Care Review  Outcome: Ongoing (interventions implemented as appropriate)  Patient plan of care reviewed, pt nods yes with plan of care. Patient free of falls or injuries this shift. Patient vitals stable.Patient wearing bipap with no issues. Will continue to monitor.

## 2018-03-17 NOTE — PROGRESS NOTES
"[facility] Medicine  Progress Note    Patient Name: Mack Will  MRN: [id number]  Patient Class: IP- Inpatient   Admission Date: 3/11/2018  Length of Stay: 5 days  Attending Physician: Jhony Finn MD  Primary Care Provider: Chetan Alvarez MD        Subjective:     Principal Problem:Pneumonia of right lower lobe due to infectious organism    HPI:  Pt presented to ER last night with c/o SOB and cough. Was treated last week with azithromycin/duonebs per PCP 3/6. No improvement. He is afebrile and had no elevated WBC. CXR shows small right lower lobe infiltrate. Blood cultures drawn and pending. He  Is laying in bed this am being fed by sister at bedside. Sister in law also at bedside. Lives at group home. Sister in law reports that he was not getting his treatments at group home. He was rattling more and having difficulty bringing up mucus. Eating well, but sister reports that this am he was getting a little gaggy with breakfast    He does not walk at home, sits in chair or w/c. Diapered.     Hospital Course:  3/13/18 admitted for RLL pneumonia. Had a seizure this am after nebulizer tx. . + hx of seizure disorder but family reports had not had seizure in years> Home meds list trileptal 300mg twice daily- family reports he was only taking divalproex at home.   Last PN per Dr. Alvarez has trileptal listed as rx.  Since seizure he has been moaning and pulling legs up intermittently. Family reports that he does not usually do this. "not a complainer". They also report lots of problems with gabriel catheter during last visit.   Afebrile- Temp 97.3, /66, 158/78. Noisy breathing.  They are not planning to have him return to group home. Planning to admit to nursing home when discharged.     3/14/18 admitted for RLL pneumonia 3-11-18,  S/P seizure yesterday am. Subsequent CT brain  negative for acute findings; + hx of hemorrhagic CVA.   Neuro  consulted; meds adjusted.  " Trileptal 300mg BID resumed as he is supposed to be on this in addition to Depakote.   Did much better overnight. Back to taking po meds and eating,  Awaiting swallow study.- hopefully today.   Still has very harsh cough.   Day 4 abx  Planning for discharge to ~2 days, NSG home placement in [place]. Allison Nash  Family concerned about excess sedation. Aware that was not getting trileptal and now resumed. Continued Depakote. They would like to see if could maybe decrease or stop Depakote- discussed will defer to neurology. Currently not getting trazodone which was home med.   3/15/18 admitted for RLL pneumonia 3-11-18,  S/P seizure 3/13 am. Subsequent CT brain  negative for acute findings; + hx of hemorrhagic CVA.   Neuro  Adjusted seizure Rx;  Trileptal  resumed as he is supposed to be on this in addition to Depakote.   Back to taking po meds and eating,    ST 3/13/ recommendations; Pureed diet with nectar thick liquids    Day 5 abx  Planning for  NSG home placement in [place]. Allison Nash  Still with junky cough and noisy breathing. O2 increased to 5LNC- O2 sats 93% this am with concern for mucous plugging. PT following   Discussed with sisters at bedside and neurology - will keep on depakote and trileptal.  3/16/18 admitted for RLL pneumonia 3-11-18,  S/P seizure 3/13 am. CT Ok,  hx of hemorrhagic CVA.   Neuro  Adjusted seizure Rx;  Trileptal  Resumed. LOC better but worsening lungs yesterday  increasing O2 requirement. Added mucinex and CPT to help with thickened mucous.O2 sats 92% on 5LNC   Patient had a rough day yesterday.  He was placed on Bipap and now seems to be better.  The rattle has resolved.    Interval History: BiPap helpful    Review of Systems   Unable to perform ROS: Dementia     Objective:     Vital Signs (Most Recent):  Temp: 98 °F (36.7 °C) (03/17/18 0748)  Pulse: 99 (03/17/18 0812)  Resp: 19 (03/17/18 0748)  BP: (!) 120/55 (03/17/18 0748)  SpO2: (!) 93 % (03/17/18 0748) Vital Signs  (24h Range):  Temp:  [97.8 °F (36.6 °C)-98.5 °F (36.9 °C)] 98 °F (36.7 °C)  Pulse:  [] 99  Resp:  [16-20] 19  SpO2:  [92 %-98 %] 93 %  BP: (100-120)/(55-80) 120/55     Weight: 58.9 kg (129 lb 13.6 oz) (rd reviewed)  Body mass index is 22.29 kg/m².    Intake/Output Summary (Last 24 hours) at 03/17/18 0840  Last data filed at 03/17/18 0000   Gross per 24 hour   Intake             1080 ml   Output                0 ml   Net             1080 ml      Physical Exam   Constitutional: No distress.   Downs affect, makes eye contact.  Sedated.  Wearing Bipap   HENT:   Head: Normocephalic and atraumatic.   Eyes: Pupils are equal, round, and reactive to light.   Neck: Normal range of motion. Neck supple. No JVD present.   Cardiovascular: Regular rhythm, normal heart sounds and intact distal pulses.  Exam reveals no gallop and no friction rub.    No murmur heard.  Pulmonary/Chest: Effort normal.   Scattered ronchi, no wheezes, no rales, resolved tachypnea and retractions   Musculoskeletal: He exhibits no edema.   Neurological:   Dementia, unable to ambulate.  Now bed bound   Skin: He is not diaphoretic.       Significant Labs:   BMP:   Recent Labs  Lab 03/17/18  0601   GLU 92      K 4.1      CO2 25   BUN 12   CREATININE 0.7   CALCIUM 9.1     CBC:   Recent Labs  Lab 03/17/18  0601   WBC 10.42   HGB 13.4*   HCT 41.2          Significant Imaging: I have reviewed all pertinent imaging results/findings within the past 24 hours.    Assessment/Plan:      * Pneumonia of right lower lobe due to infectious organism    Continue Rocephin  Stop Zmax - day 5  Continue BiPap  PT          Chewing difficulty    ST rec=Pureed diet -           Debility    He is non ambulatory at home. PT to cont his transferring ability           Somnolence    Family is concerned that depakote is making him to somnolent.   It is not technically prescribed for his seizures.  He is more awake this morning and answering questions.  Will NOT  d/c depakote as it was prescribed to help with mood- discussed with Neuro.          Seizure disorder    Seizure 3/13- divalproex 125mg po BID  Trileptal 300mg BID resumed 3/13/18  Follow  neurology recs - discussed transient unequal pupils with Dr. Correa - this is known since after last CVA and not new  CT of head - no acute findings   Follow with Neurology  .          Down's syndrome                VTE Risk Mitigation         Ordered     Medium Risk of VTE  Once      03/11/18 1450     Place sequential compression device  Until discontinued      03/11/18 1450              Jhony Finn MD  Department of Hospital Medicine   [facility]

## 2018-03-17 NOTE — PLAN OF CARE
Problem: Patient Care Overview  Goal: Plan of Care Review  Outcome: Ongoing (interventions implemented as appropriate)  Pt doing well. No question/concerns at this time. Agrees with poc. No pain/falls.  Sat in the front today in the wheelchair. He has been wearing the bipap most of the day today. Iv abx. He  is eating over 50% of food. Bath given this am.SCDS and heel bootS on.

## 2018-03-18 LAB
ALBUMIN SERPL BCP-MCNC: 2.5 G/DL
ALP SERPL-CCNC: 91 U/L
ALT SERPL W/O P-5'-P-CCNC: 11 U/L
ANION GAP SERPL CALC-SCNC: 9 MMOL/L
AST SERPL-CCNC: 17 U/L
BILIRUB SERPL-MCNC: 0.3 MG/DL
BUN SERPL-MCNC: 10 MG/DL
CALCIUM SERPL-MCNC: 8.8 MG/DL
CHLORIDE SERPL-SCNC: 107 MMOL/L
CO2 SERPL-SCNC: 23 MMOL/L
CREAT SERPL-MCNC: 0.7 MG/DL
EST. GFR  (AFRICAN AMERICAN): >60 ML/MIN/1.73 M^2
EST. GFR  (NON AFRICAN AMERICAN): >60 ML/MIN/1.73 M^2
GLUCOSE SERPL-MCNC: 91 MG/DL
POTASSIUM SERPL-SCNC: 4.3 MMOL/L
PROT SERPL-MCNC: 6.5 G/DL
SODIUM SERPL-SCNC: 139 MMOL/L

## 2018-03-18 PROCEDURE — 97110 THERAPEUTIC EXERCISES: CPT

## 2018-03-18 PROCEDURE — 63600175 PHARM REV CODE 636 W HCPCS: Performed by: SURGERY

## 2018-03-18 PROCEDURE — 36415 COLL VENOUS BLD VENIPUNCTURE: CPT

## 2018-03-18 PROCEDURE — 99232 SBSQ HOSP IP/OBS MODERATE 35: CPT | Mod: ,,, | Performed by: FAMILY MEDICINE

## 2018-03-18 PROCEDURE — 63600175 PHARM REV CODE 636 W HCPCS: Performed by: INTERNAL MEDICINE

## 2018-03-18 PROCEDURE — C9113 INJ PANTOPRAZOLE SODIUM, VIA: HCPCS | Performed by: INTERNAL MEDICINE

## 2018-03-18 PROCEDURE — 80053 COMPREHEN METABOLIC PANEL: CPT

## 2018-03-18 PROCEDURE — 25000242 PHARM REV CODE 250 ALT 637 W/ HCPCS: Performed by: SURGERY

## 2018-03-18 PROCEDURE — 25000003 PHARM REV CODE 250: Performed by: PSYCHIATRY & NEUROLOGY

## 2018-03-18 PROCEDURE — 25000003 PHARM REV CODE 250: Performed by: INTERNAL MEDICINE

## 2018-03-18 PROCEDURE — 93041 RHYTHM ECG TRACING: CPT

## 2018-03-18 PROCEDURE — 25000003 PHARM REV CODE 250: Performed by: SURGERY

## 2018-03-18 PROCEDURE — 11000001 HC ACUTE MED/SURG PRIVATE ROOM

## 2018-03-18 PROCEDURE — 94660 CPAP INITIATION&MGMT: CPT

## 2018-03-18 PROCEDURE — 99900035 HC TECH TIME PER 15 MIN (STAT)

## 2018-03-18 PROCEDURE — 94761 N-INVAS EAR/PLS OXIMETRY MLT: CPT

## 2018-03-18 PROCEDURE — 94668 MNPJ CHEST WALL SBSQ: CPT

## 2018-03-18 PROCEDURE — 94640 AIRWAY INHALATION TREATMENT: CPT

## 2018-03-18 PROCEDURE — 27000221 HC OXYGEN, UP TO 24 HOURS

## 2018-03-18 RX ADMIN — IPRATROPIUM BROMIDE AND ALBUTEROL SULFATE 3 ML: 2.5; .5 SOLUTION RESPIRATORY (INHALATION) at 03:03

## 2018-03-18 RX ADMIN — IPRATROPIUM BROMIDE AND ALBUTEROL SULFATE 3 ML: 2.5; .5 SOLUTION RESPIRATORY (INHALATION) at 07:03

## 2018-03-18 RX ADMIN — IPRATROPIUM BROMIDE AND ALBUTEROL SULFATE 3 ML: 2.5; .5 SOLUTION RESPIRATORY (INHALATION) at 11:03

## 2018-03-18 RX ADMIN — PANTOPRAZOLE SODIUM 40 MG: 40 INJECTION, POWDER, FOR SOLUTION INTRAVENOUS at 10:03

## 2018-03-18 RX ADMIN — IPRATROPIUM BROMIDE AND ALBUTEROL SULFATE 3 ML: 2.5; .5 SOLUTION RESPIRATORY (INHALATION) at 12:03

## 2018-03-18 RX ADMIN — GUAIFENESIN 200 MG: 100 SOLUTION ORAL at 05:03

## 2018-03-18 RX ADMIN — GUAIFENESIN 200 MG: 100 SOLUTION ORAL at 12:03

## 2018-03-18 RX ADMIN — OXCARBAZEPINE 300 MG: 150 TABLET, FILM COATED ORAL at 10:03

## 2018-03-18 RX ADMIN — AZITHROMYCIN MONOHYDRATE 500 MG: 500 INJECTION, POWDER, LYOPHILIZED, FOR SOLUTION INTRAVENOUS at 03:03

## 2018-03-18 RX ADMIN — OXCARBAZEPINE 300 MG: 150 TABLET, FILM COATED ORAL at 08:03

## 2018-03-18 RX ADMIN — DIVALPROEX SODIUM 125 MG: 125 TABLET, DELAYED RELEASE ORAL at 08:03

## 2018-03-18 RX ADMIN — CEFTRIAXONE 1 G: 1 INJECTION, SOLUTION INTRAVENOUS at 01:03

## 2018-03-18 RX ADMIN — DIVALPROEX SODIUM 125 MG: 125 TABLET, DELAYED RELEASE ORAL at 10:03

## 2018-03-18 NOTE — PLAN OF CARE
Problem: Patient Care Overview  Goal: Plan of Care Review  Outcome: Ongoing (interventions implemented as appropriate)  Pt doing well. No question/concerns at this time. Agrees with poc. No pain/falls.  Patient O2 sats are doing a lot better this after noon. sats are now 99% on bipap. Patient ate lunch at nursing station today in wheelchair. Patient sound a lot better than yeasterday. Turning every 2 hours. Wound covered with [employer] .

## 2018-03-18 NOTE — PROGRESS NOTES
"[facility] Medicine  Progress Note    Patient Name: Mack Will  MRN: [id number]  Patient Class: IP- Inpatient   Admission Date: 3/11/2018  Length of Stay: 6 days  Attending Physician: Jhony Finn MD  Primary Care Provider: Chetan Alvarez MD        Subjective:     Principal Problem:Pneumonia of right lower lobe due to infectious organism    HPI:  Pt presented to ER last night with c/o SOB and cough. Was treated last week with azithromycin/duonebs per PCP 3/6. No improvement. He is afebrile and had no elevated WBC. CXR shows small right lower lobe infiltrate. Blood cultures drawn and pending. He  Is laying in bed this am being fed by sister at bedside. Sister in law also at bedside. Lives at group home. Sister in law reports that he was not getting his treatments at group home. He was rattling more and having difficulty bringing up mucus. Eating well, but sister reports that this am he was getting a little gaggy with breakfast    He does not walk at home, sits in chair or w/c. Diapered.     Hospital Course:  3/13/18 admitted for RLL pneumonia. Had a seizure this am after nebulizer tx. . + hx of seizure disorder but family reports had not had seizure in years> Home meds list trileptal 300mg twice daily- family reports he was only taking divalproex at home.   Last PN per Dr. Alvarez has trileptal listed as rx.  Since seizure he has been moaning and pulling legs up intermittently. Family reports that he does not usually do this. "not a complainer". They also report lots of problems with gabriel catheter during last visit.   Afebrile- Temp 97.3, /66, 158/78. Noisy breathing.  They are not planning to have him return to group home. Planning to admit to nursing home when discharged.     3/14/18 admitted for RLL pneumonia 3-11-18,  S/P seizure yesterday am. Subsequent CT brain  negative for acute findings; + hx of hemorrhagic CVA.   Neuro  consulted; meds adjusted.  " Trileptal 300mg BID resumed as he is supposed to be on this in addition to Depakote.   Did much better overnight. Back to taking po meds and eating,  Awaiting swallow study.- hopefully today.   Still has very harsh cough.   Day 4 abx  Planning for discharge to ~2 days, NSG home placement in [place]. Allison Nash  Family concerned about excess sedation. Aware that was not getting trileptal and now resumed. Continued Depakote. They would like to see if could maybe decrease or stop Depakote- discussed will defer to neurology. Currently not getting trazodone which was home med.   3/15/18 admitted for RLL pneumonia 3-11-18,  S/P seizure 3/13 am. Subsequent CT brain  negative for acute findings; + hx of hemorrhagic CVA.   Neuro  Adjusted seizure Rx;  Trileptal  resumed as he is supposed to be on this in addition to Depakote.   Back to taking po meds and eating,    ST 3/13/ recommendations; Pureed diet with nectar thick liquids    Day 5 abx  Planning for  NSG home placement in [place]. Allison Nash  Still with junky cough and noisy breathing. O2 increased to 5LNC- O2 sats 93% this am with concern for mucous plugging. PT following   Discussed with sisters at bedside and neurology - will keep on depakote and trileptal.  3/16/18 admitted for RLL pneumonia 3-11-18,  S/P seizure 3/13 am. CT Ok,  hx of hemorrhagic CVA.   Neuro  Adjusted seizure Rx;  Trileptal  Resumed. LOC better but worsening lungs yesterday  increasing O2 requirement. Added mucinex and CPT to help with thickened mucous.O2 sats 92% on 5LNC   Patient had a rough day yesterday.  He was placed on Bipap and now seems to be better.  The rattle has resolved.    3/18  He is tolerating bipap  Satting 90% on 4 liters   Atelectasis on CXR   Afebrile       Interval History: see HC     Review of Systems   Unable to perform ROS: Dementia     Objective:     Vital Signs (Most Recent):  Temp: 98 °F (36.7 °C) (03/18/18 1155)  Pulse: 91 (03/18/18 1231)  Resp: 20  (03/18/18 1155)  BP: 104/68 (03/18/18 1155)  SpO2: 95 % (03/18/18 1155) Vital Signs (24h Range):  Temp:  [97.8 °F (36.6 °C)-99.3 °F (37.4 °C)] 98 °F (36.7 °C)  Pulse:  [] 91  Resp:  [14-23] 20  SpO2:  [90 %-96 %] 95 %  BP: (101-126)/(55-84) 104/68     Weight: 58.9 kg (129 lb 13.6 oz) (rd reviewed)  Body mass index is 22.29 kg/m².    Intake/Output Summary (Last 24 hours) at 03/18/18 1235  Last data filed at 03/18/18 1000   Gross per 24 hour   Intake             1020 ml   Output                0 ml   Net             1020 ml      Physical Exam   Constitutional: No distress.   Downs affect, poor eye contact.  Stares off into space.  Wearing NC O2   HENT:   Head: Normocephalic and atraumatic.   Eyes: Pupils are equal, round, and reactive to light.   Neck: Normal range of motion. Neck supple. No JVD present.   Cardiovascular: Regular rhythm, normal heart sounds and intact distal pulses.  Exam reveals no gallop and no friction rub.    No murmur heard.  Pulmonary/Chest: Effort normal. No respiratory distress. He has no wheezes. He has no rales.   Scattered ronchi, no wheezes, no rales, resolved tachypnea and retractions   Musculoskeletal: He exhibits no edema.   Neurological:   Dementia, unable to ambulate.  Now bed bound   Skin: He is not diaphoretic.       Significant Labs:   CBC:   Recent Labs  Lab 03/17/18  0601   WBC 10.42   HGB 13.4*   HCT 41.2        CMP:   Recent Labs  Lab 03/17/18  0601 03/18/18  0617    139   K 4.1 4.3    107   CO2 25 23   GLU 92 91   BUN 12 10   CREATININE 0.7 0.7   CALCIUM 9.1 8.8   PROT 6.9 6.5   ALBUMIN 2.6* 2.5*   BILITOT 0.3 0.3   ALKPHOS 98 91   AST 13 17   ALT 13 11   ANIONGAP 10 9   EGFRNONAA >60 >60       Significant Imaging: I have reviewed and interpreted all pertinent imaging results/findings within the past 24 hours.    Assessment/Plan:      * Pneumonia of right lower lobe due to infectious organism    Continue Rocephin  Stop Zmax - day 5  Continue  BiPap  PT  This is the natural course of Down's patients. They usually succumb to pneumonia from inability to protect their airway. Will continue current mngt, short of mechanical ventilation. He is a DNR. If it does not look like he will clinically improve, we can order hospice at the nursing home. He did bounce back from pneumonia with a similar clinical picture last year.           Chewing difficulty    ST rec=Pureed diet -           Debility    He is non ambulatory at home. PT to cont his transferring ability           Somnolence    Family is concerned that depakote is making him to somnolent.   It is not technically prescribed for his seizures.  He is more awake this morning and answering questions.  Will NOT d/c depakote as it was prescribed to help with mood- discussed with Neuro.          Seizure disorder    Seizure 3/13- divalproex 125mg po BID  Trileptal 300mg BID resumed 3/13/18  Follow  neurology recs - discussed transient unequal pupils with Dr. Correa - this is known since after last CVA and not new  CT of head - no acute findings   Follow with Neurology  .          Down's syndrome                VTE Risk Mitigation         Ordered     Medium Risk of VTE  Once      03/11/18 1450     Place sequential compression device  Until discontinued      03/11/18 1450              Chetan Alvarez MD  Department of Hospital Medicine   [facility]

## 2018-03-18 NOTE — PLAN OF CARE
Problem: Patient Care Overview  Goal: Plan of Care Review  Outcome: Ongoing (interventions implemented as appropriate)  Patient plan of care reviewed, pt agrees with plan of care. Patient free of falls or injuries this shift. Patient vitals stable.Patient has been verbal 50% of shift to make needs known when asked. Bipap in use tolerated well. Turn q 2 . Will continue to monitor.

## 2018-03-18 NOTE — PT/OT/SLP PROGRESS
Physical Therapy Treatment    Patient Name:  Mack Will   MRN:  [id number]    Recommendations:     Discharge Recommendations:      Discharge Equipment Recommendations:     Barriers to discharge: None    Assessment:     Mack Will is a 52 y.o. male admitted with a medical diagnosis of Pneumonia of right lower lobe due to infectious organism.  He presents with the following impairments/functional limitations:    weakness, impaired functioality.limited walking capability as reported by family. Impaired cognition.    Rehab Prognosis:  Fair; patient would benefit from acute skilled PT services to address these deficits and reach maximum level of function.      Recent Surgery: * No surgery found *      Plan:     During this hospitalization, patient to be seen  (1-2x/day(M-F); PRN(Sat.,Sun.)) to address the above listed problems via therapeutic exercises, therapeutic activities  · Plan of Care Expires:   (Upon D/C from facility)   Plan of Care Reviewed with: patient, family    Subjective     Communicated with patient and family members  prior to session.  Patient found supine upon PT entry to room, agreeable to treatment.      Chief Complaint: voices no complaints  Patient comments/goals: Family reports that he will be going to Nursing home for 100 days of eligible PT services.  Pain/Comfort:  ·      Patients cultural, spiritual, Sikh conflicts given the current situation:      Objective:     Patient found with: BIPAP, SCD, peripheral IV     General Precautions: Standard, fall   Orthopedic Precautions:N/A   Braces:       Functional Mobility:  · Maximal assist for all transfers and bed mobility tasks.      AM-PAC 6 CLICK MOBILITY          Therapeutic Activities and Exercises:   Pt had returned to bed by NSG services followin breakfast . Placed on Bipap device    Rendered multiple therapeutic exercises to all four extremities to facilitate increased motion and strength as able and allowed during    Session. Focus onto rt upper and left upper extremities per family request to  Improve use for ADL. Pt demonstrated improved motion for all extremities as compared to previous treatment.     Patient left HOB elevated with all lines intact, call button in reach, bed alarm on, NSG notified and family present..    GOALS:    Physical Therapy Goals        Problem: Physical Therapy Goal    Goal Priority Disciplines Outcome Goal Variances Interventions   Physical Therapy Goal     PT/OT, PT Ongoing (interventions implemented as appropriate)     Description:  Goals to be met by: upon D/C from facility     Patient will increase functional independence with mobility by performin. Supine to sit with MInimal Assistance  2. Sit to supine with MInimal Assistance  3. Bed to chair transfer with Minimal Assistance using any AD as needed.  4 Pt will tolerate PROM/A/AROM on BUE/BLE at all joints in available planes of motion with rest breaks as needed to improve functional mobility.              Problem: Physical Therapy Goal    Goal Priority Disciplines Outcome Goal Variances Interventions   Physical Therapy Goal     PT/OT, PT      Description:  Continue established goals as documented previously           Problem: Physical Therapy Goal    Goal Priority Disciplines Outcome Goal Variances Interventions   Physical Therapy Goal     PT/OT, PT      Description:  Demonstrate and perform Therapeutic exercises to increase use of Rt UE for ADL                    Time Tracking:     PT Received On: 18  PT Start Time: 0830     PT Stop Time: 0850  PT Total Time (min): 20 min     Billable Minutes: Therapeutic Exercise 20 min and Total Time 20 min    Treatment Type: Treatment  PT/PTA: PT           To Riggs, PT  2018

## 2018-03-18 NOTE — SUBJECTIVE & OBJECTIVE
Interval History: see      Review of Systems   Unable to perform ROS: Dementia     Objective:     Vital Signs (Most Recent):  Temp: 98 °F (36.7 °C) (03/18/18 1155)  Pulse: 91 (03/18/18 1231)  Resp: 20 (03/18/18 1155)  BP: 104/68 (03/18/18 1155)  SpO2: 95 % (03/18/18 1155) Vital Signs (24h Range):  Temp:  [97.8 °F (36.6 °C)-99.3 °F (37.4 °C)] 98 °F (36.7 °C)  Pulse:  [] 91  Resp:  [14-23] 20  SpO2:  [90 %-96 %] 95 %  BP: (101-126)/(55-84) 104/68     Weight: 58.9 kg (129 lb 13.6 oz) (rd reviewed)  Body mass index is 22.29 kg/m².    Intake/Output Summary (Last 24 hours) at 03/18/18 1235  Last data filed at 03/18/18 1000   Gross per 24 hour   Intake             1020 ml   Output                0 ml   Net             1020 ml      Physical Exam   Constitutional: No distress.   Downs affect, poor eye contact.  Stares off into space.  Wearing NC O2   HENT:   Head: Normocephalic and atraumatic.   Eyes: Pupils are equal, round, and reactive to light.   Neck: Normal range of motion. Neck supple. No JVD present.   Cardiovascular: Regular rhythm, normal heart sounds and intact distal pulses.  Exam reveals no gallop and no friction rub.    No murmur heard.  Pulmonary/Chest: Effort normal. No respiratory distress. He has no wheezes. He has no rales.   Scattered ronchi, no wheezes, no rales, resolved tachypnea and retractions   Musculoskeletal: He exhibits no edema.   Neurological:   Dementia, unable to ambulate.  Now bed bound   Skin: He is not diaphoretic.       Significant Labs:   CBC:   Recent Labs  Lab 03/17/18  0601   WBC 10.42   HGB 13.4*   HCT 41.2        CMP:   Recent Labs  Lab 03/17/18  0601 03/18/18  0617    139   K 4.1 4.3    107   CO2 25 23   GLU 92 91   BUN 12 10   CREATININE 0.7 0.7   CALCIUM 9.1 8.8   PROT 6.9 6.5   ALBUMIN 2.6* 2.5*   BILITOT 0.3 0.3   ALKPHOS 98 91   AST 13 17   ALT 13 11   ANIONGAP 10 9   EGFRNONAA >60 >60       Significant Imaging: I have reviewed and interpreted all  pertinent imaging results/findings within the past 24 hours.

## 2018-03-18 NOTE — ASSESSMENT & PLAN NOTE
Continue Rocephin  Stop Zmax - day 5  Continue BiPap  PT  This is the natural course of Down's patients. They usually succumb to pneumonia from inability to protect their airway. Will continue current mngt, short of mechanical ventilation. He is a DNR. If it does not look like he will clinically improve, we can order hospice at the nursing home. He did bounce back from pneumonia with a similar clinical picture last year.

## 2018-03-19 PROBLEM — L98.429 STAGE 2 SKIN ULCER OF SACRAL REGION: Status: ACTIVE | Noted: 2018-03-19

## 2018-03-19 LAB
ALBUMIN SERPL BCP-MCNC: 2.5 G/DL
ALP SERPL-CCNC: 94 U/L
ALT SERPL W/O P-5'-P-CCNC: 11 U/L
ANION GAP SERPL CALC-SCNC: 10 MMOL/L
AST SERPL-CCNC: 11 U/L
BILIRUB SERPL-MCNC: 0.3 MG/DL
BUN SERPL-MCNC: 9 MG/DL
CALCIUM SERPL-MCNC: 9 MG/DL
CHLORIDE SERPL-SCNC: 105 MMOL/L
CO2 SERPL-SCNC: 26 MMOL/L
CREAT SERPL-MCNC: 0.7 MG/DL
EST. GFR  (AFRICAN AMERICAN): >60 ML/MIN/1.73 M^2
EST. GFR  (NON AFRICAN AMERICAN): >60 ML/MIN/1.73 M^2
GLUCOSE SERPL-MCNC: 96 MG/DL
POTASSIUM SERPL-SCNC: 4.1 MMOL/L
PROT SERPL-MCNC: 6.6 G/DL
SODIUM SERPL-SCNC: 141 MMOL/L

## 2018-03-19 PROCEDURE — 94761 N-INVAS EAR/PLS OXIMETRY MLT: CPT

## 2018-03-19 PROCEDURE — 99232 SBSQ HOSP IP/OBS MODERATE 35: CPT | Mod: ,,, | Performed by: INTERNAL MEDICINE

## 2018-03-19 PROCEDURE — 99233 SBSQ HOSP IP/OBS HIGH 50: CPT | Mod: ,,, | Performed by: PSYCHIATRY & NEUROLOGY

## 2018-03-19 PROCEDURE — 27000221 HC OXYGEN, UP TO 24 HOURS

## 2018-03-19 PROCEDURE — 63600175 PHARM REV CODE 636 W HCPCS: Performed by: INTERNAL MEDICINE

## 2018-03-19 PROCEDURE — 94660 CPAP INITIATION&MGMT: CPT

## 2018-03-19 PROCEDURE — C9113 INJ PANTOPRAZOLE SODIUM, VIA: HCPCS | Performed by: INTERNAL MEDICINE

## 2018-03-19 PROCEDURE — 94640 AIRWAY INHALATION TREATMENT: CPT

## 2018-03-19 PROCEDURE — 25000242 PHARM REV CODE 250 ALT 637 W/ HCPCS: Performed by: SURGERY

## 2018-03-19 PROCEDURE — 94668 MNPJ CHEST WALL SBSQ: CPT

## 2018-03-19 PROCEDURE — 97110 THERAPEUTIC EXERCISES: CPT

## 2018-03-19 PROCEDURE — 11000001 HC ACUTE MED/SURG PRIVATE ROOM

## 2018-03-19 PROCEDURE — 25000003 PHARM REV CODE 250: Performed by: INTERNAL MEDICINE

## 2018-03-19 PROCEDURE — 99900035 HC TECH TIME PER 15 MIN (STAT)

## 2018-03-19 PROCEDURE — 36415 COLL VENOUS BLD VENIPUNCTURE: CPT

## 2018-03-19 PROCEDURE — 80053 COMPREHEN METABOLIC PANEL: CPT

## 2018-03-19 PROCEDURE — 97530 THERAPEUTIC ACTIVITIES: CPT

## 2018-03-19 PROCEDURE — 25000003 PHARM REV CODE 250: Performed by: SURGERY

## 2018-03-19 PROCEDURE — 25000003 PHARM REV CODE 250: Performed by: PSYCHIATRY & NEUROLOGY

## 2018-03-19 RX ADMIN — IPRATROPIUM BROMIDE AND ALBUTEROL SULFATE 3 ML: 2.5; .5 SOLUTION RESPIRATORY (INHALATION) at 11:03

## 2018-03-19 RX ADMIN — IPRATROPIUM BROMIDE AND ALBUTEROL SULFATE 3 ML: 2.5; .5 SOLUTION RESPIRATORY (INHALATION) at 04:03

## 2018-03-19 RX ADMIN — GUAIFENESIN 200 MG: 100 SOLUTION ORAL at 06:03

## 2018-03-19 RX ADMIN — IPRATROPIUM BROMIDE AND ALBUTEROL SULFATE 3 ML: 2.5; .5 SOLUTION RESPIRATORY (INHALATION) at 07:03

## 2018-03-19 RX ADMIN — IPRATROPIUM BROMIDE AND ALBUTEROL SULFATE 3 ML: 2.5; .5 SOLUTION RESPIRATORY (INHALATION) at 03:03

## 2018-03-19 RX ADMIN — OXCARBAZEPINE 300 MG: 150 TABLET, FILM COATED ORAL at 09:03

## 2018-03-19 RX ADMIN — GUAIFENESIN 200 MG: 100 SOLUTION ORAL at 11:03

## 2018-03-19 RX ADMIN — GUAIFENESIN 200 MG: 100 SOLUTION ORAL at 05:03

## 2018-03-19 RX ADMIN — DIVALPROEX SODIUM 125 MG: 125 TABLET, DELAYED RELEASE ORAL at 09:03

## 2018-03-19 RX ADMIN — OXCARBAZEPINE 300 MG: 150 TABLET, FILM COATED ORAL at 08:03

## 2018-03-19 RX ADMIN — PANTOPRAZOLE SODIUM 40 MG: 40 INJECTION, POWDER, FOR SOLUTION INTRAVENOUS at 08:03

## 2018-03-19 RX ADMIN — DIVALPROEX SODIUM 125 MG: 125 TABLET, DELAYED RELEASE ORAL at 08:03

## 2018-03-19 RX ADMIN — GUAIFENESIN 200 MG: 100 SOLUTION ORAL at 12:03

## 2018-03-19 NOTE — SUBJECTIVE & OBJECTIVE
Interval History: see HC     Review of Systems   Unable to perform ROS: Dementia     Objective:     Vital Signs (Most Recent):  Temp: 98.7 °F (37.1 °C) (03/19/18 0338)  Pulse: 66 (03/19/18 0731)  Resp: 20 (03/19/18 0731)  BP: 106/67 (03/19/18 0338)  SpO2: 95 % (03/19/18 0731) Vital Signs (24h Range):  Temp:  [97.8 °F (36.6 °C)-98.7 °F (37.1 °C)] 98.7 °F (37.1 °C)  Pulse:  [66-98] 66  Resp:  [17-24] 20  SpO2:  [90 %-98 %] 95 %  BP: (101-114)/(55-68) 106/67     Weight: 58.9 kg (129 lb 13.6 oz) (rd reviewed)  Body mass index is 22.29 kg/m².    Intake/Output Summary (Last 24 hours) at 03/19/18 0753  Last data filed at 03/18/18 1700   Gross per 24 hour   Intake              780 ml   Output                0 ml   Net              780 ml      Physical Exam   Constitutional: No distress.   Downs affect, poor eye contact.  Wearing NC O2   HENT:   Head: Normocephalic and atraumatic.   Right Ear: External ear normal.   Left Ear: External ear normal.   Eyes: Conjunctivae are normal. Pupils are equal, round, and reactive to light. Right eye exhibits no discharge. Left eye exhibits no discharge.   Neck: Normal range of motion. Neck supple. No JVD present.   Cardiovascular: Regular rhythm, normal heart sounds and intact distal pulses.    No murmur heard.  Pulmonary/Chest: Effort normal and breath sounds normal. No respiratory distress. He has no wheezes. He has no rales.   Abdominal: Soft. Bowel sounds are normal.   Musculoskeletal: He exhibits no edema.   Neurological:   Dementia, unable to ambulate.  Now bed bound   Skin: He is not diaphoretic.   Nursing note and vitals reviewed.      Significant Labs:   CBC: No results for input(s): WBC, HGB, HCT, PLT in the last 48 hours.  CMP:     Recent Labs  Lab 03/18/18  0617 03/19/18  0627    141   K 4.3 4.1    105   CO2 23 26   GLU 91 96   BUN 10 9   CREATININE 0.7 0.7   CALCIUM 8.8 9.0   PROT 6.5 6.6   ALBUMIN 2.5* 2.5*   BILITOT 0.3 0.3   ALKPHOS 91 94   AST 17 11   ALT 11  11   ANIONGAP 9 10   EGFRNONAA >60 >60     Lab Results   Component Value Date    WBC 10.42 03/17/2018    HGB 13.4 (L) 03/17/2018    HCT 41.2 03/17/2018     (H) 03/17/2018     03/17/2018         Significant Imaging:   3/11 admission cxr A small infiltrate at the right lung base is consistent with pneumonia.  Borderline heart size.    3/13 ct head s/p sz activity with hx of hemorrhagic stroke A heavy and generalized cerebral volume loss is noted, similar to the previous study of 4/21/17, more prominently affecting the bilateral parietal lobes.  There is compensatory ventriculomegaly.  Periventricular and basal ganglia calcifications are noted.  There is no evidence for acute intracranial hemorrhage or sulcal effacement.  No mass effect or midline shift is seen.  No extra-axial fluid collections.  Visualized paranasal sinuses and mastoid air cells are clear.    3/15 cxr The lungs are underexpanded with crowding of the pulmonary vascularity.  There is congestion of the pulmonary vasculature.  This is identified opacity in the right lung base could represent a pneumonia in the appropriate clinical setting.  The heart is at the upper limits of normal in size.  The skeletal structures show age-appropriate degenerative change.    3/17 cxr Interval development of left basilar opacification suggesting atelectasis.  Infiltrate/pneumonia cannot be excluded.    ECHO     Ref Range & Units 3d ago 2yr ago    EF 55 - 65 55  65     Mitral Valve Regurgitation  TRIVIAL      Diastolic Dysfunction  No      Est. PA Systolic Pressure  24.8      Pericardial Effusion  SMALL   NONE     Tricuspid Valve Regurgitation  TRIVIAL TO MILD       3/11 EKG Normal sinus rhythm with sinus arrhythmia  Normal ECG  When compared with ECG of 22-MAR-2017 12:43,  No significant change was found  Confirmed by MALACHI FLORES MD ([id number]) on 3/12/2018 9:05:38 AM

## 2018-03-19 NOTE — PT/OT/SLP PROGRESS
"Physical Therapy Treatment    Patient Name:  Mack Will   MRN:  [id number]    Recommendations:     Discharge Recommendations:  nursing facility, basic   Discharge Equipment Recommendations: none   Barriers to discharge: None    Assessment:     Mack Will is a 52 y.o. male admitted with a medical diagnosis of Pneumonia of right lower lobe due to infectious organism.  He presents with the following impairments/functional limitations:  weakness, impaired endurance, impaired self care skills, impaired functional mobilty, impaired balance, decreased upper extremity function, decreased lower extremity function, impaired cognition, decreased safety awareness . Pt tolerated treatment session well with no c/o.    Rehab Prognosis:  Good; patient would benefit from acute skilled PT services to address these deficits and reach maximum level of function.      Recent Surgery: * No surgery found *      Plan:     During this hospitalization, patient to be seen  (1-2x/day Monday-Friday; PRN Weekends/Holidays) to address the above listed problems via therapeutic activities, therapeutic exercises  · Plan of Care Expires:   (upon D/C from facility)   Plan of Care Reviewed with: patient    Subjective     Communicated with patient prior to session.  Patient found supine in bed upon PT entry to room, agreeable to treatment.      Chief Complaint: Pt with no verbal communication  Patient comments/goals: "family goal to new facility"  Pain/Comfort:  · Pain Rating 1: 0/10    Patients cultural, spiritual, [employer]aritan conflicts given the current situation:      Objective:     Patient found with: peripheral IV, SCD     General Precautions: Standard, fall   Orthopedic Precautions:N/A   Braces: N/A     AM-PAC 6 CLICK MOBILITY  Turning over in bed (including adjusting bedclothes, sheets and blankets)?: 2  Sitting down on and standing up from a chair with arms (e.g., wheelchair, bedside commode, etc.): 1  Moving from lying on back to " sitting on the side of the bed?: 2  Moving to and from a bed to a chair (including a wheelchair)?: 1  Need to walk in hospital room?: 1  Climbing 3-5 steps with a railing?: 1  Total Score: 8       Therapeutic Activities and Exercises:  PROM and a/A exercises to BUE/BLE at all joints in available planes of motion with rest breaks as needed to increase strength and endurance with all gross mobility skills and prevent contractures.      Patient left supine with all lines intact, call button in reach and NSG notified..    GOALS:    Physical Therapy Goals        Problem: Physical Therapy Goal    Goal Priority Disciplines Outcome Goal Variances Interventions   Physical Therapy Goal     PT/OT, PT Ongoing (interventions implemented as appropriate)     Description:  Goals to be met by: upon D/C from facility     Patient will increase functional independence with mobility by performin. Supine to sit with MInimal Assistance  2. Sit to supine with MInimal Assistance  3. Bed to chair transfer with Minimal Assistance using any AD as needed.  4 Pt will tolerate PROM/A/AROM on BUE/BLE at all joints in available planes of motion with rest breaks as needed to improve functional mobility.              Problem: Physical Therapy Goal    Goal Priority Disciplines Outcome Goal Variances Interventions   Physical Therapy Goal     PT/OT, PT      Description:  Continue established goals as documented previously           Problem: Physical Therapy Goal    Goal Priority Disciplines Outcome Goal Variances Interventions   Physical Therapy Goal     PT/OT, PT      Description:  Demonstrate and perform Therapeutic exercises to increase use of Rt UE for ADL                    Time Tracking:     PT Received On: 18  PT Start Time: 1020     PT Stop Time: 1040  PT Total Time (min): 20 min     Billable Minutes: Therapeutic Exercise 20 minutes    Treatment Type: Treatment  PT/PTA: PT           Lulú Sagastume, PT  2018

## 2018-03-19 NOTE — PROGRESS NOTES
"[facility] Medicine  Progress Note    Patient Name: Mack Will  MRN: [id number]  Patient Class: IP- Inpatient   Admission Date: 3/11/2018  Length of Stay: 7 days  Attending Physician: Jhony Finn MD  Primary Care Provider: Chetan Alvarez MD        Subjective:     Principal Problem:Pneumonia of right lower lobe due to infectious organism    HPI:  Pt presented to ER last night with c/o SOB and cough. Was treated last week with azithromycin/duonebs per PCP 3/6. No improvement. He is afebrile and had no elevated WBC. CXR shows small right lower lobe infiltrate. Blood cultures drawn and pending. He  Is laying in bed this am being fed by sister at bedside. Sister in law also at bedside. Lives at group home. Sister in law reports that he was not getting his treatments at group home. He was rattling more and having difficulty bringing up mucus. Eating well, but sister reports that this am he was getting a little gaggy with breakfast    He does not walk at home, sits in chair or w/c. Diapered.     Hospital Course:  3/13/18 admitted for RLL pneumonia. Had a seizure this am after nebulizer tx. . + hx of seizure disorder but family reports had not had seizure in years> Home meds list trileptal 300mg twice daily- family reports he was only taking divalproex at home.   Last PN per Dr. Alvarez has trileptal listed as rx.  Since seizure he has been moaning and pulling legs up intermittently. Family reports that he does not usually do this. "not a complainer". They also report lots of problems with gabriel catheter during last visit.   Afebrile- Temp 97.3, /66, 158/78. Noisy breathing.  They are not planning to have him return to group home. Planning to admit to nursing home when discharged.     3/14/18 admitted for RLL pneumonia 3-11-18,  S/P seizure yesterday am. Subsequent CT brain  negative for acute findings; + hx of hemorrhagic CVA.   Neuro  consulted; meds adjusted.  " Trileptal 300mg BID resumed as he is supposed to be on this in addition to Depakote.   Did much better overnight. Back to taking po meds and eating,  Awaiting swallow study.- hopefully today.   Still has very harsh cough.   Day 4 abx  Planning for discharge to ~2 days, NSG home placement in [place]. [facility]  Family concerned about excess sedation. Aware that was not getting trileptal and now resumed. Continued Depakote. They would like to see if could maybe decrease or stop Depakote- discussed will defer to neurology. Currently not getting trazodone which was home med.   3/15/18 admitted for RLL pneumonia 3-11-18,  S/P seizure 3/13 am. Subsequent CT brain  negative for acute findings; + hx of hemorrhagic CVA.   Neuro  Adjusted seizure Rx;  Trileptal  resumed as he is supposed to be on this in addition to Depakote.   Back to taking po meds and eating,    ST 3/13/ recommendations; Pureed diet with nectar thick liquids    Day 5 abx  Planning for  NSG home placement in [place]. [facility]  Still with junky cough and noisy breathing. O2 increased to 5LNC- O2 sats 93% this am with concern for mucous plugging. PT following   Discussed with sisters at bedside and neurology - will keep on depakote and trileptal.  3/16/18 admitted for RLL pneumonia 3-11-18,  S/P seizure 3/13 am. CT Ok,  hx of hemorrhagic CVA.   Neuro  Adjusted seizure Rx;  Trileptal  Resumed. LOC better but worsening lungs yesterday  increasing O2 requirement. Added mucinex and CPT to help with thickened mucous.O2 sats 92% on 5LNC   Patient had a rough day yesterday.  He was placed on Bipap and now seems to be better.  The rattle has resolved.    3/18  He is tolerating bipap  Satting 90% on 4 liters   Atelectasis on CXR   Afebrile     3/19  Had uneventful night. Tolerated bipap over night. Sats 93-98% on 35 % O2 with bipap. Afebrile. Labs essentially normal with exception of low albumin. Speech following patient and per MBSS 3/14 remains on    puree diet with nectar thick liquids due to patient demonstrated mild oropharyngeal dysphagia characterized by delayed pharyngeal swallow and decreased hyolaryngeal elevation. Transferring well which is his baseline. Still with PT    Interval History: see HC     Review of Systems   Unable to perform ROS: Dementia     Objective:     Vital Signs (Most Recent):  Temp: 98.7 °F (37.1 °C) (03/19/18 0338)  Pulse: 66 (03/19/18 0731)  Resp: 20 (03/19/18 0731)  BP: 106/67 (03/19/18 0338)  SpO2: 95 % (03/19/18 0731) Vital Signs (24h Range):  Temp:  [97.8 °F (36.6 °C)-98.7 °F (37.1 °C)] 98.7 °F (37.1 °C)  Pulse:  [66-98] 66  Resp:  [17-24] 20  SpO2:  [90 %-98 %] 95 %  BP: (101-114)/(55-68) 106/67     Weight: 58.9 kg (129 lb 13.6 oz) (rd reviewed)  Body mass index is 22.29 kg/m².    Intake/Output Summary (Last 24 hours) at 03/19/18 0753  Last data filed at 03/18/18 1700   Gross per 24 hour   Intake              780 ml   Output                0 ml   Net              780 ml      Physical Exam   Constitutional: No distress.   Downs affect, poor eye contact.  Wearing NC O2   HENT:   Head: Normocephalic and atraumatic.   Right Ear: External ear normal.   Left Ear: External ear normal.   Eyes: Conjunctivae are normal. Pupils are equal, round, and reactive to light. Right eye exhibits no discharge. Left eye exhibits no discharge.   Neck: Normal range of motion. Neck supple. No JVD present.   Cardiovascular: Regular rhythm, normal heart sounds and intact distal pulses.    No murmur heard.  Pulmonary/Chest: Effort normal and breath sounds normal. No respiratory distress. He has no wheezes. He has no rales.   Abdominal: Soft. Bowel sounds are normal.   Musculoskeletal: He exhibits no edema.   Neurological:   Dementia, unable to ambulate.  Now bed bound   Skin: He is not diaphoretic.   Nursing note and vitals reviewed.      Significant Labs:   CBC: No results for input(s): WBC, HGB, HCT, PLT in the last 48 hours.  CMP:     Recent Labs  Lab  03/18/18  0617 03/19/18  0627    141   K 4.3 4.1    105   CO2 23 26   GLU 91 96   BUN 10 9   CREATININE 0.7 0.7   CALCIUM 8.8 9.0   PROT 6.5 6.6   ALBUMIN 2.5* 2.5*   BILITOT 0.3 0.3   ALKPHOS 91 94   AST 17 11   ALT 11 11   ANIONGAP 9 10   EGFRNONAA >60 >60     Lab Results   Component Value Date    WBC 10.42 03/17/2018    HGB 13.4 (L) 03/17/2018    HCT 41.2 03/17/2018     (H) 03/17/2018     03/17/2018         Significant Imaging:   3/11 admission cxr A small infiltrate at the right lung base is consistent with pneumonia.  Borderline heart size.    3/13 ct head s/p sz activity with hx of hemorrhagic stroke A heavy and generalized cerebral volume loss is noted, similar to the previous study of 4/21/17, more prominently affecting the bilateral parietal lobes.  There is compensatory ventriculomegaly.  Periventricular and basal ganglia calcifications are noted.  There is no evidence for acute intracranial hemorrhage or sulcal effacement.  No mass effect or midline shift is seen.  No extra-axial fluid collections.  Visualized paranasal sinuses and mastoid air cells are clear.    3/15 cxr The lungs are underexpanded with crowding of the pulmonary vascularity.  There is congestion of the pulmonary vasculature.  This is identified opacity in the right lung base could represent a pneumonia in the appropriate clinical setting.  The heart is at the upper limits of normal in size.  The skeletal structures show age-appropriate degenerative change.    3/17 cxr Interval development of left basilar opacification suggesting atelectasis.  Infiltrate/pneumonia cannot be excluded.    ECHO     Ref Range & Units 3d ago 2yr ago    EF 55 - 65 55  65     Mitral Valve Regurgitation  TRIVIAL      Diastolic Dysfunction  No      Est. PA Systolic Pressure  24.8      Pericardial Effusion  SMALL   NONE     Tricuspid Valve Regurgitation  TRIVIAL TO MILD       3/11 EKG Normal sinus rhythm with sinus arrhythmia  Normal  ECG  When compared with ECG of 22-MAR-2017 12:43,  No significant change was found  Confirmed by MALACHI FLORES MD ([id number]) on 3/12/2018 9:05:38 AM    Assessment/Plan:      * Pneumonia of right lower lobe due to infectious organism    Continue Rocephin day 9 today   zmax day 9 today  Continue BiPap  duonebs  Mucinex added over the weekend  PT  This is the natural course of Down's patients. They usually succumb to pneumonia from inability to protect their airway. Will continue current mngt, short of mechanical ventilation. He is a DNR. If it does not look like he will clinically improve, we can order hospice at the nursing home. He did bounce back from pneumonia with a similar clinical picture last year. Family in agreement with this plan. Currently he is stable with treatment. Plan is to d/c to [facility] in [place] at d./c    3/19--lungs are very clear. No respiratory distress. Will stop BiPaP and antibx and ensure he doesn't clinically deteriorate over next 24 hours. If not, stable for discharge to NH tomorrow with oxygen.           Stage 2 skin ulcer of sacral region    Cont meplex dressing, PT          Chewing difficulty    ST rec=Pureed diet -crushed meds ordered           Debility    He is non ambulatory at home. PT to cont his transferring ability           Somnolence    Family is concerned that depakote is making him to somnolent.   It is not technically prescribed for his seizures.  He is more awake this morning and answering questions.  Will NOT d/c depakote as it was prescribed to help with mood- discussed with Neuro.          Seizure disorder    Seizure 3/13- divalproex 125mg po BID  Trileptal 300mg BID resumed 3/13/18  Follow  neurology recs - discussed transient unequal pupils with Dr. Correa - this is known since after last CVA and not new  CT of head - no acute findings   Follow with Neurology  .          Down's syndrome                VTE Risk Mitigation         Ordered     Medium Risk of VTE  Once       03/11/18 1450     Place sequential compression device  Until discontinued      03/11/18 1450              Italia Vargas MD  Department of Hospital Medicine   [facility]

## 2018-03-19 NOTE — ASSESSMENT & PLAN NOTE
Continue Rocephin day 9 today   zmax day 9 today  Continue BiPap  duonebs  Mucinex added over the weekend  PT  This is the natural course of Down's patients. They usually succumb to pneumonia from inability to protect their airway. Will continue current mngt, short of mechanical ventilation. He is a DNR. If it does not look like he will clinically improve, we can order hospice at the nursing home. He did bounce back from pneumonia with a similar clinical picture last year. Family in agreement with this plan. Currently he is stable with treatment. Plan is to d/c to [facility] in [place] at d./c    3/19--lungs are very clear. No respiratory distress. Will stop BiPaP and antibx and ensure he doesn't clinically deteriorate over next 24 hours. If not, stable for discharge to NH tomorrow with oxygen.

## 2018-03-19 NOTE — PLAN OF CARE
Problem: Patient Care Overview  Goal: Plan of Care Review  Outcome: Ongoing (interventions implemented as appropriate)  Patient aware of plan of care. Patient had restful night. Patient tolerated BIPAP throughout night. VS stable, afebrile. crushed meds, given with pudding, tolerated well. Sinus rhythm on tele monitor. Incontinent of urine and stool, diaper in place. Turn every 2 hours. mepilex to sacral area to protect wound. Bed alarm engaged. SCD's. Heel booties in place. No falls/injuries this shift. BrotherGiovanni told day shift he would be back in this morning. No questions or concerns at this time. Agrees with plan of care.

## 2018-03-19 NOTE — PROGRESS NOTES
Staff Handoff    Bedside report received from PRUDENCE Amaya. Patient lying in bed. No distress noted. 3L O2 per NC in use, sating 93%.  Bed locked and in lowest position. Call bell in reach.   Resident Handoff

## 2018-03-19 NOTE — PLAN OF CARE
03/19/18 1523   Discharge Reassessment   Assessment Type Discharge Planning Reassessment     DAKSHA contacted [facility] and spoke with Bronwyn who stated that they have a bipap machine available if its required for d/c. Daksha informed Bronwyn that she will contact the nh tomorrow if the pt is d/c.

## 2018-03-19 NOTE — PLAN OF CARE
Problem: Patient Care Overview  Goal: Plan of Care Review  Outcome: Ongoing (interventions implemented as appropriate)  Tolerating oxygen on nasal cannula. Congestion improved. V/S stable. IV antibiotics discontinued. Turned and repositioned. Mepilex to sacrum dry and intact. Breathing treatments. Mucinex continued. No distress this shift. Brother understands plan of care and agrees.

## 2018-03-19 NOTE — PT/OT/SLP PROGRESS
"Physical Therapy Treatment    Patient Name:  Mack Will   MRN:  [id number]    Recommendations:     Discharge Recommendations:  nursing facility, basic   Discharge Equipment Recommendations: none   Barriers to discharge: None    Assessment:     Mack Will is a 52 y.o. male admitted with a medical diagnosis of Pneumonia of right lower lobe due to infectious organism.  He presents with the following impairments/functional limitations:  weakness, impaired endurance, impaired self care skills, impaired functional mobilty, impaired balance, decreased upper extremity function, decreased lower extremity function, impaired cognition, decreased safety awareness . Pt tolerated sitting EOB well with no c/o.    Rehab Prognosis:  Fair; patient would benefit from acute skilled PT services to address these deficits and reach maximum level of function.      Recent Surgery: * No surgery found *      Plan:     During this hospitalization, patient to be seen  (1-2x/day Monday-Friday; PRN Weekends/Holidays) to address the above listed problems via therapeutic activities, therapeutic exercises  · Plan of Care Expires:   (upon D/C from facility)   Plan of Care Reviewed with: patient    Subjective     Communicated with patient prior to session.  Patient found supine in bed upon PT entry to room, agreeable to treatment.      Chief Complaint: Pt with no verbal communication  Patient comments/goals: "Family goal is pt to new facility"  Pain/Comfort:  · Pain Rating 1: 0/10    Patients cultural, spiritual, Zoroastrian conflicts given the current situation:      Objective:     Patient found with: peripheral IV, SCD     General Precautions: Standard, fall   Orthopedic Precautions:N/A   Braces: N/A     Functional Mobility:  · Bed Mobility:     · Rolling Left:  maximal assistance  · Rolling Right: maximal assistance  · Scooting: maximal assistance  · Bridging: maximal assistance  · Supine to Sit: maximal assistance  · Sit to Supine: " maximal assistance      AM-PAC 6 CLICK MOBILITY  Turning over in bed (including adjusting bedclothes, sheets and blankets)?: 2  Sitting down on and standing up from a chair with arms (e.g., wheelchair, bedside commode, etc.): 1  Moving from lying on back to sitting on the side of the bed?: 2  Moving to and from a bed to a chair (including a wheelchair)?: 1  Need to walk in hospital room?: 1  Climbing 3-5 steps with a railing?: 1  Total Score: 8       Therapeutic Activities and Exercises:  Pt sat EOB x 15 minutes with CGA with cueing for upright posturing. Pt tolerated PROM and A/A exercises to BUE/BLE supine in bed at all joints in available planes of motion with rest breaks as needed to increase strength and endurance with gross mobility and prevent contractures.     Patient left supine with all lines intact, call button in reach and NSG notified..    GOALS:    Physical Therapy Goals        Problem: Physical Therapy Goal    Goal Priority Disciplines Outcome Goal Variances Interventions   Physical Therapy Goal     PT/OT, PT Ongoing (interventions implemented as appropriate)     Description:  Goals to be met by: upon D/C from facility     Patient will increase functional independence with mobility by performin. Supine to sit with MInimal Assistance  2. Sit to supine with MInimal Assistance  3. Bed to chair transfer with Minimal Assistance using any AD as needed.  4 Pt will tolerate PROM/A/AROM on BUE/BLE at all joints in available planes of motion with rest breaks as needed to improve functional mobility.              Problem: Physical Therapy Goal    Goal Priority Disciplines Outcome Goal Variances Interventions   Physical Therapy Goal     PT/OT, PT      Description:  Continue established goals as documented previously           Problem: Physical Therapy Goal    Goal Priority Disciplines Outcome Goal Variances Interventions   Physical Therapy Goal     PT/OT, PT      Description:  Demonstrate and perform  Therapeutic exercises to increase use of Rt UE for ADL                    Time Tracking:     PT Received On: 03/19/18  PT Start Time: 1315     PT Stop Time: 1340  PT Total Time (min): 25 min     Billable Minutes: Therapeutic Activity 15 minutes and Therapeutic Exercise 10 minutes    Treatment Type: Treatment  PT/PTA: PT           Lulú Sagastume, PT  03/19/2018

## 2018-03-19 NOTE — PROGRESS NOTES
Report received from PRUDENCE Kim. Patient lying in bed tolerating Bipap. Bed alarm engaged. Heel booties and SCD's. No distress noted.

## 2018-03-19 NOTE — PROGRESS NOTES
[facility]  Neurology  Progress Note     Patient Name: Mack Will  MRN: [id number]  Admission Date: 3/11/2018  Code Status: DNR   Attending Provider: Hunter Morfin MD   Consulting Provider: Huang Correa MD  Primary Care Physician: Chetan Alvarez MD  Principal Problem:Pneumonia of right lower lobe due to infectious organism     Inpatient consult to Neurology  Consult performed by: HUANG CORREA  Consult ordered by: RABIA ALEXANDRA        Subjective:      Chief Complaint:  seizure      HPI:   Mack Will is a 52 y.o. male admitted with pneumonia and was witnessed to have a seizure with GTC activity lasting about 5 minutes this am.  This responded to Ativan and CT head was ordered  The patient was using  Trileptal at baseline at the last visit at out neurology clinic. He is on Depakote now as well which was added when he was last seen at out clinic on 5/2017 for mood and appeptite  Trileptal was also continued but for reasons unknown the patient was not on this prior to admit per med list and this medication was not continued at his admission.      The patient is known to me for his  Down Syndrome and associated early Alzheimer's like memory loss. 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 He has poor gait and attention due to this.      Sister at bedside notes patient is drowsy and at times appears mildly agitated. He was awake and pleasant  but confused last pm prior to seizure     Interval history 3/15/18: Tolerating diet per speech including po meds.  Was more awake yesterday and family asking to reduce VPA which was started originally for agitated mood and poor appetite.   Sister at bedside states he is more awake and alert today. They are concerned about him being sedated appearing at times at the NH     Interval history 3/16/18: Primary team addressing ongoing  respiratory symptoms. NO further seizures noted by nursing. Patient has been tolerating diet     Interval History: 3/19/18: Patient more awake over time. Responding to questions better. Transferring which is his baseline. Sat up at Nursing station. No seizures            Past Medical History:   Diagnosis Date    Alzheimer disease      Brain bleed      Down syndrome      Seizure                     Past Surgical History:   Procedure Laterality Date    CHOLECYSTECTOMY        CYST REMOVAL             Review of patient's allergies indicates:  No Known Allergies     I have reviewed all of this patient's past medical and surgical histories as well as family and social histories and active allergies and medications as documented in the electronic medical record.        No current facility-administered medications on file prior to encounter.               Current Outpatient Prescriptions on File Prior to Encounter   Medication Sig    albuterol-ipratropium 2.5mg-0.5mg/3mL (DUO-NEB) 0.5 mg-3 mg(2.5 mg base)/3 mL nebulizer solution Take 3 mLs by nebulization every 6 (six) hours as needed for Wheezing or Shortness of Breath. Rescue    clotrimazole (LOTRIMIN) 1 % cream Apply topically 2 (two) times daily.    cyanocobalamin, vitamin B-12, 5,000 mcg TbDL Take 5,000 mcg by mouth once daily.     divalproex (DEPAKOTE) 125 MG EC tablet Take one nightly for 3 weeks, then one BID (Patient taking differently: Take 125 mg by mouth every 12 (twelve) hours. )    ketoconazole (NIZORAL) 2 % cream Apply topically once daily.    LACTOSE-REDUCED FOOD (BOOST ORAL) Take by mouth once daily at 6am.    megestrol (MEGACE) 400 mg/10 mL (10 mL) Susp Take 5 mLs (200 mg total) by mouth once daily.    mupirocin (BACTROBAN) 2 % ointment Apply topically 2 (two) times daily.    oxcarbazepine (TRILEPTAL) 300 MG Tab Take 1 tablet (300 mg total) by mouth 2 (two) times daily.    tamsulosin (FLOMAX) 0.4 mg Cp24 Take 1 capsule (0.4 mg total) by  mouth once daily.    trazodone (DESYREL) 50 MG tablet Take 1 tablet (50 mg total) by mouth every evening.              Family History      Problem Relation (Age of Onset)     Heart disease Father                  Social History Main Topics    Smoking status: Never Smoker    Smokeless tobacco: Never Used    Alcohol use No    Drug use: Unknown    Sexual activity: Not on file      Review of Systems   Unable to perform ROS: Dementia      Objective:      Vitals:    03/19/18 1017 03/19/18 1111 03/19/18 1149 03/19/18 1201   BP:  106/62     BP Location:       Patient Position:  Lying     Pulse: 74 79 80 67   Resp:   18    Temp:  98.4 °F (36.9 °C)     TempSrc:  Axillary     SpO2:  96% (!) 94%    Weight:       Height:                 Weight: 58.9 kg (129 lb 13.6 oz)  Body mass index is 22.29 kg/m².     Physical Exam                    Exam:  Gen Appearance, well developed in stigmata of down syndrome/nourished in no apparent distress  CV: 2+ distal pulses with no edema or swelling  Neuro:  MS: Awake and alert today  and localizes to pain. He nods head well when asked questions although answers are not congruent. No tremors todayRecent/remote memory are gravely impaired    Language is not testable  Fund of Knowledge is not testable  CN: Optic discs are flat with normal vasculature, left pupil is smaller than right by 1mm today in ambient light (this is his baseline from prior cerebellar bleeding), Extraoccular movements and visual fields are full. Normal facial sensation and strength,  Tongue and Palate are midline and strong. Shoulder Shrug symmetric and strong.  Motor: Normal bulk, tone, no abnormal movements. 1+ reflexes. Patient can't cooperate with motor testing but appears to be moving all extremities equally.   Sensory: Can't cooperate  Cerebellar: Can't Well  Cooperate   Gait: Not done- patient not longer walks without assitance               Significant Labs: CMP today reviewed- unremarkable     Significant  Imaging 3/1/18:  CT head: No acute changes     Assessment and Plan:   Mack Will is a 52 y.o. male admitted with pneumonia and had a seizure this am.  He is known to me for Down Syndrome and associated Alzheimer's,  seizures and prior cerebellar hemorrhage   I recommend:           * Pneumonia of right lower lobe due to infectious organism     -This infection also likely lowered his seizure threshold and may continue to cause some somnolence and worsened confusion in this patient - he is improved now with treatment          Seizure disorder     -Resumed Trileptal 300mg BID since seizures  in addition to Depakote as it was off of his med list for unclear reasons   - Continue Depakote as well which is being used for mood and appetite and will also cover seizures in his acute illness. Will need to switch to IV again if he is not able to maintain po  - family is interested in stopping depakote at some point; however, at this time it will be continued given his recent seizure and need for trileptal levels to rise for adequate seizure protection and need for seizure protection while he is acutely ill. If there are long term concerns for sedation, the medication can perhaps be tapered then. It may be causing a mild tremor in this patient but this is a benign side effect and this was not seen today. Patient is improved  -If there is any concern for ongoing seizures, patient will need transfer to facility with EEG. However, currently, the patient is awake and cooperative. Mental status has improved with time away from seizure. Due to repeat pneumonia, progressive dementia with Downs and dysphagia, the patient is a Hospice candidate if desired by family at any point. Plan is for d/c to NH perhaps tomorrow          Patient has outpatient follow appointment scheduled with me.      Bib Correa MD  Neurology  [facility]

## 2018-03-20 VITALS
DIASTOLIC BLOOD PRESSURE: 66 MMHG | TEMPERATURE: 97 F | RESPIRATION RATE: 20 BRPM | SYSTOLIC BLOOD PRESSURE: 107 MMHG | BODY MASS INDEX: 22.17 KG/M2 | HEART RATE: 93 BPM | OXYGEN SATURATION: 93 % | HEIGHT: 64 IN | WEIGHT: 129.88 LBS

## 2018-03-20 LAB
ALBUMIN SERPL BCP-MCNC: 2.6 G/DL
ALP SERPL-CCNC: 108 U/L
ALT SERPL W/O P-5'-P-CCNC: 12 U/L
ANION GAP SERPL CALC-SCNC: 12 MMOL/L
AST SERPL-CCNC: 12 U/L
BILIRUB SERPL-MCNC: 0.3 MG/DL
BUN SERPL-MCNC: 8 MG/DL
CALCIUM SERPL-MCNC: 9.2 MG/DL
CHLORIDE SERPL-SCNC: 103 MMOL/L
CO2 SERPL-SCNC: 23 MMOL/L
CREAT SERPL-MCNC: 0.7 MG/DL
EST. GFR  (AFRICAN AMERICAN): >60 ML/MIN/1.73 M^2
EST. GFR  (NON AFRICAN AMERICAN): >60 ML/MIN/1.73 M^2
GLUCOSE SERPL-MCNC: 94 MG/DL
POTASSIUM SERPL-SCNC: 4.1 MMOL/L
PROT SERPL-MCNC: 6.9 G/DL
SODIUM SERPL-SCNC: 138 MMOL/L

## 2018-03-20 PROCEDURE — 94640 AIRWAY INHALATION TREATMENT: CPT

## 2018-03-20 PROCEDURE — 80053 COMPREHEN METABOLIC PANEL: CPT

## 2018-03-20 PROCEDURE — 99239 HOSP IP/OBS DSCHRG MGMT >30: CPT | Mod: ,,, | Performed by: INTERNAL MEDICINE

## 2018-03-20 PROCEDURE — 25000003 PHARM REV CODE 250: Performed by: PSYCHIATRY & NEUROLOGY

## 2018-03-20 PROCEDURE — 94760 N-INVAS EAR/PLS OXIMETRY 1: CPT

## 2018-03-20 PROCEDURE — 25000003 PHARM REV CODE 250: Performed by: INTERNAL MEDICINE

## 2018-03-20 PROCEDURE — G8983 BODY POS D/C STATUS: HCPCS | Mod: CM

## 2018-03-20 PROCEDURE — 94668 MNPJ CHEST WALL SBSQ: CPT

## 2018-03-20 PROCEDURE — G8982 BODY POS GOAL STATUS: HCPCS | Mod: CL

## 2018-03-20 PROCEDURE — 25000003 PHARM REV CODE 250: Performed by: SURGERY

## 2018-03-20 PROCEDURE — 97110 THERAPEUTIC EXERCISES: CPT

## 2018-03-20 PROCEDURE — 25000242 PHARM REV CODE 250 ALT 637 W/ HCPCS: Performed by: SURGERY

## 2018-03-20 PROCEDURE — 63600175 PHARM REV CODE 636 W HCPCS: Performed by: INTERNAL MEDICINE

## 2018-03-20 PROCEDURE — 36415 COLL VENOUS BLD VENIPUNCTURE: CPT

## 2018-03-20 PROCEDURE — C9113 INJ PANTOPRAZOLE SODIUM, VIA: HCPCS | Performed by: INTERNAL MEDICINE

## 2018-03-20 RX ORDER — DIVALPROEX SODIUM 125 MG/1
125 TABLET, DELAYED RELEASE ORAL EVERY 12 HOURS
Start: 2018-03-20 | End: 2018-04-17 | Stop reason: SINTOL

## 2018-03-20 RX ADMIN — IPRATROPIUM BROMIDE AND ALBUTEROL SULFATE 3 ML: 2.5; .5 SOLUTION RESPIRATORY (INHALATION) at 04:03

## 2018-03-20 RX ADMIN — GUAIFENESIN 200 MG: 100 SOLUTION ORAL at 05:03

## 2018-03-20 RX ADMIN — OXCARBAZEPINE 300 MG: 150 TABLET, FILM COATED ORAL at 10:03

## 2018-03-20 RX ADMIN — PANTOPRAZOLE SODIUM 40 MG: 40 INJECTION, POWDER, FOR SOLUTION INTRAVENOUS at 10:03

## 2018-03-20 RX ADMIN — DIVALPROEX SODIUM 125 MG: 125 TABLET, DELAYED RELEASE ORAL at 10:03

## 2018-03-20 RX ADMIN — GUAIFENESIN 200 MG: 100 SOLUTION ORAL at 11:03

## 2018-03-20 RX ADMIN — IPRATROPIUM BROMIDE AND ALBUTEROL SULFATE 3 ML: 2.5; .5 SOLUTION RESPIRATORY (INHALATION) at 07:03

## 2018-03-20 RX ADMIN — IPRATROPIUM BROMIDE AND ALBUTEROL SULFATE 3 ML: 2.5; .5 SOLUTION RESPIRATORY (INHALATION) at 12:03

## 2018-03-20 NOTE — PLAN OF CARE
Problem: Physical Therapy Goal  Goal: Physical Therapy Goal  Goals to be met by: upon D/C from facility     Patient will increase functional independence with mobility by performin. Supine to sit with MInimal Assistance  2. Sit to supine with MInimal Assistance  3. Bed to chair transfer with Minimal Assistance using any AD as needed.  4 Pt will tolerate PROM/A/AROM on BUE/BLE at all joints in available planes of motion with rest breaks as needed to improve functional mobility.      Outcome: Unable to achieve outcome(s) by discharge  Pt. Has achieved 1/4 PT POC goals.    Comments: Pt. has achieved 1/4 PT POC goals.

## 2018-03-20 NOTE — ASSESSMENT & PLAN NOTE
Seizure 3/13- divalproex 125mg po BID  Trileptal 300mg BID resumed 3/13/18  Follow  neurology recs - discussed transient unequal pupils with Dr. Correa - this is known since after last CVA and not new  CT of head - no acute findings   Follow with Neurology outpt  .

## 2018-03-20 NOTE — PROGRESS NOTES
"[facility] Hospital Medicine  Progress Note    Patient Name: Mack Will  MRN: [id number]  Patient Class: IP- Inpatient   Admission Date: 3/11/2018  Length of Stay: 8 days  Attending Physician: Jhony Finn MD  Primary Care Provider: Chetan Alvarez MD        Subjective:     Principal Problem:Pneumonia of right lower lobe due to infectious organism    HPI:  Pt presented to ER last night with c/o SOB and cough. Was treated last week with azithromycin/duonebs per PCP 3/6. No improvement. He is afebrile and had no elevated WBC. CXR shows small right lower lobe infiltrate. Blood cultures drawn and pending. He  Is laying in bed this am being fed by sister at bedside. Sister in law also at bedside. Lives at group home. Sister in law reports that he was not getting his treatments at group home. He was rattling more and having difficulty bringing up mucus. Eating well, but sister reports that this am he was getting a little gaggy with breakfast    He does not walk at home, sits in chair or w/c. Diapered.     Hospital Course:  3/13/18 admitted for RLL pneumonia. Had a seizure this am after nebulizer tx. . + hx of seizure disorder but family reports had not had seizure in years> Home meds list trileptal 300mg twice daily- family reports he was only taking divalproex at home.   Last PN per Dr. Alvarez has trileptal listed as rx.  Since seizure he has been moaning and pulling legs up intermittently. Family reports that he does not usually do this. "not a complainer". They also report lots of problems with gabriel catheter during last visit.   Afebrile- Temp 97.3, /66, 158/78. Noisy breathing.  They are not planning to have him return to group home. Planning to admit to nursing home when discharged.     3/14/18 admitted for RLL pneumonia 3-11-18,  S/P seizure yesterday am. Subsequent CT brain  negative for acute findings; + hx of hemorrhagic CVA.   Neuro  consulted; meds adjusted.  " Trileptal 300mg BID resumed as he is supposed to be on this in addition to Depakote.   Did much better overnight. Back to taking po meds and eating,  Awaiting swallow study.- hopefully today.   Still has very harsh cough.   Day 4 abx  Planning for discharge to ~2 days, NSG home placement in [place]. [facility]  Family concerned about excess sedation. Aware that was not getting trileptal and now resumed. Continued Depakote. They would like to see if could maybe decrease or stop Depakote- discussed will defer to neurology. Currently not getting trazodone which was home med.   3/15/18 admitted for RLL pneumonia 3-11-18,  S/P seizure 3/13 am. Subsequent CT brain  negative for acute findings; + hx of hemorrhagic CVA.   Neuro  Adjusted seizure Rx;  Trileptal  resumed as he is supposed to be on this in addition to Depakote.   Back to taking po meds and eating,    ST 3/13/ recommendations; Pureed diet with nectar thick liquids    Day 5 abx  Planning for  NSG home placement in [place]. [facility]  Still with junky cough and noisy breathing. O2 increased to 5LNC- O2 sats 93% this am with concern for mucous plugging. PT following   Discussed with sisters at bedside and neurology - will keep on depakote and trileptal.  3/16/18 admitted for RLL pneumonia 3-11-18,  S/P seizure 3/13 am. CT Ok,  hx of hemorrhagic CVA.   Neuro  Adjusted seizure Rx;  Trileptal  Resumed. LOC better but worsening lungs yesterday  increasing O2 requirement. Added mucinex and CPT to help with thickened mucous.O2 sats 92% on 5LNC   Patient had a rough day yesterday.  He was placed on Bipap and now seems to be better.  The rattle has resolved.    3/18  He is tolerating bipap  Satting 90% on 4 liters   Atelectasis on CXR   Afebrile     3/19  Had uneventful night. Tolerated bipap over night. Sats 93-98% on 35 % O2 with bipap. Afebrile. Labs essentially normal with exception of low albumin. Speech following patient and per MBSS 3/14 remains on    puree diet with nectar thick liquids due to patient demonstrated mild oropharyngeal dysphagia characterized by delayed pharyngeal swallow and decreased hyolaryngeal elevation. Transferring well which is his baseline. Still with PT    3/20  This am pt was on RA when resp walked into room. Sat was 93%. He did not use bipap over night. No fever. No elevated WBC. [facility] at [facility] for [facility] term [facility]. He has no complaints today    Interval History: see HC     Review of Systems   Unable to perform ROS: Dementia   Hematological: Bruises/bleeds easily: .lastcbc.     Objective:     Vital Signs (Most Recent):  Temp: 97.2 °F (36.2 °C) (03/20/18 0730)  Pulse: 88 (03/20/18 0730)  Resp: 20 (03/20/18 0730)  BP: 108/62 (03/20/18 0730)  SpO2: (!) 91 % (03/20/18 0730) Vital Signs (24h Range):  Temp:  [97.2 °F (36.2 °C)-99 °F (37.2 °C)] 97.2 °F (36.2 °C)  Pulse:  [65-93] 88  Resp:  [18-22] 20  SpO2:  [90 %-96 %] 91 %  BP: (106-114)/(60-85) 108/62     Weight: 58.9 kg (129 lb 13.6 oz) (rd reviewed)  Body mass index is 22.29 kg/m².    Intake/Output Summary (Last 24 hours) at 03/20/18 0933  Last data filed at 03/19/18 1700   Gross per 24 hour   Intake               90 ml   Output                0 ml   Net               90 ml      Physical Exam   Constitutional: No distress.   Downs affect, poor eye contact.  Wearing NC O2   HENT:   Head: Normocephalic and atraumatic.   Right Ear: External ear normal.   Left Ear: External ear normal.   Eyes: Conjunctivae are normal. Pupils are equal, round, and reactive to light. Right eye exhibits no discharge. Left eye exhibits no discharge.   Neck: Normal range of motion. Neck supple. No JVD present.   Cardiovascular: Regular rhythm, normal heart sounds and intact distal pulses.    No murmur heard.  Pulmonary/Chest: Effort normal and breath sounds normal. No respiratory distress. He has no wheezes. He has no rales.   Abdominal: Soft. Bowel sounds are normal.   Musculoskeletal: He exhibits no  edema.   Neurological:   Dementia, unable to ambulate.  Now bed bound   Skin: He is not diaphoretic.   Nursing note and vitals reviewed.      Significant Labs:   Lab Results   Component Value Date    WBC 10.42 03/17/2018    HGB 13.4 (L) 03/17/2018    HCT 41.2 03/17/2018     (H) 03/17/2018     03/17/2018     CMP:     Recent Labs  Lab 03/19/18  0627 03/20/18  0529    138   K 4.1 4.1    103   CO2 26 23   GLU 96 94   BUN 9 8   CREATININE 0.7 0.7   CALCIUM 9.0 9.2   PROT 6.6 6.9   ALBUMIN 2.5* 2.6*   BILITOT 0.3 0.3   ALKPHOS 94 108   AST 11 12   ALT 11 12   ANIONGAP 10 12   EGFRNONAA >60 >60         Significant Imaging:     Speech consult  diagnosis of Dysphagia.  Recommend continued use of solid diet of puree and nectar thick liquids.     3/11 admission cxr A small infiltrate at the right lung base is consistent with pneumonia.  Borderline heart size.    3/13 ct head s/p sz activity with hx of hemorrhagic stroke A heavy and generalized cerebral volume loss is noted, similar to the previous study of 4/21/17, more prominently affecting the bilateral parietal lobes.  There is compensatory ventriculomegaly.  Periventricular and basal ganglia calcifications are noted.  There is no evidence for acute intracranial hemorrhage or sulcal effacement.  No mass effect or midline shift is seen.  No extra-axial fluid collections.  Visualized paranasal sinuses and mastoid air cells are clear.    3/15 cxr The lungs are underexpanded with crowding of the pulmonary vascularity.  There is congestion of the pulmonary vasculature.  This is identified opacity in the right lung base could represent a pneumonia in the appropriate clinical setting.  The heart is at the upper limits of normal in size.  The skeletal structures show age-appropriate degenerative change.    3/17 cxr Interval development of left basilar opacification suggesting atelectasis.  Infiltrate/pneumonia cannot be excluded.    ECHO     Ref Range &  Units 3d ago 2yr ago    EF 55 - 65 55  65     Mitral Valve Regurgitation  TRIVIAL      Diastolic Dysfunction  No      Est. PA Systolic Pressure  24.8      Pericardial Effusion  SMALL   NONE     Tricuspid Valve Regurgitation  TRIVIAL TO MILD       3/11 EKG Normal sinus rhythm with sinus arrhythmia  Normal ECG  When compared with ECG of 22-MAR-2017 12:43,  No significant change was found  Confirmed by MALACHI FLORES MD ([id number]) on 3/12/2018 9:05:38 AM    Assessment/Plan:      * Pneumonia of right lower lobe due to infectious organism    S/p Rocephin and zmax 8 days  D/romeo bipap over night and tolerated well  duonebs  Mucinex added over the weekend  PT  This is the natural course of Down's patients. They usually succumb to pneumonia from inability to protect their airway. Will continue current mngt, short of mechanical ventilation. He is a DNR. If it does not look like he will clinically improve, we can order hospice at the nursing home. He did bounce back from pneumonia with a similar clinical picture last year. Family in agreement with this plan. Currently he is stable with treatment. Plan is to d/c to [facility] in [place] at d./c    3/19--lungs are very clear. No respiratory distress. Will stop BiPaP and antibx and ensure he doesn't clinically deteriorate over next 24 hours. If not, stable for discharge to NH tomorrow with oxygen. 3/20 remains well, d/c today          Stage 2 skin ulcer of sacral region    Cont meplex dressing, PT          Chewing difficulty    ST rec=Pureed diet -crushed meds ordered           Debility    He is non ambulatory at home. PT is working with him although he is max assist. Will tyry and get cont therapy at nursing home upon d/c          Somnolence    Family is concerned that depakote is making him to somnolent.   It is not technically prescribed for his seizures.  He is more awake this morning and answering questions.  DID NOT d/c depakote as it was prescribed to help with mood- discussed with  Neuro again and she says too high risk to d/c atr this time. May decrease at f/u. He is much more awake today and past couple days even with it. Discussed with family            Seizure disorder    Seizure 3/13- divalproex 125mg po BID  Trileptal 300mg BID resumed 3/13/18  Follow  neurology recs - discussed transient unequal pupils with Dr. Correa - this is known since after last CVA and not new  CT of head - no acute findings   Follow with Neurology outpt  .          Down's syndrome                VTE Risk Mitigation         Ordered     Medium Risk of VTE  Once      03/11/18 1450     Place sequential compression device  Until discontinued      03/11/18 1450              Hunter Morfin MD  Department of Hospital Medicine   [facility]

## 2018-03-20 NOTE — ASSESSMENT & PLAN NOTE
S/p Rocephin and zmax 8 days  D/romeo bipap over night and tolerated well  duonebs  Mucinex added over the weekend  PT  This is the natural course of Down's patients. They usually succumb to pneumonia from inability to protect their airway. Will continue current mngt, short of mechanical ventilation. He is a DNR. If it does not look like he will clinically improve, we can order hospice at the nursing home. He did bounce back from pneumonia with a similar clinical picture last year. Family in agreement with this plan. Currently he is stable with treatment. Plan is to d/c to [facility] in [place] at d./c    3/19--lungs are very clear. No respiratory distress. Will stop BiPaP and antibx and ensure he doesn't clinically deteriorate over next 24 hours. If not, stable for discharge to NH tomorrow with oxygen. 3/20 remains well, d/c today no abx needed

## 2018-03-20 NOTE — ASSESSMENT & PLAN NOTE
Family is concerned that depakote is making him to somnolent.   It is not technically prescribed for his seizures.  He is more awake this morning and answering questions.  DID NOT d/c depakote as it was prescribed to help with mood- discussed with Neuro again and she says too high risk to d/c atr this time. May decrease at f/u. He is much more awake today and past couple days even with it. Discussed with family

## 2018-03-20 NOTE — PLAN OF CARE
03/20/18 1225   Discharge Reassessment   Assessment Type Discharge Planning Reassessment     Sw faxed d/c summary to [facility]. [phone]. Daksha will f/up with Bronwyn.

## 2018-03-20 NOTE — ASSESSMENT & PLAN NOTE
He is non ambulatory at home. PT is working with him although he is max assist. Will tyry and get cont therapy at nursing home upon d/c

## 2018-03-20 NOTE — PROGRESS NOTES
Staff Handoff  Beside report received from PRUDENCE Thomas. Patient lying in bed. VS stable, afebrile. 4L O2 per NC in use, tolerating well. Call bell in reach.     Resident Handoff

## 2018-03-20 NOTE — PLAN OF CARE
03/20/18 1136   Discharge Reassessment   Assessment Type Discharge Planning Reassessment     Daksha contacted [facility] and spoke with Bronwyn in admit. Daksha informed nh that the pt will be d/c today and that he will require van transportation. Bronwyn stated that she will prepare for the pt's arrival. Daksha will continue to follow and offer support as needed.    Esvin Rojas LMSW

## 2018-03-20 NOTE — PHYSICIAN QUERY
PT Name: Mack Will  MR #: [id number]    Physician Query Form -Present on Admission (POA) Diagnosis Clarification     CDS/: STEW Gamez, RN, CCDS          Contact information: [phone]    This form is a permanent document in the medical record.     Query Date: March 20, 2018    By submitting this query, we are merely seeking further clarification of documentation. Please utilize your independent clinical judgment when addressing the question(s) below.       The Medical record contains the following:    Diagnosis      Supporting Clinical Information   Location in Medical Record     Stage 2 skin ulcer of sacral region         Skin: Skin is warm and dry  He does not walk at home, sits in chair or w/c. Diapered    Sacral pressure ulcer with picture    Wound covered with Meriplex .      Stage 2 skin ulcer of sacral region     Cont meplex dressing, PT        H & P: 3/12        Wound care: 3/17      Nurse's Note: 3/18      Progress Note: 3/19             Doctor, Please specify Present On Admission (POA) status of __Stage 2 skin ulcer of sacral region______________________.    [ X ] Present on Admission   [  ] Not Present on Admission  [  ] Clinically undetermined

## 2018-03-20 NOTE — PLAN OF CARE
03/20/18 1410   Discharge Reassessment   Assessment Type Discharge Planning Reassessment     Daksha spoke with Bronwyn at [facility] who stated that she has everything needed to accept the pt. The pt's Nurse Martha will call report to Leslie. Pt room number at [facility] is [phone].

## 2018-03-20 NOTE — PLAN OF CARE
Problem: Patient Care Overview  Goal: Plan of Care Review  Outcome: Ongoing (interventions implemented as appropriate)  Patient aware of plan of care. Patient had restful night. Patient tolerated 4L O2 per NC throughout night sating lower 90's. VS stable, afebrile. crushed meds, given with applesauce, tolerated well. Sinus rhythm on tele monitor. Incontinent of urine and stool, diaper in place. Turn every 2 hours. mepilex to sacral area to protect wound. Bed alarm engaged. SCD's. Heel booties in place. No falls/injuries this shift. No questions or concerns at this time. Agrees with plan of care.

## 2018-03-20 NOTE — PT/OT/SLP DISCHARGE
Physical Therapy Discharge Summary    Name: Mack Will  MRN: [id number]   Principal Problem: Pneumonia of right lower lobe due to infectious organism     Patient Discharged from acute Physical Therapy on 3/20/2018 following a.m. PT tx. session.  Please refer to prior PT noted date on 3/20/2018 for functional status.     Assessment:     Patient appropriate for care in another setting.    Objective:     GOALS:    Physical Therapy Goals        Problem: Physical Therapy Goal    Goal Priority Disciplines Outcome Goal Variances Interventions   Physical Therapy Goal     PT/OT, PT Unable to achieve outcome(s) by discharge     Description:  Goals to be met by: upon D/C from facility     Patient will increase functional independence with mobility by performin. Supine to sit with MInimal Assistance  2. Sit to supine with MInimal Assistance  3. Bed to chair transfer with Minimal Assistance using any AD as needed.  4 Pt will tolerate PROM/A/AROM on BUE/BLE at all joints in available planes of motion with rest breaks as needed to improve functional mobility.              Problem: Physical Therapy Goal    Goal Priority Disciplines Outcome Goal Variances Interventions   Physical Therapy Goal     PT/OT, PT      Description:  Continue established goals as documented previously           Problem: Physical Therapy Goal    Goal Priority Disciplines Outcome Goal Variances Interventions   Physical Therapy Goal     PT/OT, PT      Description:  Demonstrate and perform Therapeutic exercises to increase use of Rt UE for ADL                    Reasons for Discontinuation of Therapy Services  Transfer to alternate level of care.      Plan:     Patient Discharged to: Basic Nursing Facility.    Matthew Riggs, PT  3/20/2018

## 2018-03-20 NOTE — ASSESSMENT & PLAN NOTE
S/p Rocephin and zmax 8 days  D/romeo bipap over night and tolerated well  duonebs  Mucinex added over the weekend  PT  This is the natural course of Down's patients. They usually succumb to pneumonia from inability to protect their airway. Will continue current mngt, short of mechanical ventilation. He is a DNR. If it does not look like he will clinically improve, we can order hospice at the nursing home. He did bounce back from pneumonia with a similar clinical picture last year. Family in agreement with this plan. Currently he is stable with treatment. Plan is to d/c to [facility] in [place] at d./c    3/19--lungs are very clear. No respiratory distress. Will stop BiPaP and antibx and ensure he doesn't clinically deteriorate over next 24 hours. If not, stable for discharge to NH tomorrow with oxygen. 3/20 remains well, d/c today

## 2018-03-20 NOTE — PLAN OF CARE
Tolerating room air. Some course breath sounds but much improved. Swallowing well. Mepilex dry and intact to sacrum. Turned and repositioned. Discharged to [place]. No distress this shift. Family understands plan of care and agrees.

## 2018-03-20 NOTE — SUBJECTIVE & OBJECTIVE
Interval History: see HC     Review of Systems   Unable to perform ROS: Dementia   Hematological: Bruises/bleeds easily: .lastcbc.     Objective:     Vital Signs (Most Recent):  Temp: 97.2 °F (36.2 °C) (03/20/18 0730)  Pulse: 88 (03/20/18 0730)  Resp: 20 (03/20/18 0730)  BP: 108/62 (03/20/18 0730)  SpO2: (!) 91 % (03/20/18 0730) Vital Signs (24h Range):  Temp:  [97.2 °F (36.2 °C)-99 °F (37.2 °C)] 97.2 °F (36.2 °C)  Pulse:  [65-93] 88  Resp:  [18-22] 20  SpO2:  [90 %-96 %] 91 %  BP: (106-114)/(60-85) 108/62     Weight: 58.9 kg (129 lb 13.6 oz) (rd reviewed)  Body mass index is 22.29 kg/m².    Intake/Output Summary (Last 24 hours) at 03/20/18 0933  Last data filed at 03/19/18 1700   Gross per 24 hour   Intake               90 ml   Output                0 ml   Net               90 ml      Physical Exam   Constitutional: No distress.   Downs affect, poor eye contact.  Wearing NC O2   HENT:   Head: Normocephalic and atraumatic.   Right Ear: External ear normal.   Left Ear: External ear normal.   Eyes: Conjunctivae are normal. Pupils are equal, round, and reactive to light. Right eye exhibits no discharge. Left eye exhibits no discharge.   Neck: Normal range of motion. Neck supple. No JVD present.   Cardiovascular: Regular rhythm, normal heart sounds and intact distal pulses.    No murmur heard.  Pulmonary/Chest: Effort normal and breath sounds normal. No respiratory distress. He has no wheezes. He has no rales.   Abdominal: Soft. Bowel sounds are normal.   Musculoskeletal: He exhibits no edema.   Neurological:   Dementia, unable to ambulate.  Now bed bound   Skin: He is not diaphoretic.   Nursing note and vitals reviewed.      Significant Labs:   Lab Results   Component Value Date    WBC 10.42 03/17/2018    HGB 13.4 (L) 03/17/2018    HCT 41.2 03/17/2018     (H) 03/17/2018     03/17/2018     CMP:     Recent Labs  Lab 03/19/18  0627 03/20/18  0529    138   K 4.1 4.1    103   CO2 26 23   GLU 96 94    BUN 9 8   CREATININE 0.7 0.7   CALCIUM 9.0 9.2   PROT 6.6 6.9   ALBUMIN 2.5* 2.6*   BILITOT 0.3 0.3   ALKPHOS 94 108   AST 11 12   ALT 11 12   ANIONGAP 10 12   EGFRNONAA >60 >60         Significant Imaging:     Speech consult  diagnosis of Dysphagia.  Recommend continued use of solid diet of puree and nectar thick liquids.     3/11 admission cxr A small infiltrate at the right lung base is consistent with pneumonia.  Borderline heart size.    3/13 ct head s/p sz activity with hx of hemorrhagic stroke A heavy and generalized cerebral volume loss is noted, similar to the previous study of 4/21/17, more prominently affecting the bilateral parietal lobes.  There is compensatory ventriculomegaly.  Periventricular and basal ganglia calcifications are noted.  There is no evidence for acute intracranial hemorrhage or sulcal effacement.  No mass effect or midline shift is seen.  No extra-axial fluid collections.  Visualized paranasal sinuses and mastoid air cells are clear.    3/15 cxr The lungs are underexpanded with crowding of the pulmonary vascularity.  There is congestion of the pulmonary vasculature.  This is identified opacity in the right lung base could represent a pneumonia in the appropriate clinical setting.  The heart is at the upper limits of normal in size.  The skeletal structures show age-appropriate degenerative change.    3/17 cxr Interval development of left basilar opacification suggesting atelectasis.  Infiltrate/pneumonia cannot be excluded.    ECHO     Ref Range & Units 3d ago 2yr ago    EF 55 - 65 55  65     Mitral Valve Regurgitation  TRIVIAL      Diastolic Dysfunction  No      Est. PA Systolic Pressure  24.8      Pericardial Effusion  SMALL   NONE     Tricuspid Valve Regurgitation  TRIVIAL TO MILD       3/11 EKG Normal sinus rhythm with sinus arrhythmia  Normal ECG  When compared with ECG of 22-MAR-2017 12:43,  No significant change was found  Confirmed by MALACHI FLORES MD ([id number]) on 3/12/2018 9:05:38  AM

## 2018-03-20 NOTE — PT/OT/SLP PROGRESS
Physical Therapy Treatment    Patient Name:  Mack Will   MRN:  [id number]    Recommendations:     Discharge Recommendations:  nursing facility, basic   Discharge Equipment Recommendations: none   Barriers to discharge: None    Assessment:     Mack Will is a 52 y.o. male admitted with a medical diagnosis of Pneumonia of right lower lobe due to infectious organism.  He presents with the following impairments/functional limitations:  weakness, impaired balance, impaired endurance, impaired self care skills, impaired functional mobilty, decreased safety awareness, impaired cognition, decreased lower extremity function, decreased upper extremity function .  Pt. Has achieved 1/4 PT POC goals and anticipates D/C to nursing facility today.  Please see PT discharge note.    Rehab Prognosis:  Fair; patient would benefit from acute skilled PT services to address these deficits and reach maximum level of function.      Recent Surgery: * No surgery found *      Plan:     During this hospitalization, patient to be seen  (Anticipated D/C to nursing facility 3/20/2018) to address the above listed problems via therapeutic activities, therapeutic exercises  · Plan of Care Expires:   (Upon D/C from facility)   Plan of Care Reviewed with: patient    Subjective     Communicated with patient prior to session.  Patient found supine in bed upon PT entry to room, agreeable to treatment.      Chief Complaint: None voiced  Patient comments/goals: minimal verbalization  Pain/Comfort:  · Pain Rating 1: 0/10  · Pain Rating Post-Intervention 1: 0/10    Patients cultural, spiritual, Advent conflicts given the current situation:      Objective:     Patient found with: peripheral IV     General Precautions: Standard, fall   Orthopedic Precautions:N/A   Braces: N/A         AM-PAC 6 CLICK MOBILITY  Turning over in bed (including adjusting bedclothes, sheets and blankets)?: 2  Sitting down on and standing up from a chair with arms  (e.g., wheelchair, bedside commode, etc.): 1  Moving from lying on back to sitting on the side of the bed?: 2  Moving to and from a bed to a chair (including a wheelchair)?: 1  Need to walk in hospital room?: 1  Climbing 3-5 steps with a railing?: 1  Total Score: 8       Therapeutic Activities and Exercises:   There. Ex.:  PT rendered gentle there. exercises of BLE's while supine in bed, for N.M. Tone and fluid dynamics, in available planes of motion at major joints.  1-on-1 BLE HC stretches performed with pt.  VC's and tactile cues given to increase patient alertness.      Patient left HOB elevated with all lines intact, call button in reach and RN notified..    GOALS:    Physical Therapy Goals        Problem: Physical Therapy Goal    Goal Priority Disciplines Outcome Goal Variances Interventions   Physical Therapy Goal     PT/OT, PT Unable to achieve outcome(s) by discharge     Description:  Goals to be met by: upon D/C from facility     Patient will increase functional independence with mobility by performin. Supine to sit with MInimal Assistance  2. Sit to supine with MInimal Assistance  3. Bed to chair transfer with Minimal Assistance using any AD as needed.  4 Pt will tolerate PROM/A/AROM on BUE/BLE at all joints in available planes of motion with rest breaks as needed to improve functional mobility.              Problem: Physical Therapy Goal    Goal Priority Disciplines Outcome Goal Variances Interventions   Physical Therapy Goal     PT/OT, PT      Description:  Continue established goals as documented previously           Problem: Physical Therapy Goal    Goal Priority Disciplines Outcome Goal Variances Interventions   Physical Therapy Goal     PT/OT, PT      Description:  Demonstrate and perform Therapeutic exercises to increase use of Rt UE for ADL                    Time Tracking:     PT Received On: 18  PT Start Time: 1100     PT Stop Time: 1115  PT Total Time (min): 15 min     Billable  Minutes: Therapeutic Exercise 15 minutes    Treatment Type: Treatment  PT/PTA: PT           Matthew Riggs, PT  03/20/2018

## 2018-03-20 NOTE — ASSESSMENT & PLAN NOTE
Seizure 3/13- divalproex 125mg po BID  Trileptal 300mg BID resumed 3/13/18  Follow  neurology recs - discussed transient unequal pupils with Dr. Correa - this is known since after last CVA and not new  CT of head - no acute findings   Follow with Neurology outpt  Needs to cont both sz meds at this time although family feels this makes him sleepy. according to neuro too soon to d/c sz meds after sz  .

## 2018-03-20 NOTE — DISCHARGE SUMMARY
"[facility] Medicine  Discharge Summary      Patient Name: Mack Will  MRN: [id number]  Admission Date: 3/11/2018  Hospital Length of Stay: 8 days  Discharge Date and Time:  03/20/2018 12:10 PM  Attending Physician: Jhony Finn MD   Discharging Provider: Eva Brasher NP  Primary Care Provider: Chetan Alvarez MD      HPI:   Pt presented to ER last night with c/o SOB and cough. Was treated last week with azithromycin/duonebs per PCP 3/6. No improvement. He is afebrile and had no elevated WBC. CXR shows small right lower lobe infiltrate. Blood cultures drawn and pending. He  Is laying in bed this am being fed by sister at bedside. Sister in law also at bedside. Lives at group home. Sister in law reports that he was not getting his treatments at group home. He was rattling more and having difficulty bringing up mucus. Eating well, but sister reports that this am he was getting a little gaggy with breakfast    He does not walk at home, sits in chair or w/c. Diapered.     * No surgery found *      Hospital Course:   3/13/18 admitted for RLL pneumonia. Had a seizure this am after nebulizer tx. . + hx of seizure disorder but family reports had not had seizure in years> Home meds list trileptal 300mg twice daily- family reports he was only taking divalproex at home.   Last PN per Dr. Alvarez has trileptal listed as rx.  Since seizure he has been moaning and pulling legs up intermittently. Family reports that he does not usually do this. "not a complainer". They also report lots of problems with gabriel catheter during last visit.   Afebrile- Temp 97.3, /66, 158/78. Noisy breathing.  They are not planning to have him return to group home. Planning to admit to nursing home when discharged.     3/14/18 admitted for RLL pneumonia 3-11-18,  S/P seizure yesterday am. Subsequent CT brain  negative for acute findings; + hx of hemorrhagic CVA.   Neuro  consulted; meds adjusted. "  Trileptal 300mg BID resumed as he is supposed to be on this in addition to Depakote.   Did much better overnight. Back to taking po meds and eating,  Awaiting swallow study.- hopefully today.   Still has very harsh cough.   Day 4 abx  Planning for discharge to ~2 days, NSG home placement in [place]. Allison Nash  Family concerned about excess sedation. Aware that was not getting trileptal and now resumed. Continued Depakote. They would like to see if could maybe decrease or stop Depakote- discussed will defer to neurology. Currently not getting trazodone which was home med.   3/15/18 admitted for RLL pneumonia 3-11-18,  S/P seizure 3/13 am. Subsequent CT brain  negative for acute findings; + hx of hemorrhagic CVA.   Neuro  Adjusted seizure Rx;  Trileptal  resumed as he is supposed to be on this in addition to Depakote.   Back to taking po meds and eating,    ST 3/13/ recommendations; Pureed diet with nectar thick liquids    Day 5 abx  Planning for  NSG home placement in [place]. Allison Nash  Still with junky cough and noisy breathing. O2 increased to 5LNC- O2 sats 93% this am with concern for mucous plugging. PT following   Discussed with sisters at bedside and neurology - will keep on depakote and trileptal.  3/16/18 admitted for RLL pneumonia 3-11-18,  S/P seizure 3/13 am. CT Ok,  hx of hemorrhagic CVA.   Neuro  Adjusted seizure Rx;  Trileptal  Resumed. LOC better but worsening lungs yesterday  increasing O2 requirement. Added mucinex and CPT to help with thickened mucous.O2 sats 92% on 5LNC   Patient had a rough day yesterday.  He was placed on Bipap and now seems to be better.  The rattle has resolved.    3/18  He is tolerating bipap  Satting 90% on 4 liters   Atelectasis on CXR   Afebrile     3/19  Had uneventful night. Tolerated bipap over night. Sats 93-98% on 35 % O2 with bipap. Afebrile. Labs essentially normal with exception of low albumin. Speech following patient and per MBSS 3/14 remains on    puree diet with nectar thick liquids due to patient demonstrated mild oropharyngeal dysphagia characterized by delayed pharyngeal swallow and decreased hyolaryngeal elevation. Transferring well which is his baseline. Still with PT    3/20  This am pt was on RA when resp walked into room. Sat was 93%. He did not use bipap over night. No fever. No elevated WBC. Has placement at [facility] for long term care. He has no complaints today     Consults:   Consults         Status Ordering Provider     Inpatient consult to Neurology  Once     Provider:  Bib Correa MD    Completed RABIA ALEXANDRA          * Pneumonia of right lower lobe due to infectious organism    S/p Rocephin and zmax 8 days  D/romeo bipap over night and tolerated well  duonebs  Mucinex added over the weekend  PT  This is the natural course of Down's patients. They usually succumb to pneumonia from inability to protect their airway. Will continue current mngt, short of mechanical ventilation. He is a DNR. If it does not look like he will clinically improve, we can order hospice at the nursing home. He did bounce back from pneumonia with a similar clinical picture last year. Family in agreement with this plan. Currently he is stable with treatment. Plan is to d/c to [facility] in [place] at d./c    3/19--lungs are very clear. No respiratory distress. Will stop BiPaP and antibx and ensure he doesn't clinically deteriorate over next 24 hours. If not, stable for discharge to NH tomorrow with oxygen. 3/20 remains well, d/c today no abx needed          Stage 2 skin ulcer of sacral region    Cont meplex dressing, PT          Chewing difficulty    ST rec=Pureed diet -crushed meds ordered  Cont therapy at nursing home           Debility    He is non ambulatory at home. PT is working with him although he is max assist. Will tyry and get cont therapy at nursing home upon d/c          Somnolence    Family is concerned that depakote is making him to somnolent.    It is not technically prescribed for his seizures.  He is more awake this morning and answering questions.  DID NOT d/c depakote as it was prescribed to help with mood- discussed with Neuro again and she says too high risk to d/c atr this time. May decrease at f/u. He is much more awake today and past couple days even with it. Discussed with family            Seizure disorder    Seizure 3/13- divalproex 125mg po BID  Trileptal 300mg BID resumed 3/13/18  Follow  neurology recs - discussed transient unequal pupils with Dr. Correa - this is known since after last CVA and not new  CT of head - no acute findings   Follow with Neurology outpt  Needs to cont both sz meds at this time although family feels this makes him sleepy. according to neuro too soon to d/c sz meds after sz  .            Final Active Diagnoses:    Diagnosis Date Noted POA    PRINCIPAL PROBLEM:  Pneumonia of right lower lobe due to infectious organism [J18.1] 03/11/2018 Yes    Stage 2 skin ulcer of sacral region [L89.152] 03/19/2018 Yes    Chewing difficulty [R63.3] 03/13/2018 Yes    Debility [R53.81] 01/16/2017 Yes    Seizure disorder [G40.909] 03/28/2015 Yes    Somnolence [R40.0]  Yes      Problems Resolved During this Admission:    Diagnosis Date Noted Date Resolved POA       Discharged Condition: good    Disposition: Home or Self Care    Follow Up:  Follow-up Information     Chetan Alvarez MD In 1 week.    Specialty:  Family Medicine  Why:  outpatient services  Contact information:  [address]  [facility] [address]  [phone]             Bib Correa MD In 4 weeks.    Specialty:  Neurology  Contact information:  [address]  [facility] [address]  [phone]                 Patient Instructions:   No discharge procedures on file.    Significant Diagnostic Studies:     Lab Results   Component Value Date     WBC 10.42 03/17/2018     HGB 13.4 (L) 03/17/2018     HCT 41.2 03/17/2018     MCV  100 (H) 03/17/2018      03/17/2018      CMP:      Recent Labs  Lab 03/19/18  0627 03/20/18  0529    138   K 4.1 4.1    103   CO2 26 23   GLU 96 94   BUN 9 8   CREATININE 0.7 0.7   CALCIUM 9.0 9.2   PROT 6.6 6.9   ALBUMIN 2.5* 2.6*   BILITOT 0.3 0.3   ALKPHOS 94 108   AST 11 12   ALT 11 12   ANIONGAP 10 12   EGFRNONAA >60 >60            Significant Imaging:      Speech consult  diagnosis of Dysphagia.  Recommend continued use of solid diet of puree and nectar thick liquids.      3/11 admission cxr A small infiltrate at the right lung base is consistent with pneumonia.  Borderline heart size.     3/13 ct head s/p sz activity with hx of hemorrhagic stroke A heavy and generalized cerebral volume loss is noted, similar to the previous study of 4/21/17, more prominently affecting the bilateral parietal lobes.  There is compensatory ventriculomegaly.  Periventricular and basal ganglia calcifications are noted.  There is no evidence for acute intracranial hemorrhage or sulcal effacement.  No mass effect or midline shift is seen.  No extra-axial fluid collections.  Visualized paranasal sinuses and mastoid air cells are clear.     3/15 cxr The lungs are underexpanded with crowding of the pulmonary vascularity.  There is congestion of the pulmonary vasculature.  This is identified opacity in the right lung base could represent a pneumonia in the appropriate clinical setting.  The heart is at the upper limits of normal in size.  The skeletal structures show age-appropriate degenerative change.     3/17 cxr Interval development of left basilar opacification suggesting atelectasis.  Infiltrate/pneumonia cannot be excluded.     ECHO       Ref Range & Units 3d ago 2yr ago    EF 55 - 65 55  65     Mitral Valve Regurgitation   TRIVIAL       Diastolic Dysfunction   No       Est. PA Systolic Pressure   24.8       Pericardial Effusion   SMALL   NONE     Tricuspid Valve Regurgitation   TRIVIAL TO MILD         3/11 EKG  Normal sinus rhythm with sinus arrhythmia  Normal ECG  When compared with ECG of 22-MAR-2017 12:43,  No significant change was found  Confirmed by MALACHI FLORES MD ([id number]) on 3/12/2018 9:05:38 AM      Pending Diagnostic Studies:     Procedure Component Value Units Date/Time    Fl Modified Barium Swallow Speech [[id number]] Resulted:  03/14/18 1214    Order Status:  Sent Lab Status:  In process Updated:  03/14/18 1216         Medications:  Reconciled Home Medications:   Current Discharge Medication List      CONTINUE these medications which have CHANGED    Details   divalproex (DEPAKOTE) 125 MG EC tablet Take 1 tablet (125 mg total) by mouth every 12 (twelve) hours.         CONTINUE these medications which have NOT CHANGED    Details   albuterol-ipratropium 2.5mg-0.5mg/3mL (DUO-NEB) 0.5 mg-3 mg(2.5 mg base)/3 mL nebulizer solution Take 3 mLs by nebulization every 6 (six) hours as needed for Wheezing or Shortness of Breath. Rescue  Qty: 1 Box, Refills: 5    Associated Diagnoses: Bronchitis      clotrimazole (LOTRIMIN) 1 % cream Apply topically 2 (two) times daily.  Qty: 45 g, Refills: 1    Associated Diagnoses: Tinea cruris      cyanocobalamin, vitamin B-12, 5,000 mcg TbDL Take 5,000 mcg by mouth once daily.   Refills: 11      ketoconazole (NIZORAL) 2 % cream Apply topically once daily.      LACTOSE-REDUCED FOOD (BOOST ORAL) Take by mouth once daily at 6am.      megestrol (MEGACE) 400 mg/10 mL (10 mL) Susp Take 5 mLs (200 mg total) by mouth once daily.  Qty: 600 mL, Refills: 0      mupirocin (BACTROBAN) 2 % ointment Apply topically 2 (two) times daily.      oxcarbazepine (TRILEPTAL) 300 MG Tab Take 1 tablet (300 mg total) by mouth 2 (two) times daily.  Qty: 60 tablet, Refills: 11    Associated Diagnoses: Seizure disorder         STOP taking these medications       azithromycin (ZITHROMAX) 500 MG tablet Comments:   Reason for Stopping:         tamsulosin (FLOMAX) 0.4 mg Cp24 Comments:   Reason for Stopping:         trazodone  (DESYREL) 50 MG tablet Comments:   Reason for Stopping:               Indwelling Lines/Drains at time of discharge:   Lines/Drains/Airways     Pressure Ulcer                 Pressure Injury 03/17/18 1700 midline Sacral spine Stage 2 2 days                Time spent on the discharge of patient: 35 minutes  Patient was seen and examined on the date of discharge and determined to be suitable for discharge.         Eva Brasher NP  Department of Hospital Medicine  [facility]

## 2018-03-21 ENCOUNTER — PATIENT OUTREACH (OUTPATIENT)
Dept: ADMINISTRATIVE | Facility: CLINIC | Age: 53
End: 2018-03-21

## 2018-03-21 NOTE — PLAN OF CARE
03/21/18 1330   Final Note   Assessment Type Final Discharge Note   Discharge Disposition skilled nursing [facility]   What phone number can be called within the next 1-3 days to see how you are doing after discharge? [phone]   Hospital Follow Up  Appt(s) scheduled? Yes   Discharge plans and expectations educations in teach back method with documentation complete? Yes   Right Care Referral Info   Post Acute Recommendation Other   Referral Type skilled nursing nursing home   Facility Name [facility] [place]

## 2018-03-21 NOTE — PLAN OF CARE
03/20/18 1540   Medicare Message   Important Message from Medicare regarding Discharge Appeal Rights Given to patient/caregiver;Explained to patient/caregiver;Signed/date by patient/caregiver   Date IMM was signed 03/20/18   Time IMM was signed [id number]

## 2018-03-21 NOTE — PT/OT/SLP DISCHARGE
Physical Therapy Discharge Summary    Name: Mack Will  MRN: [id number]   Principal Problem: Pneumonia of right lower lobe due to infectious organism     Patient Discharged from acute Physical Therapy on 2018.  Please refer to prior PT noted date on 2018 for functional status.     Assessment:     Goals partially met.    Objective:     GOALS:    Physical Therapy Goals     Not on file          Multidisciplinary Problems (Resolved)        Problem: Physical Therapy Goal    Goal Priority Disciplines Outcome Goal Variances Interventions   Physical Therapy Goal   (Resolved)     PT/OT, PT Outcome(s) achieved     Description:  Goals to be met by: upon D/C from facility     Patient will increase functional independence with mobility by performin. Supine to sit with MInimal Assistance -- NOT MET  2. Sit to supine with MInimal Assistance-- NOT MET  3. Bed to chair transfer with Minimal Assistance using any AD as needed. -- NOT MET  4 Pt will tolerate PROM/A/AROM on BUE/BLE at all joints in available planes of motion with rest breaks as needed to improve functional mobility. -- GOAL MET              Problem: Physical Therapy Goal    Goal Priority Disciplines Outcome Goal Variances Interventions   Physical Therapy Goal   (Resolved)     PT/OT, PT Outcome(s) achieved     Description:  Continue established goals as documented previously -- goal met           Problem: Physical Therapy Goal    Goal Priority Disciplines Outcome Goal Variances Interventions   Physical Therapy Goal   (Resolved)     PT/OT, PT Outcome(s) achieved     Description:  Demonstrate and perform Therapeutic exercises to increase use of Rt UE for ADL -- GOAL MET                    Reasons for Discontinuation of Therapy Services  Transfer to alternate level of care.      Plan:     Patient Discharged to: Skilled Nursing Facility.    Lulú Sagastume, PT  3/21/2018

## 2018-03-27 ENCOUNTER — OFFICE VISIT (OUTPATIENT)
Dept: FAMILY MEDICINE | Facility: CLINIC | Age: 53
End: 2018-03-27
Payer: MEDICARE

## 2018-03-27 ENCOUNTER — LAB VISIT (OUTPATIENT)
Dept: LAB | Facility: HOSPITAL | Age: 53
End: 2018-03-27
Attending: FAMILY MEDICINE
Payer: MEDICARE

## 2018-03-27 VITALS
SYSTOLIC BLOOD PRESSURE: 115 MMHG | WEIGHT: 115 LBS | HEIGHT: 61 IN | DIASTOLIC BLOOD PRESSURE: 82 MMHG | BODY MASS INDEX: 21.71 KG/M2 | HEART RATE: 74 BPM | RESPIRATION RATE: 12 BRPM

## 2018-03-27 DIAGNOSIS — J18.9 PNEUMONIA OF LEFT LOWER LOBE DUE TO INFECTIOUS ORGANISM: ICD-10-CM

## 2018-03-27 DIAGNOSIS — R34 DECREASED URINE OUTPUT: ICD-10-CM

## 2018-03-27 DIAGNOSIS — Q90.9 DOWN'S SYNDROME: ICD-10-CM

## 2018-03-27 DIAGNOSIS — Z09 HOSPITAL DISCHARGE FOLLOW-UP: Primary | ICD-10-CM

## 2018-03-27 LAB
ANION GAP SERPL CALC-SCNC: 9 MMOL/L
BUN SERPL-MCNC: 10 MG/DL
CALCIUM SERPL-MCNC: 9.7 MG/DL
CHLORIDE SERPL-SCNC: 108 MMOL/L
CO2 SERPL-SCNC: 25 MMOL/L
CREAT SERPL-MCNC: 0.9 MG/DL
EST. GFR  (AFRICAN AMERICAN): >60 ML/MIN/1.73 M^2
EST. GFR  (NON AFRICAN AMERICAN): >60 ML/MIN/1.73 M^2
GLUCOSE SERPL-MCNC: 91 MG/DL
POTASSIUM SERPL-SCNC: 4.9 MMOL/L
SODIUM SERPL-SCNC: 142 MMOL/L

## 2018-03-27 PROCEDURE — 80048 BASIC METABOLIC PNL TOTAL CA: CPT

## 2018-03-27 PROCEDURE — 99999 PR PBB SHADOW E&M-EST. PATIENT-LVL III: CPT | Mod: PBBFAC,,, | Performed by: FAMILY MEDICINE

## 2018-03-27 PROCEDURE — 99213 OFFICE O/P EST LOW 20 MIN: CPT | Mod: PBBFAC | Performed by: FAMILY MEDICINE

## 2018-03-27 PROCEDURE — 99999 PR STA SHADOW: CPT | Mod: PBBFAC,,, | Performed by: FAMILY MEDICINE

## 2018-03-27 PROCEDURE — 99214 OFFICE O/P EST MOD 30 MIN: CPT | Mod: S$PBB | Performed by: FAMILY MEDICINE

## 2018-03-27 PROCEDURE — 36415 COLL VENOUS BLD VENIPUNCTURE: CPT

## 2018-03-27 NOTE — PROGRESS NOTES
Subjective:       Patient ID: Mack Will is a 52 y.o. male.    Chief Complaint: Follow-up (pneumonia)    Pt is a 52 y.o. male who presents for evaluation and management of   Encounter Diagnoses   Name Primary?    Hospital discharge follow-up Yes    Pneumonia of left lower lobe due to infectious organism     Down's syndrome     Decreased urine output    .eating and drinking well per brother   NH called office today to say UOP is decreased  He is incontinent. Wears diapers     Doing well on current meds. Denies any side effects. Prevention is up to date.    Review of Systems   Constitutional: Negative for fever.   Respiratory: Negative for cough and shortness of breath.        Objective:      Physical Exam   Constitutional: He appears well-developed. No distress.   HENT:   Head: Normocephalic and atraumatic.   Downs facies   Eyes: Conjunctivae are normal. Pupils are equal, round, and reactive to light.   Neck: Normal range of motion. Neck supple. No thyromegaly present.   Cardiovascular: Normal rate, regular rhythm, normal heart sounds and intact distal pulses.    Pulmonary/Chest: Effort normal. No respiratory distress. He has no wheezes. He has no rales.   No wheezing or rhonchi no rales   Abdominal: Soft. Bowel sounds are normal. There is no tenderness.   Musculoskeletal: Normal range of motion. He exhibits no edema.   Lymphadenopathy:     He has no cervical adenopathy.   Neurological: He is alert.   O to person only   Speaks very little   Stares down   Skin: Skin is warm and dry. No rash noted.   Nursing note and vitals reviewed.      Assessment:       1. Hospital discharge follow-up    2. Pneumonia of left lower lobe due to infectious organism    3. Down's syndrome    4. Decreased urine output        Plan:   Mack was seen today for follow-up.    Diagnoses and all orders for this visit:    Hospital discharge follow-up    Pneumonia of left lower lobe due to infectious organism    Down's  syndrome    Decreased urine output  -     Basic metabolic panel; Future    pneumonia resolved   Purred diet with nectar thick liquids   Is now at [facility]. Will be followed there by another MD there   He is welcomed to come here prn       No Follow-up on file.

## 2018-04-10 ENCOUNTER — TELEPHONE (OUTPATIENT)
Dept: FAMILY MEDICINE | Facility: CLINIC | Age: 53
End: 2018-04-10

## 2018-04-10 NOTE — TELEPHONE ENCOUNTER
----- Message from Prasanth Lisa sent at 4/10/2018  8:49 AM CDT -----  Contact: Ced Modi @ [facility]  Mack Will  MRN: [id number]  : 1965  PCP: Chetan Alvarez  Home Phone      [phone]  Work Phone      Not on file.  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

## 2018-04-17 ENCOUNTER — OFFICE VISIT (OUTPATIENT)
Dept: NEUROLOGY | Facility: CLINIC | Age: 53
End: 2018-04-17
Payer: MEDICARE

## 2018-04-17 VITALS
DIASTOLIC BLOOD PRESSURE: 68 MMHG | SYSTOLIC BLOOD PRESSURE: 100 MMHG | HEIGHT: 61 IN | HEART RATE: 72 BPM | RESPIRATION RATE: 12 BRPM

## 2018-04-17 DIAGNOSIS — G40.909 SEIZURE DISORDER: Primary | ICD-10-CM

## 2018-04-17 DIAGNOSIS — Q90.9 DOWN'S SYNDROME: ICD-10-CM

## 2018-04-17 DIAGNOSIS — E53.8 B12 DEFICIENCY: ICD-10-CM

## 2018-04-17 DIAGNOSIS — I61.9 HEMORRHAGIC CEREBROVASCULAR ACCIDENT (CVA): ICD-10-CM

## 2018-04-17 DIAGNOSIS — G30.0 EARLY ONSET ALZHEIMER'S DISEASE WITH BEHAVIORAL DISTURBANCE: ICD-10-CM

## 2018-04-17 DIAGNOSIS — F02.818 EARLY ONSET ALZHEIMER'S DISEASE WITH BEHAVIORAL DISTURBANCE: ICD-10-CM

## 2018-04-17 PROCEDURE — 99213 OFFICE O/P EST LOW 20 MIN: CPT | Mod: PBBFAC | Performed by: NURSE PRACTITIONER

## 2018-04-17 PROCEDURE — 99999 PR PBB SHADOW E&M-EST. PATIENT-LVL III: CPT | Mod: PBBFAC,,, | Performed by: NURSE PRACTITIONER

## 2018-04-17 PROCEDURE — 99999 PR STA SHADOW: CPT | Mod: PBBFAC,,, | Performed by: NURSE PRACTITIONER

## 2018-04-17 PROCEDURE — 99214 OFFICE O/P EST MOD 30 MIN: CPT | Mod: S$PBB | Performed by: NURSE PRACTITIONER

## 2018-04-17 NOTE — PROGRESS NOTES
HPI:   Mack Will is a 51 y.o. 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 Patient was noted to have poor gait and attention in 4/2017; CT head was unchanged/ I felt this was late effect of his hemorrhage and dementia effects.     Patient presents today for a hospital follow up for an admit on 3/11/2018 for witnessed seizure with GTC activity, lasting near 5 minutes, which responded to Ativan; this was  believed to be triggered by pneumonia. He was not taking Trileptal at the time for reasons, which are unclear.     Family would like to stop Depakote, which was started for agitated mood and poor appetite, after his seizures were completely controlled with Trileptal. They feel that it may be blunting his personality/resulting in sedation, and hope to preserve whatever part of his personality that he has remaining.     No further seizures. Patient continues to lean toward the right; however, this is progressing with therapy at the nursing home. He is rarely verbal since his hospital discharge.     ROS is not reliable    Exam:  Gen Appearance, well developed in stigmata of down syndrome/nourished in no apparent distress  CV: 2+ distal pulses with no edema or swelling  Neuro:  MS: Awake and alert today  and localizes to pain. Does not respond verbally when spoking to.  No tremors today. Unable to assess memory.    Language is not testable  Fund of Knowledge is not testable  CN: Optic discs are flat with normal vasculature, left pupil is smaller than right by 1mm today in ambient light (this is his baseline from prior cerebellar bleeding), Extraoccular movements and visual fields are full. Normal facial sensation and strength,  Tongue and Palate are midline and strong. Shoulder Shrug symmetric and  strong.  Motor: Normal bulk, tone, no abnormal movements. 1+ reflexes. Patient can't cooperate with motor testing but appears to be moving all extremities equally.   Sensory: Can't cooperate  Cerebellar: Can't Well  Cooperate   Gait: Not done- patient not longer walks without assitance; leaning toward the right in wheelchair    Labs: B12 well corrected in 2017  Sodium normal 3/2017    Imaging:   3/2018 CT Head:  No detrimental change from 4/21/17.    4/2017 CT head:   1. Continued evolution of the previously noted cerebellar hemorrhage with improvement in the adjacent edema.  Minimal residual linear increased attenuation remains.  2.  Atrophy and small vessel ischemic changes of the periventricular white matter.    Labs done: 7/2017 CMP, Lipids, TSH WNL.    EEG 2/2016:   Abnormal EEG secondary to the presence of  mild generalized slowing which is etiologically non-specific.      Due to calcifications, requested PTH and TSH (calcium was normal prior)    Assessment/Plan: Mack Will is a 51 y.o. 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 Patient was noted to have poor gait and attention in 4/2017; CT head was unchanged/ I felt this was late effect of his hemorrhage and dementia effects.     I recommend:   1. Continue Trileptal for seizure prevention, and check level at this time. He had mood changes on Keppra and switched to Trileptal and is tolerating this well now without seizures. Follow  BMP routinely given trileptal use.  2. Wean Depakote, which was prescribed for agitation and appetite loss, and not for seizure prevention, due to family concern over sedation/personality change. Seizures were well controlled prior to Depakote initiation, and recent seizure was suspected to be triggered by pneumonia. He  is on Megace for appetite. Off Trazodone since in hospital. Consider restart if needed for sundowning behavior.   3. Patient has favored the right side since cerebellar hemorrhage in 2017. Continues PT for this.   4. B12 supplementation for B12 deficiency.   5.He has probable progressive alzheimer's dementia associated with Down Syndrome    6. He has likely idiopathic intracerebral calcifications.    RTC 3 months

## 2018-05-08 NOTE — TELEPHONE ENCOUNTER
I called all four numbers. No answer to any of them   The last number is the office number of Salas Maharaj office in [place]

## 2018-07-27 ENCOUNTER — OFFICE VISIT (OUTPATIENT)
Dept: NEUROLOGY | Facility: CLINIC | Age: 53
End: 2018-07-27
Payer: MEDICARE

## 2018-07-27 VITALS
SYSTOLIC BLOOD PRESSURE: 114 MMHG | HEART RATE: 60 BPM | RESPIRATION RATE: 16 BRPM | DIASTOLIC BLOOD PRESSURE: 72 MMHG | HEIGHT: 62 IN

## 2018-07-27 DIAGNOSIS — F02.818 EARLY ONSET ALZHEIMER'S DISEASE WITH BEHAVIORAL DISTURBANCE: ICD-10-CM

## 2018-07-27 DIAGNOSIS — G30.0 EARLY ONSET ALZHEIMER'S DISEASE WITH BEHAVIORAL DISTURBANCE: ICD-10-CM

## 2018-07-27 DIAGNOSIS — E53.8 B12 DEFICIENCY: Primary | ICD-10-CM

## 2018-07-27 DIAGNOSIS — Q90.9 DOWN'S SYNDROME: ICD-10-CM

## 2018-07-27 DIAGNOSIS — G40.909 SEIZURE DISORDER: ICD-10-CM

## 2018-07-27 PROCEDURE — 99214 OFFICE O/P EST MOD 30 MIN: CPT | Mod: PBBFAC | Performed by: PSYCHIATRY & NEUROLOGY

## 2018-07-27 PROCEDURE — 99999 PR STA SHADOW: CPT | Mod: PBBFAC,,, | Performed by: PSYCHIATRY & NEUROLOGY

## 2018-07-27 PROCEDURE — 99999 PR PBB SHADOW E&M-EST. PATIENT-LVL IV: CPT | Mod: PBBFAC,,, | Performed by: PSYCHIATRY & NEUROLOGY

## 2018-07-27 PROCEDURE — 99214 OFFICE O/P EST MOD 30 MIN: CPT | Mod: S$PBB | Performed by: PSYCHIATRY & NEUROLOGY

## 2018-07-27 RX ORDER — ASCORBIC ACID 500 MG
500 TABLET ORAL DAILY
Status: ON HOLD | COMMUNITY
End: 2019-07-11 | Stop reason: HOSPADM

## 2018-07-27 RX ORDER — FLUTICASONE PROPIONATE 50 MCG
1 SPRAY, SUSPENSION (ML) NASAL DAILY
Status: ON HOLD | COMMUNITY
End: 2018-10-16 | Stop reason: HOSPADM

## 2018-07-27 RX ORDER — LORATADINE 10 MG/1
10 TABLET ORAL DAILY
Status: ON HOLD | COMMUNITY
End: 2019-07-11 | Stop reason: HOSPADM

## 2018-07-27 RX ORDER — NAPROXEN 500 MG/1
500 TABLET ORAL 2 TIMES DAILY
Status: ON HOLD | COMMUNITY
End: 2018-10-16 | Stop reason: HOSPADM

## 2018-07-27 NOTE — PROGRESS NOTES
HPI: Mack Will is a 51 y.o. 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 Patient was noted to have poor gait and attention in 4/2017; CT head was unchanged/ I felt this was late effect of his hemorrhage and dementia effects.     Patient had a seizure during hospitalization for pneumonia in 3/2018.  He was subsequently weaned from Depakote given family's concerns over sedation (this was used for mood prior) as he was seizure free at the last visit with NP Jes in Neurology in 4/2018  He is also on Megace for appetite   No trileptal level was sent  Patient is here with sister in law. She states having him off of Depakote did not make him much more aware.  He no longer recognizes family.  He is not agitated.  No seizures. He continues to loose weight, but is on megace.  No choking on the food, but does have a modified diet.  He can't stand independently.   He has been hospitalized since March    ROS is not reliable    Exam:  Gen Appearance, well developed in stigmata of down syndrome/nourished in no apparent distress  CV: 2+ distal pulses with no edema or swelling  Neuro:  MS: Awake and alert today  and localizes to pain. Does not respond verbally when spoking to.  Frontal release signs throughout No tremors today. Unable to assess memory.    Language is not testable  Fund of Knowledge is not testable  CN: Optic discs are flat with normal vasculature, left pupil is smaller than right by 1mm today in ambient light (this is his baseline from prior cerebellar bleeding), Extraoccular movements and visual fields are full. Normal facial sensation and strength,  Tongue and Palate are midline and strong. Shoulder Shrug symmetric and strong.  Motor: Normal bulk, tone, no abnormal movements. 1+ reflexes. Patient can't  cooperate with motor testing but appears to be moving all extremities equally and responds to pain with withdrawal  Sensory: Can't cooperate  Cerebellar: Can't Well  Cooperate   Gait: Not done- patient not longer walks     Labs: B12 well corrected in 2017  Sodium normal 3/2017      Imaging:   3/2018 CT Head:  No detrimental change from 4/21/17.    4/2017 CT head:   1. Continued evolution of the previously noted cerebellar hemorrhage with improvement in the adjacent edema.  Minimal residual linear increased attenuation remains.  2.  Atrophy and small vessel ischemic changes of the periventricular white matter.    Labs done: 7/2017 CMP, Lipids, TSH WNL.    EEG 2/2016:   Abnormal EEG secondary to the presence of  mild generalized slowing which is etiologically non-specific.      Due to calcifications, requested PTH and TSH (calcium was normal prior)    Assessment/Plan: Mack Will is a 51 y.o. 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 Patient was noted to have poor gait and attention in 4/2017; CT head was unchanged/ I felt this was late effect of his hemorrhage and dementia effects.     I recommend:   1. Continue Trileptal for seizure prevention, and check level at this time with BMP. He had mood changes on Keppra and switched to Trileptal and is tolerating this well now without seizures.   2. Weaned Depakote, which was prescribed for agitation and appetite loss, and not for seizure prevention, due to family concern over sedation/personality change however he continues to progress with dementia. Seizures were well controlled prior to Depakote initiation, and 3/2018 seizure was suspected to be triggered by pneumonia. He is on Megace for appetite. Off Trazodone since in hospital. Consider restart if needed for  sundowning behavior-not currently needed. He is a hospice candidate for any worsening as discussed with family today  3. Patient has favored the right side since cerebellar hemorrhage in 2017. Continues PT for this.   4. B12 supplementation for B12 deficiency- check level today.   5.He has probable progressive alzheimer's dementia associated with Down Syndrome    6. He has likely idiopathic intracerebral calcifications.    RTC 6 months

## 2018-09-07 DIAGNOSIS — Z12.11 COLON CANCER SCREENING: ICD-10-CM

## 2018-10-14 PROBLEM — I95.9 HYPOTENSIVE EPISODE: Status: ACTIVE | Noted: 2018-10-14

## 2018-10-16 PROBLEM — E86.0 DEHYDRATION: Status: ACTIVE | Noted: 2018-10-16

## 2019-01-21 ENCOUNTER — TELEPHONE (OUTPATIENT)
Dept: NEUROLOGY | Facility: CLINIC | Age: 54
End: 2019-01-21

## 2019-01-21 NOTE — TELEPHONE ENCOUNTER
----- Message from Kary Quintana MA sent at 2019  1:14 PM CST -----  Contact: Allison Nash-- Di (Nurse)  Mack Will  MRN: [id number]  : 1965  PCP: Waylon Hoyos  Home Phone      [phone]  Work Phone      Not on file.  Mobile          [phone]  Mobile          [phone]      MESSAGE:  Is wanting Dr Correa to know that patient had a seizure this morning. At this moment he is stable, but sleeping a lot and not much of an appetite.     Vitals:   BP:    115/69   P     85   R     18   Temp     98.1  95%     Room Air     She can be reached at  [phone] for any further questions.

## 2019-01-21 NOTE — TELEPHONE ENCOUNTER
Please get me a description of the seizure and find out if he is back to himself and if he skipped any medication doses.

## 2019-01-21 NOTE — TELEPHONE ENCOUNTER
He should go to the nearest ER if he does not fully improve, if jerking continues, or he has another seizures.  For now- given 1/2 tablet of Trileptal now.   Starting tomorrow, please increase his trileptal to 300mg in the am and 450mg at night (1 and 1/2 tablets).   He is scheduled to see me next week. To the ER for any further symptoms in the interim.

## 2019-01-21 NOTE — TELEPHONE ENCOUNTER
Nursing Home calling that patient had a seizure this morning. Please see vitals below. Please advise.

## 2019-01-21 NOTE — TELEPHONE ENCOUNTER
Jacya states that she witnessed the seizure and the patient was jerking, eyes were rolled back, body was rigid, and he was blinking very fast. He seems to be back to baseline but he is jerking at times. Nurse is unsure of any missed doses of medications but she cannot be sure because she is not always his nurse. She would think that he should not have missed any doses of medications.

## 2019-01-29 ENCOUNTER — TELEPHONE (OUTPATIENT)
Dept: NEUROLOGY | Facility: CLINIC | Age: 54
End: 2019-01-29

## 2019-01-29 ENCOUNTER — HOSPITAL ENCOUNTER (EMERGENCY)
Facility: HOSPITAL | Age: 54
Discharge: HOME | End: 2019-01-29
Attending: SURGERY
Payer: MEDICARE

## 2019-01-29 VITALS
HEART RATE: 80 BPM | DIASTOLIC BLOOD PRESSURE: 71 MMHG | SYSTOLIC BLOOD PRESSURE: 91 MMHG | TEMPERATURE: 98 F | RESPIRATION RATE: 20 BRPM

## 2019-01-29 DIAGNOSIS — G40.909 SEIZURE DISORDER: Primary | ICD-10-CM

## 2019-01-29 LAB
ALBUMIN SERPL BCP-MCNC: 3.3 G/DL
ALP SERPL-CCNC: 91 U/L
ALT SERPL W/O P-5'-P-CCNC: 16 U/L
ANION GAP SERPL CALC-SCNC: 8 MMOL/L
AST SERPL-CCNC: 16 U/L
BACTERIA #/AREA URNS HPF: NORMAL /HPF
BASOPHILS # BLD AUTO: 0.1 K/UL
BASOPHILS NFR BLD: 1.2 %
BILIRUB SERPL-MCNC: 0.3 MG/DL
BILIRUB UR QL STRIP: NEGATIVE
BUN SERPL-MCNC: 6 MG/DL
CALCIUM SERPL-MCNC: 9.1 MG/DL
CHLORIDE SERPL-SCNC: 98 MMOL/L
CLARITY UR: CLEAR
CO2 SERPL-SCNC: 25 MMOL/L
COLOR UR: YELLOW
CREAT SERPL-MCNC: 0.8 MG/DL
DIFFERENTIAL METHOD: ABNORMAL
EOSINOPHIL # BLD AUTO: 0.1 K/UL
EOSINOPHIL NFR BLD: 1.1 %
ERYTHROCYTE [DISTWIDTH] IN BLOOD BY AUTOMATED COUNT: 14.1 %
EST. GFR  (AFRICAN AMERICAN): >60 ML/MIN/1.73 M^2
EST. GFR  (NON AFRICAN AMERICAN): >60 ML/MIN/1.73 M^2
GLUCOSE SERPL-MCNC: 75 MG/DL
GLUCOSE UR QL STRIP: NEGATIVE
HCT VFR BLD AUTO: 44 %
HGB BLD-MCNC: 15.2 G/DL
HGB UR QL STRIP: ABNORMAL
KETONES UR QL STRIP: NEGATIVE
LEUKOCYTE ESTERASE UR QL STRIP: NEGATIVE
LYMPHOCYTES # BLD AUTO: 1.7 K/UL
LYMPHOCYTES NFR BLD: 20.6 %
MCH RBC QN AUTO: 31.9 PG
MCHC RBC AUTO-ENTMCNC: 34.5 G/DL
MCV RBC AUTO: 92 FL
MICROSCOPIC COMMENT: NORMAL
MONOCYTES # BLD AUTO: 0.8 K/UL
MONOCYTES NFR BLD: 9 %
NEUTROPHILS # BLD AUTO: 5.7 K/UL
NEUTROPHILS NFR BLD: 68.1 %
NITRITE UR QL STRIP: NEGATIVE
PH UR STRIP: 7 [PH] (ref 5–8)
PLATELET # BLD AUTO: 297 K/UL
PMV BLD AUTO: 10 FL
POTASSIUM SERPL-SCNC: 4.1 MMOL/L
PROT SERPL-MCNC: 7 G/DL
PROT UR QL STRIP: NEGATIVE
RBC # BLD AUTO: 4.77 M/UL
RBC #/AREA URNS HPF: 2 /HPF (ref 0–4)
SODIUM SERPL-SCNC: 131 MMOL/L
SP GR UR STRIP: 1.01 (ref 1–1.03)
SQUAMOUS #/AREA URNS HPF: 0 /HPF
URN SPEC COLLECT METH UR: ABNORMAL
UROBILINOGEN UR STRIP-ACNC: NEGATIVE EU/DL
WBC # BLD AUTO: 8.41 K/UL
WBC #/AREA URNS HPF: 0 /HPF (ref 0–5)

## 2019-01-29 PROCEDURE — 25000003 PHARM REV CODE 250: Performed by: SURGERY

## 2019-01-29 PROCEDURE — 63600175 PHARM REV CODE 636 W HCPCS

## 2019-01-29 PROCEDURE — 99284 EMERGENCY DEPT VISIT MOD MDM: CPT | Mod: 25

## 2019-01-29 PROCEDURE — 36415 COLL VENOUS BLD VENIPUNCTURE: CPT

## 2019-01-29 PROCEDURE — 96361 HYDRATE IV INFUSION ADD-ON: CPT

## 2019-01-29 PROCEDURE — 96374 THER/PROPH/DIAG INJ IV PUSH: CPT

## 2019-01-29 PROCEDURE — 85025 COMPLETE CBC W/AUTO DIFF WBC: CPT

## 2019-01-29 PROCEDURE — 80053 COMPREHEN METABOLIC PANEL: CPT

## 2019-01-29 PROCEDURE — 81000 URINALYSIS NONAUTO W/SCOPE: CPT

## 2019-01-29 RX ORDER — SODIUM CHLORIDE 9 MG/ML
1000 INJECTION, SOLUTION INTRAVENOUS
Status: COMPLETED | OUTPATIENT
Start: 2019-01-29 | End: 2019-01-29

## 2019-01-29 RX ORDER — OXCARBAZEPINE 150 MG/1
450 TABLET, FILM COATED ORAL 2 TIMES DAILY
Qty: 90 TABLET | Refills: 0 | Status: SHIPPED | OUTPATIENT
Start: 2019-01-29 | End: 2019-02-04 | Stop reason: DRUGHIGH

## 2019-01-29 RX ORDER — LORAZEPAM 2 MG/ML
0.5 INJECTION INTRAMUSCULAR
Status: COMPLETED | OUTPATIENT
Start: 2019-01-29 | End: 2019-01-29

## 2019-01-29 RX ORDER — LORAZEPAM 2 MG/ML
INJECTION INTRAMUSCULAR
Status: COMPLETED
Start: 2019-01-29 | End: 2019-01-29

## 2019-01-29 RX ADMIN — LORAZEPAM 0.5 MG: 2 INJECTION INTRAMUSCULAR at 11:01

## 2019-01-29 RX ADMIN — LORAZEPAM 0.5 MG: 2 INJECTION INTRAMUSCULAR; INTRAVENOUS at 11:01

## 2019-01-29 RX ADMIN — SODIUM CHLORIDE 1000 ML: 0.9 INJECTION, SOLUTION INTRAVENOUS at 11:01

## 2019-01-29 NOTE — ED NOTES
Received verbal report from PRESTON Wray RN.  Pt in ED room 2A lying in stretcher.  Stretcher is in low, locked position, side rails up x2. Call bell within reach of pt, pt instructed on use, pt verbalizes understanding of call bell use.  Family at Bedside.  The patient is awake and alert.  He is nonverbal.  UA to be collected. Patient/family updated on plan of care.  Family reports no repeat seizure-like activity.

## 2019-01-29 NOTE — TELEPHONE ENCOUNTER
I spoke to the ER doc, Dr Diggs about this patient who is currently in the ER  The patient was on route to clinic here when he started having a seizure.  He was given ativan in the ER.  I asked the ER MD to increase the patient's trileptal to 450mg BID. We need to see the patient (me or Jes) in 3-5 days to further adjust his meds.  Please arrange.

## 2019-01-29 NOTE — TELEPHONE ENCOUNTER
"If they wish to avoid emergency care, that will have to be arranged between the family and the facility where he lives. Not between me and the facility. Docs don't order "don't bring to the ER." He has had frequent seizures lately which are sometimes best treated in the ER.   "

## 2019-01-29 NOTE — TELEPHONE ENCOUNTER
Spoke with Genie at [facility], appointment scheduled for patient for Thursday with .

## 2019-01-29 NOTE — ED TRIAGE NOTES
Was enroute to see dr. ambriz when he had a seizure.  Pt has a hx of seizures and is being treated by dr. Ambriz.  Pt is alert and oriented upon arrival.

## 2019-01-29 NOTE — ED NOTES
Noted a focal motor seizure which lasted approx 30 seconds concerning his lt arm. Dr. miller notified.

## 2019-01-29 NOTE — TELEPHONE ENCOUNTER
----- Message from Kerry Kapadia sent at 2019  2:03 PM CST -----  Contact: ANNAKARYN WOODSCHOLO LUKE Will  MRN: [id number]  : 1965  PCP: Waylon Hoyos  Home Phone      [phone]  Work Phone      Not on file.  Mobile          [phone]  Mobile          [phone]      MESSAGE: Leslie from the home is stating that they received a call from the brother stating that the patient is being discharged from the ER at [facility] and would like an order faxed to the home from Dr. Correa that if the patient continues to have seizures he would like the home to monitor him & give updates to Dr. Correa.  He does not want the patient to be going back & forth to the ER.        Phone: [phone]

## 2019-01-30 NOTE — TELEPHONE ENCOUNTER
Can be discussed with family at the appointment with Dr Correa on 1/31/19. Yojana spoke with Genie at [facility] and notified of above.

## 2019-02-04 ENCOUNTER — OFFICE VISIT (OUTPATIENT)
Dept: NEUROLOGY | Facility: CLINIC | Age: 54
End: 2019-02-04
Payer: MEDICARE

## 2019-02-04 VITALS
HEART RATE: 64 BPM | BODY MASS INDEX: 23.43 KG/M2 | DIASTOLIC BLOOD PRESSURE: 68 MMHG | HEIGHT: 61 IN | SYSTOLIC BLOOD PRESSURE: 104 MMHG | RESPIRATION RATE: 12 BRPM

## 2019-02-04 DIAGNOSIS — F02.818 EARLY ONSET ALZHEIMER'S DISEASE WITH BEHAVIORAL DISTURBANCE: ICD-10-CM

## 2019-02-04 DIAGNOSIS — E87.1 HYPONATREMIA: ICD-10-CM

## 2019-02-04 DIAGNOSIS — G30.0 EARLY ONSET ALZHEIMER'S DISEASE WITH BEHAVIORAL DISTURBANCE: ICD-10-CM

## 2019-02-04 DIAGNOSIS — G40.909 SEIZURE DISORDER: Primary | ICD-10-CM

## 2019-02-04 DIAGNOSIS — E53.8 B12 DEFICIENCY: ICD-10-CM

## 2019-02-04 DIAGNOSIS — R53.81 DEBILITY: ICD-10-CM

## 2019-02-04 DIAGNOSIS — Q90.9 DOWN'S SYNDROME: ICD-10-CM

## 2019-02-04 PROCEDURE — 99999 PR PBB SHADOW E&M-EST. PATIENT-LVL III: ICD-10-PCS | Mod: PBBFAC,,, | Performed by: PSYCHIATRY & NEUROLOGY

## 2019-02-04 PROCEDURE — 99214 OFFICE O/P EST MOD 30 MIN: CPT | Mod: S$PBB | Performed by: PSYCHIATRY & NEUROLOGY

## 2019-02-04 PROCEDURE — 99999 PR STA SHADOW: CPT | Mod: PBBFAC,,, | Performed by: PSYCHIATRY & NEUROLOGY

## 2019-02-04 PROCEDURE — 99999 PR PBB SHADOW E&M-EST. PATIENT-LVL III: CPT | Mod: PBBFAC,,, | Performed by: PSYCHIATRY & NEUROLOGY

## 2019-02-04 PROCEDURE — 99213 OFFICE O/P EST LOW 20 MIN: CPT | Mod: PBBFAC | Performed by: PSYCHIATRY & NEUROLOGY

## 2019-02-04 RX ORDER — OXCARBAZEPINE 300 MG/1
TABLET, FILM COATED ORAL
Qty: 45 TABLET | Refills: 11 | Status: SHIPPED | OUTPATIENT
Start: 2019-02-04

## 2019-02-04 RX ORDER — OXCARBAZEPINE 300 MG/1
300 TABLET, FILM COATED ORAL
COMMUNITY
End: 2019-02-04

## 2019-02-04 NOTE — PROGRESS NOTES
HPI: Mack Will is a 53 y.o. 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 Patient was noted to have poor gait and attention in 4/2017; CT head was unchanged/ I felt this was late effect of his hemorrhage and dementia effects.       The patient had a spell of seizure on 1/22 and 1/29.  I recommended increase of trileptal to 450mg BID. He is currently on 300mg in the am and 450mg at night.    He is back to himself after seizures per his brother here.    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 This is not worse. The patient does interact with others when he has food he enjoys.   He is fed by others but enjoys food and can swallow pureed food well.   He has gained weight per his brother    He does not recognize his brother now.     ROS is not reliable    Exam:  Gen Appearance, well developed in stigmata of down syndrome/nourished in no apparent distress  CV: 2+ distal pulses with no edema or swelling  Neuro:  MS: Awake and alert today  and localizes to pain. Does not respond verbally when spoking to.  Frontal release signs throughout No tremors today. Unable to assess memory.    Language is not testable  Fund of Knowledge is not testable  CN: Optic discs are flat with normal vasculature, left pupil is smaller than right by 1mm today in ambient light (this is his baseline from prior cerebellar bleeding), Extraoccular movements and visual fields are full. Normal facial sensation and strength,  Tongue and Palate are midline and strong. Shoulder Shrug symmetric and strong.  Motor: Normal bulk, tone, no abnormal movements. 1+ reflexes. Patient can't cooperate with motor testing but appears to be moving all extremities equally and responds to pain with withdrawal  Sensory: Can't  cooperate  Cerebellar: Can't Well  Cooperate   Gait: Not done- patient not longer walks     Labs: B12 well corrected in 2017  Sodium normal 3/2017      Imaging:   3/2018 CT Head:  No detrimental change from 4/21/17.    4/2017 CT head:   1. Continued evolution of the previously noted cerebellar hemorrhage with improvement in the adjacent edema.  Minimal residual linear increased attenuation remains.  2.  Atrophy and small vessel ischemic changes of the periventricular white matter.    10/2018 CT head: Extensive chronic intracranial findings which appear stable from prior study with no detrimental interval CT change detected    Labs done: 7/2017 CMP, Lipids, TSH WNL.    2018 B12 well corrected    EEG 2/2016:   Abnormal EEG secondary to the presence of  mild generalized slowing which is etiologically non-specific.      Due to calcifications, requested PTH and TSH (calcium was normal prior)    Assessment/Plan: Mack Will is a 53 y.o. 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 Patient was noted to have poor gait and attention in 4/2017; CT head was unchanged/ I felt this was late effect of his hemorrhage and dementia effects.     I recommend:   1.Raise Trileptal for seizure prevention to 450mg BID. Can further increase as needed. Family would like to stay at lower dose if able.   2. Check  BMP today (noting some hyponatremia prior). He had mood changes on Keppra prior  3. Weaned Depakote prior  due to family concern over sedation/personality change however he continues to progress with dementia. . Off Trazodone since in hospital. Consider restart if needed for sundowning behavior-not currently needed. He is a hospice candidate for any persistent worsening as discussed with family today  4. Patient has favored the  right side since cerebellar hemorrhage in 2017.   5. B12 supplementation for B12 deficiency- level well corrected in 2018  6.He has probable progressive alzheimer's dementia associated with Down Syndrome    7. He has likely idiopathic intracerebral calcifications.  8. Seizures precautions outlined per instructions.     RTC 6 months

## 2019-04-19 PROBLEM — J81.1 PULMONARY EDEMA: Status: ACTIVE | Noted: 2019-04-19

## 2019-04-19 PROBLEM — R53.2 FUNCTIONAL QUADRIPLEGIA: Status: ACTIVE | Noted: 2019-04-19

## 2019-04-19 PROBLEM — J45.909 ASTHMATIC BRONCHITIS: Status: ACTIVE | Noted: 2019-04-19

## 2019-04-21 PROBLEM — I50.31 ACUTE DIASTOLIC HEART FAILURE: Status: ACTIVE | Noted: 2019-04-21

## 2019-07-04 PROBLEM — R56.9 SEIZURE: Status: ACTIVE | Noted: 2019-07-04

## 2019-07-05 PROBLEM — E63.9 INADEQUATE DIETARY ENERGY INTAKE: Status: ACTIVE | Noted: 2019-07-05

## 2019-07-07 PROBLEM — D72.829 LEUKOCYTOSIS: Status: ACTIVE | Noted: 2019-07-07

## 2019-07-10 PROBLEM — J69.0 ASPIRATION PNEUMONIA: Status: ACTIVE | Noted: 2019-07-10

## 2022-03-28 NOTE — PROGRESS NOTES
[facility] Medicine  Progress Note    Patient Name: Mack Will  MRN: [id number]  Patient Class: IP- Inpatient   Admission Date: 1/12/2017  Length of Stay: 1 days  Attending Physician: Makenzie Mckeon MD  Primary Care Provider: Chetan Alvarez MD        Subjective:     Principal Problem:Pneumonia of left lower lobe due to infectious organism    HPI:  51 year old non verbal and demented Downs syndrome patient was sent here via ambulance from his group home because of worsening cough, fever and fatigue. His condition was called to Dr. Alvarez who advised ER evaluation.  Admitted for pneumia ; presently on IV antibiotics      Hospital Course:  Patient was seen and evaluated in the ER and was found to be hypoxic and in respiratory distress. He was admitted to floor with left lower lobe pneumonia. Placed on zithromax and zosyn. Questionable aspiration. Speech consulted for swallow assessment this am. He is currntly NPO. t max 100.3. POx 90% on 5LNC this am. WBC 35303>>>82161.     He is non verbal    Nurse at bedside reports that he had a difficult cath last night. Urine dark and mucusy. No urine since then. (NPO and not on fluids). Also noted elevated lactate.           Interval note:: he is non verbal. Oxygenation is a concern. Also watching fluid status  Review of Systems   Constitutional: Positive for fatigue and fever. Negative for chills.   HENT: Negative for congestion, ear pain, postnasal drip, rhinorrhea, sore throat and trouble swallowing.    Respiratory: Positive for cough, shortness of breath and wheezing.    Cardiovascular: Negative for chest pain and palpitations.   Gastrointestinal: Negative for abdominal pain, diarrhea, nausea and vomiting.   Genitourinary: Positive for decreased urine volume and difficulty urinating. Negative for dysuria and frequency.   Skin: Negative for rash.   Neurological: Positive for weakness. Negative for headaches.     Objective:     Vital  Signs (Most Recent):  Temp: 98.1 °F (36.7 °C) (01/13/17 0815)  Pulse: 89 (01/13/17 0815)  Resp: 20 (01/13/17 0815)  BP: (!) 149/66 (01/13/17 0815)  SpO2: (!) 90 % (01/13/17 0815) Vital Signs (24h Range):  Temp:  [97.6 °F (36.4 °C)-100.3 °F (37.9 °C)] 98.1 °F (36.7 °C)  Pulse:  [] 89  Resp:  [18-24] 20  SpO2:  [86 %-97 %] 90 %  BP: (106-149)/(54-79) 149/66     Weight: 79.8 kg (176 lb)  Body mass index is 31.18 kg/(m^2).    Physical Exam   Constitutional: He appears well-developed. No distress.   HENT:   Head: Normocephalic and atraumatic.   Downs facies   Eyes: Conjunctivae are normal. Pupils are equal, round, and reactive to light.   Neck: Normal range of motion. Neck supple. No thyromegaly present.   Cardiovascular: Normal rate, regular rhythm and intact distal pulses.    Murmur heard.  Pulmonary/Chest: He is in respiratory distress. He has no wheezes. He has rales.   Abdominal: Soft. Bowel sounds are normal. There is no tenderness.   Musculoskeletal: Normal range of motion. He exhibits no edema.   Lymphadenopathy:     He has no cervical adenopathy.   Neurological: He is alert.   Skin: Skin is warm and dry. No rash noted.   Psychiatric: He has a normal mood and affect. His behavior is normal.   Nursing note and vitals reviewed.       Significant Labs:   BMP:   Recent Labs  Lab 01/12/17  1223  01/13/17  0531   GLU  --   < > 133*   NA  --   < > 142   K  --   < > 3.5   CL  --   < > 106   CO2  --   < > 25   BUN  --   < > 8   CREATININE  --   < > 0.9   CALCIUM  --   < > 8.7   MG 2.4  --   --    < > = values in this interval not displayed.  CBC:     Recent Labs  Lab 01/12/17  1224 01/13/17  0531   WBC 19.93* 18.52*   HGB 16.4 13.9*   HCT 48.4 41.1    281     Lactic Acid:     Recent Labs  Lab 01/12/17  1224 01/12/17  1613 01/12/17  1950   LACTATE 1.3 1.9 2.8*       Recent Labs  Lab 01/12/17  1224  01/13/17  0531   CPK 1058*  1058*  --   --    CPKMB 3.7  --   --    TROPONINI 0.008  < > 0.009   MB 0.3  --   --     < > = values in this interval not displayed.  BNP    Recent Labs  Lab 01/12/17  1223   BNP 14     Blood cultures pending x 2    resp cultures ordered    Urinalysis    Recent Labs  Lab 01/12/17  2111   COLORU Yellow   SPECGRAV 1.025   PHUR 6.0   PROTEINUA 1+*   BACTERIA Many*   NITRITE Negative   LEUKOCYTESUR Negative   UROBILINOGEN Negative   HYALINECASTS 3*     ificant Imaging:   CXR Left retrocardiac opacity is noted which could be related to atelectasis, aspiration or pneumonia.  Mild chronic lung changes are seen.  The heart is normal in size.  The skeletal structures are intact.    Assessment/Plan:      * Pneumonia of left lower lobe due to infectious organism  Given dose of rocephin and azithromycin in Er.  Continue azithromycin and change rocephin to zosyn to cover for aspiration pna.  With mental status and poor ability to communicate, swallow study may not be successful, but has been ordered.  Monitor for signs of fever, etc.  Due to increased O2 needs check d dimer and repeat cxr, consult pulmonology      Down's syndrome  Non verbal, difficult to communicate, unsure what patient is understanding, no family at bedside      Dementia without behavioral disturbance  See down plan      Sepsis  Likely secondary to pna but with urinary retention, some concern for bladder etiology.  On pathway.  Continue abx.  REpeat lactate acid now  NS 250ml bolus this am and cont NS at 75ml/hr. BP stable      Urinary retention  Check In and Out cath.  Send u/a- ok  Give fluids and cont flomax  D/c trazadone  If no urine this am will need to place gabriel      VTE Risk Mitigation         Ordered     Medium Risk of VTE  Once      01/12/17 1409          Hunter Morfin MD  Department of Hospital Medicine   [facility]   Consent 1/Introductory Paragraph: The rationale for Mohs surgery was explained to the patient and written informed consent was obtained. The risks, benefits, and alternatives to Mohs Surgery were discussed in detail. Specifically, the risks of infection, scarring, bleeding, prolonged wound healing, incomplete removal, allergy to anesthesia, nerve injury, and recurrence were addressed. Prior to beginning the procedure, the surgical site was identified with the assistance of the patient and the medical record, including photographs and/or maps if available. The site was then clearly confirmed by the patient by direct visualization and/or the use of a hand mirror and a cotton-tipped applicator stick. All components of Universal Protocol/PAUSE Rule were completed: Prior to beginning the procedure, a pause was taken during which the surgeon, his assistant, and the patient verbally confirmed the patient's identification, the intended procedure, and the correct surgical site. Pause time:

## 2023-03-21 NOTE — ASSESSMENT & PLAN NOTE
Non verbal, difficult to communicate, unsure what patient is understanding, no family at bedside     right side kidney stones

## 2023-11-14 NOTE — PT/OT/SLP PROGRESS
Speech Language Pathology Treatment    Patient Name:  Mack Will   MRN:  [id number]  Admitting Diagnosis: Pneumonia of right lower lobe due to infectious organism    Recommendations:                 General Recommendations:  Follow-up not indicated  Diet recommendations:  Solid Diet Level: Puree, Liquid Diet Level: Nectar Thick   Aspiration Precautions:   · 1 bite/sip at a time  · Feed only when awake/alert  · HOB to 90 degrees   General Precautions: Standard, aspiration  Communication strategies:  none    Subjective     Patient appeared to be slightly more lethargic since seen yesterday. Pt's sibling was attempting to feed pt but pt seemed uninterested in eating or drinking any consistency.    Patient goals: none stated     Pain/Comfort:  · Pain Rating 1: 0/10    Objective:     Has the patient been evaluated by SLP for swallowing?   Yes  Keep patient NPO? No   Current Respiratory Status: room air      Pt seen for follow up for diet tolerance. Pt's sister remarked that he did not seem to be interested in eating; sister was holding straw to pt's mouth but pt had his head turned away from her and refused to open his mouth. SLP discussed importance of food consumption upon pt's request. Pt's sister and nursing staff expressed that he was not having any coughing or choking with meals.    Assessment:     Mack Will is a 52 y.o. male with an SLP diagnosis of Dysphagia.  Recommend continued use of solid diet of puree and nectar thick liquids. No further skilled speech services are warranted at this time.    Goals:    SLP Goals        Problem: SLP Goal    Goal Priority Disciplines Outcome   SLP Goal     SLP    Description:    Long Term Goals:  1. Patient will tolerate least restrictive PO diet with no s/s of aspiration                    Plan:     · Patient to be seen:  1 x/week, 2 x/week   · Plan of Care expires:  03/28/18  · Plan of Care reviewed with:  patient, sibling   · SLP Follow-Up:  No       Discharge  recommendations:   (per medical team)   Barriers to Discharge:  Level of Skilled Assistance Needed - patient continues to require skilled personel for management of medical diagnoses    Time Tracking:     SLP Treatment Date:   03/15/18  Speech Start Time:  0835  Speech Stop Time:  0844     Speech Total Time (min):  9 min    Billable Minutes: Treatment Swallowing Dysfunction - 9 minutes    KENYA Chiu  03/15/2018   X Size Of Lesion In Cm: 1.7

## 2024-04-08 NOTE — PLAN OF CARE
Problem: Patient Care Overview  Goal: Plan of Care Review  Outcome: Ongoing (interventions implemented as appropriate)  Patient is non verbal 90% of the time, iv antibiotic started, nebs Q 6, and needs to be fed. Family plans to stay at bedside. Puree foods with boost at all meals requested due to trouble swallowing. Patient plan of care reviewed, family agrees with plan of care. Patient free of falls or injuries this shift. Patient vitals stable. Will continue to monitor.         show

## 2024-12-31 NOTE — ED PROVIDER NOTES
Encounter Date: 1/29/2019       History     Chief Complaint   Patient presents with    Seizures     Patient is 53-year-old white male with history of seizure disorder.  Family reports that he had a generalized seizure earlier this morning, and actually had a seizure while in the ED.  He takes trileptal as anti seizure medication.  Supposedly the dose was changed recently, he was actually in route to a neurology appointment this morning when he had a seizure.          Review of patient's allergies indicates:  No Known Allergies  Past Medical History:   Diagnosis Date    Alzheimer disease     Brain bleed     Down syndrome     Seizure      Past Surgical History:   Procedure Laterality Date    CHOLECYSTECTOMY      CYST REMOVAL       Family History   Problem Relation Age of Onset    Heart disease Father      Social History     Tobacco Use    Smoking status: Never Smoker    Smokeless tobacco: Never Used   Substance Use Topics    Alcohol use: No    Drug use: Not on file     Review of Systems   Unable to perform ROS: Dementia       Physical Exam     Initial Vitals [01/29/19 1003]   BP Pulse Resp Temp SpO2   136/81 (!) 59 20 96.8 °F (36 °C) --      MAP       --         Physical Exam    Nursing note and vitals reviewed.  Constitutional: He appears well-developed and well-nourished.   HENT:   Head: Normocephalic and atraumatic.   Eyes: EOM are normal. Pupils are equal, round, and reactive to light.   Neck: Normal range of motion. Neck supple.   Cardiovascular: Normal rate.   Pulmonary/Chest: Breath sounds normal.   Abdominal: Soft. He exhibits no distension. There is no tenderness.   Musculoskeletal: Normal range of motion.   Neurological: He is alert and oriented to person, place, and time.   Skin: Skin is warm and dry.   Psychiatric: He has a normal mood and affect.         ED Course   Procedures  Labs Reviewed - No data to display       Imaging Results    None         IV fluids given and Ativan administered.  No  Request routed to PCP for refill consideration.   more seizure activity noted. I spoke with Neurology, we will increase his TRILEPTAL to 450 mg twice a day.  Neurology will contact the nursing facility for close follow-up with the patient.                       Clinical Impression:   The encounter diagnosis was Seizure disorder.      Disposition:   Disposition: Discharged  Condition: Stable                        Bruno Diggs Jr., MD  01/29/19 5277

## 2025-05-28 NOTE — PLAN OF CARE
EMAILED RESULTS TO MANINDER   Problem: Patient Care Overview  Goal: Plan of Care Review  Very congested breathing today. Oxygen at 3.5 liters per nasal cannula. Chest physiotherapy initiated. Mucinex initiated. Sat on side the bed with PT. Tolerated well. Breathing treatments continued. IV antibiotics continued. Tolerated pills po. Puree diet with nectar thick. Afebrile. Bed alarm. Heel booties applied. Turned and repositioned. Family understands plan of care and agrees.